# Patient Record
Sex: MALE | Race: WHITE | NOT HISPANIC OR LATINO | Employment: OTHER | URBAN - METROPOLITAN AREA
[De-identification: names, ages, dates, MRNs, and addresses within clinical notes are randomized per-mention and may not be internally consistent; named-entity substitution may affect disease eponyms.]

---

## 2017-01-30 ENCOUNTER — DOCUMENTATION (OUTPATIENT)
Dept: PHYSICAL THERAPY | Facility: HOSPITAL | Age: 47
End: 2017-01-30

## 2017-01-30 RX ORDER — GABAPENTIN 300 MG/1
CAPSULE ORAL
Qty: 90 CAPSULE | Refills: 0 | Status: SHIPPED | OUTPATIENT
Start: 2017-01-30 | End: 2017-02-27 | Stop reason: SDUPTHER

## 2017-02-27 RX ORDER — GABAPENTIN 300 MG/1
CAPSULE ORAL
Qty: 90 CAPSULE | Refills: 0 | Status: SHIPPED | OUTPATIENT
Start: 2017-02-27 | End: 2017-03-08 | Stop reason: SDUPTHER

## 2017-03-08 ENCOUNTER — OFFICE VISIT (OUTPATIENT)
Dept: NEUROSURGERY | Facility: CLINIC | Age: 47
End: 2017-03-08

## 2017-03-08 VITALS
WEIGHT: 312 LBS | HEART RATE: 103 BPM | SYSTOLIC BLOOD PRESSURE: 123 MMHG | DIASTOLIC BLOOD PRESSURE: 66 MMHG | BODY MASS INDEX: 44.67 KG/M2 | HEIGHT: 70 IN

## 2017-03-08 DIAGNOSIS — Q76.2 CONGENITAL SPONDYLOLYSIS, LUMBOSACRAL REGION: Primary | ICD-10-CM

## 2017-03-08 DIAGNOSIS — M54.16 LUMBAR RADICULOPATHY: ICD-10-CM

## 2017-03-08 PROCEDURE — 99213 OFFICE O/P EST LOW 20 MIN: CPT | Performed by: NEUROLOGICAL SURGERY

## 2017-03-08 RX ORDER — GABAPENTIN 300 MG/1
300 CAPSULE ORAL 3 TIMES DAILY
Qty: 90 CAPSULE | Refills: 6 | Status: SHIPPED | OUTPATIENT
Start: 2017-03-08 | End: 2017-09-14 | Stop reason: SDUPTHER

## 2017-03-08 RX ORDER — ALOGLIPTIN 6.25 MG/1
TABLET, FILM COATED ORAL
COMMUNITY
Start: 2017-03-06 | End: 2018-10-04 | Stop reason: ALTCHOICE

## 2017-03-08 RX ORDER — LISINOPRIL 20 MG/1
TABLET ORAL
Refills: 2 | COMMUNITY
Start: 2017-02-09 | End: 2019-05-14

## 2017-03-08 NOTE — PROGRESS NOTES
Subjective   Patient ID: Ronni Asif is a 46 y.o. male is here today for follow-up of back pain.    At the patient's last visit, he was encouraged to followup with Dr. Nair regarding his orthostatic hypotension and was advised to followup today.    Today, the patient notes that he is doing well at this time.  He notes that he has been treated with AREN x3 since his last visit.  His last AREN was in December.  He notes that the AREN has helped his pain symptoms.    He notes in December after the AREN that his pain symptoms had resolved, but notes that as the AREN wears off that his pain will return.  He notes he is scheduled for another AREN next month.    He notes that he continues to have feet and leg pain.      The patient notes that he is undergoing Octreotide treatment for neurogenic tumor at [facility] with Dr. Villalba ([phone]).  He notes that he shouldn't make physical contact with other people at this time. He is scheduled for more testing later today.    Back Pain   This is a chronic problem. The current episode started more than 1 month ago. The problem occurs daily. The problem has been gradually improving since onset. Pertinent negatives include no fever.       The following portions of the patient's history were reviewed and updated as appropriate: allergies, current medications, past family history, past medical history, past social history, past surgical history and problem list.    Review of Systems   Constitutional: Negative for fever.   Musculoskeletal: Positive for back pain.   Neurological: Negative for syncope.   All other systems reviewed and are negative.    This patient has a known L5-S1 isthmic spondylolisthesis.  We have been trying to avoid surgery.  Among other issues is his morbid obesity.  He is also being evaluated for a possible neurogenic tumor that is causing fluctuations in his blood pressure.  He has been getting blocks for his pain physician.  They seem to be helping.  He  remains on gabapentin at 300 mg 3 times a day which seems to help his nerve pain.  There is also some superimposed diabetic neuropathy.  Generally doing well from my standpoint.  I encouraged him to continue to work with his pain physician.  We can keep it open-ended.  We will refill his gabapentin.  If symptoms flareup in the future, he can certainly return to the office.      Objective   Physical Exam   Constitutional: He is oriented to person, place, and time. He appears well-developed and well-nourished.   HENT:   Head: Normocephalic and atraumatic.   Eyes: Conjunctivae and EOM are normal. Pupils are equal, round, and reactive to light.   Fundoscopic exam:       The right eye shows no papilledema. The right eye shows venous pulsations.        The left eye shows no papilledema. The left eye shows venous pulsations.   Neck: Carotid bruit is not present.   Neurological: He is oriented to person, place, and time. He has a normal Finger-Nose-Finger Test and a normal Heel to Shin Test. Gait normal.   Reflex Scores:       Tricep reflexes are 2+ on the right side and 2+ on the left side.       Bicep reflexes are 2+ on the right side and 2+ on the left side.       Brachioradialis reflexes are 2+ on the right side and 2+ on the left side.       Patellar reflexes are 2+ on the right side and 2+ on the left side.       Achilles reflexes are 2+ on the right side and 2+ on the left side.  Psychiatric: His speech is normal.     Neurologic Exam     Mental Status   Oriented to person, place, and time.   Registration of memory: Good recent and remote memory.   Attention: normal. Concentration: normal.   Speech: speech is normal   Level of consciousness: alert  Knowledge: consistent with education.     Cranial Nerves     CN II   Visual fields full to confrontation.   Visual acuity: normal    CN III, IV, VI   Pupils are equal, round, and reactive to light.  Extraocular motions are normal.     CN V   Facial sensation intact.   Right  corneal reflex: normal  Left corneal reflex: normal    CN VII   Facial expression full, symmetric.   Right facial weakness: none  Left facial weakness: none    CN VIII   Hearing: intact    CN IX, X   Palate: symmetric    CN XI   Right sternocleidomastoid strength: normal  Left sternocleidomastoid strength: normal    CN XII   Tongue: not atrophic  Tongue deviation: none    Motor Exam   Muscle bulk: normal  Right arm tone: normal  Left arm tone: normal  Right leg tone: normal  Left leg tone: normal    Strength   Strength 5/5 except as noted.     Sensory Exam   Light touch normal.     Gait, Coordination, and Reflexes     Gait  Gait: normal    Coordination   Finger to nose coordination: normal  Heel to shin coordination: normal    Reflexes   Right brachioradialis: 2+  Left brachioradialis: 2+  Right biceps: 2+  Left biceps: 2+  Right triceps: 2+  Left triceps: 2+  Right patellar: 2+  Left patellar: 2+  Right achilles: 2+  Left achilles: 2+  Right : 2+  Left : 2+      Assessment/Plan   Independent Review of Radiographic Studies:    I reviewed the MRI done 5/12/16 which does show a chronic L5-S1 pars defect with an anterolisthesis of 3 mm and disc desiccation at L4-L5.  I agree with the report.    Medical Decision Making:    Generally doing well.  I refilled his gabapentin.  He will continue to work with his pain physician.  He will do exercises as tolerated.  We can keep it open-ended.  If he needs refills for his gabapentin, he can talk to his nurse practitioner.  If symptoms flareup in the future, he can certainly return to the office.      Ronni was seen today for back pain.    Diagnoses and all orders for this visit:    Congenital spondylolysis, lumbosacral region    Lumbar radiculopathy    Other orders  -     gabapentin (NEURONTIN) 300 MG capsule; Take 1 capsule by mouth 3 (Three) Times a Day.    Return if symptoms worsen or fail to improve.    Body mass index is 44.77 kg/(m^2).

## 2017-03-08 NOTE — PATIENT INSTRUCTIONS
Obesity  Obesity is defined as having too much total body fat and a body mass index (BMI) of 30 or more. BMI is an estimate of body fat and is calculated from your height and weight. BMI is typically calculated by your health care provider during regular wellness visits. Obesity happens when you consume more calories than you can burn by exercising or performing daily physical tasks. Prolonged obesity can cause major illnesses or emergencies, such as:  · Stroke.  · Heart disease.  · Diabetes.  · Cancer.  · Arthritis.  · High blood pressure (hypertension).  · High cholesterol.  · Sleep apnea.  · Erectile dysfunction.  · Infertility problems.  CAUSES   · Regularly eating unhealthy foods.  · Physical inactivity.  · Certain disorders, such as an underactive thyroid (hypothyroidism), Cushing's syndrome, and polycystic ovarian syndrome.  · Certain medicines, such as steroids, some depression medicines, and antipsychotics.  · Genetics.  · Lack of sleep.  DIAGNOSIS  A health care provider can diagnose obesity after calculating your BMI. Obesity will be diagnosed if your BMI is 30 or higher.  There are other methods of measuring obesity levels. Some other methods include measuring your skinfold thickness, your waist circumference, and comparing your hip circumference to your waist circumference.  TREATMENT   A healthy treatment program includes some or all of the following:  · Long-term dietary changes.  · Exercise and physical activity.  · Behavioral and lifestyle changes.  · Medicine only under the supervision of your health care provider. Medicines may help, but only if they are used with diet and exercise programs.  If your BMI is 40 or higher, your health care provider may recommend specialized surgery or programs to help with weight loss.  An unhealthy treatment program includes:  · Fasting.  · Fad diets.  · Supplements and drugs.  These choices do not succeed in long-term weight control.  HOME CARE  INSTRUCTIONS  · Exercise and perform physical activity as directed by your health care provider. To increase physical activity, try the following:    Use stairs instead of elevators.    Park farther away from store entrances.    Garden, bike, or walk instead of watching television or using the computer.  · Eat healthy, low-calorie foods and drinks on a regular basis. Eat more fruits and vegetables. Use low-calorie cookbooks or take healthy cooking classes.  · Limit fast food, sweets, and processed snack foods.  · Eat smaller portions.  · Keep a daily journal of everything you eat. There are many free websites to help you with this. It may be helpful to measure your foods so you can determine if you are eating the correct portion sizes.  · Avoid drinking alcohol. Drink more water and drinks without calories.  · Take vitamins and supplements only as recommended by your health care provider.  · Weight-loss support groups, registered dietitians, counselors, and stress reduction education can also be very helpful.  SEEK IMMEDIATE MEDICAL CARE IF:  · You have chest pain or tightness.  · You have trouble breathing or feel short of breath.  · You have weakness or leg numbness.  · You feel confused or have trouble talking.  · You have sudden changes in your vision.     This information is not intended to replace advice given to you by your health care provider. Make sure you discuss any questions you have with your health care provider.     Document Released: 01/25/2006 Document Revised: 01/08/2016 Document Reviewed: 10/05/2016  [employer] Interactive Patient Education ©2016 [employer]

## 2017-04-10 RX ORDER — FLUDROCORTISONE ACETATE 0.1 MG/1
TABLET ORAL
Qty: 30 TABLET | Refills: 1 | Status: SHIPPED | OUTPATIENT
Start: 2017-04-10 | End: 2017-09-14 | Stop reason: SDUPTHER

## 2017-05-08 RX ORDER — GABAPENTIN 300 MG/1
CAPSULE ORAL
Qty: 90 CAPSULE | Refills: 0 | Status: SHIPPED | OUTPATIENT
Start: 2017-05-08 | End: 2018-08-06 | Stop reason: SDUPTHER

## 2017-07-11 ENCOUNTER — HOSPITAL ENCOUNTER (OUTPATIENT)
Dept: SLEEP MEDICINE | Facility: HOSPITAL | Age: 47
Discharge: HOME | End: 2017-07-11
Admitting: NURSE PRACTITIONER

## 2017-07-11 PROCEDURE — G0463 HOSPITAL OUTPT CLINIC VISIT: HCPCS

## 2017-07-12 NOTE — PROGRESS NOTES
[facility]  Telephone: [phone] / Fax: [phone] [place]  Telephone: [phone] / Fax: [phone] Carissa Winkler    Referring Physician: THEO Mock  PCP: THEO Bello    Reason for visit:  Sleep disorders annual f/u    Ronni Asif was last seen at [facility] sleep disorder Center on 7/12/16.  This is [facility] patient I am asked to see as he is out of the office. Patient has underlying obstructive sleep apnea currently controlled well on CPAP device.  He goes to bed at 10 PM and is up at 7 AM.  He is here today to obtain an order for supplies renewal.  He gets about 9 hours of sleep per night.  He uses the device and benefits from it in terms of reduction of hypersomnia and snoring.  His Belmont sleepiness score today is 15.  He is considering changing DME providers.  He complains of a rubber piece on the headgear being stretched too much and needs to have it replaced as soon as possible.  He uses a nose mask.  It does not feel properly and leaks.      Caffeine usage: coffee 2 /day  Alcohol usage: social drinker  Belmont Sleepiness Scale is recorded as 15.    Current Medications:    Current Outpatient Prescriptions:   •  albuterol (2.5 MG/3ML) 0.083% nebulizer solution 3 mL, albuterol (5 MG/ML) 0.5% nebulizer solution 0.5 mL, Inhale 4 (four) times a day as needed., Disp: , Rfl:   •  albuterol (PROVENTIL HFA;VENTOLIN HFA) 108 (90 BASE) MCG/ACT inhaler, Inhale 2 puffs every 4 (four) hours as needed for wheezing., Disp: , Rfl:   •  Alogliptin Benzoate 6.25 MG tablet, , Disp: , Rfl:   •  ASPIRIN LOW DOSE 81 MG chewable tablet, Chew 1 tablet Daily., Disp: , Rfl: 4  •  baclofen (LIORESAL) 10 MG tablet, Take 10 mg by mouth 3 (three) times a day., Disp: , Rfl:   •  diclofenac (CATAFLAM) 50 MG tablet, 50 mg 2 (two) times a day., Disp: , Rfl:   •  fludrocortisone 0.1 MG tablet, Take 0.1 mg by mouth Daily., Disp: , Rfl:   •  fludrocortisone 0.1 MG  tablet, TAKE 1 TABLET BY MOUTH DAILY, Disp: 30 tablet, Rfl: 1  •  gabapentin (NEURONTIN) 300 MG capsule, Take 1 capsule by mouth 3 (Three) Times a Day., Disp: 90 capsule, Rfl: 6  •  gabapentin (NEURONTIN) 300 MG capsule, TAKE 1 CAPSULE BY MOUTH THREE TIMES DAILY, Disp: 90 capsule, Rfl: 0  •  glipiZIDE (GLUCOTROL) 5 MG ER tablet, Take 5 mg by mouth daily., Disp: , Rfl:   •  HYDROcodone-acetaminophen (NORCO)  MG per tablet, Take 1 tablet by mouth 2 (two) times a day., Disp: , Rfl:   •  HYDROcodone-acetaminophen (NORCO) 5-325 MG per tablet, Take 1 tablet by mouth Every 4 (Four) Hours As Needed for moderate pain (4-6)., Disp: 20 tablet, Rfl: 0  •  linagliptin (TRADJENTA) 5 MG tablet tablet, Take 5 mg by mouth daily., Disp: , Rfl:   •  lisinopril (PRINIVIL,ZESTRIL) 20 MG tablet, TK 1 T PO  BID, Disp: , Rfl: 2  •  metFORMIN XR (GLUCOPHAGE-XR) 750 MG 24 hr tablet, Take 750 mg by mouth daily with breakfast., Disp: , Rfl:   •  mometasone-formoterol (DULERA 100) 100-5 MCG/ACT inhaler, Inhale 2 puffs 2 (two) times a day., Disp: , Rfl:   •  omeprazole (PriLOSEC) 40 MG capsule, Take 40 mg by mouth daily., Disp: , Rfl:   •  tiZANidine (ZANAFLEX) 4 MG tablet, Take 4 mg by mouth daily., Disp: , Rfl:     I have reviewed the Past Medical History, Past Surgical History, Social History and Family History.    Review of Systems  and as recorded in Sleep Questionnaire.    Vital Signs: height 71 inches, weight 314 pounds, BMI 44, blood pressure 110/74, heart rate 107.          General: Alert. Cooperative. Well developed. No acute distress.             Head:  Normocephalic. Symmetrical. Atraumatic.              Eyes: Sclera clear. No icterus. PERRLA. Normal EOM.             Ears: No deformities. Normal hearing.             Nose: No septal deviation. No drainage.          Throat: No oral lesions. No thrush. Moist mucous membranes.   Tongue is large       Pharynx: Posterior pharyngeal pillars are narrow with a Mallampati score of IV  (only hard palate visible) and pharynx without erythema.   Chest Wall:  No abnormalities observed.             Neck:  Trachea midline. No JVD.          Lungs:  Clear to auscultation bilaterally. No wheezes. No rhonchi. No rales. Respirations regular, even and unlabored.            Heart:  Regular rhythm and normal rate. Normal S1 and S2. No murmur.     Abdomen:  Soft, non-tender and non-distended. Normal bowel sounds. No masses. No organomegaly. No guarding. No rebound tenderness.  Extremities:  Moves all extremities well. No cyanosis. No redness. No edema.           Pulses: Pulses palpable and equal bilaterally.               Skin: Dry. Intact. No bleeding. No rash.           Neuro: Moves all 4 extremities and cranial nerves grossly intact.  Psychiatric: Normal mood and affect.    Testing:   Download dates 4/12/17-7/10/17 shows 85% compliance. He did not use the machine on 13 days as he was on vacation.  Average use is 7 hours and 7 minutes on auto CPAP with average pressures of 12.9 and residual AHI of 1.5.  Central apnea index is 0.3.    DME Company: [employer]    Impression:  1. Obstructive sleep apnea   2.Diabetes mellitus  3. Hypertension   4. Morbid obesity with BMI 44    Plan:  He uses the CPAP device and benefits from it in terms of reduction of hypersomnia and snoring.  He was reminded to change CPAP accessories and regular intervals.  He is considering changing DME providers and has contact information for [employer].  He will call both companies to see which one will be more affordable to transfer to.  He will call us when he makes the decision.    Weight loss will be strongly beneficial in order to reduce the severity of sleep-disordered breathing.  He will continue to monitor blood pressure with primary care physician as well as of his blood sugars.      He will follow-up sleep Center in 1 year.      I appreciate the opportunity appointment this patient's care    THEO Mock  [facility] SLEEP  LAB PROVIDER SCHEDULE  7/11/2017

## 2017-08-03 ENCOUNTER — HOSPITAL ENCOUNTER (EMERGENCY)
Facility: HOSPITAL | Age: 47
Discharge: HOME | End: 2017-08-03
Admitting: PHYSICIAN ASSISTANT

## 2017-08-03 ENCOUNTER — APPOINTMENT (OUTPATIENT)
Dept: GENERAL RADIOLOGY | Facility: HOSPITAL | Age: 47
End: 2017-08-03

## 2017-08-03 VITALS
RESPIRATION RATE: 18 BRPM | TEMPERATURE: 98.3 F | OXYGEN SATURATION: 95 % | HEIGHT: 70 IN | HEART RATE: 94 BPM | DIASTOLIC BLOOD PRESSURE: 86 MMHG | BODY MASS INDEX: 42.95 KG/M2 | WEIGHT: 300 LBS | SYSTOLIC BLOOD PRESSURE: 135 MMHG

## 2017-08-03 DIAGNOSIS — M79.672 LEFT FOOT PAIN: ICD-10-CM

## 2017-08-03 DIAGNOSIS — L03.90 CELLULITIS, UNSPECIFIED CELLULITIS SITE: Primary | ICD-10-CM

## 2017-08-03 PROCEDURE — 99284 EMERGENCY DEPT VISIT MOD MDM: CPT | Performed by: PHYSICIAN ASSISTANT

## 2017-08-03 PROCEDURE — 73630 X-RAY EXAM OF FOOT: CPT

## 2017-08-03 PROCEDURE — 99283 EMERGENCY DEPT VISIT LOW MDM: CPT

## 2017-08-03 RX ORDER — CLONIDINE HYDROCHLORIDE 0.1 MG/1
0.1 TABLET ORAL 2 TIMES DAILY
COMMUNITY
End: 2019-06-11 | Stop reason: SDUPTHER

## 2017-08-03 RX ORDER — CEPHALEXIN 500 MG/1
500 CAPSULE ORAL 4 TIMES DAILY
Qty: 40 CAPSULE | Refills: 0 | Status: SHIPPED | OUTPATIENT
Start: 2017-08-03 | End: 2017-08-13

## 2017-08-03 NOTE — ED PROVIDER NOTES
Subjective   History of Present Illness  History of Present Illness    Chief complaint: Foot pain    Location: Left foot    Quality/Severity:  Aching, moderate to severe    Timing/Duration: One week ago.  Worse past 2-3 days    Modifying Factors: Walking makes worse.  Nothing makes better.    Associated Symptoms: Denies numbness or tingling.  Denies any other injury.  Denies fevers or chills.    Narrative: 47-year-old male with a history of diabetes presents after dropping some wood on his foot one week ago.  He was doing okay but started to develop worsening pain approximately 2-3 days ago.  The area of pain is NOT in the area where he dropped the wound on his foot.  His primary care provider's office closed and his Medicaid has not given him a new PCP yet.  His medicaid told him to come to the emergency department for evaluation.    Review of Systems  General: Denies fevers or chills.  Denies any weakness or fatigue.  Denies any weight loss or weight gain.  SKIN: Denies any rashes lesions or ulcers.  Denies color change.    HEENT:  Denies any change in vision.  LUNGS: Denies any shortness of breath or wheezing.    CARDIAC: Denies any chest pain.  Denies palpitations.  Denies syncope.  Denies any edema  ABD: Denies any abdominal pain.  Denies any nausea or vomiting or diarrhea.    : Denies any dysuria, urgency, frequency or hematuria.    NEURO: Denies any weakness.  Denies headache.  Denies seizures.  Denies changes in speech or difficulty walking.  M/S: Denies arthralgias, back pain, myalgias or neck pain  PSYCH: Negative for suicidal ideas. Denies anxiety or depression  A 10 system review was performed in addition to those in the above all other reviews are negative.    Past Medical History:   Diagnosis Date   • Asthma    • Diabetes mellitus    • Dizziness    • GERD (gastroesophageal reflux disease)    • Hypertension    • Injury of back 03/2016    Pt fell 20 feet    • Kidney stone    • Neuromuscular disorder     • Pneumonia    • Sleep apnea    • Stroke    • Syncope and collapse        No Known Allergies    Past Surgical History:   Procedure Laterality Date   • COLONOSCOPY     • HERNIA REPAIR  10/2015       Family History   Problem Relation Age of Onset   • Brain cancer Father    • Stroke Father    • Hypertension Father    • Atrial fibrillation Father    • Hypertension Mother    • Diabetes Mother    • Heart disease Mother    • Hypertension Brother    • Colon cancer Maternal Grandmother    • Lung cancer Maternal Grandfather    • Bone cancer Maternal Grandfather        Social History     Social History   • Marital status:      Spouse name: N/A   • Number of children: N/A   • Years of education: N/A     Social History Main Topics   • Smoking status: Never Smoker   • Smokeless tobacco: Never Used   • Alcohol use No   • Drug use: No   • Sexual activity: Defer     Other Topics Concern   • None     Social History Narrative   • None     No current facility-administered medications on file prior to encounter.      Current Outpatient Prescriptions on File Prior to Encounter   Medication Sig Dispense Refill   • albuterol (2.5 MG/3ML) 0.083% nebulizer solution 3 mL, albuterol (5 MG/ML) 0.5% nebulizer solution 0.5 mL Inhale 4 (four) times a day as needed.     • albuterol (PROVENTIL HFA;VENTOLIN HFA) 108 (90 BASE) MCG/ACT inhaler Inhale 2 puffs every 4 (four) hours as needed for wheezing.     • Alogliptin Benzoate 6.25 MG tablet      • ASPIRIN LOW DOSE 81 MG chewable tablet Chew 1 tablet Daily.  4   • baclofen (LIORESAL) 10 MG tablet Take 10 mg by mouth 3 (three) times a day.     • diclofenac (CATAFLAM) 50 MG tablet 50 mg 2 (two) times a day.     • fludrocortisone 0.1 MG tablet Take 0.1 mg by mouth Daily.     • fludrocortisone 0.1 MG tablet TAKE 1 TABLET BY MOUTH DAILY 30 tablet 1   • gabapentin (NEURONTIN) 300 MG capsule Take 1 capsule by mouth 3 (Three) Times a Day. 90 capsule 6   • gabapentin (NEURONTIN) 300 MG capsule TAKE 1  CAPSULE BY MOUTH THREE TIMES DAILY 90 capsule 0   • glipiZIDE (GLUCOTROL) 5 MG ER tablet Take 5 mg by mouth daily.     • HYDROcodone-acetaminophen (NORCO)  MG per tablet Take 1 tablet by mouth 2 (two) times a day.     • HYDROcodone-acetaminophen (NORCO) 5-325 MG per tablet Take 1 tablet by mouth Every 4 (Four) Hours As Needed for moderate pain (4-6). 20 tablet 0   • linagliptin (TRADJENTA) 5 MG tablet tablet Take 5 mg by mouth daily.     • lisinopril (PRINIVIL,ZESTRIL) 20 MG tablet TK 1 T PO  BID  2   • metFORMIN XR (GLUCOPHAGE-XR) 750 MG 24 hr tablet Take 750 mg by mouth daily with breakfast.     • mometasone-formoterol (DULERA 100) 100-5 MCG/ACT inhaler Inhale 2 puffs 2 (two) times a day.     • omeprazole (PriLOSEC) 40 MG capsule Take 40 mg by mouth daily.     • tiZANidine (ZANAFLEX) 4 MG tablet Take 4 mg by mouth daily.             Objective   Physical Exam  Vitals:    08/03/17 1122   BP: 135/86   Pulse: 94   Resp: 18   Temp: 98.3 °F (36.8 °C)   SpO2: 95%     GENERAL: Alert and oriented ×4.  No apparent distress.  SKIN: Warm, pink and dry  HEENT: Atraumatic normocephalic  LUNGS: Clear to auscultation bilaterally without wheezes, rales or rhonchi  CARDIAC: Regular rate and rhythm.  S1 and S2.  No murmurs, rubs or gallops.  ABD: Soft, nontender  M/S:  no deformity, Left  to palpation along 3nd digit and metatarsal with erythema 5 cm x 3 cm  PSYCH: Normal mood and affect    Procedures         ED Course  ED Course      Xr Foot 3+ View Left    Result Date: 8/3/2017  Narrative: LEFT FOOT, 08/03/2017:  HISTORY: 47-year-old male in the ED complaining of chronic left foot pain, present for about four years. No reported recent acute injury.  TECHNIQUE: Three-view left foot series.  FINDINGS: The examination is negative. No evidence of stress fracture, significant arthropathy or additional osseous abnormality.      Impression: Negative left foot.  This report was finalized on 8/3/2017 12:55 PM by   Eleazar Parnell MD.    .  Reviewed foot xray. Independently viewed by me. Interpreted by radiologist. Discussed with pt.      Dictated patient on importance of good foot care and hygiene with diabetes.  He will follow up with a podiatrist.  He has plenty of pain medications at home.            MDM  Number of Diagnoses or Management Options  Cellulitis, unspecified cellulitis site: new and requires workup  Left foot pain: new and requires workup     Amount and/or Complexity of Data Reviewed  Tests in the radiology section of CPT®: ordered and reviewed  Tests in the medicine section of CPT®: reviewed and ordered  Independent visualization of images, tracings, or specimens: yes    Risk of Complications, Morbidity, and/or Mortality  Presenting problems: low  Diagnostic procedures: low  Management options: low    Patient Progress  Patient progress: improved      Final diagnoses:   Cellulitis, unspecified cellulitis site   Left foot pain            Missy Chi PA-C  08/03/17 1314       Missy Chi PA-C  08/03/17 131

## 2017-08-03 NOTE — ED NOTES
Went over discharge instructions with patient, alert and oriented at time of discharge, no questions at this time. Left ambulatory.       Shayla Suh RN  08/03/17 6000

## 2017-08-22 ENCOUNTER — TELEPHONE (OUTPATIENT)
Dept: CARDIOLOGY | Facility: CLINIC | Age: 47
End: 2017-08-22

## 2017-08-22 NOTE — TELEPHONE ENCOUNTER
08/22/17  1:10 PM  Ronni Asif  1970     Home Phone [phone]   Mobile [phone]     Mr. Asif recently switched PCP. He saw Dr. Gil Chambers this week who recommended follow-up with Dr. Conteh.  He had a positive Tilt table test with Dr. Andrade Boykin back in October 2016. He was referred to Dr. 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 However, he is having trouble with his insurance company getting to cover therapy for this condition.    He has labile blood pressure and Dr. Conteh has prescribed him 20mg lisinopril and 0.1mg florinef daily. He was last seen in October 2016.     The patient states his BP has been as high as 200/80 and then quickly drops to 90/40. In Dr. Chambers's office, /78 with HR 92. He also went to ER at [place] that same day on 8/21 for headache and /82 with HR 94.     You have appts available on 9/14. Will this be ok or does he need to be seen sooner?    Celi WALLS RN

## 2017-09-14 ENCOUNTER — OFFICE VISIT (OUTPATIENT)
Dept: CARDIOLOGY | Facility: CLINIC | Age: 47
End: 2017-09-14

## 2017-09-14 VITALS
BODY MASS INDEX: 44.95 KG/M2 | HEIGHT: 70 IN | SYSTOLIC BLOOD PRESSURE: 110 MMHG | DIASTOLIC BLOOD PRESSURE: 80 MMHG | HEART RATE: 98 BPM | WEIGHT: 314 LBS

## 2017-09-14 DIAGNOSIS — E11.43 TYPE 2 DIABETES MELLITUS WITH AUTONOMIC NEUROPATHY, UNSPECIFIED LONG TERM INSULIN USE STATUS: ICD-10-CM

## 2017-09-14 DIAGNOSIS — I95.1 ORTHOSTATIC HYPOTENSION: ICD-10-CM

## 2017-09-14 DIAGNOSIS — I10 ESSENTIAL HYPERTENSION: Primary | ICD-10-CM

## 2017-09-14 DIAGNOSIS — E11.43 AUTONOMIC DYSFUNCTION WITH TYPE 2 DIABETES MELLITUS (HCC): ICD-10-CM

## 2017-09-14 PROCEDURE — 93000 ELECTROCARDIOGRAM COMPLETE: CPT | Performed by: INTERNAL MEDICINE

## 2017-09-14 PROCEDURE — 99214 OFFICE O/P EST MOD 30 MIN: CPT | Performed by: INTERNAL MEDICINE

## 2017-09-14 RX ORDER — TESTOSTERONE 16.2 MG/G
GEL TRANSDERMAL
Refills: 0 | COMMUNITY
Start: 2017-08-10 | End: 2019-09-03 | Stop reason: HOSPADM

## 2017-09-14 RX ORDER — IPRATROPIUM BROMIDE AND ALBUTEROL SULFATE 2.5; .5 MG/3ML; MG/3ML
3 SOLUTION RESPIRATORY (INHALATION)
COMMUNITY
End: 2019-05-14

## 2017-09-14 RX ORDER — LISINOPRIL 10 MG/1
1 TABLET ORAL EVERY EVENING
Refills: 2 | COMMUNITY
Start: 2017-08-03 | End: 2019-05-14

## 2017-09-14 RX ORDER — METFORMIN HYDROCHLORIDE 500 MG/1
2 TABLET, EXTENDED RELEASE ORAL 2 TIMES DAILY
Refills: 1 | COMMUNITY
Start: 2017-08-21

## 2017-09-14 RX ORDER — GLIPIZIDE 2.5 MG/1
1 TABLET, EXTENDED RELEASE ORAL DAILY
Refills: 1 | COMMUNITY
Start: 2017-08-28 | End: 2018-08-06

## 2017-09-14 NOTE — PROGRESS NOTES
Date of Office Visit: 2017  Encounter Provider: Víctor Conteh MD  Place of Service: [facility] CARDIOLOGY  Patient Name: Ronni Asif  :1970      Chief Complaint   Patient presents with   • Orthostatic Hypotension     History of Present Illness  Orthostatic hypotension that I believe is on the basis of autonomic dysfunction.  The patient's symptoms are intermittent.  He had been prescribed Florinef in the past and has now used it intermittently.  He also has significant spikes in his blood pressure and uses clonidine intermittently.  He's never on either of the medications at the same time nor does he take either medicine on a daily basis.  He's had an evaluation done at [facility].  I do not have any records from this evaluation.  He was also without a diabetic physician for a period of time but is now reestablished with Dr. Bower.    Symptomatically the patient does complain intermittently of palpitations and syncope or lightheadedness.  He did have one episode where he actually fell injuring himself.  One issue that seems to be problematic for the patient is his continued weight gain.  He states he's been trying to eat healthier and do some exercise but appears to be unable to lose weight in fact he's gained weight from his last visit.    Past Medical History:   Diagnosis Date   • Asthma    • Diabetes mellitus    • Dizziness    • GERD (gastroesophageal reflux disease)    • Hypertension    • Injury of back 2016    Pt fell 20 feet    • Kidney stone    • Neuromuscular disorder    • Pneumonia    • Sleep apnea    • Stroke    • Syncope and collapse          Past Surgical History:   Procedure Laterality Date   • COLONOSCOPY     • HERNIA REPAIR  10/2015           Current Outpatient Prescriptions:   •  albuterol (2.5 MG/3ML) 0.083% nebulizer solution 3 mL, albuterol (5 MG/ML) 0.5% nebulizer solution 0.5 mL, Inhale 4 (four) times a day as needed.,  Disp: , Rfl:   •  albuterol (PROVENTIL HFA;VENTOLIN HFA) 108 (90 BASE) MCG/ACT inhaler, Inhale 2 puffs every 4 (four) hours as needed for wheezing., Disp: , Rfl:   •  Alogliptin Benzoate 6.25 MG tablet, , Disp: , Rfl:   •  ANDROGEL PUMP 20.25 MG/ACT (1.62%) gel, APPLY 2 PUMPS DAILY AS DIRECTED, Disp: , Rfl: 0  •  ASPIRIN LOW DOSE 81 MG chewable tablet, Chew 1 tablet Daily., Disp: , Rfl: 4  •  CloNIDine (CATAPRES) 0.1 MG tablet, Take 0.1 mg by mouth 2 (Two) Times a Day., Disp: , Rfl:   •  diclofenac (CATAFLAM) 50 MG tablet, 50 mg 2 (two) times a day., Disp: , Rfl:   •  fludrocortisone 0.1 MG tablet, Take 0.1 mg by mouth 2 (Two) Times a Day., Disp: , Rfl:   •  gabapentin (NEURONTIN) 300 MG capsule, TAKE 1 CAPSULE BY MOUTH THREE TIMES DAILY, Disp: 90 capsule, Rfl: 0  •  glipiZIDE (GLUCOTROL) 2.5 MG 24 hr tablet, Take 1 tablet by mouth Daily., Disp: , Rfl: 1  •  HYDROcodone-acetaminophen (NORCO)  MG per tablet, Take 1 tablet by mouth 2 (two) times a day., Disp: , Rfl:   •  ipratropium-albuterol (DUO-NEB) 0.5-2.5 mg/mL nebulizer, Inhale 3 mL., Disp: , Rfl:   •  lisinopril (PRINIVIL,ZESTRIL) 10 MG tablet, Take 1 tablet by mouth Every Evening., Disp: , Rfl: 2  •  lisinopril (PRINIVIL,ZESTRIL) 20 MG tablet, TK 1 T PO  qd, Disp: , Rfl: 2  •  metFORMIN ER (GLUCOPHAGE-XR) 500 MG 24 hr tablet, Take 2 tablets by mouth 2 (Two) Times a Day., Disp: , Rfl: 1  •  metFORMIN XR (GLUCOPHAGE-XR) 750 MG 24 hr tablet, Take 750 mg by mouth daily with breakfast., Disp: , Rfl:   •  mometasone-formoterol (DULERA 100) 100-5 MCG/ACT inhaler, Inhale 2 puffs 2 (two) times a day., Disp: , Rfl:   •  omeprazole (PriLOSEC) 40 MG capsule, Take 40 mg by mouth daily., Disp: , Rfl:   •  tiZANidine (ZANAFLEX) 4 MG tablet, Take 4 mg by mouth daily., Disp: , Rfl:       Social History     Social History   • Marital status:      Spouse name: N/A   • Number of children: N/A   • Years of education: N/A     Occupational History   • Not on file.  "    Social History Main Topics   • Smoking status: Never Smoker   • Smokeless tobacco: Never Used   • Alcohol use No   • Drug use: No   • Sexual activity: Defer     Other Topics Concern   • Not on file     Social History Narrative         Review of Systems   Constitution: Negative.   HENT: Negative.    Eyes: Negative.    Cardiovascular: Positive for near-syncope and palpitations.   Respiratory: Negative.    Endocrine: Negative.    Skin: Negative.    Musculoskeletal: Negative.    Gastrointestinal: Negative.    Neurological: Positive for light-headedness.   Psychiatric/Behavioral: Negative.        Procedures      ECG 12 Lead  Date/Time: 9/14/2017 3:37 PM  Performed by: COMFORT QUILES  Authorized by: COMFORT QUILES   Comparison: compared with previous ECG from 8/21/2017  Similar to previous ECG  Rhythm: sinus rhythm  Rate: normal  Conduction: conduction normal  ST Segments: ST segments normal  T Waves: T waves normal  QRS axis: normal                 Objective:    /80  Pulse 98  Ht 70\" (177.8 cm)  Wt (!) 314 lb (142 kg)  BMI 45.05 kg/m2        Physical Exam   Constitutional: He is oriented to person, place, and time. He appears well-developed and well-nourished.   Overweight   HENT:   Head: Normocephalic.   Eyes: Pupils are equal, round, and reactive to light.   Neck: Normal range of motion. No JVD present. Carotid bruit is not present. No thyromegaly present.   Cardiovascular: Normal rate, regular rhythm, S1 normal, S2 normal, normal heart sounds and intact distal pulses.  Exam reveals no gallop and no friction rub.    No murmur heard.  Pulmonary/Chest: Effort normal and breath sounds normal.   Abdominal: Soft. Bowel sounds are normal.   Musculoskeletal: He exhibits no edema.   Neurological: He is alert and oriented to person, place, and time.   Skin: Skin is warm, dry and intact. No erythema.   Psychiatric: He has a normal mood and affect.   Vitals reviewed.          Assessment:       Diagnosis " Plan   1. Essential hypertension     2. Orthostatic hypotension     3. Type 2 diabetes mellitus with autonomic neuropathy, unspecified long term insulin use status     4. Autonomic dysfunction with type 2 diabetes mellitus         At this point I don't have much to offer the patient.  We have evaluated him in the past and I believe after the evaluation this patient has autonomic dysfunction.  Whether this some neuropsychological issue associated with this I am not aware of.  Major issue I believe that is it the root of the patient's issues is his diabetes that is uncontrolled along with his weight.  We spent quite some time talking about dietary maneuvers and what he could do to help himself.  I believe this will be his major road to improvement.  I will be more than happy to reevaluate him should there be a concern with his cardiac status.   Plan:

## 2018-01-17 ENCOUNTER — APPOINTMENT (OUTPATIENT)
Dept: GENERAL RADIOLOGY | Facility: HOSPITAL | Age: 48
End: 2018-01-17

## 2018-01-17 ENCOUNTER — HOSPITAL ENCOUNTER (EMERGENCY)
Facility: HOSPITAL | Age: 48
Discharge: HOME | End: 2018-01-17
Attending: EMERGENCY MEDICINE | Admitting: EMERGENCY MEDICINE

## 2018-01-17 VITALS
BODY MASS INDEX: 41.52 KG/M2 | HEART RATE: 98 BPM | RESPIRATION RATE: 18 BRPM | WEIGHT: 290 LBS | OXYGEN SATURATION: 96 % | TEMPERATURE: 97.8 F | DIASTOLIC BLOOD PRESSURE: 83 MMHG | HEIGHT: 70 IN | SYSTOLIC BLOOD PRESSURE: 132 MMHG

## 2018-01-17 DIAGNOSIS — R07.9 CHEST PAIN, UNSPECIFIED TYPE: Primary | ICD-10-CM

## 2018-01-17 DIAGNOSIS — M51.36 DEGENERATIVE DISC DISEASE, LUMBAR: ICD-10-CM

## 2018-01-17 LAB
ALBUMIN SERPL-MCNC: 4.3 G/DL (ref 3.5–5.2)
ALBUMIN/GLOB SERPL: 1.7 G/DL
ALP SERPL-CCNC: 62 U/L (ref 40–129)
ALT SERPL W P-5'-P-CCNC: 45 U/L (ref 5–41)
ANION GAP SERPL CALCULATED.3IONS-SCNC: 14.5 MMOL/L
APTT PPP: 30.6 SECONDS (ref 24.3–38.1)
AST SERPL-CCNC: 28 U/L (ref 5–40)
BACTERIA UR QL AUTO: ABNORMAL /HPF
BASOPHILS # BLD AUTO: 0.04 10*3/MM3 (ref 0–0.2)
BASOPHILS NFR BLD AUTO: 0.4 % (ref 0–2)
BILIRUB SERPL-MCNC: 0.4 MG/DL (ref 0.2–1.2)
BILIRUB UR QL STRIP: NEGATIVE
BUN BLD-MCNC: 14 MG/DL (ref 6–20)
BUN/CREAT SERPL: 16.1 (ref 7–25)
CALCIUM SPEC-SCNC: 9.4 MG/DL (ref 8.6–10.5)
CHLORIDE SERPL-SCNC: 96 MMOL/L (ref 98–107)
CLARITY UR: CLEAR
CO2 SERPL-SCNC: 24.5 MMOL/L (ref 22–29)
COLOR UR: YELLOW
CREAT BLD-MCNC: 0.87 MG/DL (ref 0.76–1.27)
DEPRECATED RDW RBC AUTO: 43 FL (ref 37–54)
EOSINOPHIL # BLD AUTO: 0.25 10*3/MM3 (ref 0.1–0.3)
EOSINOPHIL NFR BLD AUTO: 2.6 % (ref 0–4)
ERYTHROCYTE [DISTWIDTH] IN BLOOD BY AUTOMATED COUNT: 14.2 % (ref 11.5–14.5)
FLUAV AG NPH QL: NEGATIVE
FLUBV AG NPH QL IA: NEGATIVE
GFR SERPL CREATININE-BSD FRML MDRD: 94 ML/MIN/1.73
GLOBULIN UR ELPH-MCNC: 2.6 GM/DL
GLUCOSE BLD-MCNC: 157 MG/DL (ref 65–99)
GLUCOSE UR STRIP-MCNC: NEGATIVE MG/DL
HCT VFR BLD AUTO: 44.2 % (ref 42–52)
HGB BLD-MCNC: 14.8 G/DL (ref 14–18)
HGB UR QL STRIP.AUTO: ABNORMAL
HYALINE CASTS UR QL AUTO: ABNORMAL /LPF
IMM GRANULOCYTES # BLD: 0.06 10*3/MM3 (ref 0–0.03)
IMM GRANULOCYTES NFR BLD: 0.6 % (ref 0–0.5)
INR PPP: 1.01 (ref 0.9–1.1)
KETONES UR QL STRIP: NEGATIVE
LEUKOCYTE ESTERASE UR QL STRIP.AUTO: NEGATIVE
LYMPHOCYTES # BLD AUTO: 2.23 10*3/MM3 (ref 0.6–4.8)
LYMPHOCYTES NFR BLD AUTO: 23.6 % (ref 20–45)
MCH RBC QN AUTO: 28 PG (ref 27–31)
MCHC RBC AUTO-ENTMCNC: 33.5 G/DL (ref 31–37)
MCV RBC AUTO: 83.7 FL (ref 80–94)
MONOCYTES # BLD AUTO: 0.58 10*3/MM3 (ref 0–1)
MONOCYTES NFR BLD AUTO: 6.1 % (ref 3–8)
MUCOUS THREADS URNS QL MICRO: ABNORMAL /HPF
NEUTROPHILS # BLD AUTO: 6.28 10*3/MM3 (ref 1.5–8.3)
NEUTROPHILS NFR BLD AUTO: 66.7 % (ref 45–70)
NITRITE UR QL STRIP: NEGATIVE
NRBC BLD MANUAL-RTO: 0 /100 WBC (ref 0–0)
OTHER OBSERVATIONS IN URINE MICRO: ABNORMAL /HPF
PH UR STRIP.AUTO: 7.5 [PH] (ref 4.5–8)
PLATELET # BLD AUTO: 279 10*3/MM3 (ref 140–500)
PMV BLD AUTO: 9.8 FL (ref 7.4–10.4)
POTASSIUM BLD-SCNC: 4.4 MMOL/L (ref 3.5–5.2)
PROT SERPL-MCNC: 6.9 G/DL (ref 6–8.5)
PROT UR QL STRIP: NEGATIVE
PROTHROMBIN TIME: 13.3 SECONDS (ref 12.1–15)
RBC # BLD AUTO: 5.28 10*6/MM3 (ref 4.7–6.1)
RBC # UR: ABNORMAL /HPF
REF LAB TEST METHOD: ABNORMAL
SODIUM BLD-SCNC: 135 MMOL/L (ref 136–145)
SP GR UR STRIP: 1.01 (ref 1–1.03)
SPERM URNS QL MICRO: ABNORMAL /HPF
SQUAMOUS #/AREA URNS HPF: ABNORMAL /HPF
TROPONIN T SERPL-MCNC: <0.01 NG/ML (ref 0–0.03)
TROPONIN T SERPL-MCNC: <0.01 NG/ML (ref 0–0.03)
UROBILINOGEN UR QL STRIP: ABNORMAL
WBC NRBC COR # BLD: 9.44 10*3/MM3 (ref 4.8–10.8)
WBC UR QL AUTO: ABNORMAL /HPF

## 2018-01-17 PROCEDURE — 93010 ELECTROCARDIOGRAM REPORT: CPT | Performed by: INTERNAL MEDICINE

## 2018-01-17 PROCEDURE — 93005 ELECTROCARDIOGRAM TRACING: CPT | Performed by: EMERGENCY MEDICINE

## 2018-01-17 PROCEDURE — 81001 URINALYSIS AUTO W/SCOPE: CPT | Performed by: PHYSICIAN ASSISTANT

## 2018-01-17 PROCEDURE — 87804 INFLUENZA ASSAY W/OPTIC: CPT | Performed by: EMERGENCY MEDICINE

## 2018-01-17 PROCEDURE — 85730 THROMBOPLASTIN TIME PARTIAL: CPT | Performed by: PHYSICIAN ASSISTANT

## 2018-01-17 PROCEDURE — 71046 X-RAY EXAM CHEST 2 VIEWS: CPT

## 2018-01-17 PROCEDURE — 85610 PROTHROMBIN TIME: CPT | Performed by: PHYSICIAN ASSISTANT

## 2018-01-17 PROCEDURE — 99284 EMERGENCY DEPT VISIT MOD MDM: CPT | Performed by: PHYSICIAN ASSISTANT

## 2018-01-17 PROCEDURE — 72110 X-RAY EXAM L-2 SPINE 4/>VWS: CPT

## 2018-01-17 PROCEDURE — 85025 COMPLETE CBC W/AUTO DIFF WBC: CPT | Performed by: PHYSICIAN ASSISTANT

## 2018-01-17 PROCEDURE — 99285 EMERGENCY DEPT VISIT HI MDM: CPT

## 2018-01-17 PROCEDURE — 84484 ASSAY OF TROPONIN QUANT: CPT | Performed by: PHYSICIAN ASSISTANT

## 2018-01-17 PROCEDURE — 80053 COMPREHEN METABOLIC PANEL: CPT | Performed by: PHYSICIAN ASSISTANT

## 2018-01-17 RX ORDER — SODIUM CHLORIDE 0.9 % (FLUSH) 0.9 %
10 SYRINGE (ML) INJECTION AS NEEDED
Status: DISCONTINUED | OUTPATIENT
Start: 2018-01-17 | End: 2018-01-17 | Stop reason: HOSPADM

## 2018-01-17 NOTE — ED PROVIDER NOTES
Subjective   History of Present Illness  History of Present Illness    Chief complaint: back pain, cp    Location: substernal without radiation and L lower back    Quality/Severity: tightness    Timing/Duration: 3 days    Modifying Factors: Nothing specific makes worse or better    Associated Symptoms: Mild nonproductive cough.  Denies shortness of breath.  Denies abdominal pain or nausea/vomiting/diarrhea.  Denies fevers or chills.  Denies hemoptysis.    Narrative: 47-year-old male presents with back pain and chest pain.  He's got chronic back pain that he sees neurology, neurosurgery and pain management for.  He has an appointment with the neurosurgeon next week.  He states that he is causing autonomic disorder that has been affecting his blood pressure.  He is also seen pain management and has been getting epidurals.  He is taking 3 hydrocodone 10 mg today already.  He is also supposed to be following with neuropsych.  Patient also has a history of asthma and does follow with pulmonology for that as well.  He reports that he has had a negative stress test within the past year, but I cannot find any record of that.  He does have prior results as below.  He was having orthostatic hypotension.  Cardiology notes refer to the fact that he had been on Florinef at one point but was taking a very inconsistently.    MRI lumbar spine May 2016- chronic L5-S1 pars defect with an anterolisthesis of 3 mm and disc desiccation at L4-L5.     - MRI brain 8/17/16- WNL  - seen by neuro, possible autonomic dysfunction  - 3/7/17: neg Octreotide scan  - 10/31/16: positive tilt table test      Review of Systems  General: Denies fevers or chills.  Denies any weakness or fatigue.  Denies any weight loss or weight gain.  SKIN: Denies any rashes lesions or ulcers.  Denies color change.  ENT: Denies sore throat or rhinorrhea.  Denies ear pain.    EYES: Denies any blurred vision.  Denies any change in vision.  Denies any photophobia.  Denies  any vision loss.  LUNGS: Denies any shortness of breath or wheezing.  Denies any cough.  Denies any hemoptysis.  CARDIAC: Denies any chest pain.  Denies palpitations.  Denies syncope.  Denies any edema  ABD: Denies any abdominal pain.  Denies any nausea or vomiting or diarrhea.  Denies any rectal bleeding.  Denies constipation  : Denies any dysuria, urgency, frequency or hematuria.  Denies discharge.  Denies flank pain.  NEURO: Denies any focal weakness.  Denies headache.  Denies seizures.  Denies changes in speech or difficulty walking.  ENDOCRINE: Denies polydipsia and polyuria  M/S: + back pain. Denies arthralgias, myalgias or neck pain  HEME/LYMPH: Negative for adenopathy. Does not bruise/bleed easily.   PSYCH: Negative for suicidal ideas. Denies anxiety or depression  review was performed in addition to those in the above all other reviews are negative.      Past Medical History:   Diagnosis Date   • Asthma    • Diabetes mellitus    • Dizziness    • GERD (gastroesophageal reflux disease)    • Hypertension    • Injury of back 03/2016    Pt fell 20 feet    • Kidney stone    • Neuromuscular disorder    • Pneumonia    • Sleep apnea    • Stroke    • Syncope and collapse        No Known Allergies    Past Surgical History:   Procedure Laterality Date   • COLONOSCOPY     • HERNIA REPAIR  10/2015       Family History   Problem Relation Age of Onset   • Brain cancer Father    • Stroke Father    • Hypertension Father    • Atrial fibrillation Father    • Hypertension Mother    • Diabetes Mother    • Heart disease Mother    • Hypertension Brother    • Colon cancer Maternal Grandmother    • Lung cancer Maternal Grandfather    • Bone cancer Maternal Grandfather        Social History     Social History   • Marital status:      Spouse name: N/A   • Number of children: N/A   • Years of education: N/A     Social History Main Topics   • Smoking status: Never Smoker   • Smokeless tobacco: Never Used   • Alcohol use No   •  Drug use: No   • Sexual activity: Defer     Other Topics Concern   • None     Social History Narrative   No current facility-administered medications for this encounter.     Current Outpatient Prescriptions:   •  albuterol (2.5 MG/3ML) 0.083% nebulizer solution 3 mL, albuterol (5 MG/ML) 0.5% nebulizer solution 0.5 mL, Inhale 4 (four) times a day as needed., Disp: , Rfl:   •  albuterol (PROVENTIL HFA;VENTOLIN HFA) 108 (90 BASE) MCG/ACT inhaler, Inhale 2 puffs every 4 (four) hours as needed for wheezing., Disp: , Rfl:   •  Alogliptin Benzoate 6.25 MG tablet, , Disp: , Rfl:   •  ANDROGEL PUMP 20.25 MG/ACT (1.62%) gel, APPLY 2 PUMPS DAILY AS DIRECTED, Disp: , Rfl: 0  •  ASPIRIN LOW DOSE 81 MG chewable tablet, Chew 1 tablet Daily., Disp: , Rfl: 4  •  gabapentin (NEURONTIN) 300 MG capsule, TAKE 1 CAPSULE BY MOUTH THREE TIMES DAILY, Disp: 90 capsule, Rfl: 0  •  glipiZIDE (GLUCOTROL) 2.5 MG 24 hr tablet, Take 1 tablet by mouth Daily., Disp: , Rfl: 1  •  HYDROcodone-acetaminophen (NORCO)  MG per tablet, Take 1 tablet by mouth 2 (two) times a day., Disp: , Rfl:   •  ipratropium-albuterol (DUO-NEB) 0.5-2.5 mg/mL nebulizer, Inhale 3 mL., Disp: , Rfl:   •  lisinopril (PRINIVIL,ZESTRIL) 10 MG tablet, Take 1 tablet by mouth Every Evening., Disp: , Rfl: 2  •  lisinopril (PRINIVIL,ZESTRIL) 20 MG tablet, TK 1 T PO  qd, Disp: , Rfl: 2  •  metFORMIN ER (GLUCOPHAGE-XR) 500 MG 24 hr tablet, Take 2 tablets by mouth 2 (Two) Times a Day., Disp: , Rfl: 1  •  metFORMIN XR (GLUCOPHAGE-XR) 750 MG 24 hr tablet, Take 750 mg by mouth daily with breakfast., Disp: , Rfl:   •  mometasone-formoterol (DULERA 100) 100-5 MCG/ACT inhaler, Inhale 2 puffs 2 (two) times a day., Disp: , Rfl:   •  omeprazole (PriLOSEC) 40 MG capsule, Take 40 mg by mouth daily., Disp: , Rfl:   •  CloNIDine (CATAPRES) 0.1 MG tablet, Take 0.1 mg by mouth 2 (Two) Times a Day., Disp: , Rfl:   •  diclofenac (CATAFLAM) 50 MG tablet, 50 mg 2 (two) times a day., Disp: , Rfl:   •   fludrocortisone 0.1 MG tablet, Take 0.1 mg by mouth 2 (Two) Times a Day., Disp: , Rfl:   •  tiZANidine (ZANAFLEX) 4 MG tablet, Take 4 mg by mouth daily., Disp: , Rfl:           Objective   Physical Exam  Vitals:    01/17/18 1250   BP: 113/70   Pulse: 100   Resp: 18   Temp: 98.1 °F (36.7 °C)   SpO2: 96%     GENERAL: a/o x 4, NAD  SKIN: Warm pink and dry   HEENT:  PERRLA, EOM intact, conjunctiva normal, sclera clear  NECK: supple, no JVD  LUNGS: Clear to auscultation bilaterally without wheezes, rales or rhonchi.  No accessory muscle use and no nasal flaring.  CARDIAC:  Regular rate and rhythm, S1-S2.  No murmurs, rubs or gallops.  No peripheral edema.  Equal pulses bilaterally.  ABDOMEN: Soft, nontender, nondistended.  No guarding or rebound tenderness.  Normal bowel sounds.  MUSCULOSKELETAL: Moves all extremities well.  No deformity.  NEURO: Cranial nerves II through XII grossly intact.  No gross focal deficits.  Alert.  Normal speech and motor.  PSYCH: Anxious      Procedures         ED Course  ED Course    EKG         EKG time / Interpretation time: 1316 / 1316  Rhythm/Rate: NSR 88   ME: 128  QRS, axis: 38   QTc 431  ST and T waves: invesrion III   Interpreted Contemporaneously by me, independently viewed by me and MD.  unchanged compared to prior 11/21/16    Results for orders placed or performed during the hospital encounter of 01/17/18   Influenza Antigen, Rapid - Swab, Nasopharynx   Result Value Ref Range    Influenza A Ag, EIA Negative Negative    Influenza B Ag, EIA Negative Negative   Comprehensive Metabolic Panel   Result Value Ref Range    Glucose 157 (H) 65 - 99 mg/dL    BUN 14 6 - 20 mg/dL    Creatinine 0.87 0.76 - 1.27 mg/dL    Sodium 135 (L) 136 - 145 mmol/L    Potassium 4.4 3.5 - 5.2 mmol/L    Chloride 96 (L) 98 - 107 mmol/L    CO2 24.5 22.0 - 29.0 mmol/L    Calcium 9.4 8.6 - 10.5 mg/dL    Total Protein 6.9 6.0 - 8.5 g/dL    Albumin 4.30 3.50 - 5.20 g/dL    ALT (SGPT) 45 (H) 5 - 41 U/L    AST (SGOT)  28 5 - 40 U/L    Alkaline Phosphatase 62 40 - 129 U/L    Total Bilirubin 0.4 0.2 - 1.2 mg/dL    eGFR Non African Amer 94 >60 mL/min/1.73    Globulin 2.6 gm/dL    A/G Ratio 1.7 g/dL    BUN/Creatinine Ratio 16.1 7.0 - 25.0    Anion Gap 14.5 mmol/L   Protime-INR   Result Value Ref Range    Protime 13.3 12.1 - 15.0 Seconds    INR 1.01 0.90 - 1.10   aPTT   Result Value Ref Range    PTT 30.6 24.3 - 38.1 seconds   Troponin   Result Value Ref Range    Troponin T <0.010 0.000 - 0.030 ng/mL   CBC Auto Differential   Result Value Ref Range    WBC 9.44 4.80 - 10.80 10*3/mm3    RBC 5.28 4.70 - 6.10 10*6/mm3    Hemoglobin 14.8 14.0 - 18.0 g/dL    Hematocrit 44.2 42.0 - 52.0 %    MCV 83.7 80.0 - 94.0 fL    MCH 28.0 27.0 - 31.0 pg    MCHC 33.5 31.0 - 37.0 g/dL    RDW 14.2 11.5 - 14.5 %    RDW-SD 43.0 37.0 - 54.0 fl    MPV 9.8 7.4 - 10.4 fL    Platelets 279 140 - 500 10*3/mm3    Neutrophil % 66.7 45.0 - 70.0 %    Lymphocyte % 23.6 20.0 - 45.0 %    Monocyte % 6.1 3.0 - 8.0 %    Eosinophil % 2.6 0.0 - 4.0 %    Basophil % 0.4 0.0 - 2.0 %    Immature Grans % 0.6 (H) 0.0 - 0.5 %    Neutrophils, Absolute 6.28 1.50 - 8.30 10*3/mm3    Lymphocytes, Absolute 2.23 0.60 - 4.80 10*3/mm3    Monocytes, Absolute 0.58 0.00 - 1.00 10*3/mm3    Eosinophils, Absolute 0.25 0.10 - 0.30 10*3/mm3    Basophils, Absolute 0.04 0.00 - 0.20 10*3/mm3    Immature Grans, Absolute 0.06 (H) 0.00 - 0.03 10*3/mm3    nRBC 0.0 0.0 - 0.0 /100 WBC     Reviewed CXR, L spine xray. Independently viewed by me. Interpreted by radiologist. Discussed with pt.  Xr Chest 2 View    Result Date: 1/17/2018  Narrative: CHEST X-RAY, 01/17/2018:  HISTORY: 47-year-old male in the ED complaining of three day history of cough and some shortness of air.  TECHNIQUE: AP and lateral upright chest series.  FINDINGS: Heart size and pulmonary vascularity are within normal limits. The lungs appear clear. No visible pulmonary infiltrate or pleural effusion. No change since 11/21/2016.       Impression: Negative chest.  This report was finalized on 1/17/2018 2:12 PM by Dr. Eleazar Parnell MD.      Xr Spine Lumbar 4+ View    Result Date: 1/17/2018  Narrative: LUMBAR SPINE, 01/17/2018:  HISTORY: 47-year-old male in the ED complaining of chronic low back pain, worsening about three days ago. No reported acute traumatic injury.  TECHNIQUE: Five view lumbar spine series.  FINDINGS: No acute or chronic fracture deformity or additional osseous lesion.  Minimal degenerative disc space narrowing at L1-2, L2-3 and L3-4. Mild posterior vertebral osteophyte formation is seen at the L3-4 and L4-5 disc spaces.  Mild lower lumbar degenerative facet arthropathy. Lumbar vertebral alignment is normal.      Impression: 1. No acute osseous abnormality. 2. Mild degenerative changes as noted.  This report was finalized on 1/17/2018 2:07 PM by Dr. Eleazar Parnell MD.      1605- no cp since arrival, pt only has back pain.  Offered obs. Pt will f/u with cards as outpt.  He may need stress test. Encouraged keeping  Appointment with pain management as well as neurosurgery next week.    Discussed pertinent labs and imaging findings with the patient/family.  Patient/Family voiced understanding of need to follow-up for recheck, further testing as needed.  Return to the emergency Department warnings were given.                  MDM  Number of Diagnoses or Management Options  Chest pain, unspecified type: new and requires workup  Degenerative disc disease, lumbar: new and requires workup     Amount and/or Complexity of Data Reviewed  Clinical lab tests: reviewed and ordered  Tests in the radiology section of CPT®: reviewed and ordered  Tests in the medicine section of CPT®: reviewed and ordered  Decide to obtain previous medical records or to obtain history from someone other than the patient: yes  Obtain history from someone other than the patient: yes  Review and summarize past medical records: yes  Independent visualization  of images, tracings, or specimens: yes    Risk of Complications, Morbidity, and/or Mortality  Presenting problems: moderate  Diagnostic procedures: moderate  Management options: moderate    Patient Progress  Patient progress: improved    HEART score 2- low risk.    Final diagnoses:   Chest pain, unspecified type   Degenerative disc disease, lumbar       Dictated utilizing Dragon dictation       Missy Chi PA-C  01/17/18 [id number]

## 2018-03-06 ENCOUNTER — HOSPITAL ENCOUNTER (OUTPATIENT)
Dept: GENERAL RADIOLOGY | Facility: HOSPITAL | Age: 48
Discharge: HOME | End: 2018-03-06
Attending: NEUROLOGICAL SURGERY

## 2018-03-06 ENCOUNTER — OFFICE VISIT (OUTPATIENT)
Dept: NEUROSURGERY | Facility: CLINIC | Age: 48
End: 2018-03-06

## 2018-03-06 ENCOUNTER — HOSPITAL ENCOUNTER (OUTPATIENT)
Dept: GENERAL RADIOLOGY | Facility: HOSPITAL | Age: 48
Discharge: HOME | End: 2018-03-06
Attending: NEUROLOGICAL SURGERY | Admitting: NEUROLOGICAL SURGERY

## 2018-03-06 VITALS
HEART RATE: 103 BPM | DIASTOLIC BLOOD PRESSURE: 74 MMHG | SYSTOLIC BLOOD PRESSURE: 118 MMHG | WEIGHT: 290 LBS | HEIGHT: 70 IN | BODY MASS INDEX: 41.52 KG/M2

## 2018-03-06 DIAGNOSIS — Q76.2 CONGENITAL SPONDYLOLYSIS, LUMBOSACRAL REGION: ICD-10-CM

## 2018-03-06 DIAGNOSIS — M54.2 NECK PAIN: ICD-10-CM

## 2018-03-06 DIAGNOSIS — M54.6 PAIN IN THORACIC SPINE: ICD-10-CM

## 2018-03-06 DIAGNOSIS — M54.2 NECK PAIN: Primary | ICD-10-CM

## 2018-03-06 PROCEDURE — 72052 X-RAY EXAM NECK SPINE 6/>VWS: CPT

## 2018-03-06 PROCEDURE — 72070 X-RAY EXAM THORAC SPINE 2VWS: CPT

## 2018-03-06 PROCEDURE — 99213 OFFICE O/P EST LOW 20 MIN: CPT | Performed by: NEUROLOGICAL SURGERY

## 2018-03-06 RX ORDER — CYCLOBENZAPRINE HCL 5 MG
10 TABLET ORAL 3 TIMES DAILY PRN
Qty: 60 TABLET | Refills: 3 | Status: SHIPPED | OUTPATIENT
Start: 2018-03-06 | End: 2018-07-05 | Stop reason: SDUPTHER

## 2018-03-06 RX ORDER — RANITIDINE 150 MG/1
150 TABLET ORAL 2 TIMES DAILY
COMMUNITY
End: 2019-05-14

## 2018-03-06 NOTE — PROGRESS NOTES
Subjective   Patient ID: Ronni Asif is a 47 y.o. male is here today for follow-up on back pain.    At the patient's last visit he reported getting relief from the epidurals, but the pain would return once the epidural wore off. He also complained of pain in his feet and leg.    Today the patient reports that his back pain has worsened. He is still having the right leg pain and pain in his feet. The patient had a lumbar spine x-ray on 1/17/18. Patient reports that he has been diagnosed with autonomic nervous disorder and POTS since the last visit.    Back Pain   This is a chronic problem. The current episode started more than 1 month ago. The problem occurs daily. The problem has been gradually worsening since onset. The pain radiates to the right knee and right thigh. Associated symptoms include leg pain, numbness, tingling and weakness. Pertinent negatives include no bladder incontinence or bowel incontinence.       The following portions of the patient's history were reviewed and updated as appropriate: allergies, current medications, past family history, past medical history, past social history, past surgical history and problem list.    Review of Systems   Gastrointestinal: Negative for bowel incontinence.   Genitourinary: Negative for bladder incontinence.   Musculoskeletal: Positive for back pain.   Neurological: Positive for tingling, weakness and numbness.   All other systems reviewed and are negative.    I have not seen this patient in about 1 year. His back and his leg pain have been helped with the blocks that he gets from Dr. Nair. He has been evaluated by Dr. Boykin for possible autonomic dysfunction and has been going [place] of [place] testing. Recently he has been having some pain in his midthoracic and lower cervical spine. He had x-rays done in the emergency room that were just of his lumbar spine but the symptoms do not run down the arms. I told him that given his history of disk disease in  his low back, it would not surprise me if he has the same thing going on in his neck and his thoracic spine. I do not think he needs an MRI but plain x-rays will do and we will start with physical therapy and muscle relaxants. We will have him come back in 2 months for a follow-up visit.      Objective   Physical Exam   Constitutional: He is oriented to person, place, and time. He appears well-developed and well-nourished.   HENT:   Head: Normocephalic and atraumatic.   Eyes: Conjunctivae and EOM are normal. Pupils are equal, round, and reactive to light.   Fundoscopic exam:       The right eye shows no papilledema. The right eye shows venous pulsations.        The left eye shows no papilledema. The left eye shows venous pulsations.   Neck: Carotid bruit is not present.   Neurological: He is oriented to person, place, and time. He has a normal Finger-Nose-Finger Test and a normal Heel to Shin Test. Gait normal.   Reflex Scores:       Tricep reflexes are 2+ on the right side and 2+ on the left side.       Bicep reflexes are 2+ on the right side and 2+ on the left side.       Brachioradialis reflexes are 2+ on the right side and 2+ on the left side.       Patellar reflexes are 2+ on the right side and 2+ on the left side.       Achilles reflexes are 2+ on the right side and 2+ on the left side.  Psychiatric: His speech is normal.     Neurologic Exam     Mental Status   Oriented to person, place, and time.   Registration of memory: Good recent and remote memory.   Attention: normal. Concentration: normal.   Speech: speech is normal   Level of consciousness: alert  Knowledge: consistent with education.     Cranial Nerves     CN II   Visual fields full to confrontation.   Visual acuity: normal    CN III, IV, VI   Pupils are equal, round, and reactive to light.  Extraocular motions are normal.     CN V   Facial sensation intact.   Right corneal reflex: normal  Left corneal reflex: normal    CN VII   Facial expression  full, symmetric.   Right facial weakness: none  Left facial weakness: none    CN VIII   Hearing: intact    CN IX, X   Palate: symmetric    CN XI   Right sternocleidomastoid strength: normal  Left sternocleidomastoid strength: normal    CN XII   Tongue: not atrophic  Tongue deviation: none    Motor Exam   Muscle bulk: normal  Right arm tone: normal  Left arm tone: normal  Right leg tone: normal  Left leg tone: normal    Strength   Strength 5/5 except as noted.     Sensory Exam   Light touch normal.     Gait, Coordination, and Reflexes     Gait  Gait: normal    Coordination   Finger to nose coordination: normal  Heel to shin coordination: normal    Reflexes   Right brachioradialis: 2+  Left brachioradialis: 2+  Right biceps: 2+  Left biceps: 2+  Right triceps: 2+  Left triceps: 2+  Right patellar: 2+  Left patellar: 2+  Right achilles: 2+  Left achilles: 2+  Right : 2+  Left : 2+      Assessment/Plan   Independent Review of Radiographic Studies:    I reviewed the x-rays done in January 2018 as well as the MRI on 5/12/16 which shows a chronic L5-S1 pars defect with a mild anterolisthesis at 3 mm and disc desiccation at L4-L5.  I agree with the report.      Medical Decision Making:    We will go ahead and get some cervical and thoracic x-rays and try some Flexeril and physical therapy and have him come back in 2 months.  I think this should probably helped some the cervical and thoracic symptoms of pain and spasm that he has.      Ronni was seen today for back pain.    Diagnoses and all orders for this visit:    Neck pain  -     XR Spine Thoracic 2 View; Future  -     XR Spine Cervical Complete With Flex Ext; Future  -     Ambulatory Referral to Physical Therapy Evaluate and treat    Pain in thoracic spine  -     XR Spine Thoracic 2 View; Future  -     XR Spine Cervical Complete With Flex Ext; Future  -     Ambulatory Referral to Physical Therapy Evaluate and treat    Congenital spondylolysis, lumbosacral  region  -     XR Spine Thoracic 2 View; Future  -     XR Spine Cervical Complete With Flex Ext; Future  -     Ambulatory Referral to Physical Therapy Evaluate and treat    Other orders  -     cyclobenzaprine (FLEXERIL) 5 MG tablet; Take 2 tablets by mouth 3 (Three) Times a Day As Needed for Muscle Spasms.    Return in about 2 months (around 5/6/2018).

## 2018-03-07 RX ORDER — FLUDROCORTISONE ACETATE 0.1 MG/1
0.1 TABLET ORAL DAILY
Qty: 30 TABLET | Refills: 5 | Status: SHIPPED | OUTPATIENT
Start: 2018-03-07 | End: 2018-09-29 | Stop reason: SDUPTHER

## 2018-03-27 ENCOUNTER — TREATMENT (OUTPATIENT)
Dept: PHYSICAL THERAPY | Facility: CLINIC | Age: 48
End: 2018-03-27

## 2018-03-27 DIAGNOSIS — M54.2 NECK PAIN: Primary | ICD-10-CM

## 2018-03-27 DIAGNOSIS — M54.6 PAIN IN THORACIC SPINE: ICD-10-CM

## 2018-03-27 PROCEDURE — 97161 PT EVAL LOW COMPLEX 20 MIN: CPT | Performed by: PHYSICAL THERAPIST

## 2018-03-27 NOTE — PROGRESS NOTES
Physical Therapy Initial Evaluation and Plan of Care    TIME IN 11:35 TIME OUT 12:10  Patient: Ronni Asif   : 1970  Diagnosis/ICD-10 Code:  Neck pain [M54.2]  Referring practitioner: Jake Tripathi MD    Subjective Evaluation    History of Present Illness  Onset date: 4 months ago   Mechanism of injury: Pt is a 48 y/o WM who reports to the clinic with neck and upper back pain.  Pt reports he went to the ER secondary to increased pain-did an x-ray-MRI to rule out CVA/TIA-found the skin CA-referred to [facility].  Pt states he has had a three yr Hx of LBP-spinal jwolskuk-LVR-yvqnbqzevhwecmwle- epidurals for LBP, but no prior treatment to neck.      PMH:  Pt states he has been diagnosis with a Autoimmune disease-states he can pass out frequently because of it.  Pt reports falling in  and sustained compression Fx at T11-T12.        Subjective comment: pt states he is getting more relief with Ibuprofren than his pain meds.    Precautions and Work Restrictions: In the process of removing skin cancer on back.  Finished first procedure and waiting on getting the second one done after initial heals-also has to have a cysts removed on left upper trap area.  Quality of life: good    Pain  Current pain rating: 3  At worst pain ratin  Location: mid thoracic and scapula area   Quality: dull ache (constant pain-at times that radiates into shoulders )  Relieving factors: medications, ice and heat (therapeutic massages )  Aggravating factors: prolonged positioning and sleeping (has trouble getting comfortable secondary to entire back is sore and painful )    Hand dominance: right    Diagnostic Tests  No diagnostic tests performed  X-ray: abnormal (degenerative changes at cervical and thoracic spine )    Treatments  Previous treatment: injection treatment, medication, massage and chiropractic  Current treatment: medication  Patient Goals  Patient goals for therapy: decreased pain             Objective        Postural Observations  Seated posture: fair        Palpation   Left   Hypertonic in the levator scapulae, middle trapezius, scalenes, sternocleidomastoid and upper trapezius.   Tenderness of the levator scapulae, middle trapezius, scalenes, sternocleidomastoid and upper trapezius.     Right   Hypertonic in the levator scapulae, middle trapezius, scalenes, sternocleidomastoid and upper trapezius. Tenderness of the levator scapulae, middle trapezius, scalenes, sternocleidomastoid and upper trapezius.     Neurological Testing     Sensation   Cervical/Thoracic   Left   Intact: light touch    Right   Intact: light touch    Active Range of Motion   Cervical/Thoracic Spine   Cervical    Flexion: 41 degrees   Extension: 38 degrees   Left lateral flexion: 28 degrees   Right lateral flexion: 35 degrees   Left rotation: 38 degrees   Right rotation: 47 degrees   Left Shoulder   Flexion: WFL  Abduction: WFL  External rotation BTH: WFL  Internal rotation BTB: WFL    Right Shoulder   Flexion: WFL  Abduction: WFL  External rotation BTH: WFL  Internal rotation BTB: WFL    Strength/Myotome Testing   Cervical Spine   Neck extension: 5  Neck flexion: 5    Left   Neck lateral flexion (C3): 3+    Right   Neck lateral flexion (C3): 5    Left Shoulder     Planes of Motion   Flexion: 4+   Abduction: 3+   External rotation at 0°: 4+   Internal rotation at 0°: 5     Right Shoulder     Planes of Motion   Flexion: 4+   Abduction: 3+   External rotation at 0°: 4+   Internal rotation at 0°: 5     Left Elbow   Flexion: 5  Extension: 5    Right Elbow   Flexion: 5  Extension: 5    Left Wrist/Hand   Wrist extension: 4+    Right Wrist/Hand   Wrist extension: 4+    Additional Strength Details  Finger add B 4/5   Thumb abd B 4/5     Tests   Cervical     Left   Negative cervical distraction.     Right   Negative cervical distraction.     Left Shoulder   Negative active compression ([place]).     Right Shoulder   Negative active compression  ([place]).     Additional Tests Details  Negative vertebral artery B          Assessment & Plan     Assessment  Impairments: abnormal muscle tone, abnormal or restricted ROM, impaired physical strength, lacks appropriate home exercise program and pain with function  Assessment details: Pt is a 46 y/o WM who reports to the clinic with c/o upper back and neck pain, tenderness, decreased B UE strength, decreased flexibility in B cervical musculature, and decreased functional use of UE secondary to pain.    Barriers to therapy: mild degenerative changes in cervical-lumbar spine.    Prognosis: fair  Functional Limitations: lifting, sleeping, pushing, uncomfortable because of pain, sitting, standing and reaching overhead  Goals  Plan Goals: STGs: 3 weeks  1.  Decrease cervical and upper thoracic pain to a 5/10 at it's worst  2.  Increase left SB AROM 35 degrees  3.  Increase left cervical Rot 45 degrees  4.  Decrease B cervical musculature tightness to minimal to palpation  5.  Increase pt's tolerance to sitting and standing for at least 20-30 minutes with < or = 5/10 pain.      LTGs:6 weeks  1.  Decrease cervical and upper thoracic pain 3/10 at it's worst  2.  Increase B shoulder strength to 4-4+/5 flex, abd, scaption, and ER  3.  Increase pt's tolerance to sitting and standing for at least >30 minutes with minimal pain.    4.  Decrease B cervical and thoracic PS tenderness to palpation to minimal to palpation.    5.  Pt is independent with HEP.      Plan  Therapy options: will be seen for skilled physical therapy services  Planned modality interventions: cryotherapy, electrical stimulation/Russian stimulation, TENS, thermotherapy (hydrocollator packs) and traction  Planned therapy interventions: flexibility, functional ROM exercises, home exercise program, strengthening, stretching, therapeutic activities, soft tissue mobilization and manual therapy  Other planned therapy interventions: dry needling performed by  Wen Stanley, PT,MPT   Frequency: 2x week  Duration in weeks: 6  Treatment plan discussed with: patient        Manual Therapy:         mins  [id number];  Therapeutic Exercise:         mins  50114;     Neuromuscular Kamini:        mins  87739;    Therapeutic Activity:          mins  44490;     Gait Training:           mins  82955;     Ultrasound:          mins  05322;    Electrical Stimulation:         mins  90555 ( );  Dry Needling          mins self-pay    Timed Treatment:      mins   Total Treatment:    35    mins    PT SIGNATURE: Jigna Ahuja, PT   DATE TREATMENT INITIATED: 3/29/2018    Initial Certification  Certification Period: 6/27/2018  I certify that the therapy services are furnished while this patient is under my care.  The services outlined above are required by this patient, and will be reviewed every 90 days.     PHYSICIAN: Jake Tripathi MD      DATE:     Please sign and return via fax to [phone].. Thank you, [facility].

## 2018-05-02 ENCOUNTER — TELEPHONE (OUTPATIENT)
Dept: NEUROSURGERY | Facility: CLINIC | Age: 48
End: 2018-05-02

## 2018-05-02 DIAGNOSIS — M54.2 CERVICAL PAIN: Primary | ICD-10-CM

## 2018-05-02 DIAGNOSIS — M54.6 PAIN IN THORACIC SPINE: ICD-10-CM

## 2018-05-02 NOTE — TELEPHONE ENCOUNTER
Does this patient need to continue with physical therapy? His order has  and he will need a new one if he needs to continue therapy.

## 2018-05-02 NOTE — TELEPHONE ENCOUNTER
PATIENT PHYSICAL THERAPY ORDER HAS  NEEDS A NEW ORDER FAXED TO [facility] [id number] PATIENTS PHONE [phone]

## 2018-05-04 NOTE — PROGRESS NOTES
Subjective   Patient ID: Ronni Asif is a 48 y.o. male is here today for follow-up on back pain.    At the last visit the patient reported worsening back pain along with right leg and bilateral foot pain. He was started on Flexeril 5 mg TID PRN and referred to physical therapy at the last visit.    Today the patient is here with new cervical, thoracic, and lumbar spine x-rays. He reports that there have been no changes with his symptoms since the last visit.    Back Pain   This is a chronic problem. The current episode started more than 1 month ago. The problem occurs daily. The problem is unchanged. The pain is present in the lumbar spine. The pain radiates to the right thigh and right knee. Associated symptoms include leg pain, numbness, tingling and weakness. Pertinent negatives include no bladder incontinence or bowel incontinence.       The following portions of the patient's history were reviewed and updated as appropriate: allergies, current medications, past family history, past medical history, past social history, past surgical history and problem list.    Review of Systems   Gastrointestinal: Negative for bowel incontinence.   Genitourinary: Negative for bladder incontinence.   Musculoskeletal: Positive for back pain.   Neurological: Positive for tingling, weakness and numbness.   All other systems reviewed and are negative.    He comes in again to discuss the x-rays. No surprises. He does have some degenerative disk changes in the cervical and thoracic spine. So far the insurance would not approve the physical therapy but we put in the request again and he is supposed to see a therapist and hopefully the therapy will start. He sees Hammad Nair MD, and he got a lumbar epidural block. He does have a known L5-S1 anterolisthesis that we are trying to avoid surgery for. I told him to talk to Dr. Nair. He could have epidural blocks in his thoracic spine but I think it is probably best to start with  therapy which hopefully we can get started later on this week. Obviously, nothing surgical in the thoracic or neck area. We will see him in about 3 months.         Objective   Physical Exam   Constitutional: He is oriented to person, place, and time. He appears well-developed and well-nourished.   HENT:   Head: Normocephalic and atraumatic.   Eyes: Conjunctivae and EOM are normal. Pupils are equal, round, and reactive to light.   Fundoscopic exam:       The right eye shows no papilledema. The right eye shows venous pulsations.        The left eye shows no papilledema. The left eye shows venous pulsations.   Neck: Carotid bruit is not present.   Neurological: He is oriented to person, place, and time. He has a normal Finger-Nose-Finger Test and a normal Heel to Shin Test. Gait normal.   Reflex Scores:       Tricep reflexes are 2+ on the right side and 2+ on the left side.       Bicep reflexes are 2+ on the right side and 2+ on the left side.       Brachioradialis reflexes are 2+ on the right side and 2+ on the left side.       Patellar reflexes are 2+ on the right side and 2+ on the left side.       Achilles reflexes are 2+ on the right side and 2+ on the left side.  Psychiatric: His speech is normal.     Neurologic Exam     Mental Status   Oriented to person, place, and time.   Registration of memory: Good recent and remote memory.   Attention: normal. Concentration: normal.   Speech: speech is normal   Level of consciousness: alert  Knowledge: consistent with education.     Cranial Nerves     CN II   Visual fields full to confrontation.   Visual acuity: normal    CN III, IV, VI   Pupils are equal, round, and reactive to light.  Extraocular motions are normal.     CN V   Facial sensation intact.   Right corneal reflex: normal  Left corneal reflex: normal    CN VII   Facial expression full, symmetric.   Right facial weakness: none  Left facial weakness: none    CN VIII   Hearing: intact    CN IX, X   Palate:  symmetric    CN XI   Right sternocleidomastoid strength: normal  Left sternocleidomastoid strength: normal    CN XII   Tongue: not atrophic  Tongue deviation: none    Motor Exam   Muscle bulk: normal  Right arm tone: normal  Left arm tone: normal  Right leg tone: normal  Left leg tone: normal    Strength   Strength 5/5 except as noted.     Sensory Exam   Light touch normal.     Gait, Coordination, and Reflexes     Gait  Gait: normal    Coordination   Finger to nose coordination: normal  Heel to shin coordination: normal    Reflexes   Right brachioradialis: 2+  Left brachioradialis: 2+  Right biceps: 2+  Left biceps: 2+  Right triceps: 2+  Left triceps: 2+  Right patellar: 2+  Left patellar: 2+  Right achilles: 2+  Left achilles: 2+  Right : 2+  Left : 2+      Assessment/Plan   Independent Review of Radiographic Studies:    Plain x-rays done on 3/6/18 of the cervical thoracic spine showed some disc degeneration and endplate hypertrophy throughout and disc space narrowing.  Agree with the report.      Medical Decision Making:    She should be able to start the physical therapy now that we've reordered it pending insurance approval.  He'll continue ibuprofen and muscle relaxants and see us in 3 months.      Ronni was seen today for back pain.    Diagnoses and all orders for this visit:    Pain in thoracic spine    Degeneration of thoracic intervertebral disc    Degeneration of intervertebral disc at C5-C6 level      Return in about 3 months (around 8/7/2018).

## 2018-05-07 ENCOUNTER — OFFICE VISIT (OUTPATIENT)
Dept: NEUROSURGERY | Facility: CLINIC | Age: 48
End: 2018-05-07

## 2018-05-07 VITALS
WEIGHT: 290 LBS | HEIGHT: 70 IN | SYSTOLIC BLOOD PRESSURE: 131 MMHG | DIASTOLIC BLOOD PRESSURE: 84 MMHG | BODY MASS INDEX: 41.52 KG/M2 | HEART RATE: 94 BPM

## 2018-05-07 DIAGNOSIS — M51.34 DEGENERATION OF THORACIC INTERVERTEBRAL DISC: ICD-10-CM

## 2018-05-07 DIAGNOSIS — M50.322 DEGENERATION OF INTERVERTEBRAL DISC AT C5-C6 LEVEL: ICD-10-CM

## 2018-05-07 DIAGNOSIS — M54.6 PAIN IN THORACIC SPINE: Primary | ICD-10-CM

## 2018-05-07 PROCEDURE — 99213 OFFICE O/P EST LOW 20 MIN: CPT | Performed by: NEUROLOGICAL SURGERY

## 2018-05-15 ENCOUNTER — TREATMENT (OUTPATIENT)
Dept: PHYSICAL THERAPY | Facility: CLINIC | Age: 48
End: 2018-05-15

## 2018-05-15 DIAGNOSIS — M50.322 DEGENERATION OF INTERVERTEBRAL DISC AT C5-C6 LEVEL: ICD-10-CM

## 2018-05-15 DIAGNOSIS — M54.6 PAIN IN THORACIC SPINE: ICD-10-CM

## 2018-05-15 DIAGNOSIS — M54.2 NECK PAIN: Primary | ICD-10-CM

## 2018-05-15 DIAGNOSIS — M51.34 DEGENERATION OF THORACIC INTERVERTEBRAL DISC: ICD-10-CM

## 2018-05-15 PROCEDURE — 97162 PT EVAL MOD COMPLEX 30 MIN: CPT | Performed by: PHYSICAL THERAPIST

## 2018-05-15 NOTE — PROGRESS NOTES
Physical Therapy Initial Evaluation and Plan of Care        Patient: Ronni Asif   : 1970  Diagnosis/ICD-10 Code:  Neck pain [M54.2]  Referring practitioner: Jake Tripathi MD  Date of Initial Visit: 5/15/2018  Today's Date: 5/15/2018  Patient seen for 1 sessions           Subjective Questionnaire: NDI:26      Subjective Evaluation    History of Present Illness  Date of onset: 2017  Mechanism of injury: Progressive neck and thoracic pain started in November and has progressively gotten worse.  Every 3 months for the past 3 years lumbar epidural injections were planning lumbar fusion and found out that I have ANS (going to [place] for treatment) - tomorrow going for epidurals for cervical and possible thoracic epidural injections       Precautions and Work Restrictions: off work since last year due to ANS - Dr. Andrade Proality of life: excellent    Pain  Current pain ratin (w/ 3 hydrocodone)  At best pain ratin  At worst pain rating: 10  Location: thoraco-lumbar back pain   Quality: shooting.  Relieving factors: ice, heat, rest and medications  Aggravating factors: standing, prolonged positioning, sleeping and lifting  Progression: worsening    Diagnostic Tests  X-ray: abnormal (disc space narrowing at)  MRI studies: abnormal (There is a 3 mm anterolisthesis of L5 with respect to S1.)    Treatments  Previous treatment: injection treatment, medication and physical therapy  Current treatment: medication and physical therapy  Current treatment comments: flexeril, hydrocodone, ibuprofen, gabapentin.     Patient Goals  Patient goals for therapy: decreased pain, improved balance, increased motion, increased strength, independence with ADLs/IADLs, return to sport/leisure activities and return to work             Objective       Palpation   Left   Tenderness of the erector spinae, cervical paraspinals, lumbar paraspinals, middle trapezius, piriformis, quadratus lumborum and rhomboids.      Right Tenderness of the erector spinae, cervical paraspinals, lumbar paraspinals, middle trapezius, piriformis, quadratus lumborum and rhomboids.     Neurological Testing     Sensation   Cervical/Thoracic   Left   Intact: light touch    Right   Intact: light touch    Lumbar   Left   Intact: light touch    Right   Intact: light touch    Active Range of Motion   Cervical/Thoracic Spine   Cervical    Flexion: 45 degrees   Extension: 40 degrees with pain  Left lateral flexion: 45 degrees   Right lateral flexion: 45 degrees   Left rotation: 60 degrees   Right rotation: 60 degrees     Lumbar   Flexion: 50 degrees with pain  Extension: 90 degrees   Left lateral flexion: 75 degrees with pain  Right lateral flexion: 75 degrees with pain  Left rotation: 90 degrees   Right rotation: 90 degrees     Strength/Myotome Testing     Left Shoulder     Planes of Motion   Flexion: 5   Abduction: 5     Right Shoulder     Planes of Motion   Flexion: 5   Abduction: 5     Left Elbow   Flexion: 5  Extension: 5    Right Elbow   Flexion: 5  Extension: 5    Left Wrist/Hand   Wrist extension: 5  Wrist flexion: 5    Right Wrist/Hand   Wrist extension: 5  Wrist flexion: 5    Left Hip   Planes of Motion   Flexion: 5    Right Hip   Planes of Motion   Flexion: 4+    Left Knee   Flexion: 5  Extension: 5    Right Knee   Flexion: 5 (5-/5)  Extension: 5    Left Ankle/Foot   Dorsiflexion: 5  Plantar flexion: 5    Right Ankle/Foot   Dorsiflexion: 5  Plantar flexion: 5    Tests   Cervical     Left   Negative alar ligament integrity, cervical distraction and transverse ligament.     Right   Negative alar ligament integrity and cervical distraction.     Thoracic   Negative slump.     Left Shoulder   Negative active compression (Webb).     Right Shoulder   Negative active compression (Webb).     Lumbar     Left   Negative passive SLR.     Right   Negative passive SLR.     Left Pelvic Girdle/Sacrum   Negative: sacrum compression and gapping.     Right  Pelvic Girdle/Sacrum   Negative: sacrum compression and gapping.     Left Hip   Negative Gaenslen's.     Right Hip   Negative Gaenslen's.          Assessment & Plan     Assessment  Impairments: abnormal muscle firing, abnormal muscle tone, abnormal or restricted ROM, activity intolerance, impaired physical strength, pain with function and safety issue  Other impairment: lumbar pain   Assessment details: 48 y.o male with 1) cervical, thoracic and lumbar pain 2) decreased trunk and cervical ROM 3) decreased right LE strength 4) decreased tolerance to prolonged positioning sitting or standing   Functional Limitations: lifting, sleeping, walking, uncomfortable because of pain, moving in bed, sitting, standing and reaching overhead  Goals  Plan Goals: SHORT TERM GOALS:     4 weeks  1. Pt independent with HEP  2. Pt to demonstrate cervical AROM % of expected norms to allow for improved ability to perform ADL's  3. Pt to tolerated prolonged sitting > 1 hour without increase in pain     LONG TERM GOALS:   8 weeks  1. Pt to demonstrate cervical AROM 100% of expected norms to allow for improved safety when driving  2. Pt to demonstrate ability to lift 10# OH with bilateral arm(s) without increase in pain in the neck   3. Pt to report being able to work in the home without increase in pain in the neck    Plan  Therapy options: will be seen for skilled physical therapy services  Planned modality interventions: cryotherapy, electrical stimulation/Russian stimulation, iontophoresis, thermotherapy (hydrocollator packs), traction and ultrasound  Planned therapy interventions: ADL retraining, compression, flexibility, functional ROM exercises, home exercise program, joint mobilization, manual therapy, neuromuscular re-education, soft tissue mobilization, spinal/joint mobilization, strengthening, stretching and therapeutic activities  Frequency: 2x week  Duration in weeks: 12  Treatment plan discussed with:  patient        Manual Therapy:         mins  42157;  Therapeutic Exercise:         mins  57344;     Neuromuscular Kamini:        mins  99837;    Therapeutic Activity:          mins  29624;     Gait Training:           mins  94079;     Ultrasound:          mins  68288;    Electrical Stimulation:         mins  09971 ( );  Dry Needling          mins self-pay    Timed Treatment:      mins   Total Treatment:     40   mins    PT SIGNATURE: Wen Stanley, PT   DATE TREATMENT INITIATED: 5/15/2018    Initial Certification  Certification Period: 8/13/2018  I certify that the therapy services are furnished while this patient is under my care.  The services outlined above are required by this patient, and will be reviewed every 90 days.     PHYSICIAN: Jake Tripathi MD      DATE:     Please sign and return via fax to [phone].. Thank you, [facility].

## 2018-05-22 ENCOUNTER — TREATMENT (OUTPATIENT)
Dept: PHYSICAL THERAPY | Facility: CLINIC | Age: 48
End: 2018-05-22

## 2018-05-22 DIAGNOSIS — M54.2 NECK PAIN: Primary | ICD-10-CM

## 2018-05-22 DIAGNOSIS — M50.322 DEGENERATION OF INTERVERTEBRAL DISC AT C5-C6 LEVEL: ICD-10-CM

## 2018-05-22 DIAGNOSIS — M54.6 PAIN IN THORACIC SPINE: ICD-10-CM

## 2018-05-22 DIAGNOSIS — M54.16 LUMBAR RADICULOPATHY: ICD-10-CM

## 2018-05-22 DIAGNOSIS — Q76.2 CONGENITAL SPONDYLOLYSIS, LUMBOSACRAL REGION: ICD-10-CM

## 2018-05-22 DIAGNOSIS — M51.34 DEGENERATION OF THORACIC INTERVERTEBRAL DISC: ICD-10-CM

## 2018-05-22 PROCEDURE — 97014 ELECTRIC STIMULATION THERAPY: CPT | Performed by: PHYSICAL THERAPIST

## 2018-05-22 PROCEDURE — 97110 THERAPEUTIC EXERCISES: CPT | Performed by: PHYSICAL THERAPIST

## 2018-05-22 PROCEDURE — 97530 THERAPEUTIC ACTIVITIES: CPT | Performed by: PHYSICAL THERAPIST

## 2018-05-22 NOTE — PROGRESS NOTES
Physical Therapy Daily Progress Note        Visit # : 2  Ronni Reyespoonam reports: I had thoracic and lumbar epidural last week - scheduled to have cervical epidural tomm - have a little more relief now     Subjective     Objective       Palpation   Left   Hypertonic in the lumbar paraspinals.   Tenderness of the erector spinae.     Right   Hypertonic in the lumbar paraspinals. Tenderness of the erector spinae.     Tenderness     Right Hip   Tenderness in the PSIS.      See Exercise, Manual, and Modality Logs for complete treatment.       Assessment & Plan     Assessment  Assessment details: Patient presents with slight improved subjective reports for thoracic and lumbar spine after epidural last week. Discussed proper sleeping positions. Tolerated gentle stretching initiation and mobility well today. Deficits persist in decreased strength, endurance , and pain end-range. Cont PT 2-3x per week next visit attempt cervical stretching and/or lumbar stabilization if tolerated.         Progress strengthening /stabilization /functional activity           Manual Therapy:         mins  42267;  Therapeutic Exercise:     22    mins  26667;     Neuromuscular Kamini:        mins  28956;    Therapeutic Activity:     10     mins  46373;     Gait Training:           mins  09291;     Ultrasound:     8     mins  69415;    Electrical Stimulation:    15     mins  55284 ( );  Dry Needling          mins self-pay    Timed Treatment:   40   mins   Total Treatment:     55   mins    Wen Stanley, PT  Physical Therapist  [place] License # [id number]

## 2018-05-24 ENCOUNTER — TREATMENT (OUTPATIENT)
Dept: PHYSICAL THERAPY | Facility: CLINIC | Age: 48
End: 2018-05-24

## 2018-05-24 DIAGNOSIS — M51.34 DEGENERATION OF THORACIC INTERVERTEBRAL DISC: ICD-10-CM

## 2018-05-24 DIAGNOSIS — M54.16 LUMBAR RADICULOPATHY: ICD-10-CM

## 2018-05-24 DIAGNOSIS — M54.2 NECK PAIN: Primary | ICD-10-CM

## 2018-05-24 DIAGNOSIS — Q76.2 CONGENITAL SPONDYLOLYSIS, LUMBOSACRAL REGION: ICD-10-CM

## 2018-05-24 DIAGNOSIS — M54.6 PAIN IN THORACIC SPINE: ICD-10-CM

## 2018-05-24 DIAGNOSIS — M50.322 DEGENERATION OF INTERVERTEBRAL DISC AT C5-C6 LEVEL: ICD-10-CM

## 2018-05-24 PROCEDURE — DRYNDL PR CUSTOM DRY NEEDLING SELF PAY: Performed by: PHYSICAL THERAPIST

## 2018-05-24 NOTE — PROGRESS NOTES
Physical Therapy Daily Progress Note        Visit # : 3  Ronni Paulmahendra reports: Cervical epidural got cancelled yesterday - have been working in the yard - my back is really sore -- would like to try the Dry Needling today     Subjective     Objective       Palpation   Left   Hypertonic in the erector spinae, lumbar interspinals, lumbar paraspinals and quadratus lumborum.     Right   Hypertonic in the erector spinae, lumbar interspinals, lumbar paraspinals and quadratus lumborum.      See Exercise, Manual, and Modality Logs for complete treatment.       Assessment & Plan     Assessment  Assessment details: Informed and signed consent for Dry Needling obtained. Educated patient on purpose and procedure for Dry Needling. Improved muscle tone and subjective reports after Dry Needling. Cont PT 2-3x per week for strength, stabilization, and Dry Needling as indicated by patient.         Progress per Plan of Care           Manual Therapy:         mins  32950;  Therapeutic Exercise:         mins  95300;     Neuromuscular Kamini:       mins  48173;    Therapeutic Activity:          mins  33804;     Gait Training:           mins  58722;     Ultrasound:          mins  19527;    Electrical Stimulation:         mins  73441 ( );  Dry Needling     15     mins self-pay    Timed Treatment:      mins   Total Treatment:     25   mins    Wen tSanley, PT  Physical Therapist  [place] License # [id number]

## 2018-05-31 ENCOUNTER — TREATMENT (OUTPATIENT)
Dept: PHYSICAL THERAPY | Facility: CLINIC | Age: 48
End: 2018-05-31

## 2018-05-31 DIAGNOSIS — M54.16 LUMBAR RADICULOPATHY: ICD-10-CM

## 2018-05-31 DIAGNOSIS — M54.2 NECK PAIN: Primary | ICD-10-CM

## 2018-05-31 DIAGNOSIS — M51.34 DEGENERATION OF THORACIC INTERVERTEBRAL DISC: ICD-10-CM

## 2018-05-31 DIAGNOSIS — Q76.2 CONGENITAL SPONDYLOLYSIS, LUMBOSACRAL REGION: ICD-10-CM

## 2018-05-31 DIAGNOSIS — M50.322 DEGENERATION OF INTERVERTEBRAL DISC AT C5-C6 LEVEL: ICD-10-CM

## 2018-05-31 DIAGNOSIS — M54.6 PAIN IN THORACIC SPINE: ICD-10-CM

## 2018-05-31 PROCEDURE — 97530 THERAPEUTIC ACTIVITIES: CPT | Performed by: PHYSICAL THERAPIST

## 2018-05-31 PROCEDURE — 97110 THERAPEUTIC EXERCISES: CPT | Performed by: PHYSICAL THERAPIST

## 2018-05-31 PROCEDURE — 97014 ELECTRIC STIMULATION THERAPY: CPT | Performed by: PHYSICAL THERAPIST

## 2018-05-31 NOTE — PROGRESS NOTES
Physical Therapy Daily Progress Note        Visit # : 4  Ronni Asif reports: I just passed out about 30 min ago fell hit the back of my head and my low back so I am really sore right now - due to autonomic disorder     Subjective     Objective       Postural Observations    Additional Postural Observation Details  Guarded lumbar posture with transitional movements    Tenderness     Right Hip   Tenderness in the PSIS.      See Exercise, Manual, and Modality Logs for complete treatment.       Assessment & Plan     Assessment  Assessment details: Patient presents with increased lumbar guarding with ambulation and transitional movements. Increased tenderness right PSIS persists. Tolerated gentle stretching progressions well today. Cont PT 1-2x per week for mobility, strengthening, and Dry Needling as indicated by patient - next visit attempt lumbar stabilization initiation if tolerated.         Progress strengthening /stabilization /functional activity           Manual Therapy:         mins  60084;  Therapeutic Exercise:    15     mins  42696;     Neuromuscular Kamini:        mins  87364;    Therapeutic Activity:    10      mins  85254;     Gait Training:           mins  90612;     Ultrasound:     8     mins  02766;    Electrical Stimulation:    15     mins  63896 ( );  Dry Needling          mins self-pay    Timed Treatment:  33   mins   Total Treatment:     48   mins    Wen Stanley, PT  Physical Therapist  [place] License # [id number]

## 2018-06-05 ENCOUNTER — TREATMENT (OUTPATIENT)
Dept: PHYSICAL THERAPY | Facility: CLINIC | Age: 48
End: 2018-06-05

## 2018-06-05 DIAGNOSIS — M54.2 NECK PAIN: Primary | ICD-10-CM

## 2018-06-05 DIAGNOSIS — Q76.2 CONGENITAL SPONDYLOLYSIS, LUMBOSACRAL REGION: ICD-10-CM

## 2018-06-05 DIAGNOSIS — M54.6 PAIN IN THORACIC SPINE: ICD-10-CM

## 2018-06-05 PROCEDURE — 97110 THERAPEUTIC EXERCISES: CPT | Performed by: PHYSICAL THERAPIST

## 2018-06-05 PROCEDURE — 97014 ELECTRIC STIMULATION THERAPY: CPT | Performed by: PHYSICAL THERAPIST

## 2018-06-05 PROCEDURE — 97530 THERAPEUTIC ACTIVITIES: CPT | Performed by: PHYSICAL THERAPIST

## 2018-06-05 NOTE — PROGRESS NOTES
Physical Therapy Daily Progress Note    Time In 8:00  Time Out 9:05    Visit # : 5  Ronni Meinaseeml reports: dry needling helped a lot.  Not too sore today.    Subjective     Objective   See Exercise, Manual, and Modality Logs for complete treatment.   Tender right PSIS.  Difficulty with isolation of transversus abdominis.    Pt education:  Purpose of exercise, role of lumbar stabilizers with back pain and arthritis, anatomy.    Assessment/Plan  Initiated lumbar stabilization without increase in pain.  Needed verbal and tactile cueing to recruit transversus abdominis.   Progress per Plan of Care  Assess new exercises         Manual Therapy:          mins  43285;  Therapeutic Exercise:    20     mins  61070;     Neuromuscular Kamini:         mins  73212;    Therapeutic Activity:     10     mins  21793;     Gait Training:            mins  76000;     Ultrasound:     8     mins  80714;    Electrical Stimulation:    15     mins  10647 ( );  Dry Needling           mins self-pay    Timed Treatment:   38   mins   Total Treatment:     65   mins    Shannan Reeves, PT  Physical Therapist

## 2018-06-07 ENCOUNTER — TREATMENT (OUTPATIENT)
Dept: PHYSICAL THERAPY | Facility: CLINIC | Age: 48
End: 2018-06-07

## 2018-06-07 DIAGNOSIS — M54.2 NECK PAIN: Primary | ICD-10-CM

## 2018-06-07 DIAGNOSIS — Q76.2 CONGENITAL SPONDYLOLYSIS, LUMBOSACRAL REGION: ICD-10-CM

## 2018-06-07 DIAGNOSIS — M50.322 DEGENERATION OF INTERVERTEBRAL DISC AT C5-C6 LEVEL: ICD-10-CM

## 2018-06-07 DIAGNOSIS — M51.34 DEGENERATION OF THORACIC INTERVERTEBRAL DISC: ICD-10-CM

## 2018-06-07 DIAGNOSIS — M54.6 PAIN IN THORACIC SPINE: ICD-10-CM

## 2018-06-07 DIAGNOSIS — M54.16 LUMBAR RADICULOPATHY: ICD-10-CM

## 2018-06-07 PROCEDURE — 97014 ELECTRIC STIMULATION THERAPY: CPT | Performed by: PHYSICAL THERAPIST

## 2018-06-07 PROCEDURE — 97035 APP MDLTY 1+ULTRASOUND EA 15: CPT | Performed by: PHYSICAL THERAPIST

## 2018-06-07 PROCEDURE — 97110 THERAPEUTIC EXERCISES: CPT | Performed by: PHYSICAL THERAPIST

## 2018-06-07 NOTE — PROGRESS NOTES
Physical Therapy Daily Progress Note    Time In 9:38  Time Out 10:59    Visit # : 6  Ronni Asif reports: his back is feeling better.       Subjective     Objective   See Exercise, Manual, and Modality Logs for complete treatment.       Assessment & Plan     Assessment  Assessment details: Pt is progressing well with treatment.  Pt continues to respond with less pain and increasing tolerance to exercises.  Pt still remains tender over B PSIS with palpation.  Will continue to see for exercise progression and modalities PRN for pain relief.          Progress per Plan of Care           Manual Therapy:         mins  09950;  Therapeutic Exercise:   30      mins  08026;     Neuromuscular Kamini:        mins  89718;    Therapeutic Activity:          mins  14435;     Gait Training:           mins  14075;     Ultrasound:     8     mins  20079;    Electrical Stimulation:   15      mins  10374 ( );  Dry Needling          mins self-pay    Timed Treatment:  38    mins   Total Treatment:    81    mins    Jigna Ahuja, PT  Physical Therapist

## 2018-06-12 ENCOUNTER — TREATMENT (OUTPATIENT)
Dept: PHYSICAL THERAPY | Facility: CLINIC | Age: 48
End: 2018-06-12

## 2018-06-12 DIAGNOSIS — M51.34 DEGENERATION OF THORACIC INTERVERTEBRAL DISC: ICD-10-CM

## 2018-06-12 DIAGNOSIS — M50.322 DEGENERATION OF INTERVERTEBRAL DISC AT C5-C6 LEVEL: ICD-10-CM

## 2018-06-12 DIAGNOSIS — Q76.2 CONGENITAL SPONDYLOLYSIS, LUMBOSACRAL REGION: ICD-10-CM

## 2018-06-12 DIAGNOSIS — M54.16 LUMBAR RADICULOPATHY: ICD-10-CM

## 2018-06-12 DIAGNOSIS — M54.6 PAIN IN THORACIC SPINE: ICD-10-CM

## 2018-06-12 DIAGNOSIS — M54.2 NECK PAIN: Primary | ICD-10-CM

## 2018-06-12 PROCEDURE — 97110 THERAPEUTIC EXERCISES: CPT | Performed by: PHYSICAL THERAPIST

## 2018-06-12 PROCEDURE — 97014 ELECTRIC STIMULATION THERAPY: CPT | Performed by: PHYSICAL THERAPIST

## 2018-06-12 PROCEDURE — 97035 APP MDLTY 1+ULTRASOUND EA 15: CPT | Performed by: PHYSICAL THERAPIST

## 2018-06-12 PROCEDURE — 97530 THERAPEUTIC ACTIVITIES: CPT | Performed by: PHYSICAL THERAPIST

## 2018-06-12 NOTE — PROGRESS NOTES
Physical Therapy Daily Progress Note        Visit # : 7  Ronni Asif reports: I was very active over the weekend - lots of yard work - I was really sore yesterday better today    Subjective     Objective       Postural Observations    Additional Postural Observation Details  No visible lumbar shift in standing    Tenderness     Right Hip   Tenderness in the PSIS.      See Exercise, Manual, and Modality Logs for complete treatment.       Assessment & Plan     Assessment  Assessment details: Patient presents with improved standing posture. Increased tenderness right PSIS persists. Tolerated gentle stabilization progressions well today. Cont PT 1-2x per week for mobility, strengthening, and Dry Needling as indicated by patient - next visit attempt roll for control and/ or resisted lumbar rotation if tolerated.         Progress strengthening /stabilization /functional activity           Manual Therapy:         mins  18675;  Therapeutic Exercise:    30     mins  07667;     Neuromuscular Kamini:        mins  73436;    Therapeutic Activity:     22     mins  63470;     Gait Training:           mins  93878;     Ultrasound:     8     mins  95691;    Electrical Stimulation:    15     mins  68205 ( );  Dry Needling          mins self-pay    Timed Treatment:   60   mins   Total Treatment:     75   mins    Wen Stanley PT  Physical Therapist  [place] License # [id number]

## 2018-06-14 ENCOUNTER — TREATMENT (OUTPATIENT)
Dept: PHYSICAL THERAPY | Facility: CLINIC | Age: 48
End: 2018-06-14

## 2018-06-14 DIAGNOSIS — Q76.2 CONGENITAL SPONDYLOLYSIS, LUMBOSACRAL REGION: ICD-10-CM

## 2018-06-14 DIAGNOSIS — M50.322 DEGENERATION OF INTERVERTEBRAL DISC AT C5-C6 LEVEL: ICD-10-CM

## 2018-06-14 DIAGNOSIS — M54.6 PAIN IN THORACIC SPINE: Primary | ICD-10-CM

## 2018-06-14 DIAGNOSIS — M51.34 DEGENERATION OF THORACIC INTERVERTEBRAL DISC: ICD-10-CM

## 2018-06-14 DIAGNOSIS — M54.16 LUMBAR RADICULOPATHY: ICD-10-CM

## 2018-06-14 PROCEDURE — 97110 THERAPEUTIC EXERCISES: CPT | Performed by: PHYSICAL THERAPIST

## 2018-06-14 PROCEDURE — 97014 ELECTRIC STIMULATION THERAPY: CPT | Performed by: PHYSICAL THERAPIST

## 2018-06-14 PROCEDURE — 97140 MANUAL THERAPY 1/> REGIONS: CPT | Performed by: PHYSICAL THERAPIST

## 2018-06-14 PROCEDURE — 97530 THERAPEUTIC ACTIVITIES: CPT | Performed by: PHYSICAL THERAPIST

## 2018-06-14 NOTE — PROGRESS NOTES
Physical Therapy Daily Progress Note/ Re-Certification         Visit # : 8  Ronni Paulmahendra reports: I think I did too much yesterday - I am more sore today - picked weeds, vacuumed, picked up around the house     Subjective     Objective       Postural Observations  Standing posture: fair    Additional Postural Observation Details  Standing left lumbar shift     Active Range of Motion     Lumbar   Flexion: 50 degrees with pain  Left lateral flexion: 25 degrees with pain  Right lateral flexion: 25 degrees with pain  Left rotation: 50 degrees with pain  Right rotation: 50 degrees with pain    Tests     Lumbar     Right   Positive lumbar push.     Right Pelvic Girdle/Sacrum   Positive: sacral spring.     Right Hip   Positive Gaenslen's.      See Exercise, Manual, and Modality Logs for complete treatment.       Assessment & Plan     Assessment  Assessment details: Patient presents with increased left lumbar shift in standing. Increased tenderness right PSIS, with right Inflare present today. Tolerated gentle stabilization progressions well today. Cont PT 1-2x per week for mobility, strengthening, and Dry Needling as indicated by patient - next visit attempt roll for control and/ or resisted lumbar rotation if tolerated.       Cont PT 3-4 weeks  STG 1&2 Met - working towards - 2 weeks  3. Pt to tolerated prolonged sitting > 1 hour without increase in pain     Working towards all LTG - 6 weeks  1. Pt to demonstrate cervical AROM 100% of expected norms to allow for improved safety when driving  2. Pt to demonstrate ability to lift 10# OH with bilateral arm(s) without increase in pain in the neck   3. Pt to report being able to work in the home without increase in pain in the neck and back  4. Normal lumbar mobility and full 100% trunk AROM    Progress strengthening /stabilization /functional activity           Manual Therapy:    8     mins  56328;  Therapeutic Exercise:    25     mins  64476;     Neuromuscular Kamini:         mins  83239;    Therapeutic Activity:     19     mins  47738;     Gait Training:           mins  62475;     Ultrasound:     8     mins  44423;    Electrical Stimulation:    15     mins  79329 ( );  Dry Needling          mins self-pay    Timed Treatment:   60   mins   Total Treatment:     75   mins    Wen Stanley, PT  Physical Therapist  [place] License # [id number]

## 2018-06-19 ENCOUNTER — TREATMENT (OUTPATIENT)
Dept: PHYSICAL THERAPY | Facility: CLINIC | Age: 48
End: 2018-06-19

## 2018-06-19 DIAGNOSIS — M54.16 LUMBAR RADICULOPATHY: ICD-10-CM

## 2018-06-19 DIAGNOSIS — M54.6 PAIN IN THORACIC SPINE: Primary | ICD-10-CM

## 2018-06-19 DIAGNOSIS — Q76.2 CONGENITAL SPONDYLOLYSIS, LUMBOSACRAL REGION: ICD-10-CM

## 2018-06-19 PROCEDURE — 97140 MANUAL THERAPY 1/> REGIONS: CPT | Performed by: PHYSICAL THERAPIST

## 2018-06-19 PROCEDURE — 97530 THERAPEUTIC ACTIVITIES: CPT | Performed by: PHYSICAL THERAPIST

## 2018-06-19 PROCEDURE — 97014 ELECTRIC STIMULATION THERAPY: CPT | Performed by: PHYSICAL THERAPIST

## 2018-06-19 PROCEDURE — 97110 THERAPEUTIC EXERCISES: CPT | Performed by: PHYSICAL THERAPIST

## 2018-06-19 NOTE — PROGRESS NOTES
Physical Therapy Daily Progress Note        Visit # : 9  Ronni Meiyung reports: Overall - I still have discomfort in my right low back - increases with activity or prolonged positioning - denies any right LE radicular s/s since last epidural     Subjective     Objective       Tenderness     Right Hip   Tenderness in the PSIS.     Tests     Right Pelvic Girdle/Sacrum   Positive: sacral spring.      See Exercise, Manual, and Modality Logs for complete treatment.       Assessment & Plan     Assessment  Assessment details: Patient presents with neutral standing posture. Increased tenderness right PSIS, with right Inflare present today. Tolerated gentle stabilization progressions well today. Cont PT 1-2x per week for mobility, strengthening, and Dry Needling as indicated by patient - next visit attempt roll for control and/ or prone leg lift if tolerated.         Progress strengthening /stabilization /functional activity           Manual Therapy:    8     mins  77798;  Therapeutic Exercise:    30     mins  84064;     Neuromuscular Kamini:        mins  23897;    Therapeutic Activity:     17     mins  61449;     Gait Training:           mins  56828;     Ultrasound:          mins  89762;    Electrical Stimulation:    15     mins  73748 ( );  Dry Needling          mins self-pay    Timed Treatment:   55   mins   Total Treatment:     70   mins    Wen Stanley, PT  Physical Therapist  [place] License # [id number]

## 2018-06-26 ENCOUNTER — TREATMENT (OUTPATIENT)
Dept: PHYSICAL THERAPY | Facility: CLINIC | Age: 48
End: 2018-06-26

## 2018-06-26 DIAGNOSIS — M54.16 LUMBAR RADICULOPATHY: ICD-10-CM

## 2018-06-26 DIAGNOSIS — M50.322 DEGENERATION OF INTERVERTEBRAL DISC AT C5-C6 LEVEL: ICD-10-CM

## 2018-06-26 DIAGNOSIS — M54.2 NECK PAIN: ICD-10-CM

## 2018-06-26 DIAGNOSIS — M54.6 PAIN IN THORACIC SPINE: Primary | ICD-10-CM

## 2018-06-26 DIAGNOSIS — M51.34 DEGENERATION OF THORACIC INTERVERTEBRAL DISC: ICD-10-CM

## 2018-06-26 DIAGNOSIS — Q76.2 CONGENITAL SPONDYLOLYSIS, LUMBOSACRAL REGION: ICD-10-CM

## 2018-06-26 PROCEDURE — 97140 MANUAL THERAPY 1/> REGIONS: CPT | Performed by: PHYSICAL THERAPIST

## 2018-06-26 PROCEDURE — 97110 THERAPEUTIC EXERCISES: CPT | Performed by: PHYSICAL THERAPIST

## 2018-06-26 PROCEDURE — 97530 THERAPEUTIC ACTIVITIES: CPT | Performed by: PHYSICAL THERAPIST

## 2018-06-26 PROCEDURE — 97112 NEUROMUSCULAR REEDUCATION: CPT | Performed by: PHYSICAL THERAPIST

## 2018-06-26 PROCEDURE — 97014 ELECTRIC STIMULATION THERAPY: CPT | Performed by: PHYSICAL THERAPIST

## 2018-06-26 NOTE — PROGRESS NOTES
Physical Therapy Daily Progress Note - Re-Certification         Visit # : 10  Ronni Yefri reports: I worked around the house all weekend - I was more sore but I sat in the hot tub a lot and the pain is better today - 1/10 current pain     Subjective     Objective       Tenderness     Right Hip   Tenderness in the PSIS.     Active Range of Motion     Lumbar   Flexion: 75 degrees with pain  Extension: 100 degrees   Left lateral flexion: 75 degrees   Right lateral flexion: 75 degrees with pain  Left rotation: 75 degrees   Right rotation: 75 degrees with pain    Strength/Myotome Testing     Left Hip   Planes of Motion   Flexion: 4+    Right Hip   Planes of Motion   Flexion: 5    Left Knee   Flexion: 5  Extension: 5    Right Knee   Flexion: 5  Extension: 5    Left Ankle/Foot   Dorsiflexion: 5  Plantar flexion: 5    Right Ankle/Foot   Dorsiflexion: 5  Plantar flexion: 5    Tests       Thoracic   Negative slump.     Lumbar     Left   Negative crossed SLR and lumbar push.     Right   Positive lumbar push.   Negative crossed SLR.     Left Hip   Negative Gaenslen's.     Right Hip   Negative Gaenslen's.      See Exercise, Manual, and Modality Logs for complete treatment.       Assessment & Plan     Assessment  Assessment details: Patient presents with decreased lumbar mobility, pain end-range and limited tolerance to functional activity I.e. Vacuuming, cutting grass on riding mower, and sleeping. Improvements in tenderness and pelvic alignment have been noted. Feel that continued PT for strength, stabilization and mobility to allow increased tolerance to functional activity would be beneficial at this time.         Progress strengthening /stabilization /functional activity           Manual Therapy:    8     mins  20422;  Therapeutic Exercise:    35     mins  08566;     Neuromuscular Kamini:    10    mins  61931;    Therapeutic Activity:     14     mins  23826;     Gait Training:           mins  81290;     Ultrasound:     8      mins  43819;    Electrical Stimulation:    15     mins  64026 ( );  Dry Needling          mins self-pay    Timed Treatment:   75   mins   Total Treatment:     90   mins    Wen Stanley PT  Physical Therapist  [place] License # [id number]

## 2018-06-27 ENCOUNTER — TELEPHONE (OUTPATIENT)
Dept: NEUROSURGERY | Facility: CLINIC | Age: 48
End: 2018-06-27

## 2018-06-27 DIAGNOSIS — M54.6 PAIN IN THORACIC SPINE: Primary | ICD-10-CM

## 2018-06-27 NOTE — TELEPHONE ENCOUNTER
Patient called and needs an order for electrodes for his tens unit.  It can be faxed to [facility] at [phone]

## 2018-07-05 RX ORDER — CYCLOBENZAPRINE HCL 5 MG
TABLET ORAL
Qty: 60 TABLET | Refills: 0 | Status: SHIPPED | OUTPATIENT
Start: 2018-07-05 | End: 2018-08-06 | Stop reason: SDUPTHER

## 2018-07-05 NOTE — TELEPHONE ENCOUNTER
RX refill request from pharmacy  Cyclobenzaprine 5 mg 2 tablets TID  Patient was last seen: 05/07/2018    Patients next appointment: 08/06/2018    Is this okay to refill?

## 2018-07-24 ENCOUNTER — OFFICE VISIT (OUTPATIENT)
Dept: SLEEP MEDICINE | Facility: HOSPITAL | Age: 48
End: 2018-07-24
Attending: INTERNAL MEDICINE

## 2018-07-24 VITALS
DIASTOLIC BLOOD PRESSURE: 73 MMHG | BODY MASS INDEX: 42.09 KG/M2 | SYSTOLIC BLOOD PRESSURE: 120 MMHG | HEIGHT: 70 IN | WEIGHT: 294 LBS | HEART RATE: 94 BPM

## 2018-07-24 DIAGNOSIS — G47.33 OBSTRUCTIVE SLEEP APNEA, ADULT: Primary | ICD-10-CM

## 2018-07-24 DIAGNOSIS — G45.9 TRANSIENT CEREBRAL ISCHEMIA, UNSPECIFIED TYPE: ICD-10-CM

## 2018-07-24 DIAGNOSIS — E11.9 TYPE 2 DIABETES MELLITUS WITHOUT COMPLICATION, WITHOUT LONG-TERM CURRENT USE OF INSULIN (HCC): ICD-10-CM

## 2018-07-24 DIAGNOSIS — I10 ESSENTIAL HYPERTENSION: ICD-10-CM

## 2018-07-24 PROCEDURE — G0463 HOSPITAL OUTPT CLINIC VISIT: HCPCS

## 2018-07-24 NOTE — PROGRESS NOTES
PULMONARY SLEEP CONSULT NOTE      PATIENT IDENTIFICATION:  Name: Ronni Asif  Age: 48 y.o.  Sex: male  :  1970  MRN: [id number]    DATE OF CONSULTATION:  2018   Referring Physician: No admitting provider for patient encounter.                  CHIEF COMPLAINT: Obstructive Sleep Apnea    History of Present Illness:   Ronni Asif is a 48 y.o. male Pt on CPAP feeling better more energy especially the night he use it more than 4 hours, no sleepiness no fatigue no tiredness, no mask irritation no dryness, compliance report reviewed with pt AHI< 5 with good usage.       Review of Systems:   Constitutional: negative   Eyes: negative   ENT/oropharynx: negative   Cardiovascular: negative   Respiratory: negative   Gastrointestinal: negative   Genitourinary: negative   Neurological: negative   Musculoskeletal: negative   Integument/breast: negative   Endocrine: negative   Allergic/Immunologic: negative     Past Medical History:  Past Medical History:   Diagnosis Date   • Anxiety    • Asthma    • Cancer (CMS/HCC)    • Diabetes mellitus (CMS/HCC)    • Dizziness    • GERD (gastroesophageal reflux disease)    • Headache    • Hypertension    • Injury of back 2016    Pt fell 20 feet    • Kidney stone    • Neuromuscular disorder (CMS/HCC)    • Pneumonia    • Sleep apnea    • Stroke (CMS/HCC)    • Syncope and collapse        Past Surgical History:  Past Surgical History:   Procedure Laterality Date   • COLONOSCOPY     • HERNIA REPAIR  10/2015        Family History:  Family History   Problem Relation Age of Onset   • Brain cancer Father    • Stroke Father    • Hypertension Father    • Atrial fibrillation Father    • Hypertension Mother    • Diabetes Mother    • Heart disease Mother    • Hypertension Brother    • Colon cancer Maternal Grandmother    • Lung cancer Maternal Grandfather    • Bone cancer Maternal Grandfather         Social History:   Social History   Substance Use Topics   • Smoking  status: Never Smoker   • Smokeless tobacco: Never Used   • Alcohol use No        Allergies:  No Known Allergies    Home Meds:    (Not in a hospital admission)    Objective:    Vitals Ranges:   Heart Rate:  [94] 94  BP: (120)/(73) 120/73  Body mass index is 42.18 kg/m².         Exam:    General Appearance:    WDWN  Head:  Normocephalic, without obvious abnormality, atraumatic.   Eyes:  Conjunctiva/corneas clear, EOM's intact, both eyes.         Ears:  Normal external ear canals, both ears.    Nose:  Nares normal, no drainage      Throat:  Lips, mucosa, and tongue normal. Mallampati score 3    Neck:  Supple, symmetrical, trachea midline. No JVD.  Lungs:   Bilateral basal rhonchi bilaterally, respirations unlabored symmetrical wall movement.    Chest wall:  No tenderness or deformity.    Heart:  Regular rate and rhythm, S1 and S2 normal.  Abdomen: Soft, non-tender, no masses, no organomegaly.    Extremities: Trace edema no clubbing or Cyanosis        Data Review:  All labs (24hrs): No results found for this or any previous visit (from the past 24 hour(s)).     Imaging:  [unfilled]    ASSESSMENT:  Diagnoses and all orders for this visit:    Obstructive sleep apnea, adult    Type 2 diabetes mellitus without complication, without long-term current use of insulin (CMS/[facility])    Transient cerebral ischemia, unspecified type    Essential hypertension        PLAN:  This patient with obstructive sleep apnea, compliance is improved. Encourage to use it more frequent, I re-emphasized on pt the long and short term benefit of treating DEVAUGHN.   Treating DEVAUGHN will improve BP control and DM          Amy Wilson MD. D, ABSM.  7/24/2018  10:44 AM

## 2018-08-06 ENCOUNTER — OFFICE VISIT (OUTPATIENT)
Dept: NEUROSURGERY | Facility: CLINIC | Age: 48
End: 2018-08-06

## 2018-08-06 VITALS
DIASTOLIC BLOOD PRESSURE: 75 MMHG | HEART RATE: 115 BPM | HEIGHT: 70 IN | WEIGHT: 294 LBS | BODY MASS INDEX: 42.09 KG/M2 | SYSTOLIC BLOOD PRESSURE: 127 MMHG

## 2018-08-06 DIAGNOSIS — M50.322 DEGENERATION OF INTERVERTEBRAL DISC AT C5-C6 LEVEL: ICD-10-CM

## 2018-08-06 DIAGNOSIS — M51.34 DEGENERATION OF THORACIC INTERVERTEBRAL DISC: ICD-10-CM

## 2018-08-06 DIAGNOSIS — Q76.2 CONGENITAL SPONDYLOLYSIS, LUMBOSACRAL REGION: Primary | ICD-10-CM

## 2018-08-06 PROCEDURE — 99214 OFFICE O/P EST MOD 30 MIN: CPT | Performed by: NEUROLOGICAL SURGERY

## 2018-08-06 RX ORDER — CYCLOBENZAPRINE HCL 5 MG
TABLET ORAL
Qty: 30 TABLET | Refills: 3 | Status: SHIPPED | OUTPATIENT
Start: 2018-08-06 | End: 2019-05-09 | Stop reason: SDUPTHER

## 2018-08-06 RX ORDER — GABAPENTIN 300 MG/1
300 CAPSULE ORAL 3 TIMES DAILY
Qty: 90 CAPSULE | Refills: 5 | Status: SHIPPED | OUTPATIENT
Start: 2018-08-06 | End: 2018-12-26 | Stop reason: SDUPTHER

## 2018-08-27 ENCOUNTER — TREATMENT (OUTPATIENT)
Dept: PHYSICAL THERAPY | Facility: CLINIC | Age: 48
End: 2018-08-27

## 2018-08-27 DIAGNOSIS — M54.16 LUMBAR RADICULOPATHY: Primary | ICD-10-CM

## 2018-08-27 PROCEDURE — DRYNDL PR CUSTOM DRY NEEDLING SELF PAY: Performed by: PHYSICAL THERAPIST

## 2018-10-01 RX ORDER — FLUDROCORTISONE ACETATE 0.1 MG/1
0.1 TABLET ORAL DAILY
Qty: 30 TABLET | Refills: 0 | Status: SHIPPED | OUTPATIENT
Start: 2018-10-01 | End: 2018-10-04

## 2018-10-04 ENCOUNTER — OFFICE VISIT (OUTPATIENT)
Dept: CARDIOLOGY | Facility: CLINIC | Age: 48
End: 2018-10-04

## 2018-10-04 VITALS
WEIGHT: 290 LBS | HEIGHT: 70 IN | DIASTOLIC BLOOD PRESSURE: 80 MMHG | BODY MASS INDEX: 41.52 KG/M2 | HEART RATE: 90 BPM | SYSTOLIC BLOOD PRESSURE: 120 MMHG

## 2018-10-04 DIAGNOSIS — R55 SYNCOPE, UNSPECIFIED SYNCOPE TYPE: ICD-10-CM

## 2018-10-04 DIAGNOSIS — I10 ESSENTIAL HYPERTENSION: Primary | ICD-10-CM

## 2018-10-04 PROCEDURE — 99214 OFFICE O/P EST MOD 30 MIN: CPT | Performed by: INTERNAL MEDICINE

## 2018-10-04 PROCEDURE — 93000 ELECTROCARDIOGRAM COMPLETE: CPT | Performed by: INTERNAL MEDICINE

## 2018-10-04 NOTE — PROGRESS NOTES
Date of Office Visit: 10/04/2018  Encounter Provider: Víctor Conteh MD  Place of Service: [facility] CARDIOLOGY  Patient Name: Ronni Asif  :1970      Chief Complaint   Patient presents with   • Hypotension     History of Present Illness    The patient is a 48-year-old white male with a history of intermittent syncope associated with hypotension.  At one point it was felt that he had orthostatic hypotension secondary to autonomic dysfunction.  He had a recent evaluation at [place].  Evaluation suggests that he does not have autonomic dysfunction.  There is suggestion was that he follow-up in hypertensive clinic.  The patient has had a history of hypertension and has been on lisinopril 20 mg in the morning and 10 mg in the evening with occasional supplementation with clonidine.  He will notice he syncopal episodes of this blood pressure gets exceedingly high.  Occasionally they will go low and it can occur at anytime of the day during any activity without warning.    Past Medical History:   Diagnosis Date   • Anxiety    • Asthma    • Cancer (CMS/[facility])    • Diabetes mellitus (CMS/[facility])    • Dizziness    • GERD (gastroesophageal reflux disease)    • Headache    • Hypertension    • Injury of back 2016    Pt fell 20 feet    • Kidney stone    • Neuromuscular disorder (CMS/HCC)    • Pneumonia    • Sleep apnea    • Stroke (CMS/HCC)    • Syncope and collapse          Past Surgical History:   Procedure Laterality Date   • COLONOSCOPY     • HERNIA REPAIR  10/2015           Current Outpatient Prescriptions:   •  albuterol (2.5 MG/3ML) 0.083% nebulizer solution 3 mL, albuterol (5 MG/ML) 0.5% nebulizer solution 0.5 mL, Inhale 4 (four) times a day as needed., Disp: , Rfl:   •  albuterol (PROVENTIL HFA;VENTOLIN HFA) 108 (90 BASE) MCG/ACT inhaler, Inhale 2 puffs every 4 (four) hours as needed for wheezing., Disp: , Rfl:   •  ANDROGEL PUMP 20.25 MG/ACT (1.62%) gel, APPLY 2 PUMPS  DAILY AS DIRECTED, Disp: , Rfl: 0  •  ASPIRIN LOW DOSE 81 MG chewable tablet, Chew 1 tablet Daily., Disp: , Rfl: 4  •  CloNIDine (CATAPRES) 0.1 MG tablet, Take 0.1 mg by mouth 2 (Two) Times a Day., Disp: , Rfl:   •  cyclobenzaprine (FLEXERIL) 5 MG tablet, 1 tablet PO qhs prn, Disp: 30 tablet, Rfl: 3  •  fluticasone-salmeterol (ADVAIR DISKUS) 250-50 MCG/DOSE DISKUS, INL 1 PUFF ITL BID, Disp: , Rfl:   •  gabapentin (NEURONTIN) 300 MG capsule, Take 1 capsule by mouth 3 (Three) Times a Day., Disp: 90 capsule, Rfl: 5  •  HYDROcodone-acetaminophen (NORCO)  MG per tablet, Take 1 tablet by mouth 2 (two) times a day., Disp: , Rfl:   •  ipratropium-albuterol (DUO-NEB) 0.5-2.5 mg/mL nebulizer, Inhale 3 mL., Disp: , Rfl:   •  lisinopril (PRINIVIL,ZESTRIL) 10 MG tablet, Take 1 tablet by mouth Every Evening., Disp: , Rfl: 2  •  lisinopril (PRINIVIL,ZESTRIL) 20 MG tablet, TK 1 T PO  qd, Disp: , Rfl: 2  •  metFORMIN ER (GLUCOPHAGE-XR) 500 MG 24 hr tablet, Take 2 tablets by mouth 2 (Two) Times a Day., Disp: , Rfl: 1  •  mometasone-formoterol (DULERA 100) 100-5 MCG/ACT inhaler, Inhale 2 puffs 2 (two) times a day., Disp: , Rfl:   •  raNITIdine (ZANTAC) 150 MG tablet, Take 150 mg by mouth 2 (Two) Times a Day., Disp: , Rfl:       Social History     Social History   • Marital status:      Spouse name: N/A   • Number of children: N/A   • Years of education: N/A     Occupational History   • Not on file.     Social History Main Topics   • Smoking status: Never Smoker   • Smokeless tobacco: Never Used   • Alcohol use No   • Drug use: No   • Sexual activity: Defer     Other Topics Concern   • Not on file     Social History Narrative   • No narrative on file         Review of Systems   Constitution: Positive for malaise/fatigue.   HENT: Negative.    Eyes: Negative.    Cardiovascular: Positive for dyspnea on exertion and palpitations.   Respiratory: Negative.    Endocrine: Negative.    Skin: Negative.    Musculoskeletal: Negative.  "   Gastrointestinal: Negative.    Neurological: Negative.    Psychiatric/Behavioral: Negative.        Procedures      ECG 12 Lead  Date/Time: 10/4/2018 12:49 PM  Performed by: COMFORT QUILES  Authorized by: COMFORT QUILES   Comparison: compared with previous ECG from 1/17/2018  Similar to previous ECG  Rhythm: sinus rhythm  Rate: normal  Conduction: conduction normal  QRS axis: normal                  Objective:    /80   Pulse 90   Ht 177.8 cm (70\")   Wt 132 kg (290 lb)   BMI 41.61 kg/m²         Physical Exam   Constitutional: He is oriented to person, place, and time. He appears well-developed and well-nourished.   Obese   HENT:   Head: Normocephalic.   Eyes: Pupils are equal, round, and reactive to light.   Neck: Normal range of motion. No JVD present. Carotid bruit is not present. No thyromegaly present.   Cardiovascular: Normal rate, regular rhythm, S1 normal, S2 normal and intact distal pulses.  Exam reveals no gallop and no friction rub.    Murmur heard.   Systolic murmur is present with a grade of 1/6   Pulmonary/Chest: Effort normal and breath sounds normal.   Abdominal: Soft. Bowel sounds are normal.   Musculoskeletal: He exhibits no edema.   Neurological: He is alert and oriented to person, place, and time.   Skin: Skin is warm, dry and intact. No erythema.   Psychiatric: He has a normal mood and affect.   Vitals reviewed.          Assessment:       Diagnosis Plan   1. Essential hypertension     2. Syncope, unspecified syncope type       1.  Hypertension: He reports that his blood pressures mostly elevated in the evenings.  Going to increase his fosinopril to 20 mg twice a day.    2.  Syncope: Etiology appears to be still elusive.  It is certainly possible that this is due to labile hypertension.  We'll see how he tolerates the change in medication.  The next medicine I think may be appropriate would be a beta blocker.  He is now off Florinef.    He will start to record his blood " pressures on a regular basis and report to change if any with change in medication.  If he is doing well no further changes will need to be made and I will see him back in a year if we need to change his medication will do so.       Plan:

## 2018-11-30 RX ORDER — CYCLOBENZAPRINE HCL 5 MG
TABLET ORAL
Qty: 60 TABLET | Refills: 0 | Status: SHIPPED | OUTPATIENT
Start: 2018-11-30 | End: 2019-02-04 | Stop reason: SDUPTHER

## 2018-12-06 ENCOUNTER — TELEPHONE (OUTPATIENT)
Dept: NEUROSURGERY | Facility: CLINIC | Age: 48
End: 2018-12-06

## 2018-12-06 NOTE — TELEPHONE ENCOUNTER
Patient asked that his last office notes and MRI reports be faxed to his new pain mgmt. Faxed 36 pages to Dr Marc Quinn [phone]

## 2018-12-27 RX ORDER — GABAPENTIN 300 MG/1
300 CAPSULE ORAL 3 TIMES DAILY
Qty: 90 CAPSULE | Refills: 5 | Status: SHIPPED | OUTPATIENT
Start: 2018-12-27 | End: 2019-07-04 | Stop reason: SDUPTHER

## 2019-02-04 ENCOUNTER — OFFICE VISIT (OUTPATIENT)
Dept: NEUROSURGERY | Facility: CLINIC | Age: 49
End: 2019-02-04

## 2019-02-04 VITALS
DIASTOLIC BLOOD PRESSURE: 76 MMHG | HEART RATE: 91 BPM | WEIGHT: 290 LBS | HEIGHT: 70 IN | BODY MASS INDEX: 41.52 KG/M2 | SYSTOLIC BLOOD PRESSURE: 122 MMHG

## 2019-02-04 DIAGNOSIS — Q76.2 CONGENITAL SPONDYLOLYSIS, LUMBOSACRAL REGION: Primary | ICD-10-CM

## 2019-02-04 DIAGNOSIS — M50.322 DEGENERATION OF INTERVERTEBRAL DISC AT C5-C6 LEVEL: ICD-10-CM

## 2019-02-04 PROCEDURE — 99213 OFFICE O/P EST LOW 20 MIN: CPT | Performed by: NEUROLOGICAL SURGERY

## 2019-02-04 RX ORDER — METOPROLOL TARTRATE 50 MG/1
50 TABLET, FILM COATED ORAL 2 TIMES DAILY
COMMUNITY
End: 2019-05-14

## 2019-02-04 RX ORDER — ATORVASTATIN CALCIUM 20 MG/1
20 TABLET, FILM COATED ORAL DAILY
COMMUNITY
End: 2019-06-11 | Stop reason: SDUPTHER

## 2019-03-05 ENCOUNTER — OFFICE VISIT (OUTPATIENT)
Dept: SLEEP MEDICINE | Facility: HOSPITAL | Age: 49
End: 2019-03-05

## 2019-03-05 VITALS
WEIGHT: 300 LBS | SYSTOLIC BLOOD PRESSURE: 123 MMHG | DIASTOLIC BLOOD PRESSURE: 78 MMHG | BODY MASS INDEX: 42.95 KG/M2 | HEIGHT: 70 IN | HEART RATE: 101 BPM

## 2019-03-05 DIAGNOSIS — J45.909 MODERATE ASTHMA WITHOUT COMPLICATION, UNSPECIFIED WHETHER PERSISTENT: ICD-10-CM

## 2019-03-05 DIAGNOSIS — G47.33 OBSTRUCTIVE SLEEP APNEA, ADULT: Primary | ICD-10-CM

## 2019-03-05 DIAGNOSIS — I10 ESSENTIAL HYPERTENSION: ICD-10-CM

## 2019-03-05 PROCEDURE — G0463 HOSPITAL OUTPT CLINIC VISIT: HCPCS

## 2019-03-05 NOTE — PROGRESS NOTES
PULMONARY SLEEP CONSULT NOTE      PATIENT IDENTIFICATION:  Name: Ronni Asif  Age: 48 y.o.  Sex: male  :  1970  MRN: [id number]    DATE OF CONSULTATION:  3/5/2019   Referring Physician: No admitting provider for patient encounter.                  CHIEF COMPLAINT: Obstructive Sleep Apnea    History of Present Illness:   Ronni Asif is a 48 y.o. male Pt with still multiple wakening up at night with sleepiness fatigue and snoring, witnessed apnea, Hard  to get up in the morning. Daytime fatigue sleepiness loss of energy, [place] score of ( 18)   Pt with DEVAUGHN was on CPAP till 2 month ago when got broken it is not blowing air even after multiple trials.    Review of Systems:   Constitutional: As above    Eyes: negative   ENT/oropharynx: negative   Cardiovascular: negative   Respiratory: negative   Gastrointestinal: negative   Genitourinary: negative   Neurological: negative   Musculoskeletal: negative   Integument/breast: negative   Endocrine: negative   Allergic/Immunologic: negative     Past Medical History:  Past Medical History:   Diagnosis Date   • Anxiety    • Asthma    • Cancer (CMS/HCC)    • Diabetes mellitus (CMS/HCC)    • Dizziness    • GERD (gastroesophageal reflux disease)    • Headache    • Hypertension    • Injury of back 2016    Pt fell 20 feet    • Kidney stone    • Neuromuscular disorder (CMS/HCC)    • Pneumonia    • Sleep apnea    • Stroke (CMS/HCC)    • Syncope and collapse        Past Surgical History:  Past Surgical History:   Procedure Laterality Date   • COLONOSCOPY     • HERNIA REPAIR  10/2015        Family History:  Family History   Problem Relation Age of Onset   • Brain cancer Father    • Stroke Father    • Hypertension Father    • Atrial fibrillation Father    • Hypertension Mother    • Diabetes Mother    • Heart disease Mother    • Hypertension Brother    • Colon cancer Maternal Grandmother    • Lung cancer Maternal Grandfather    • Bone cancer Maternal  Grandfather         Social History:   Social History     Tobacco Use   • Smoking status: Never Smoker   • Smokeless tobacco: Never Used   Substance Use Topics   • Alcohol use: No        Allergies:  No Known Allergies    Home Meds:    (Not in a hospital admission)    Objective:    Vitals Ranges:   Heart Rate:  [101] 101  BP: (123)/(78) 123/78  Body mass index is 43.05 kg/m².     Exam:  General Appearance:  WDWN    HEENT:   without obvious abnormality,  Conjunctiva/corneas clear,  Normal external ear canals, no drainage    Clear orsalmucosa,  Mallampati score 3    Neck:  Supple, symmetrical, trachea midline. No JVD.  Lungs:   Bilateral basal rhonchi bilaterally, respirations unlabored symmetrical wall movement.    Chest wall:  No tenderness or deformity.    Heart:  Regular rate and rhythm, S1 and S2 normal.  Extremities: Trace edema no clubbing or Cyanosis        Data Review:  All labs (24hrs): No results found for this or any previous visit (from the past 24 hour(s)).     Imaging:  [unfilled]    ASSESSMENT:  Diagnoses and all orders for this visit:    Obstructive sleep apnea, adult    Essential hypertension    Moderate asthma without complication, unspecified whether persistent        PLAN:   This is patient with symptoms of obstructive sleep apnea, NPSG study ASAP / split night study, Avoid supine avoid sedative meds in pm, weight loss, Avoid driving. Long discussion with patient about the physiology of DEVAUGHN, and long term and short term   benefit of treating DEVAUGHN     Will try auto CPAP 6/16  in the meanwhile       Amy Wilson MD. D, ABSM.  3/5/2019  2:09 PM

## 2019-03-06 ENCOUNTER — HOSPITAL ENCOUNTER (OUTPATIENT)
Dept: SLEEP MEDICINE | Facility: HOSPITAL | Age: 49
Discharge: HOME | End: 2019-03-06
Admitting: INTERNAL MEDICINE

## 2019-03-06 DIAGNOSIS — G47.33 OBSTRUCTIVE SLEEP APNEA, ADULT: ICD-10-CM

## 2019-03-06 PROCEDURE — 95811 POLYSOM 6/>YRS CPAP 4/> PARM: CPT

## 2019-03-20 ENCOUNTER — TELEPHONE (OUTPATIENT)
Dept: SLEEP MEDICINE | Facility: HOSPITAL | Age: 49
End: 2019-03-20

## 2019-05-09 RX ORDER — CYCLOBENZAPRINE HCL 5 MG
TABLET ORAL
Qty: 30 TABLET | Refills: 0 | Status: SHIPPED | OUTPATIENT
Start: 2019-05-09 | End: 2019-09-18 | Stop reason: SDUPTHER

## 2019-05-09 NOTE — TELEPHONE ENCOUNTER
He was last seen in February 2019 and has fu appt in November 2019.  He is seeing Dr Quinn for pain management, but the Flexeril was originally prescribed by our office.    Ok to refill?

## 2019-05-14 ENCOUNTER — APPOINTMENT (OUTPATIENT)
Dept: SLEEP MEDICINE | Facility: HOSPITAL | Age: 49
End: 2019-05-14

## 2019-05-14 ENCOUNTER — OFFICE VISIT (OUTPATIENT)
Dept: SURGERY | Facility: CLINIC | Age: 49
End: 2019-05-14

## 2019-05-14 VITALS — WEIGHT: 301.4 LBS | HEART RATE: 117 BPM | HEIGHT: 70 IN | OXYGEN SATURATION: 99 % | BODY MASS INDEX: 43.15 KG/M2

## 2019-05-14 DIAGNOSIS — R19.7 DIARRHEA, UNSPECIFIED TYPE: ICD-10-CM

## 2019-05-14 DIAGNOSIS — Z86.010 HISTORY OF COLON POLYPS: ICD-10-CM

## 2019-05-14 DIAGNOSIS — K21.9 GASTROESOPHAGEAL REFLUX DISEASE, ESOPHAGITIS PRESENCE NOT SPECIFIED: Primary | ICD-10-CM

## 2019-05-14 PROBLEM — Z86.0100 HISTORY OF COLON POLYPS: Status: ACTIVE | Noted: 2019-05-14

## 2019-05-14 PROCEDURE — 99213 OFFICE O/P EST LOW 20 MIN: CPT | Performed by: SURGERY

## 2019-05-14 RX ORDER — PANTOPRAZOLE SODIUM 40 MG/1
40 TABLET, DELAYED RELEASE ORAL DAILY
Status: ON HOLD | COMMUNITY
End: 2021-06-01

## 2019-05-20 NOTE — PROGRESS NOTES
Subjective   Ronni Asif is a 49 y.o. male who presents to the office in surgical consultation from Gil Chambers MD for GERD and a previous history of colonic polyps.    History of Present Illness     The patient has frequent GERD and takes Protonix with marginal improvement.  He has not had any nausea or vomiting.  His appetite is good.    He also had a colonoscopy on 10/31/2014 at which time there was a polyp that was a tubular adenoma removed.  He has had no significant problems after that procedure until recently when he developed persistent diarrhea.  This diarrhea has been present for several months and is nonbloody in nature.  There is no associated abdominal pain.    Review of Systems   Constitutional: Negative for activity change, appetite change, fatigue and fever.   HENT: Negative for trouble swallowing and voice change.    Respiratory: Negative for chest tightness and shortness of breath.    Cardiovascular: Negative for chest pain and palpitations.   Gastrointestinal: Positive for diarrhea. Negative for abdominal pain, blood in stool, constipation, nausea and vomiting.   Endocrine: Negative for cold intolerance and heat intolerance.   Genitourinary: Negative for dysuria and flank pain.   Neurological: Negative for dizziness and light-headedness.   Hematological: Negative for adenopathy. Does not bruise/bleed easily.   Psychiatric/Behavioral: Negative for agitation and confusion.     Past Medical History:   Diagnosis Date   • Anxiety    • Asthma    • Cancer (CMS/HCC)     skin   • Diabetes mellitus (CMS/HCC)    • Dizziness    • GERD (gastroesophageal reflux disease)    • Headache    • Hypertension    • Injury of back 03/2016    Pt fell 20 feet    • Kidney stone    • Neuromuscular disorder (CMS/HCC)    • Pneumonia    • Sleep apnea    • Stroke (CMS/HCC)    • Syncope and collapse      Past Surgical History:   Procedure Laterality Date   • COLONOSCOPY     • HERNIA REPAIR  10/2015     Family  History   Problem Relation Age of Onset   • Brain cancer Father    • Stroke Father    • Hypertension Father    • Atrial fibrillation Father    • Hypertension Mother    • Diabetes Mother    • Heart disease Mother    • Hypertension Brother    • Colon cancer Maternal Grandmother    • Lung cancer Maternal Grandfather    • Bone cancer Maternal Grandfather      Social History     Socioeconomic History   • Marital status:      Spouse name: Not on file   • Number of children: Not on file   • Years of education: Not on file   • Highest education level: Not on file   Tobacco Use   • Smoking status: Never Smoker   • Smokeless tobacco: Never Used   Substance and Sexual Activity   • Alcohol use: No   • Drug use: No   • Sexual activity: Defer       Objective   Physical Exam   Constitutional: He is oriented to person, place, and time. He appears well-developed and well-nourished.  Non-toxic appearance.   Eyes: EOM are normal. No scleral icterus.   Pulmonary/Chest: Effort normal. No respiratory distress.   Abdominal: Soft. Normal appearance. There is no tenderness.   Neurological: He is alert and oriented to person, place, and time.   Skin: Skin is warm and dry.   Psychiatric: He has a normal mood and affect. His behavior is normal. Judgment and thought content normal.       Assessment/Plan       The primary encounter diagnosis was Gastroesophageal reflux disease, esophagitis presence not specified. Diagnoses of Diarrhea, unspecified type and History of colon polyps were also pertinent to this visit.    The patient has GERD that is marginally improved with Protonix.  He will need an EGD.    The patient has persistent diarrhea and a history of colonic polyps.  He will need a colonoscopy.    He has been scheduled for an EGD and colonoscopy.  The patient understands the indications, alternatives, risks, and benefits of the procedure and wishes to proceed.

## 2019-05-23 ENCOUNTER — HOSPITAL ENCOUNTER (OUTPATIENT)
Facility: HOSPITAL | Age: 49
Setting detail: HOSPITAL OUTPATIENT SURGERY
Discharge: HOME | End: 2019-05-23
Attending: SURGERY | Admitting: SURGERY

## 2019-05-23 ENCOUNTER — ANESTHESIA EVENT (OUTPATIENT)
Dept: GASTROENTEROLOGY | Facility: HOSPITAL | Age: 49
End: 2019-05-23

## 2019-05-23 ENCOUNTER — ANESTHESIA (OUTPATIENT)
Dept: GASTROENTEROLOGY | Facility: HOSPITAL | Age: 49
End: 2019-05-23

## 2019-05-23 VITALS
RESPIRATION RATE: 12 BRPM | BODY MASS INDEX: 42.09 KG/M2 | OXYGEN SATURATION: 96 % | HEART RATE: 89 BPM | WEIGHT: 294 LBS | HEIGHT: 70 IN | TEMPERATURE: 98.3 F | DIASTOLIC BLOOD PRESSURE: 84 MMHG | SYSTOLIC BLOOD PRESSURE: 125 MMHG

## 2019-05-23 DIAGNOSIS — R19.7 DIARRHEA, UNSPECIFIED TYPE: ICD-10-CM

## 2019-05-23 DIAGNOSIS — K21.9 GASTROESOPHAGEAL REFLUX DISEASE, ESOPHAGITIS PRESENCE NOT SPECIFIED: ICD-10-CM

## 2019-05-23 DIAGNOSIS — Z86.010 HISTORY OF COLON POLYPS: ICD-10-CM

## 2019-05-23 LAB — GLUCOSE BLDC GLUCOMTR-MCNC: 107 MG/DL (ref 70–130)

## 2019-05-23 PROCEDURE — 88305 TISSUE EXAM BY PATHOLOGIST: CPT | Performed by: SURGERY

## 2019-05-23 PROCEDURE — 45380 COLONOSCOPY AND BIOPSY: CPT | Performed by: SURGERY

## 2019-05-23 PROCEDURE — 45385 COLONOSCOPY W/LESION REMOVAL: CPT | Performed by: SURGERY

## 2019-05-23 PROCEDURE — 43239 EGD BIOPSY SINGLE/MULTIPLE: CPT | Performed by: SURGERY

## 2019-05-23 PROCEDURE — 82962 GLUCOSE BLOOD TEST: CPT

## 2019-05-23 PROCEDURE — 25010000002 PROPOFOL 10 MG/ML EMULSION: Performed by: NURSE ANESTHETIST, CERTIFIED REGISTERED

## 2019-05-23 RX ORDER — PROMETHAZINE HYDROCHLORIDE 25 MG/1
25 TABLET ORAL ONCE AS NEEDED
Status: DISCONTINUED | OUTPATIENT
Start: 2019-05-23 | End: 2019-05-23 | Stop reason: HOSPADM

## 2019-05-23 RX ORDER — SODIUM CHLORIDE, SODIUM LACTATE, POTASSIUM CHLORIDE, CALCIUM CHLORIDE 600; 310; 30; 20 MG/100ML; MG/100ML; MG/100ML; MG/100ML
1000 INJECTION, SOLUTION INTRAVENOUS CONTINUOUS
Status: DISCONTINUED | OUTPATIENT
Start: 2019-05-23 | End: 2019-05-23 | Stop reason: HOSPADM

## 2019-05-23 RX ORDER — GLYCOPYRROLATE 0.2 MG/ML
INJECTION INTRAMUSCULAR; INTRAVENOUS AS NEEDED
Status: DISCONTINUED | OUTPATIENT
Start: 2019-05-23 | End: 2019-05-23 | Stop reason: SURG

## 2019-05-23 RX ORDER — PROPOFOL 10 MG/ML
VIAL (ML) INTRAVENOUS CONTINUOUS PRN
Status: DISCONTINUED | OUTPATIENT
Start: 2019-05-23 | End: 2019-05-23 | Stop reason: SURG

## 2019-05-23 RX ORDER — PROPOFOL 10 MG/ML
VIAL (ML) INTRAVENOUS AS NEEDED
Status: DISCONTINUED | OUTPATIENT
Start: 2019-05-23 | End: 2019-05-23 | Stop reason: SURG

## 2019-05-23 RX ORDER — LIDOCAINE HYDROCHLORIDE 10 MG/ML
0.5 INJECTION, SOLUTION INFILTRATION; PERINEURAL ONCE AS NEEDED
Status: DISCONTINUED | OUTPATIENT
Start: 2019-05-23 | End: 2019-05-23 | Stop reason: HOSPADM

## 2019-05-23 RX ORDER — LIDOCAINE HYDROCHLORIDE 20 MG/ML
INJECTION, SOLUTION INFILTRATION; PERINEURAL AS NEEDED
Status: DISCONTINUED | OUTPATIENT
Start: 2019-05-23 | End: 2019-05-23 | Stop reason: SURG

## 2019-05-23 RX ORDER — SODIUM CHLORIDE 0.9 % (FLUSH) 0.9 %
3 SYRINGE (ML) INJECTION AS NEEDED
Status: DISCONTINUED | OUTPATIENT
Start: 2019-05-23 | End: 2019-05-23 | Stop reason: HOSPADM

## 2019-05-23 RX ORDER — PROMETHAZINE HYDROCHLORIDE 25 MG/ML
12.5 INJECTION, SOLUTION INTRAMUSCULAR; INTRAVENOUS ONCE AS NEEDED
Status: DISCONTINUED | OUTPATIENT
Start: 2019-05-23 | End: 2019-05-23 | Stop reason: HOSPADM

## 2019-05-23 RX ORDER — PROMETHAZINE HYDROCHLORIDE 25 MG/1
25 SUPPOSITORY RECTAL ONCE AS NEEDED
Status: DISCONTINUED | OUTPATIENT
Start: 2019-05-23 | End: 2019-05-23 | Stop reason: HOSPADM

## 2019-05-23 RX ADMIN — LIDOCAINE HYDROCHLORIDE 100 MG: 20 INJECTION, SOLUTION INFILTRATION; PERINEURAL at 08:53

## 2019-05-23 RX ADMIN — GLYCOPYRROLATE 0.1 MG: 0.2 INJECTION INTRAMUSCULAR; INTRAVENOUS at 08:53

## 2019-05-23 RX ADMIN — PROPOFOL 150 MCG/KG/MIN: 10 INJECTION, EMULSION INTRAVENOUS at 08:53

## 2019-05-23 RX ADMIN — SODIUM CHLORIDE, POTASSIUM CHLORIDE, SODIUM LACTATE AND CALCIUM CHLORIDE 1000 ML: 600; 310; 30; 20 INJECTION, SOLUTION INTRAVENOUS at 07:57

## 2019-05-23 RX ADMIN — PROPOFOL 100 MG: 10 INJECTION, EMULSION INTRAVENOUS at 08:53

## 2019-05-23 NOTE — ANESTHESIA PREPROCEDURE EVALUATION
Anesthesia Evaluation     Patient summary reviewed and Nursing notes reviewed   NPO Solid Status: > 8 hours  NPO Liquid Status: > 8 hours           Airway   Mallampati: II  TM distance: >3 FB  Neck ROM: full  no difficulty expected  Dental - normal exam     Pulmonary - normal exam   (+) pneumonia , asthma, sleep apnea on CPAP,   Cardiovascular - normal exam    (+) hypertension,       Neuro/Psych  (+) TIA, CVA, headaches, dizziness/light headedness, syncope, numbness, psychiatric history Anxiety,     GI/Hepatic/Renal/Endo    (+) obesity, morbid obesity, GERD,  diabetes mellitus type 2,     Musculoskeletal     (+) neck pain,   Abdominal  - normal exam   Substance History      OB/GYN          Other   (+) arthritis   history of cancer                    Anesthesia Plan    ASA 3     MAC     Anesthetic plan, all risks, benefits, and alternatives have been provided, discussed and informed consent has been obtained with: patient.

## 2019-05-23 NOTE — ANESTHESIA POSTPROCEDURE EVALUATION
Patient: Ronni Asif    Procedure Summary     Date:  05/23/19 Room / Location:  [facility] ENDOSCOPY 9 /  [facility]    Anesthesia Start:  0847 Anesthesia Stop:      Procedures:       ESOPHAGOGASTRODUODENOSCOPY with Bx's (N/A Esophagus)      COLONOSCOPY to Cecum with Hot Polypectomy x 2 (N/A ) Diagnosis:       Gastroesophageal reflux disease, esophagitis presence not specified      Diarrhea, unspecified type      History of colon polyps      (Gastroesophageal reflux disease, esophagitis presence not specified [K21.9])      (Diarrhea, unspecified type [R19.7])      (History of colon polyps [Z86.010])    Surgeon:  Navid Zafar Jr., MD Provider:  Keegan Kruger MD    Anesthesia Type:  MAC ASA Status:  3          Anesthesia Type: MAC  Last vitals  BP   126/84 (05/23/19 0758)   Temp   36.7 °C (98 °F) (05/23/19 0758)   Pulse   82 (05/23/19 0758)   Resp   16 (05/23/19 0758)     SpO2   96 % (05/23/19 0758)     Anesthesia Post Evaluation

## 2019-05-23 NOTE — ANESTHESIA POSTPROCEDURE EVALUATION
"Patient: Ronni Asif    Procedure Summary     Date:  05/23/19 Room / Location:   [facility] /  [facility]    Anesthesia Start:  0847 Anesthesia Stop:  0927    Procedures:       ESOPHAGOGASTRODUODENOSCOPY with Bx's (N/A Esophagus)      COLONOSCOPY to Cecum with Hot and Cold Polypectomy x 2 (N/A ) Diagnosis:       Gastroesophageal reflux disease, esophagitis presence not specified      Diarrhea, unspecified type      History of colon polyps      (Gastroesophageal reflux disease, esophagitis presence not specified [K21.9])      (Diarrhea, unspecified type [R19.7])      (History of colon polyps [Z86.010])    Surgeon:  Navid Zafar Jr., MD Provider:  Keegan Kruger MD    Anesthesia Type:  MAC ASA Status:  3          Anesthesia Type: MAC  Last vitals  BP   126/84 (05/23/19 0758)   Temp   36.7 °C (98 °F) (05/23/19 0758)   Pulse   82 (05/23/19 0758)   Resp   16 (05/23/19 0758)     SpO2   96 % (05/23/19 0758)     Post Anesthesia Care and Evaluation    Patient location during evaluation: bedside  Patient participation: complete - patient participated  Level of consciousness: awake and alert  Pain management: adequate  Airway patency: patent  Anesthetic complications: No anesthetic complications    Cardiovascular status: acceptable  Respiratory status: acceptable  Hydration status: acceptable    Comments: /84 (BP Location: Left arm, Patient Position: Lying)   Pulse 82   Temp 36.7 °C (98 °F) (Oral)   Resp 16   Ht 177.8 cm (70\")   Wt 133 kg (294 lb)   SpO2 96%   BMI 42.18 kg/m²       "

## 2019-05-24 LAB
CYTO UR: NORMAL
LAB AP CASE REPORT: NORMAL
PATH REPORT.FINAL DX SPEC: NORMAL
PATH REPORT.GROSS SPEC: NORMAL

## 2019-05-28 ENCOUNTER — OFFICE VISIT (OUTPATIENT)
Dept: SLEEP MEDICINE | Facility: HOSPITAL | Age: 49
End: 2019-05-28

## 2019-05-28 VITALS
HEIGHT: 70 IN | SYSTOLIC BLOOD PRESSURE: 122 MMHG | BODY MASS INDEX: 43.09 KG/M2 | HEART RATE: 80 BPM | WEIGHT: 301 LBS | DIASTOLIC BLOOD PRESSURE: 83 MMHG

## 2019-05-28 DIAGNOSIS — K21.9 GASTROESOPHAGEAL REFLUX DISEASE WITHOUT ESOPHAGITIS: ICD-10-CM

## 2019-05-28 DIAGNOSIS — I10 ESSENTIAL HYPERTENSION: ICD-10-CM

## 2019-05-28 DIAGNOSIS — G47.33 OBSTRUCTIVE SLEEP APNEA, ADULT: Primary | ICD-10-CM

## 2019-05-28 PROCEDURE — G0463 HOSPITAL OUTPT CLINIC VISIT: HCPCS

## 2019-05-28 NOTE — PROGRESS NOTES
PULMONARY SLEEP CONSULT NOTE      PATIENT IDENTIFICATION:  Name: Ronni Asif  Age: 49 y.o.  Sex: male  :  1970  MRN: [id number]    DATE OF CONSULTATION:  2019   Referring Physician: No admitting provider for patient encounter.                  CHIEF COMPLAINT: Obstructive Sleep Apnea    History of Present Illness:   Ronni Asif is a 49 y.o. male  Pt on CPAP feeling better more energy especially the night he use it more than 4 hours, no sleepiness no fatigue no tiredness, no mask irritation no dryness, compliance report reviewed with pt AHI< 5 with good usage.       Review of Systems:   Constitutional: negative   Eyes: negative   ENT/oropharynx: negative   Cardiovascular: negative   Respiratory: negative   Gastrointestinal: negative   Genitourinary: negative   Neurological: negative   Musculoskeletal: negative   Integument/breast: negative   Endocrine: negative   Allergic/Immunologic: negative     Past Medical History:  Past Medical History:   Diagnosis Date   • Anxiety    • Asthma    • Cancer (CMS/HCC)     skin   • Diabetes mellitus (CMS/HCC)    • Dizziness    • GERD (gastroesophageal reflux disease)    • Headache    • Hypertension    • Injury of back 2016    Pt fell 20 feet    • Kidney stone    • Neuromuscular disorder (CMS/HCC)    • Pneumonia    • Sleep apnea     cpap   • Stroke (CMS/HCC)     tia   • Syncope and collapse        Past Surgical History:  Past Surgical History:   Procedure Laterality Date   • COLONOSCOPY     • COLONOSCOPY N/A 2019    Procedure: COLONOSCOPY to Cecum with Hot and Cold Polypectomy x 2;  Surgeon: Navid Zafar Jr., MD;  Location: [facility] ENDOSCOPY;  Service: General   • ENDOSCOPY N/A 2019    Procedure: ESOPHAGOGASTRODUODENOSCOPY with Bx's;  Surgeon: Navid Zafar Jr., MD;  Location: [facility] ENDOSCOPY;  Service: General   • HERNIA REPAIR  10/2015        Family History:  Family History   Problem Relation Age of Onset   • Brain cancer Father     • Stroke Father    • Hypertension Father    • Atrial fibrillation Father    • Hypertension Mother    • Diabetes Mother    • Heart disease Mother    • Hypertension Brother    • Colon cancer Maternal Grandmother    • Lung cancer Maternal Grandfather    • Bone cancer Maternal Grandfather         Social History:   Social History     Tobacco Use   • Smoking status: Never Smoker   • Smokeless tobacco: Never Used   Substance Use Topics   • Alcohol use: No        Allergies:  No Known Allergies    Home Meds:    (Not in a hospital admission)    Objective:    Vitals Ranges:   Heart Rate:  [80] 80  BP: (122)/(83) 122/83  Body mass index is 43.19 kg/m².     Exam:  General Appearance:  WDWN    HEENT:   without obvious abnormality,  Conjunctiva/corneas clear,  Normal external ear canals, no drainage    Clear orsalmucosa,  Mallampati score 3    Neck:  Supple, symmetrical, trachea midline. No JVD.  Lungs:   Bilateral basal rhonchi bilaterally, respirations unlabored symmetrical wall movement.    Chest wall:  No tenderness or deformity.    Heart:  Regular rate and rhythm, S1 and S2 normal.  Extremities: Trace edema no clubbing or Cyanosis        Data Review:  All labs (24hrs): No results found for this or any previous visit (from the past 24 hour(s)).     Imaging:  [unfilled]    ASSESSMENT:  Diagnoses and all orders for this visit:    Obstructive sleep apnea, adult    Essential hypertension    Gastroesophageal reflux disease without esophagitis        PLAN:  This patient with obstructive sleep apnea, compliance is improved. Encourage to use it more frequent, I re-emphasized on pt the long and short term benefit of treating DEVAUGHN.     Treating DEVAUGHN will improve BP and GERD symptoms control        Amy Wilson MD. D, ABSM.  5/28/2019  1:31 PM

## 2019-05-29 ENCOUNTER — TELEPHONE (OUTPATIENT)
Dept: SURGERY | Facility: CLINIC | Age: 49
End: 2019-05-29

## 2019-05-29 NOTE — TELEPHONE ENCOUNTER
----- Message from Navid Zafar Jr., MD sent at 5/24/2019  5:46 PM EDT -----  Please contact this patient and let him know his stomach biopsies were fine but showed some inflammation.  He should avoid NSAIDs like ibuprofen, which includes Advil and Motrin, and Naprosyn, which includes Aleve.  He should also try to avoid aspirin.  The colon polyps were benign.  He will need a repeat colonoscopy in 5 years.  Please place him in recall for colonoscopy in 5 years.  Thanks

## 2019-05-29 NOTE — TELEPHONE ENCOUNTER
Informed patient of results and recommendations. Patient will contact PCP regarding aspirin. Recall put in computer.

## 2019-06-06 ENCOUNTER — OFFICE VISIT (OUTPATIENT)
Dept: CARDIOLOGY | Facility: CLINIC | Age: 49
End: 2019-06-06

## 2019-06-06 VITALS
DIASTOLIC BLOOD PRESSURE: 90 MMHG | HEIGHT: 70 IN | HEART RATE: 97 BPM | WEIGHT: 295 LBS | BODY MASS INDEX: 42.23 KG/M2 | SYSTOLIC BLOOD PRESSURE: 132 MMHG | OXYGEN SATURATION: 99 %

## 2019-06-06 DIAGNOSIS — I10 ESSENTIAL HYPERTENSION: Primary | ICD-10-CM

## 2019-06-06 DIAGNOSIS — R55 SYNCOPE, UNSPECIFIED SYNCOPE TYPE: ICD-10-CM

## 2019-06-06 PROCEDURE — 93000 ELECTROCARDIOGRAM COMPLETE: CPT | Performed by: INTERNAL MEDICINE

## 2019-06-06 PROCEDURE — 99214 OFFICE O/P EST MOD 30 MIN: CPT | Performed by: INTERNAL MEDICINE

## 2019-06-06 RX ORDER — ERGOCALCIFEROL 1.25 MG/1
50000 CAPSULE ORAL WEEKLY
Status: ON HOLD | COMMUNITY
Start: 2019-04-30 | End: 2021-06-01

## 2019-06-06 RX ORDER — ALLOPURINOL 100 MG/1
100 TABLET ORAL DAILY
COMMUNITY
Start: 2019-03-14 | End: 2020-03-13

## 2019-06-06 RX ORDER — PREDNISONE 20 MG/1
20 TABLET ORAL DAILY
COMMUNITY
Start: 2019-06-05 | End: 2019-09-03 | Stop reason: HOSPADM

## 2019-06-06 NOTE — PROGRESS NOTES
Date of Office Visit: 2019  Encounter Provider: Víctor Conteh MD  Place of Service: [facility] CARDIOLOGY  Patient Name: Ronni Asif  :1970      Chief Complaint   Patient presents with   • Hypertension     6 mos follow up     History of Present Illness  The patient is a 49-year-old white male with a complex history including diabetes mellitus, hypertension and intermittent syncope.  He has had multiple evaluations without any specific etiology of the syncope found.  He even had a complete evaluation at [facility].  The suspicion now is that he may have autonomic dysfunction secondary to his diabetes.  He struggles with control.  He is also moderately overweight and working to control this as well.  He still uses clonidine for control of blood pressure.    Past Medical History:   Diagnosis Date   • Anxiety    • Asthma    • Cancer (CMS/HCC)     skin   • Diabetes mellitus (CMS/HCC)    • Dizziness    • GERD (gastroesophageal reflux disease)    • Headache    • Hypertension    • Injury of back 2016    Pt fell 20 feet    • Kidney stone    • Neuromuscular disorder (CMS/HCC)    • Pneumonia    • Sleep apnea     cpap   • Stroke (CMS/HCC)     tia   • Syncope and collapse          Past Surgical History:   Procedure Laterality Date   • COLONOSCOPY     • COLONOSCOPY N/A 2019    Procedure: COLONOSCOPY to Cecum with Hot and Cold Polypectomy x 2;  Surgeon: Navid Zafar Jr., MD;  Location: [facility] ENDOSCOPY;  Service: General   • ENDOSCOPY N/A 2019    Procedure: ESOPHAGOGASTRODUODENOSCOPY with Bx's;  Surgeon: Navid Zafar Jr., MD;  Location: [facility] ENDOSCOPY;  Service: General   • HERNIA REPAIR  10/2015           Current Outpatient Medications:   •  albuterol (2.5 MG/3ML) 0.083% nebulizer solution 3 mL, albuterol (5 MG/ML) 0.5% nebulizer solution 0.5 mL, Inhale 4 (four) times a day as needed., Disp: , Rfl:   •  albuterol (PROVENTIL HFA;VENTOLIN  HFA) 108 (90 BASE) MCG/ACT inhaler, Inhale 2 puffs every 4 (four) hours as needed for wheezing., Disp: , Rfl:   •  allopurinol (ZYLOPRIM) 100 MG tablet, Take 100 mg by mouth Daily., Disp: , Rfl:   •  ANDROGEL PUMP 20.25 MG/ACT (1.62%) gel, APPLY 2 PUMPS DAILY AS DIRECTED, Disp: , Rfl: 0  •  ASPIRIN LOW DOSE 81 MG chewable tablet, Chew 1 tablet Daily., Disp: , Rfl: 4  •  atorvastatin (LIPITOR) 20 MG tablet, Take 20 mg by mouth Daily., Disp: , Rfl:   •  CloNIDine (CATAPRES) 0.1 MG tablet, Take 0.1 mg by mouth 2 (Two) Times a Day., Disp: , Rfl:   •  cyclobenzaprine (FLEXERIL) 5 MG tablet, TAKE 1 TABLET BY MOUTH EVERY NIGHT AT BEDTIME AS NEEDED, Disp: 30 tablet, Rfl: 0  •  Dulaglutide 0.75 MG/0.5ML solution pen-injector, Inject  under the skin into the appropriate area as directed., Disp: , Rfl:   •  fluticasone-salmeterol (ADVAIR DISKUS) 250-50 MCG/DOSE DISKUS, INL 1 PUFF ITL BID, Disp: , Rfl:   •  gabapentin (NEURONTIN) 300 MG capsule, Take 1 capsule by mouth 3 (Three) Times a Day., Disp: 90 capsule, Rfl: 5  •  HYDROcodone-acetaminophen (NORCO)  MG per tablet, Take 1 tablet by mouth 2 (two) times a day., Disp: , Rfl:   •  metFORMIN ER (GLUCOPHAGE-XR) 500 MG 24 hr tablet, Take 2 tablets by mouth 2 (Two) Times a Day., Disp: , Rfl: 1  •  pantoprazole (PROTONIX) 40 MG EC tablet, Take 40 mg by mouth Daily., Disp: , Rfl:   •  predniSONE (DELTASONE) 20 MG tablet, Take 20 mg by mouth Daily., Disp: , Rfl:   •  vitamin D (ERGOCALCIFEROL) 49012 units capsule capsule, Take 50,000 Units by mouth 1 (One) Time Per Week., Disp: , Rfl:       Social History     Socioeconomic History   • Marital status:      Spouse name: Not on file   • Number of children: Not on file   • Years of education: Not on file   • Highest education level: Not on file   Tobacco Use   • Smoking status: Never Smoker   • Smokeless tobacco: Never Used   • Tobacco comment: no caffeine    Substance and Sexual Activity   • Alcohol use: No   • Drug use:  "No   • Sexual activity: Defer         Review of Systems   Constitution: Negative.   HENT: Negative.    Eyes: Negative.    Cardiovascular: Positive for syncope.   Respiratory: Negative.    Endocrine: Negative.    Skin: Negative.    Musculoskeletal: Negative.    Gastrointestinal: Negative.    Neurological: Positive for light-headedness.   Psychiatric/Behavioral: Negative.        Procedures      ECG 12 Lead  Date/Time: 6/6/2019 12:06 PM  Performed by: Víctor Conteh MD  Authorized by: Víctor Conteh MD   Comparison: compared with previous ECG from 10/4/2018  Similar to previous ECG  Rhythm: sinus rhythm  Rate: normal  Conduction: conduction normal  QRS axis: normal  Other findings: non-specific ST-T wave changes                Objective:    /90 (BP Location: Left arm, Patient Position: Sitting, Cuff Size: Large Adult)   Pulse 97   Ht 177.8 cm (70\")   Wt 134 kg (295 lb)   SpO2 99%   BMI 42.33 kg/m²         Physical Exam   Constitutional: He is oriented to person, place, and time. He appears well-developed and well-nourished.   HENT:   Head: Normocephalic.   Eyes: Pupils are equal, round, and reactive to light.   Neck: Normal range of motion. No JVD present. Carotid bruit is not present. No thyromegaly present.   Cardiovascular: Normal rate, regular rhythm, S1 normal, S2 normal, normal heart sounds and intact distal pulses. Exam reveals no gallop and no friction rub.   No murmur heard.  Pulmonary/Chest: Effort normal and breath sounds normal.   Abdominal: Soft. Bowel sounds are normal.   Musculoskeletal: He exhibits no edema.   Neurological: He is alert and oriented to person, place, and time.   Skin: Skin is warm, dry and intact. No erythema.   Psychiatric: He has a normal mood and affect.   Vitals reviewed.          Assessment:       Diagnosis Plan   1. Essential hypertension     2. Syncope, unspecified syncope type       1.  Hypertension: Controlled at present  2.  Syncope: Uncertain etiology.  " This may be related to autonomic dysfunction   3.  Diabetes mellitus: The patient is struggling with control.  He is working with an endocrinologist.  There is a suggestion also that he be evaluated at the [facility] for possible autonomic dysfunction and for diabetes control.  I am supportive of that  4.  Overweight: He is working diligently to try to get his weight reduced.    The patient did ask me about using medication such as Viagra.  Based on the fact that he does not have coronary disease and is not on nitrates it would be acceptable from a cardiology standpoint to use these products.  Plan:

## 2019-06-11 RX ORDER — CLONIDINE HYDROCHLORIDE 0.1 MG/1
0.1 TABLET ORAL 2 TIMES DAILY
Qty: 60 TABLET | Refills: 5 | Status: SHIPPED | OUTPATIENT
Start: 2019-06-11 | End: 2021-05-13

## 2019-06-11 RX ORDER — ATORVASTATIN CALCIUM 20 MG/1
20 TABLET, FILM COATED ORAL DAILY
Qty: 30 TABLET | Refills: 5 | Status: SHIPPED | OUTPATIENT
Start: 2019-06-11 | End: 2019-12-20 | Stop reason: DRUGHIGH

## 2019-07-05 RX ORDER — GABAPENTIN 300 MG/1
CAPSULE ORAL
Qty: 90 CAPSULE | Refills: 0 | Status: SHIPPED | OUTPATIENT
Start: 2019-07-05 | End: 2019-08-02 | Stop reason: SDUPTHER

## 2019-07-05 NOTE — TELEPHONE ENCOUNTER
Was due to be seen in Feb 2019 and was not- he does have appt in November here for follow up.  He was last seen in Aug 2018    Ok to refill?

## 2019-07-23 ENCOUNTER — APPOINTMENT (OUTPATIENT)
Dept: SLEEP MEDICINE | Facility: HOSPITAL | Age: 49
End: 2019-07-23
Attending: INTERNAL MEDICINE

## 2019-08-02 RX ORDER — GABAPENTIN 300 MG/1
CAPSULE ORAL
Qty: 90 CAPSULE | Refills: 0 | Status: SHIPPED | OUTPATIENT
Start: 2019-08-02 | End: 2019-09-05 | Stop reason: SDUPTHER

## 2019-08-02 NOTE — TELEPHONE ENCOUNTER
Ok to refill?  He has follow up appt to see Dr SHAFFER in November-  He is seeing Dr Quinn for pain mgmt, but Dr SHAFFER has been writing the script for Gabapentin

## 2019-08-05 NOTE — TELEPHONE ENCOUNTER
Tried to call patient at the only # we have and there was no answer and his voicemail is not set up. He is wanting refill on Flexeril which Eli prescribed in May, he has not been seen since 2/2019 and has fu appt in Nove 2019    Needing to know why he wants a refill on Flexeril?  Is he having problems and if so what are his symptoms.    He is also seeing Dr Quinn for pain management    Eli, do you want to go ahead and refill or wait and see if he calls back

## 2019-08-06 RX ORDER — CYCLOBENZAPRINE HCL 5 MG
TABLET ORAL
Qty: 60 TABLET | Refills: 0 | Status: SHIPPED | OUTPATIENT
Start: 2019-08-06 | End: 2019-10-11 | Stop reason: ALTCHOICE

## 2019-08-06 NOTE — TELEPHONE ENCOUNTER
Wait and see he calls back. Try to make contact with him again tomorrow. He has not been seen for 6 months.

## 2019-08-06 NOTE — TELEPHONE ENCOUNTER
Spoke with patient , he said he has had increased low back and left posterior leg pain in past month. He said he twisted somehow.  He denies leg weakness.  He is seeing Dr Quinn for pain management.  He last had AREN in June, but due to insurance he cannot have another one just yet as it is too soon.    He is due to see Dr SHAFFER again in November, does he need to be seen again, ok to refill or does he need to try and get from Dr Quinn office?    Please advise

## 2019-08-12 ENCOUNTER — HOSPITAL ENCOUNTER (EMERGENCY)
Facility: HOSPITAL | Age: 49
Discharge: HOME | End: 2019-08-12
Attending: EMERGENCY MEDICINE | Admitting: EMERGENCY MEDICINE

## 2019-08-12 ENCOUNTER — TELEPHONE (OUTPATIENT)
Dept: NEUROSURGERY | Facility: CLINIC | Age: 49
End: 2019-08-12

## 2019-08-12 ENCOUNTER — APPOINTMENT (OUTPATIENT)
Dept: GENERAL RADIOLOGY | Facility: HOSPITAL | Age: 49
End: 2019-08-12

## 2019-08-12 VITALS
BODY MASS INDEX: 42.95 KG/M2 | OXYGEN SATURATION: 96 % | WEIGHT: 300 LBS | RESPIRATION RATE: 18 BRPM | SYSTOLIC BLOOD PRESSURE: 132 MMHG | DIASTOLIC BLOOD PRESSURE: 95 MMHG | HEIGHT: 70 IN | TEMPERATURE: 97.1 F | HEART RATE: 87 BPM

## 2019-08-12 DIAGNOSIS — M54.16 SUBACUTE LEFT LUMBAR RADICULOPATHY: Primary | ICD-10-CM

## 2019-08-12 PROCEDURE — 99283 EMERGENCY DEPT VISIT LOW MDM: CPT

## 2019-08-12 PROCEDURE — 99282 EMERGENCY DEPT VISIT SF MDM: CPT | Performed by: EMERGENCY MEDICINE

## 2019-08-12 PROCEDURE — 72100 X-RAY EXAM L-S SPINE 2/3 VWS: CPT

## 2019-08-12 NOTE — TELEPHONE ENCOUNTER
Patient called today with severe back pain that radiates into his left leg. It is similar to the right side pain he was having at his last visit. He was debating on going to the ER his pain is so severe. I told him if his is so severe he cannot tolerate it he can go to the ER. I told him we can also schedule him an appointment with a midlevel provider, he will call back if he wants an appointment and he might go to the ER.

## 2019-08-12 NOTE — ED PROVIDER NOTES
Subjective     History provided by:  Patient    History of Present Illness    · Chief complaint: Low back pain    · Location: Left lower back radiating posteriorly behind the left hip and down the left thigh to the left knee    · Quality/Severity: The pain is sharp in character and moderate in intensity.    · Timing/Onset: Started a month ago.    · Modifying Factors: The pain is exacerbated by sitting and standing.  Pain is better with laying supine.    · Associated symptoms: He reports some numbness and tingling around his left knee.  He denies any saddle anesthesia or incontinence.    · Narrative: The patient is a 49-year-old white male complaining of a one-month history of left low back pain that radiates down his left lower extremity to his left knee.  He has associated numbness and tingling in the left knee.  He denies any saddle anesthesia or bowel or bladder incontinence.  The patient has a history of degenerative disc disease affecting the right side of his body for which he is under the care of Dr. Tripathi and in pain management with Dr. Marc Quinn.  He states he normally gets epidurals about every 3 months he has had on for the last 3 to 4 years.  He currently takes Flexeril and gabapentin and hydrocodone for his back.  He is a type II diabetic and states that steroids in the past and shot his blood sugar into the 400s.    Review of Systems   Constitutional: Negative for activity change, appetite change, chills, diaphoresis, fatigue and fever.   HENT: Negative for congestion, dental problem, ear pain, hearing loss, mouth sores, postnasal drip, rhinorrhea, sinus pressure, sore throat and voice change.    Eyes: Negative for photophobia, pain, discharge, redness and visual disturbance.   Respiratory: Negative for cough, chest tightness, shortness of breath, wheezing and stridor.    Cardiovascular: Negative for chest pain, palpitations and leg swelling.   Gastrointestinal: Negative for abdominal pain,  "diarrhea, nausea and vomiting.   Genitourinary: Negative for difficulty urinating, dysuria, flank pain, frequency, hematuria and urgency.   Musculoskeletal: Positive for back pain. Negative for arthralgias, gait problem, joint swelling, myalgias, neck pain and neck stiffness.   Skin: Negative for color change and rash.   Neurological: Negative for dizziness, tremors, seizures, syncope, facial asymmetry, speech difficulty, weakness, light-headedness, numbness and headaches.   Hematological: Negative for adenopathy.   Psychiatric/Behavioral: Negative.  Negative for confusion and decreased concentration. The patient is not nervous/anxious.      Past Medical History:   Diagnosis Date   • Anxiety    • Asthma    • Cancer (CMS/HCC)     skin   • Diabetes mellitus (CMS/HCC)    • Dizziness    • GERD (gastroesophageal reflux disease)    • Headache    • Hypertension    • Injury of back 03/2016    Pt fell 20 feet    • Kidney stone    • Neuromuscular disorder (CMS/HCC)    • Pneumonia    • Sleep apnea     cpap   • Stroke (CMS/HCC)     tia   • Syncope and collapse      /80 (BP Location: Left arm, Patient Position: Standing)   Pulse 96   Temp 98.1 °F (36.7 °C) (Oral)   Resp 18   Ht 177.8 cm (70\")   Wt 136 kg (300 lb)   SpO2 95%   BMI 43.05 kg/m²     Past Medical History:   Diagnosis Date   • Anxiety    • Asthma    • Cancer (CMS/HCC)     skin   • Diabetes mellitus (CMS/HCC)    • Dizziness    • GERD (gastroesophageal reflux disease)    • Headache    • Hypertension    • Injury of back 03/2016    Pt fell 20 feet    • Kidney stone    • Neuromuscular disorder (CMS/HCC)    • Pneumonia    • Sleep apnea     cpap   • Stroke (CMS/HCC)     tia   • Syncope and collapse        No Known Allergies    Past Surgical History:   Procedure Laterality Date   • COLONOSCOPY  2014   • COLONOSCOPY N/A 5/23/2019    Procedure: COLONOSCOPY to Cecum with Hot and Cold Polypectomy x 2;  Surgeon: Navid Zafar Jr., MD;  Location: [facility] ENDOSCOPY;  " Service: General   • ENDOSCOPY N/A 5/23/2019    Procedure: ESOPHAGOGASTRODUODENOSCOPY with Bx's;  Surgeon: Navid Zafar Jr., MD;  Location: [facility] ENDOSCOPY;  Service: General   • HERNIA REPAIR  10/2015       Family History   Problem Relation Age of Onset   • Brain cancer Father    • Stroke Father    • Hypertension Father    • Atrial fibrillation Father    • Hypertension Mother    • Diabetes Mother    • Heart disease Mother    • Hypertension Brother    • Colon cancer Maternal Grandmother    • Lung cancer Maternal Grandfather    • Bone cancer Maternal Grandfather        Social History     Socioeconomic History   • Marital status:      Spouse name: Not on file   • Number of children: Not on file   • Years of education: Not on file   • Highest education level: Not on file   Tobacco Use   • Smoking status: Never Smoker   • Smokeless tobacco: Never Used   • Tobacco comment: no caffeine    Substance and Sexual Activity   • Alcohol use: No   • Drug use: No   • Sexual activity: Defer           Objective   Physical Exam   Constitutional: He is oriented to person, place, and time. He appears well-developed and well-nourished. No distress.   The patient is obese.  He appears in no acute distress.  Review of his vital signs: He is afebrile temperature 98.1, mildly tachycardic with a heart rate of 96, blood pressure normal 118/80, respirations normal 18 with a normal oxygen saturation of 95% on room air.   HENT:   Head: Normocephalic and atraumatic.   Nose: Nose normal.   Mouth/Throat: Oropharynx is clear and moist. No oropharyngeal exudate.   Eyes: EOM are normal. Pupils are equal, round, and reactive to light. Right eye exhibits no discharge. Left eye exhibits no discharge. No scleral icterus.   Neck: Normal range of motion. Neck supple. No JVD present. No thyromegaly present.   Cardiovascular: Normal rate, regular rhythm and normal heart sounds.   No murmur heard.  Pulmonary/Chest: Effort normal and breath sounds  normal. He has no wheezes. He has no rales. He exhibits no tenderness.   Abdominal: Soft. Bowel sounds are normal. He exhibits no distension. There is no tenderness.   Musculoskeletal: Normal range of motion. He exhibits no edema, tenderness or deformity.   No tenderness or deformity to palpation of the thoracic or lumbar spine.   Lymphadenopathy:     He has no cervical adenopathy.   Neurological: He is alert and oriented to person, place, and time. No cranial nerve deficit or sensory deficit. He exhibits normal muscle tone. Coordination normal.   No focal motor sensory deficit.  He has good sensation and strength in his left lower extremity.   Skin: Skin is warm and dry. Capillary refill takes less than 2 seconds. No rash noted. He is not diaphoretic.   Psychiatric: He has a normal mood and affect. His behavior is normal. Judgment and thought content normal.   Nursing note and vitals reviewed.      Procedures           ED Course  ED Course as of Aug 12 1619   Mon Aug 12, 2019   1457 Andrey Report [id number]  [TP]   1601 The patient's x-rays of the lumbar spine show degenerative changes only.  [TP]   1609 The patient has a new left lumbar radiculopathy.  He is instructed to make an appointment to follow-up with his PCP or Dr. Tripathi to schedule a lumbar MRI to further evaluate it.  He is in pain management, so he will have to contact Dr. Rasmussen for any additional pain prescriptions.  [TP]      ED Course User Index  [TP] Porfirio Yusuf MD                  MDM  Number of Diagnoses or Management Options  Subacute left lumbar radiculopathy: new and requires workup     Amount and/or Complexity of Data Reviewed  Tests in the radiology section of CPT®: ordered and reviewed  Independent visualization of images, tracings, or specimens: yes    Risk of Complications, Morbidity, and/or Mortality  Presenting problems: moderate  Diagnostic procedures: moderate  Management options: moderate  General comments: My differential  diagnosis for back pain includes but is not limited to:  Musculoskeletal strain, contusion, retroperitoneal hematoma, disc protrusion, vertebral fracture, transverse process fracture, rib fracture, facet syndrome, sacroiliac joint strain, sciatica, renal injury, splenic injury, pancreatic injury, osteoarthritis, lumbar spondylosis, spinal stenosis, ankylosing spondylitis, sacroiliac joint inflammation, pancreatitis, perforated peptic ulcer, diverticulitis, endometriosis, chronic PID, epidural abscess, osteomyelitis, retroperitoneal abscess, pyelonephritis, pneumonia, subphrenic abscess, tuberculosis, neurofibroma, meningioma, multiple myeloma, lymphoma, metastatic cancer, primary cancer, AAA, aortic dissection, spinal ischemia, referred pain, ureterolithiasis    Patient Progress  Patient progress: stable        Final diagnoses:   Subacute left lumbar radiculopathy           Labs Reviewed - No data to display  XR Spine Lumbar 2 or 3 View   ED Interpretation   Degenerative changes of the lumbar spine. No acute findings. FINDINGS   are similar to mildly progressed compared to the prior study.       This report was finalized on 8/12/2019 3:30 PM by Dr. Rl Boucher MD.          Final Result   Degenerative changes of the lumbar spine. No acute findings. FINDINGS   are similar to mildly progressed compared to the prior study.       This report was finalized on 8/12/2019 3:30 PM by Dr. Rl Boucher MD.                 Medication List      No changes were made to your prescriptions during this visit.            Porfirio Yusuf MD  08/12/19 1906

## 2019-08-19 ENCOUNTER — DOCUMENTATION (OUTPATIENT)
Dept: NEUROSURGERY | Facility: CLINIC | Age: 49
End: 2019-08-19

## 2019-08-19 ENCOUNTER — TELEPHONE (OUTPATIENT)
Dept: NEUROSURGERY | Facility: CLINIC | Age: 49
End: 2019-08-19

## 2019-08-19 NOTE — TELEPHONE ENCOUNTER
Dr SHAFFER spoke with physician.  Patient now has a foot drop.  Patient will call office when he comes back in town for follow up appt and bring any images that they have done there with him    I called and spoke with pt and made appt for him for 8.26.19 at 10 am- he has his reports and disc from hospital where he is and will bring those with him

## 2019-08-19 NOTE — TELEPHONE ENCOUNTER
Dr SHAFFER called this number and had to leave a message.  He left his cell phone number for the doctor to call

## 2019-08-19 NOTE — PROGRESS NOTES
I spoke with a neurosurgeon who saw him in [place].  He was therefore a  and began having weakness and pain in his leg as well as a foot drop.  He is diabetic and he was put on steroids so his sugars were elevated but he is a bit better and he wants to come home to be seen and possibly undergo surgical treatment.  He has an L5-S1 isthmic spondylolisthesis.  We will see him when he gets back from being out of town.

## 2019-08-19 NOTE — TELEPHONE ENCOUNTER
Patient called and wanted to know if Dr. Tripathi will call Dr. Ferrell (chief marli) at [facility] in [place], [place] at phone number [phone].  The patient went out of town for a  and ended up in the hospital.  Dr. Ferrell is wanting to do surgery and the patient is wanting to come back to [place], [place] but they will not discharge him until they speak with Dr. Tripathi.  The patient is going to have the hospital fax the imaging reports to our office.  The patient is scheduled to see you on 19.

## 2019-08-21 ENCOUNTER — TELEPHONE (OUTPATIENT)
Dept: NEUROSURGERY | Facility: CLINIC | Age: 49
End: 2019-08-21

## 2019-08-21 RX ORDER — DEXAMETHASONE 2 MG/1
2 TABLET ORAL 2 TIMES DAILY
Qty: 14 TABLET | Refills: 0 | Status: SHIPPED | OUTPATIENT
Start: 2019-08-21 | End: 2019-09-03 | Stop reason: HOSPADM

## 2019-08-21 NOTE — TELEPHONE ENCOUNTER
Spoke with patient and let him know that Dr SHAFFER has sent a script to his pharmacy for Dexamethasone 2 mg BID #14.    I also reminded him that his appt for 8.26.19 is here in the office to discuss surgery, he is not currently scheduled for surgery

## 2019-08-21 NOTE — TELEPHONE ENCOUNTER
Patient came in the office to drop off Medical Records/discs that he said Dr. SHAFFER wanted to see. Patient wanted to know if Dr. SHAFFER could refill a script he got from a previuos phy. ( dexamethasone 2mg tab) or a diff med. I put copy of the script with other info..Pt stated he will be running out soon and the med is for his leg and if he doesn't have it he will lose the use of his leg...the patient also said Dr. SHAFFER will be perfoming surgery on him nxt week and if he wanted to do it earlier that would be fine as he is ready.    Thanks,

## 2019-08-25 ENCOUNTER — HOSPITAL ENCOUNTER (INPATIENT)
Facility: HOSPITAL | Age: 49
LOS: 9 days | Discharge: NURSING, REHAB OR LONG-TERM CARE | End: 2019-09-03
Attending: EMERGENCY MEDICINE | Admitting: NEUROLOGICAL SURGERY

## 2019-08-25 DIAGNOSIS — M21.372 LEFT FOOT DROP: ICD-10-CM

## 2019-08-25 DIAGNOSIS — Q76.2 CONGENITAL SPONDYLOLYSIS, LUMBOSACRAL REGION: ICD-10-CM

## 2019-08-25 DIAGNOSIS — M54.16 LUMBAR RADICULOPATHY: ICD-10-CM

## 2019-08-25 DIAGNOSIS — R73.9 HYPERGLYCEMIA: Primary | ICD-10-CM

## 2019-08-25 LAB
ALBUMIN SERPL-MCNC: 4.4 G/DL (ref 3.5–5.2)
ALBUMIN/GLOB SERPL: 1.8 G/DL
ALP SERPL-CCNC: 118 U/L (ref 39–117)
ALT SERPL W P-5'-P-CCNC: 31 U/L (ref 1–41)
ANION GAP SERPL CALCULATED.3IONS-SCNC: 16.2 MMOL/L (ref 5–15)
AST SERPL-CCNC: 19 U/L (ref 1–40)
BASOPHILS # BLD AUTO: 0.03 10*3/MM3 (ref 0–0.2)
BASOPHILS NFR BLD AUTO: 0.2 % (ref 0–1.5)
BILIRUB SERPL-MCNC: 0.3 MG/DL (ref 0.2–1.2)
BUN BLD-MCNC: 17 MG/DL (ref 6–20)
BUN/CREAT SERPL: 20.5 (ref 7–25)
CALCIUM SPEC-SCNC: 9.5 MG/DL (ref 8.6–10.5)
CHLORIDE SERPL-SCNC: 100 MMOL/L (ref 98–107)
CO2 SERPL-SCNC: 21.8 MMOL/L (ref 22–29)
CREAT BLD-MCNC: 0.83 MG/DL (ref 0.76–1.27)
DEPRECATED RDW RBC AUTO: 43.8 FL (ref 37–54)
EOSINOPHIL # BLD AUTO: 0.02 10*3/MM3 (ref 0–0.4)
EOSINOPHIL NFR BLD AUTO: 0.1 % (ref 0.3–6.2)
ERYTHROCYTE [DISTWIDTH] IN BLOOD BY AUTOMATED COUNT: 14 % (ref 12.3–15.4)
GFR SERPL CREATININE-BSD FRML MDRD: 98 ML/MIN/1.73
GLOBULIN UR ELPH-MCNC: 2.4 GM/DL
GLUCOSE BLD-MCNC: 356 MG/DL (ref 65–99)
GLUCOSE BLDC GLUCOMTR-MCNC: 349 MG/DL (ref 70–130)
HCT VFR BLD AUTO: 46.9 % (ref 37.5–51)
HGB BLD-MCNC: 15.4 G/DL (ref 13–17.7)
IMM GRANULOCYTES # BLD AUTO: 0.21 10*3/MM3 (ref 0–0.05)
IMM GRANULOCYTES NFR BLD AUTO: 1.4 % (ref 0–0.5)
LYMPHOCYTES # BLD AUTO: 2.37 10*3/MM3 (ref 0.7–3.1)
LYMPHOCYTES NFR BLD AUTO: 15.4 % (ref 19.6–45.3)
MCH RBC QN AUTO: 28.2 PG (ref 26.6–33)
MCHC RBC AUTO-ENTMCNC: 32.8 G/DL (ref 31.5–35.7)
MCV RBC AUTO: 85.7 FL (ref 79–97)
MONOCYTES # BLD AUTO: 0.85 10*3/MM3 (ref 0.1–0.9)
MONOCYTES NFR BLD AUTO: 5.5 % (ref 5–12)
NEUTROPHILS # BLD AUTO: 11.91 10*3/MM3 (ref 1.7–7)
NEUTROPHILS NFR BLD AUTO: 77.4 % (ref 42.7–76)
NRBC BLD AUTO-RTO: 0 /100 WBC (ref 0–0.2)
PLATELET # BLD AUTO: 316 10*3/MM3 (ref 140–450)
PMV BLD AUTO: 10.3 FL (ref 6–12)
POTASSIUM BLD-SCNC: 4.9 MMOL/L (ref 3.5–5.2)
PROT SERPL-MCNC: 6.8 G/DL (ref 6–8.5)
RBC # BLD AUTO: 5.47 10*6/MM3 (ref 4.14–5.8)
SODIUM BLD-SCNC: 138 MMOL/L (ref 136–145)
WBC NRBC COR # BLD: 15.39 10*3/MM3 (ref 3.4–10.8)

## 2019-08-25 PROCEDURE — G0378 HOSPITAL OBSERVATION PER HR: HCPCS

## 2019-08-25 PROCEDURE — 25010000002 DEXAMETHASONE PER 1 MG: Performed by: HOSPITALIST

## 2019-08-25 PROCEDURE — 63710000001 INSULIN LISPRO (HUMAN) PER 5 UNITS: Performed by: HOSPITALIST

## 2019-08-25 PROCEDURE — 25010000002 HYDROMORPHONE PER 4 MG: Performed by: HOSPITALIST

## 2019-08-25 PROCEDURE — 82962 GLUCOSE BLOOD TEST: CPT

## 2019-08-25 PROCEDURE — 99284 EMERGENCY DEPT VISIT MOD MDM: CPT

## 2019-08-25 PROCEDURE — 85025 COMPLETE CBC W/AUTO DIFF WBC: CPT | Performed by: EMERGENCY MEDICINE

## 2019-08-25 PROCEDURE — 80053 COMPREHEN METABOLIC PANEL: CPT | Performed by: EMERGENCY MEDICINE

## 2019-08-25 PROCEDURE — 63710000001 INSULIN GLARGINE PER 5 UNITS: Performed by: HOSPITALIST

## 2019-08-25 PROCEDURE — 25010000002 DEXAMETHASONE SODIUM PHOSPHATE 20 MG/5ML SOLUTION: Performed by: HOSPITALIST

## 2019-08-25 RX ORDER — SODIUM CHLORIDE 0.9 % (FLUSH) 0.9 %
10 SYRINGE (ML) INJECTION AS NEEDED
Status: DISCONTINUED | OUTPATIENT
Start: 2019-08-25 | End: 2019-09-03 | Stop reason: HOSPADM

## 2019-08-25 RX ORDER — HYDROMORPHONE HYDROCHLORIDE 1 MG/ML
0.5 INJECTION, SOLUTION INTRAMUSCULAR; INTRAVENOUS; SUBCUTANEOUS
Status: DISCONTINUED | OUTPATIENT
Start: 2019-08-25 | End: 2019-08-30

## 2019-08-25 RX ORDER — ACETAMINOPHEN 325 MG/1
650 TABLET ORAL EVERY 4 HOURS PRN
Status: DISCONTINUED | OUTPATIENT
Start: 2019-08-25 | End: 2019-09-03 | Stop reason: HOSPADM

## 2019-08-25 RX ORDER — CYCLOBENZAPRINE HCL 10 MG
10 TABLET ORAL 3 TIMES DAILY
Status: DISCONTINUED | OUTPATIENT
Start: 2019-08-25 | End: 2019-08-25

## 2019-08-25 RX ORDER — ALLOPURINOL 100 MG/1
100 TABLET ORAL DAILY
Status: DISCONTINUED | OUTPATIENT
Start: 2019-08-25 | End: 2019-09-03 | Stop reason: HOSPADM

## 2019-08-25 RX ORDER — ALBUTEROL SULFATE 90 UG/1
2 AEROSOL, METERED RESPIRATORY (INHALATION) EVERY 4 HOURS PRN
Status: DISCONTINUED | OUTPATIENT
Start: 2019-08-25 | End: 2019-08-25 | Stop reason: CLARIF

## 2019-08-25 RX ORDER — BUDESONIDE AND FORMOTEROL FUMARATE DIHYDRATE 160; 4.5 UG/1; UG/1
2 AEROSOL RESPIRATORY (INHALATION)
Status: DISCONTINUED | OUTPATIENT
Start: 2019-08-25 | End: 2019-09-03 | Stop reason: HOSPADM

## 2019-08-25 RX ORDER — ATORVASTATIN CALCIUM 20 MG/1
20 TABLET, FILM COATED ORAL DAILY
Status: DISCONTINUED | OUTPATIENT
Start: 2019-08-25 | End: 2019-09-03 | Stop reason: HOSPADM

## 2019-08-25 RX ORDER — NALOXONE HCL 0.4 MG/ML
0.4 VIAL (ML) INJECTION
Status: DISCONTINUED | OUTPATIENT
Start: 2019-08-25 | End: 2019-08-30

## 2019-08-25 RX ORDER — ACETAMINOPHEN 160 MG/5ML
650 SOLUTION ORAL EVERY 4 HOURS PRN
Status: DISCONTINUED | OUTPATIENT
Start: 2019-08-25 | End: 2019-09-03 | Stop reason: HOSPADM

## 2019-08-25 RX ORDER — HYDROCODONE BITARTRATE AND ACETAMINOPHEN 10; 325 MG/1; MG/1
1 TABLET ORAL EVERY 6 HOURS PRN
Status: DISCONTINUED | OUTPATIENT
Start: 2019-08-25 | End: 2019-08-30

## 2019-08-25 RX ORDER — CYCLOBENZAPRINE HCL 10 MG
5 TABLET ORAL 3 TIMES DAILY PRN
Status: DISCONTINUED | OUTPATIENT
Start: 2019-08-25 | End: 2019-08-25 | Stop reason: SDUPTHER

## 2019-08-25 RX ORDER — SODIUM CHLORIDE 0.9 % (FLUSH) 0.9 %
3-10 SYRINGE (ML) INJECTION AS NEEDED
Status: DISCONTINUED | OUTPATIENT
Start: 2019-08-25 | End: 2019-09-03 | Stop reason: HOSPADM

## 2019-08-25 RX ORDER — NICOTINE POLACRILEX 4 MG
15 LOZENGE BUCCAL
Status: DISCONTINUED | OUTPATIENT
Start: 2019-08-25 | End: 2019-09-03 | Stop reason: HOSPADM

## 2019-08-25 RX ORDER — DEXTROSE MONOHYDRATE 25 G/50ML
25 INJECTION, SOLUTION INTRAVENOUS
Status: DISCONTINUED | OUTPATIENT
Start: 2019-08-25 | End: 2019-09-03 | Stop reason: HOSPADM

## 2019-08-25 RX ORDER — ALBUTEROL SULFATE 2.5 MG/3ML
2.5 SOLUTION RESPIRATORY (INHALATION) EVERY 6 HOURS PRN
Status: DISCONTINUED | OUTPATIENT
Start: 2019-08-25 | End: 2019-09-03 | Stop reason: HOSPADM

## 2019-08-25 RX ORDER — DEXAMETHASONE SODIUM PHOSPHATE 4 MG/ML
4 INJECTION, SOLUTION INTRA-ARTICULAR; INTRALESIONAL; INTRAMUSCULAR; INTRAVENOUS; SOFT TISSUE EVERY 6 HOURS
Status: DISCONTINUED | OUTPATIENT
Start: 2019-08-25 | End: 2019-08-27

## 2019-08-25 RX ORDER — ACETAMINOPHEN 650 MG/1
650 SUPPOSITORY RECTAL EVERY 4 HOURS PRN
Status: DISCONTINUED | OUTPATIENT
Start: 2019-08-25 | End: 2019-09-03 | Stop reason: HOSPADM

## 2019-08-25 RX ORDER — INSULIN GLARGINE 100 [IU]/ML
10 INJECTION, SOLUTION SUBCUTANEOUS NIGHTLY
Status: DISCONTINUED | OUTPATIENT
Start: 2019-08-25 | End: 2019-08-27

## 2019-08-25 RX ORDER — PANTOPRAZOLE SODIUM 40 MG/1
40 TABLET, DELAYED RELEASE ORAL DAILY
Status: DISCONTINUED | OUTPATIENT
Start: 2019-08-25 | End: 2019-09-03 | Stop reason: HOSPADM

## 2019-08-25 RX ORDER — CYCLOBENZAPRINE HCL 10 MG
10 TABLET ORAL 3 TIMES DAILY PRN
Status: DISCONTINUED | OUTPATIENT
Start: 2019-08-25 | End: 2019-09-03 | Stop reason: HOSPADM

## 2019-08-25 RX ORDER — CLONIDINE HYDROCHLORIDE 0.1 MG/1
0.1 TABLET ORAL 2 TIMES DAILY
Status: DISCONTINUED | OUTPATIENT
Start: 2019-08-25 | End: 2019-09-03 | Stop reason: HOSPADM

## 2019-08-25 RX ORDER — ASPIRIN 81 MG/1
81 TABLET, CHEWABLE ORAL DAILY
Status: DISCONTINUED | OUTPATIENT
Start: 2019-08-25 | End: 2019-08-26

## 2019-08-25 RX ORDER — SODIUM CHLORIDE 0.9 % (FLUSH) 0.9 %
3 SYRINGE (ML) INJECTION EVERY 12 HOURS SCHEDULED
Status: DISCONTINUED | OUTPATIENT
Start: 2019-08-25 | End: 2019-09-03 | Stop reason: HOSPADM

## 2019-08-25 RX ORDER — GABAPENTIN 300 MG/1
300 CAPSULE ORAL 3 TIMES DAILY
Status: DISCONTINUED | OUTPATIENT
Start: 2019-08-25 | End: 2019-09-03 | Stop reason: HOSPADM

## 2019-08-25 RX ADMIN — PANTOPRAZOLE SODIUM 40 MG: 40 TABLET, DELAYED RELEASE ORAL at 18:44

## 2019-08-25 RX ADMIN — ASPIRIN 81 MG: 81 TABLET, CHEWABLE ORAL at 18:44

## 2019-08-25 RX ADMIN — DEXAMETHASONE SODIUM PHOSPHATE 4 MG: 4 INJECTION, SOLUTION INTRAMUSCULAR; INTRAVENOUS at 22:18

## 2019-08-25 RX ADMIN — GABAPENTIN 300 MG: 300 CAPSULE ORAL at 20:25

## 2019-08-25 RX ADMIN — SODIUM CHLORIDE, PRESERVATIVE FREE 3 ML: 5 INJECTION INTRAVENOUS at 20:25

## 2019-08-25 RX ADMIN — HYDROMORPHONE HYDROCHLORIDE 0.5 MG: 1 INJECTION, SOLUTION INTRAMUSCULAR; INTRAVENOUS; SUBCUTANEOUS at 18:45

## 2019-08-25 RX ADMIN — HYDROMORPHONE HYDROCHLORIDE 0.5 MG: 1 INJECTION, SOLUTION INTRAMUSCULAR; INTRAVENOUS; SUBCUTANEOUS at 21:42

## 2019-08-25 RX ADMIN — CLONIDINE HYDROCHLORIDE 0.1 MG: 0.1 TABLET ORAL at 20:25

## 2019-08-25 RX ADMIN — ALLOPURINOL 100 MG: 100 TABLET ORAL at 20:25

## 2019-08-25 RX ADMIN — ATORVASTATIN CALCIUM 20 MG: 20 TABLET, FILM COATED ORAL at 20:24

## 2019-08-25 RX ADMIN — INSULIN GLARGINE 10 UNITS: 100 INJECTION, SOLUTION SUBCUTANEOUS at 20:26

## 2019-08-25 RX ADMIN — INSULIN LISPRO 12 UNITS: 100 INJECTION, SOLUTION INTRAVENOUS; SUBCUTANEOUS at 22:16

## 2019-08-25 RX ADMIN — CYCLOBENZAPRINE 5 MG: 10 TABLET, FILM COATED ORAL at 18:44

## 2019-08-25 RX ADMIN — INSULIN LISPRO 4 UNITS: 100 INJECTION, SOLUTION INTRAVENOUS; SUBCUTANEOUS at 18:44

## 2019-08-25 RX ADMIN — HYDROCODONE BITARTRATE AND ACETAMINOPHEN 1 TABLET: 10; 325 TABLET ORAL at 20:25

## 2019-08-25 RX ADMIN — DEXAMETHASONE SODIUM PHOSPHATE 4 MG: 4 INJECTION, SOLUTION INTRAMUSCULAR; INTRAVENOUS at 18:45

## 2019-08-26 ENCOUNTER — TRANSCRIBE ORDERS (OUTPATIENT)
Dept: NEUROSURGERY | Facility: CLINIC | Age: 49
End: 2019-08-26

## 2019-08-26 DIAGNOSIS — M21.372 FOOT DROP, LEFT: ICD-10-CM

## 2019-08-26 DIAGNOSIS — M51.16 LUMBAR DISC HERNIATION WITH RADICULOPATHY: Primary | ICD-10-CM

## 2019-08-26 LAB
ANION GAP SERPL CALCULATED.3IONS-SCNC: 13.7 MMOL/L (ref 5–15)
BASOPHILS # BLD AUTO: 0.03 10*3/MM3 (ref 0–0.2)
BASOPHILS NFR BLD AUTO: 0.2 % (ref 0–1.5)
BUN BLD-MCNC: 21 MG/DL (ref 6–20)
BUN/CREAT SERPL: 25 (ref 7–25)
CALCIUM SPEC-SCNC: 9.9 MG/DL (ref 8.6–10.5)
CHLORIDE SERPL-SCNC: 92 MMOL/L (ref 98–107)
CO2 SERPL-SCNC: 29.3 MMOL/L (ref 22–29)
CREAT BLD-MCNC: 0.84 MG/DL (ref 0.76–1.27)
DEPRECATED RDW RBC AUTO: 43.5 FL (ref 37–54)
EOSINOPHIL # BLD AUTO: 0 10*3/MM3 (ref 0–0.4)
EOSINOPHIL NFR BLD AUTO: 0 % (ref 0.3–6.2)
ERYTHROCYTE [DISTWIDTH] IN BLOOD BY AUTOMATED COUNT: 14.2 % (ref 12.3–15.4)
GFR SERPL CREATININE-BSD FRML MDRD: 97 ML/MIN/1.73
GLUCOSE BLD-MCNC: 246 MG/DL (ref 65–99)
GLUCOSE BLDC GLUCOMTR-MCNC: 246 MG/DL (ref 70–130)
GLUCOSE BLDC GLUCOMTR-MCNC: 258 MG/DL (ref 70–130)
GLUCOSE BLDC GLUCOMTR-MCNC: 301 MG/DL (ref 70–130)
GLUCOSE BLDC GLUCOMTR-MCNC: 324 MG/DL (ref 70–130)
GLUCOSE BLDC GLUCOMTR-MCNC: 380 MG/DL (ref 70–130)
HCT VFR BLD AUTO: 46.1 % (ref 37.5–51)
HGB BLD-MCNC: 15 G/DL (ref 13–17.7)
IMM GRANULOCYTES # BLD AUTO: 0.27 10*3/MM3 (ref 0–0.05)
IMM GRANULOCYTES NFR BLD AUTO: 1.8 % (ref 0–0.5)
LYMPHOCYTES # BLD AUTO: 1.96 10*3/MM3 (ref 0.7–3.1)
LYMPHOCYTES NFR BLD AUTO: 13.1 % (ref 19.6–45.3)
MCH RBC QN AUTO: 27.7 PG (ref 26.6–33)
MCHC RBC AUTO-ENTMCNC: 32.5 G/DL (ref 31.5–35.7)
MCV RBC AUTO: 85.2 FL (ref 79–97)
MONOCYTES # BLD AUTO: 0.51 10*3/MM3 (ref 0.1–0.9)
MONOCYTES NFR BLD AUTO: 3.4 % (ref 5–12)
NEUTROPHILS # BLD AUTO: 12.14 10*3/MM3 (ref 1.7–7)
NEUTROPHILS NFR BLD AUTO: 81.5 % (ref 42.7–76)
NRBC BLD AUTO-RTO: 0 /100 WBC (ref 0–0.2)
PLATELET # BLD AUTO: 288 10*3/MM3 (ref 140–450)
PMV BLD AUTO: 10.1 FL (ref 6–12)
POTASSIUM BLD-SCNC: 4.6 MMOL/L (ref 3.5–5.2)
RBC # BLD AUTO: 5.41 10*6/MM3 (ref 4.14–5.8)
SODIUM BLD-SCNC: 135 MMOL/L (ref 136–145)
WBC NRBC COR # BLD: 14.91 10*3/MM3 (ref 3.4–10.8)

## 2019-08-26 PROCEDURE — 94799 UNLISTED PULMONARY SVC/PX: CPT

## 2019-08-26 PROCEDURE — 25010000002 DEXAMETHASONE PER 1 MG: Performed by: HOSPITALIST

## 2019-08-26 PROCEDURE — 25010000002 DEXAMETHASONE SODIUM PHOSPHATE 20 MG/5ML SOLUTION: Performed by: HOSPITALIST

## 2019-08-26 PROCEDURE — 63710000001 INSULIN LISPRO (HUMAN) PER 5 UNITS: Performed by: HOSPITALIST

## 2019-08-26 PROCEDURE — 36415 COLL VENOUS BLD VENIPUNCTURE: CPT | Performed by: HOSPITALIST

## 2019-08-26 PROCEDURE — 85025 COMPLETE CBC W/AUTO DIFF WBC: CPT | Performed by: HOSPITALIST

## 2019-08-26 PROCEDURE — 94640 AIRWAY INHALATION TREATMENT: CPT

## 2019-08-26 PROCEDURE — 25010000002 HYDROMORPHONE PER 4 MG: Performed by: HOSPITALIST

## 2019-08-26 PROCEDURE — 99214 OFFICE O/P EST MOD 30 MIN: CPT | Performed by: NURSE PRACTITIONER

## 2019-08-26 PROCEDURE — 82962 GLUCOSE BLOOD TEST: CPT

## 2019-08-26 PROCEDURE — G0378 HOSPITAL OBSERVATION PER HR: HCPCS

## 2019-08-26 PROCEDURE — 80048 BASIC METABOLIC PNL TOTAL CA: CPT | Performed by: HOSPITALIST

## 2019-08-26 PROCEDURE — 63710000001 INSULIN GLARGINE PER 5 UNITS: Performed by: HOSPITALIST

## 2019-08-26 RX ADMIN — INSULIN LISPRO 8 UNITS: 100 INJECTION, SOLUTION INTRAVENOUS; SUBCUTANEOUS at 08:39

## 2019-08-26 RX ADMIN — INSULIN LISPRO 4 UNITS: 100 INJECTION, SOLUTION INTRAVENOUS; SUBCUTANEOUS at 08:40

## 2019-08-26 RX ADMIN — ASPIRIN 81 MG: 81 TABLET, CHEWABLE ORAL at 08:39

## 2019-08-26 RX ADMIN — DEXAMETHASONE SODIUM PHOSPHATE 4 MG: 4 INJECTION, SOLUTION INTRAMUSCULAR; INTRAVENOUS at 04:17

## 2019-08-26 RX ADMIN — GABAPENTIN 300 MG: 300 CAPSULE ORAL at 17:19

## 2019-08-26 RX ADMIN — CYCLOBENZAPRINE 10 MG: 10 TABLET, FILM COATED ORAL at 02:17

## 2019-08-26 RX ADMIN — ALLOPURINOL 100 MG: 100 TABLET ORAL at 08:39

## 2019-08-26 RX ADMIN — SODIUM CHLORIDE, PRESERVATIVE FREE 3 ML: 5 INJECTION INTRAVENOUS at 22:00

## 2019-08-26 RX ADMIN — CLONIDINE HYDROCHLORIDE 0.1 MG: 0.1 TABLET ORAL at 21:53

## 2019-08-26 RX ADMIN — HYDROMORPHONE HYDROCHLORIDE 0.5 MG: 1 INJECTION, SOLUTION INTRAMUSCULAR; INTRAVENOUS; SUBCUTANEOUS at 07:55

## 2019-08-26 RX ADMIN — ATORVASTATIN CALCIUM 20 MG: 20 TABLET, FILM COATED ORAL at 08:39

## 2019-08-26 RX ADMIN — INSULIN LISPRO 12 UNITS: 100 INJECTION, SOLUTION INTRAVENOUS; SUBCUTANEOUS at 21:52

## 2019-08-26 RX ADMIN — HYDROCODONE BITARTRATE AND ACETAMINOPHEN 1 TABLET: 10; 325 TABLET ORAL at 09:02

## 2019-08-26 RX ADMIN — GABAPENTIN 300 MG: 300 CAPSULE ORAL at 21:53

## 2019-08-26 RX ADMIN — DEXAMETHASONE SODIUM PHOSPHATE 4 MG: 4 INJECTION, SOLUTION INTRAMUSCULAR; INTRAVENOUS at 17:19

## 2019-08-26 RX ADMIN — CLONIDINE HYDROCHLORIDE 0.1 MG: 0.1 TABLET ORAL at 08:39

## 2019-08-26 RX ADMIN — PANTOPRAZOLE SODIUM 40 MG: 40 TABLET, DELAYED RELEASE ORAL at 08:39

## 2019-08-26 RX ADMIN — HYDROMORPHONE HYDROCHLORIDE 0.5 MG: 1 INJECTION, SOLUTION INTRAMUSCULAR; INTRAVENOUS; SUBCUTANEOUS at 11:59

## 2019-08-26 RX ADMIN — CYCLOBENZAPRINE 10 MG: 10 TABLET, FILM COATED ORAL at 23:33

## 2019-08-26 RX ADMIN — INSULIN LISPRO 4 UNITS: 100 INJECTION, SOLUTION INTRAVENOUS; SUBCUTANEOUS at 17:19

## 2019-08-26 RX ADMIN — HYDROMORPHONE HYDROCHLORIDE 0.5 MG: 1 INJECTION, SOLUTION INTRAMUSCULAR; INTRAVENOUS; SUBCUTANEOUS at 18:21

## 2019-08-26 RX ADMIN — INSULIN LISPRO 5 UNITS: 100 INJECTION, SOLUTION INTRAVENOUS; SUBCUTANEOUS at 17:19

## 2019-08-26 RX ADMIN — DEXAMETHASONE SODIUM PHOSPHATE 4 MG: 4 INJECTION, SOLUTION INTRAMUSCULAR; INTRAVENOUS at 23:26

## 2019-08-26 RX ADMIN — BUDESONIDE AND FORMOTEROL FUMARATE DIHYDRATE 2 PUFF: 160; 4.5 AEROSOL RESPIRATORY (INHALATION) at 20:12

## 2019-08-26 RX ADMIN — CYCLOBENZAPRINE 10 MG: 10 TABLET, FILM COATED ORAL at 12:00

## 2019-08-26 RX ADMIN — GABAPENTIN 300 MG: 300 CAPSULE ORAL at 08:39

## 2019-08-26 RX ADMIN — SODIUM CHLORIDE, PRESERVATIVE FREE 3 ML: 5 INJECTION INTRAVENOUS at 08:40

## 2019-08-26 RX ADMIN — INSULIN LISPRO 4 UNITS: 100 INJECTION, SOLUTION INTRAVENOUS; SUBCUTANEOUS at 12:00

## 2019-08-26 RX ADMIN — HYDROCODONE BITARTRATE AND ACETAMINOPHEN 1 TABLET: 10; 325 TABLET ORAL at 23:33

## 2019-08-26 RX ADMIN — INSULIN GLARGINE 10 UNITS: 100 INJECTION, SOLUTION SUBCUTANEOUS at 21:52

## 2019-08-26 RX ADMIN — HYDROCODONE BITARTRATE AND ACETAMINOPHEN 1 TABLET: 10; 325 TABLET ORAL at 02:17

## 2019-08-26 RX ADMIN — INSULIN LISPRO 10 UNITS: 100 INJECTION, SOLUTION INTRAVENOUS; SUBCUTANEOUS at 12:00

## 2019-08-26 RX ADMIN — DEXAMETHASONE SODIUM PHOSPHATE 4 MG: 4 INJECTION, SOLUTION INTRAMUSCULAR; INTRAVENOUS at 11:59

## 2019-08-27 ENCOUNTER — APPOINTMENT (OUTPATIENT)
Dept: GENERAL RADIOLOGY | Facility: HOSPITAL | Age: 49
End: 2019-08-27

## 2019-08-27 ENCOUNTER — APPOINTMENT (OUTPATIENT)
Dept: CT IMAGING | Facility: HOSPITAL | Age: 49
End: 2019-08-27

## 2019-08-27 LAB
ANION GAP SERPL CALCULATED.3IONS-SCNC: 14.6 MMOL/L (ref 5–15)
BILIRUB UR QL STRIP: NEGATIVE
BUN BLD-MCNC: 21 MG/DL (ref 6–20)
BUN/CREAT SERPL: 24.7 (ref 7–25)
CALCIUM SPEC-SCNC: 9.4 MG/DL (ref 8.6–10.5)
CHLORIDE SERPL-SCNC: 93 MMOL/L (ref 98–107)
CLARITY UR: CLEAR
CO2 SERPL-SCNC: 25.4 MMOL/L (ref 22–29)
COLOR UR: YELLOW
CREAT BLD-MCNC: 0.85 MG/DL (ref 0.76–1.27)
DEPRECATED RDW RBC AUTO: 42.5 FL (ref 37–54)
ERYTHROCYTE [DISTWIDTH] IN BLOOD BY AUTOMATED COUNT: 13.8 % (ref 12.3–15.4)
GFR SERPL CREATININE-BSD FRML MDRD: 96 ML/MIN/1.73
GLUCOSE BLD-MCNC: 312 MG/DL (ref 65–99)
GLUCOSE BLDC GLUCOMTR-MCNC: 230 MG/DL (ref 70–130)
GLUCOSE BLDC GLUCOMTR-MCNC: 293 MG/DL (ref 70–130)
GLUCOSE BLDC GLUCOMTR-MCNC: 311 MG/DL (ref 70–130)
GLUCOSE BLDC GLUCOMTR-MCNC: 320 MG/DL (ref 70–130)
GLUCOSE BLDC GLUCOMTR-MCNC: 364 MG/DL (ref 70–130)
GLUCOSE UR STRIP-MCNC: ABNORMAL MG/DL
HCT VFR BLD AUTO: 44.3 % (ref 37.5–51)
HGB BLD-MCNC: 14.8 G/DL (ref 13–17.7)
HGB UR QL STRIP.AUTO: NEGATIVE
INR PPP: 1.04 (ref 0.9–1.1)
KETONES UR QL STRIP: ABNORMAL
LEUKOCYTE ESTERASE UR QL STRIP.AUTO: NEGATIVE
MAGNESIUM SERPL-MCNC: 2.5 MG/DL (ref 1.6–2.6)
MCH RBC QN AUTO: 28.5 PG (ref 26.6–33)
MCHC RBC AUTO-ENTMCNC: 33.4 G/DL (ref 31.5–35.7)
MCV RBC AUTO: 85.2 FL (ref 79–97)
NITRITE UR QL STRIP: NEGATIVE
PH UR STRIP.AUTO: 6 [PH] (ref 5–8)
PLATELET # BLD AUTO: 281 10*3/MM3 (ref 140–450)
PMV BLD AUTO: 9.9 FL (ref 6–12)
POTASSIUM BLD-SCNC: 4.4 MMOL/L (ref 3.5–5.2)
PROT UR QL STRIP: NEGATIVE
PROTHROMBIN TIME: 13.3 SECONDS (ref 11.7–14.2)
RBC # BLD AUTO: 5.2 10*6/MM3 (ref 4.14–5.8)
SODIUM BLD-SCNC: 133 MMOL/L (ref 136–145)
SP GR UR STRIP: >=1.03 (ref 1–1.03)
UROBILINOGEN UR QL STRIP: ABNORMAL
WBC NRBC COR # BLD: 15.9 10*3/MM3 (ref 3.4–10.8)

## 2019-08-27 PROCEDURE — 63710000001 INSULIN GLARGINE PER 5 UNITS: Performed by: INTERNAL MEDICINE

## 2019-08-27 PROCEDURE — 83735 ASSAY OF MAGNESIUM: CPT | Performed by: INTERNAL MEDICINE

## 2019-08-27 PROCEDURE — 62304 MYELOGRAPHY LUMBAR INJECTION: CPT

## 2019-08-27 PROCEDURE — 72240 MYELOGRAPHY NECK SPINE: CPT

## 2019-08-27 PROCEDURE — B01B1ZZ FLUOROSCOPY OF SPINAL CORD USING LOW OSMOLAR CONTRAST: ICD-10-PCS | Performed by: RADIOLOGY

## 2019-08-27 PROCEDURE — 72114 X-RAY EXAM L-S SPINE BENDING: CPT

## 2019-08-27 PROCEDURE — G0378 HOSPITAL OBSERVATION PER HR: HCPCS

## 2019-08-27 PROCEDURE — 82962 GLUCOSE BLOOD TEST: CPT

## 2019-08-27 PROCEDURE — 25010000003 LIDOCAINE 1 % SOLUTION: Performed by: INTERNAL MEDICINE

## 2019-08-27 PROCEDURE — 85610 PROTHROMBIN TIME: CPT | Performed by: PHYSICIAN ASSISTANT

## 2019-08-27 PROCEDURE — 94799 UNLISTED PULMONARY SVC/PX: CPT

## 2019-08-27 PROCEDURE — 72132 CT LUMBAR SPINE W/DYE: CPT

## 2019-08-27 PROCEDURE — 63710000001 INSULIN LISPRO (HUMAN) PER 5 UNITS: Performed by: HOSPITALIST

## 2019-08-27 PROCEDURE — 0 IOPAMIDOL 41 % SOLUTION: Performed by: INTERNAL MEDICINE

## 2019-08-27 PROCEDURE — 25010000002 DEXAMETHASONE SODIUM PHOSPHATE 20 MG/5ML SOLUTION: Performed by: PHYSICIAN ASSISTANT

## 2019-08-27 PROCEDURE — 85027 COMPLETE CBC AUTOMATED: CPT | Performed by: INTERNAL MEDICINE

## 2019-08-27 PROCEDURE — 25010000002 HYDROMORPHONE PER 4 MG: Performed by: HOSPITALIST

## 2019-08-27 PROCEDURE — 25010000002 DEXAMETHASONE SODIUM PHOSPHATE 20 MG/5ML SOLUTION: Performed by: HOSPITALIST

## 2019-08-27 PROCEDURE — 80048 BASIC METABOLIC PNL TOTAL CA: CPT | Performed by: INTERNAL MEDICINE

## 2019-08-27 PROCEDURE — 81003 URINALYSIS AUTO W/O SCOPE: CPT | Performed by: PHYSICIAN ASSISTANT

## 2019-08-27 RX ORDER — DEXAMETHASONE SODIUM PHOSPHATE 4 MG/ML
2 INJECTION, SOLUTION INTRA-ARTICULAR; INTRALESIONAL; INTRAMUSCULAR; INTRAVENOUS; SOFT TISSUE EVERY 6 HOURS
Status: DISCONTINUED | OUTPATIENT
Start: 2019-08-27 | End: 2019-08-30

## 2019-08-27 RX ORDER — LIDOCAINE HYDROCHLORIDE 10 MG/ML
10 INJECTION, SOLUTION INFILTRATION; PERINEURAL ONCE
Status: COMPLETED | OUTPATIENT
Start: 2019-08-27 | End: 2019-08-27

## 2019-08-27 RX ORDER — INSULIN GLARGINE 100 [IU]/ML
25 INJECTION, SOLUTION SUBCUTANEOUS NIGHTLY
Status: DISCONTINUED | OUTPATIENT
Start: 2019-08-27 | End: 2019-08-29

## 2019-08-27 RX ADMIN — INSULIN LISPRO 4 UNITS: 100 INJECTION, SOLUTION INTRAVENOUS; SUBCUTANEOUS at 18:51

## 2019-08-27 RX ADMIN — CYCLOBENZAPRINE 10 MG: 10 TABLET, FILM COATED ORAL at 18:50

## 2019-08-27 RX ADMIN — DEXAMETHASONE SODIUM PHOSPHATE 2 MG: 4 INJECTION, SOLUTION INTRAMUSCULAR; INTRAVENOUS at 18:50

## 2019-08-27 RX ADMIN — LIDOCAINE HYDROCHLORIDE 7 ML: 10 INJECTION, SOLUTION INFILTRATION; PERINEURAL at 12:15

## 2019-08-27 RX ADMIN — INSULIN LISPRO 5 UNITS: 100 INJECTION, SOLUTION INTRAVENOUS; SUBCUTANEOUS at 18:50

## 2019-08-27 RX ADMIN — GABAPENTIN 300 MG: 300 CAPSULE ORAL at 10:24

## 2019-08-27 RX ADMIN — GABAPENTIN 300 MG: 300 CAPSULE ORAL at 18:50

## 2019-08-27 RX ADMIN — CLONIDINE HYDROCHLORIDE 0.1 MG: 0.1 TABLET ORAL at 21:56

## 2019-08-27 RX ADMIN — INSULIN LISPRO 4 UNITS: 100 INJECTION, SOLUTION INTRAVENOUS; SUBCUTANEOUS at 10:24

## 2019-08-27 RX ADMIN — CLONIDINE HYDROCHLORIDE 0.1 MG: 0.1 TABLET ORAL at 10:24

## 2019-08-27 RX ADMIN — GABAPENTIN 300 MG: 300 CAPSULE ORAL at 21:47

## 2019-08-27 RX ADMIN — BUDESONIDE AND FORMOTEROL FUMARATE DIHYDRATE 2 PUFF: 160; 4.5 AEROSOL RESPIRATORY (INHALATION) at 07:26

## 2019-08-27 RX ADMIN — ATORVASTATIN CALCIUM 20 MG: 20 TABLET, FILM COATED ORAL at 10:24

## 2019-08-27 RX ADMIN — BUDESONIDE AND FORMOTEROL FUMARATE DIHYDRATE 2 PUFF: 160; 4.5 AEROSOL RESPIRATORY (INHALATION) at 20:36

## 2019-08-27 RX ADMIN — HYDROMORPHONE HYDROCHLORIDE 0.5 MG: 1 INJECTION, SOLUTION INTRAMUSCULAR; INTRAVENOUS; SUBCUTANEOUS at 13:26

## 2019-08-27 RX ADMIN — HYDROCODONE BITARTRATE AND ACETAMINOPHEN 1 TABLET: 10; 325 TABLET ORAL at 21:46

## 2019-08-27 RX ADMIN — CYCLOBENZAPRINE 10 MG: 10 TABLET, FILM COATED ORAL at 10:23

## 2019-08-27 RX ADMIN — INSULIN GLARGINE 25 UNITS: 100 INJECTION, SOLUTION SUBCUTANEOUS at 21:50

## 2019-08-27 RX ADMIN — INSULIN LISPRO 10 UNITS: 100 INJECTION, SOLUTION INTRAVENOUS; SUBCUTANEOUS at 10:24

## 2019-08-27 RX ADMIN — DEXAMETHASONE SODIUM PHOSPHATE 2 MG: 4 INJECTION, SOLUTION INTRAMUSCULAR; INTRAVENOUS at 10:29

## 2019-08-27 RX ADMIN — INSULIN LISPRO 8 UNITS: 100 INJECTION, SOLUTION INTRAVENOUS; SUBCUTANEOUS at 13:42

## 2019-08-27 RX ADMIN — IOPAMIDOL 15 ML: 408 INJECTION, SOLUTION INTRATHECAL at 12:18

## 2019-08-27 RX ADMIN — INSULIN LISPRO 4 UNITS: 100 INJECTION, SOLUTION INTRAVENOUS; SUBCUTANEOUS at 13:41

## 2019-08-27 RX ADMIN — PANTOPRAZOLE SODIUM 40 MG: 40 TABLET, DELAYED RELEASE ORAL at 10:23

## 2019-08-27 RX ADMIN — DEXAMETHASONE SODIUM PHOSPHATE 2 MG: 4 INJECTION, SOLUTION INTRAMUSCULAR; INTRAVENOUS at 23:56

## 2019-08-27 RX ADMIN — DEXAMETHASONE SODIUM PHOSPHATE 4 MG: 4 INJECTION, SOLUTION INTRAMUSCULAR; INTRAVENOUS at 04:58

## 2019-08-27 RX ADMIN — SODIUM CHLORIDE, PRESERVATIVE FREE 3 ML: 5 INJECTION INTRAVENOUS at 21:48

## 2019-08-27 RX ADMIN — INSULIN LISPRO 12 UNITS: 100 INJECTION, SOLUTION INTRAVENOUS; SUBCUTANEOUS at 21:47

## 2019-08-27 RX ADMIN — SODIUM CHLORIDE, PRESERVATIVE FREE 3 ML: 5 INJECTION INTRAVENOUS at 10:29

## 2019-08-27 RX ADMIN — ALLOPURINOL 100 MG: 100 TABLET ORAL at 10:24

## 2019-08-28 ENCOUNTER — TRANSCRIBE ORDERS (OUTPATIENT)
Dept: NEUROSURGERY | Facility: CLINIC | Age: 49
End: 2019-08-28

## 2019-08-28 DIAGNOSIS — M51.16 LUMBAR DISC HERNIATION WITH RADICULOPATHY: ICD-10-CM

## 2019-08-28 DIAGNOSIS — M21.372 FOOT DROP, LEFT: ICD-10-CM

## 2019-08-28 DIAGNOSIS — Q76.2 CONGENITAL SPONDYLOLYSIS, LUMBOSACRAL REGION: Primary | ICD-10-CM

## 2019-08-28 LAB
ANION GAP SERPL CALCULATED.3IONS-SCNC: 12.7 MMOL/L (ref 5–15)
BASOPHILS # BLD AUTO: 0.02 10*3/MM3 (ref 0–0.2)
BASOPHILS NFR BLD AUTO: 0.1 % (ref 0–1.5)
BUN BLD-MCNC: 25 MG/DL (ref 6–20)
BUN/CREAT SERPL: 30.9 (ref 7–25)
CALCIUM SPEC-SCNC: 9 MG/DL (ref 8.6–10.5)
CHLORIDE SERPL-SCNC: 97 MMOL/L (ref 98–107)
CO2 SERPL-SCNC: 25.3 MMOL/L (ref 22–29)
CREAT BLD-MCNC: 0.81 MG/DL (ref 0.76–1.27)
DEPRECATED RDW RBC AUTO: 42.9 FL (ref 37–54)
EOSINOPHIL # BLD AUTO: 0 10*3/MM3 (ref 0–0.4)
EOSINOPHIL NFR BLD AUTO: 0 % (ref 0.3–6.2)
ERYTHROCYTE [DISTWIDTH] IN BLOOD BY AUTOMATED COUNT: 13.7 % (ref 12.3–15.4)
GFR SERPL CREATININE-BSD FRML MDRD: 101 ML/MIN/1.73
GLUCOSE BLD-MCNC: 256 MG/DL (ref 65–99)
GLUCOSE BLDC GLUCOMTR-MCNC: 211 MG/DL (ref 70–130)
GLUCOSE BLDC GLUCOMTR-MCNC: 221 MG/DL (ref 70–130)
GLUCOSE BLDC GLUCOMTR-MCNC: 276 MG/DL (ref 70–130)
GLUCOSE BLDC GLUCOMTR-MCNC: 276 MG/DL (ref 70–130)
HCT VFR BLD AUTO: 43.5 % (ref 37.5–51)
HGB BLD-MCNC: 14.3 G/DL (ref 13–17.7)
IMM GRANULOCYTES # BLD AUTO: 0.3 10*3/MM3 (ref 0–0.05)
IMM GRANULOCYTES NFR BLD AUTO: 2.2 % (ref 0–0.5)
LYMPHOCYTES # BLD AUTO: 2.07 10*3/MM3 (ref 0.7–3.1)
LYMPHOCYTES NFR BLD AUTO: 15.4 % (ref 19.6–45.3)
MCH RBC QN AUTO: 28.1 PG (ref 26.6–33)
MCHC RBC AUTO-ENTMCNC: 32.9 G/DL (ref 31.5–35.7)
MCV RBC AUTO: 85.6 FL (ref 79–97)
MONOCYTES # BLD AUTO: 1.06 10*3/MM3 (ref 0.1–0.9)
MONOCYTES NFR BLD AUTO: 7.9 % (ref 5–12)
NEUTROPHILS # BLD AUTO: 9.96 10*3/MM3 (ref 1.7–7)
NEUTROPHILS NFR BLD AUTO: 74.4 % (ref 42.7–76)
NRBC BLD AUTO-RTO: 0 /100 WBC (ref 0–0.2)
PLATELET # BLD AUTO: 252 10*3/MM3 (ref 140–450)
PMV BLD AUTO: 9.7 FL (ref 6–12)
POTASSIUM BLD-SCNC: 4.3 MMOL/L (ref 3.5–5.2)
RBC # BLD AUTO: 5.08 10*6/MM3 (ref 4.14–5.8)
SODIUM BLD-SCNC: 135 MMOL/L (ref 136–145)
WBC NRBC COR # BLD: 13.41 10*3/MM3 (ref 3.4–10.8)

## 2019-08-28 PROCEDURE — 25010000002 DEXAMETHASONE PER 1 MG: Performed by: PHYSICIAN ASSISTANT

## 2019-08-28 PROCEDURE — 94660 CPAP INITIATION&MGMT: CPT

## 2019-08-28 PROCEDURE — 97162 PT EVAL MOD COMPLEX 30 MIN: CPT

## 2019-08-28 PROCEDURE — 63710000001 INSULIN GLARGINE PER 5 UNITS: Performed by: INTERNAL MEDICINE

## 2019-08-28 PROCEDURE — 99232 SBSQ HOSP IP/OBS MODERATE 35: CPT | Performed by: NURSE PRACTITIONER

## 2019-08-28 PROCEDURE — 94799 UNLISTED PULMONARY SVC/PX: CPT

## 2019-08-28 PROCEDURE — 25010000002 HYDROMORPHONE PER 4 MG: Performed by: HOSPITALIST

## 2019-08-28 PROCEDURE — 97110 THERAPEUTIC EXERCISES: CPT

## 2019-08-28 PROCEDURE — 82962 GLUCOSE BLOOD TEST: CPT

## 2019-08-28 PROCEDURE — 80048 BASIC METABOLIC PNL TOTAL CA: CPT | Performed by: INTERNAL MEDICINE

## 2019-08-28 PROCEDURE — 85025 COMPLETE CBC W/AUTO DIFF WBC: CPT | Performed by: PHYSICIAN ASSISTANT

## 2019-08-28 PROCEDURE — 63710000001 INSULIN LISPRO (HUMAN) PER 5 UNITS: Performed by: HOSPITALIST

## 2019-08-28 RX ORDER — POLYETHYLENE GLYCOL 3350 17 G/17G
17 POWDER, FOR SOLUTION ORAL DAILY PRN
Status: DISCONTINUED | OUTPATIENT
Start: 2019-08-28 | End: 2019-09-03 | Stop reason: HOSPADM

## 2019-08-28 RX ADMIN — INSULIN LISPRO 5 UNITS: 100 INJECTION, SOLUTION INTRAVENOUS; SUBCUTANEOUS at 11:40

## 2019-08-28 RX ADMIN — CYCLOBENZAPRINE 10 MG: 10 TABLET, FILM COATED ORAL at 23:45

## 2019-08-28 RX ADMIN — SODIUM CHLORIDE, PRESERVATIVE FREE 3 ML: 5 INJECTION INTRAVENOUS at 08:37

## 2019-08-28 RX ADMIN — DEXAMETHASONE SODIUM PHOSPHATE 2 MG: 4 INJECTION, SOLUTION INTRAMUSCULAR; INTRAVENOUS at 23:45

## 2019-08-28 RX ADMIN — HYDROCODONE BITARTRATE AND ACETAMINOPHEN 1 TABLET: 10; 325 TABLET ORAL at 04:47

## 2019-08-28 RX ADMIN — INSULIN GLARGINE 25 UNITS: 100 INJECTION, SOLUTION SUBCUTANEOUS at 20:32

## 2019-08-28 RX ADMIN — HYDROMORPHONE HYDROCHLORIDE 0.5 MG: 1 INJECTION, SOLUTION INTRAMUSCULAR; INTRAVENOUS; SUBCUTANEOUS at 20:43

## 2019-08-28 RX ADMIN — DEXAMETHASONE SODIUM PHOSPHATE 2 MG: 4 INJECTION, SOLUTION INTRAMUSCULAR; INTRAVENOUS at 04:47

## 2019-08-28 RX ADMIN — INSULIN LISPRO 8 UNITS: 100 INJECTION, SOLUTION INTRAVENOUS; SUBCUTANEOUS at 20:31

## 2019-08-28 RX ADMIN — PANTOPRAZOLE SODIUM 40 MG: 40 TABLET, DELAYED RELEASE ORAL at 08:21

## 2019-08-28 RX ADMIN — HYDROCODONE BITARTRATE AND ACETAMINOPHEN 1 TABLET: 10; 325 TABLET ORAL at 17:57

## 2019-08-28 RX ADMIN — DEXAMETHASONE SODIUM PHOSPHATE 2 MG: 4 INJECTION, SOLUTION INTRAMUSCULAR; INTRAVENOUS at 16:32

## 2019-08-28 RX ADMIN — POLYETHYLENE GLYCOL 3350 17 G: 17 POWDER, FOR SOLUTION ORAL at 20:34

## 2019-08-28 RX ADMIN — SODIUM CHLORIDE, PRESERVATIVE FREE 3 ML: 5 INJECTION INTRAVENOUS at 20:38

## 2019-08-28 RX ADMIN — BUDESONIDE AND FORMOTEROL FUMARATE DIHYDRATE 2 PUFF: 160; 4.5 AEROSOL RESPIRATORY (INHALATION) at 20:22

## 2019-08-28 RX ADMIN — HYDROMORPHONE HYDROCHLORIDE 0.5 MG: 1 INJECTION, SOLUTION INTRAMUSCULAR; INTRAVENOUS; SUBCUTANEOUS at 09:04

## 2019-08-28 RX ADMIN — DEXAMETHASONE SODIUM PHOSPHATE 2 MG: 4 INJECTION, SOLUTION INTRAMUSCULAR; INTRAVENOUS at 11:39

## 2019-08-28 RX ADMIN — GABAPENTIN 300 MG: 300 CAPSULE ORAL at 16:31

## 2019-08-28 RX ADMIN — HYDROCODONE BITARTRATE AND ACETAMINOPHEN 1 TABLET: 10; 325 TABLET ORAL at 23:45

## 2019-08-28 RX ADMIN — HYDROMORPHONE HYDROCHLORIDE 0.5 MG: 1 INJECTION, SOLUTION INTRAMUSCULAR; INTRAVENOUS; SUBCUTANEOUS at 13:54

## 2019-08-28 RX ADMIN — ATORVASTATIN CALCIUM 20 MG: 20 TABLET, FILM COATED ORAL at 08:22

## 2019-08-28 RX ADMIN — INSULIN LISPRO 5 UNITS: 100 INJECTION, SOLUTION INTRAVENOUS; SUBCUTANEOUS at 17:56

## 2019-08-28 RX ADMIN — HYDROMORPHONE HYDROCHLORIDE 0.5 MG: 1 INJECTION, SOLUTION INTRAMUSCULAR; INTRAVENOUS; SUBCUTANEOUS at 11:39

## 2019-08-28 RX ADMIN — HYDROCODONE BITARTRATE AND ACETAMINOPHEN 1 TABLET: 10; 325 TABLET ORAL at 11:39

## 2019-08-28 RX ADMIN — BUDESONIDE AND FORMOTEROL FUMARATE DIHYDRATE 2 PUFF: 160; 4.5 AEROSOL RESPIRATORY (INHALATION) at 12:21

## 2019-08-28 RX ADMIN — HYDROMORPHONE HYDROCHLORIDE 0.5 MG: 1 INJECTION, SOLUTION INTRAMUSCULAR; INTRAVENOUS; SUBCUTANEOUS at 06:01

## 2019-08-28 RX ADMIN — HYDROMORPHONE HYDROCHLORIDE 0.5 MG: 1 INJECTION, SOLUTION INTRAMUSCULAR; INTRAVENOUS; SUBCUTANEOUS at 16:02

## 2019-08-28 RX ADMIN — CYCLOBENZAPRINE 10 MG: 10 TABLET, FILM COATED ORAL at 04:47

## 2019-08-28 RX ADMIN — CLONIDINE HYDROCHLORIDE 0.1 MG: 0.1 TABLET ORAL at 08:21

## 2019-08-28 RX ADMIN — INSULIN LISPRO 4 UNITS: 100 INJECTION, SOLUTION INTRAVENOUS; SUBCUTANEOUS at 08:20

## 2019-08-28 RX ADMIN — INSULIN LISPRO 4 UNITS: 100 INJECTION, SOLUTION INTRAVENOUS; SUBCUTANEOUS at 17:55

## 2019-08-28 RX ADMIN — GABAPENTIN 300 MG: 300 CAPSULE ORAL at 08:21

## 2019-08-28 RX ADMIN — INSULIN LISPRO 4 UNITS: 100 INJECTION, SOLUTION INTRAVENOUS; SUBCUTANEOUS at 11:40

## 2019-08-28 RX ADMIN — CYCLOBENZAPRINE 10 MG: 10 TABLET, FILM COATED ORAL at 13:54

## 2019-08-28 RX ADMIN — INSULIN LISPRO 8 UNITS: 100 INJECTION, SOLUTION INTRAVENOUS; SUBCUTANEOUS at 08:20

## 2019-08-28 RX ADMIN — HYDROMORPHONE HYDROCHLORIDE 0.5 MG: 1 INJECTION, SOLUTION INTRAMUSCULAR; INTRAVENOUS; SUBCUTANEOUS at 17:56

## 2019-08-28 RX ADMIN — CLONIDINE HYDROCHLORIDE 0.1 MG: 0.1 TABLET ORAL at 20:31

## 2019-08-28 RX ADMIN — GABAPENTIN 300 MG: 300 CAPSULE ORAL at 20:31

## 2019-08-28 RX ADMIN — ALLOPURINOL 100 MG: 100 TABLET ORAL at 08:22

## 2019-08-29 LAB
GLUCOSE BLDC GLUCOMTR-MCNC: 217 MG/DL (ref 70–130)
GLUCOSE BLDC GLUCOMTR-MCNC: 243 MG/DL (ref 70–130)
GLUCOSE BLDC GLUCOMTR-MCNC: 262 MG/DL (ref 70–130)
GLUCOSE BLDC GLUCOMTR-MCNC: 271 MG/DL (ref 70–130)

## 2019-08-29 PROCEDURE — 63710000001 INSULIN LISPRO (HUMAN) PER 5 UNITS: Performed by: INTERNAL MEDICINE

## 2019-08-29 PROCEDURE — 63710000001 INSULIN GLARGINE PER 5 UNITS: Performed by: INTERNAL MEDICINE

## 2019-08-29 PROCEDURE — 63710000001 INSULIN LISPRO (HUMAN) PER 5 UNITS: Performed by: HOSPITALIST

## 2019-08-29 PROCEDURE — 99232 SBSQ HOSP IP/OBS MODERATE 35: CPT | Performed by: NURSE PRACTITIONER

## 2019-08-29 PROCEDURE — 82962 GLUCOSE BLOOD TEST: CPT

## 2019-08-29 PROCEDURE — 25010000002 DEXAMETHASONE PER 1 MG: Performed by: PHYSICIAN ASSISTANT

## 2019-08-29 PROCEDURE — 94799 UNLISTED PULMONARY SVC/PX: CPT

## 2019-08-29 PROCEDURE — 25010000002 HYDROMORPHONE PER 4 MG: Performed by: HOSPITALIST

## 2019-08-29 RX ORDER — INSULIN GLARGINE 100 [IU]/ML
30 INJECTION, SOLUTION SUBCUTANEOUS NIGHTLY
Status: DISCONTINUED | OUTPATIENT
Start: 2019-08-29 | End: 2019-09-01

## 2019-08-29 RX ORDER — LORAZEPAM 0.5 MG/1
0.5 TABLET ORAL EVERY 6 HOURS PRN
Status: DISCONTINUED | OUTPATIENT
Start: 2019-08-29 | End: 2019-09-03 | Stop reason: HOSPADM

## 2019-08-29 RX ADMIN — CYCLOBENZAPRINE 10 MG: 10 TABLET, FILM COATED ORAL at 16:26

## 2019-08-29 RX ADMIN — INSULIN LISPRO 2 UNITS: 100 INJECTION, SOLUTION INTRAVENOUS; SUBCUTANEOUS at 21:39

## 2019-08-29 RX ADMIN — HYDROMORPHONE HYDROCHLORIDE 0.5 MG: 1 INJECTION, SOLUTION INTRAMUSCULAR; INTRAVENOUS; SUBCUTANEOUS at 05:27

## 2019-08-29 RX ADMIN — LORAZEPAM 0.5 MG: 0.5 TABLET ORAL at 19:00

## 2019-08-29 RX ADMIN — SODIUM CHLORIDE, PRESERVATIVE FREE 3 ML: 5 INJECTION INTRAVENOUS at 21:39

## 2019-08-29 RX ADMIN — HYDROMORPHONE HYDROCHLORIDE 0.5 MG: 1 INJECTION, SOLUTION INTRAMUSCULAR; INTRAVENOUS; SUBCUTANEOUS at 14:21

## 2019-08-29 RX ADMIN — HYDROMORPHONE HYDROCHLORIDE 0.5 MG: 1 INJECTION, SOLUTION INTRAMUSCULAR; INTRAVENOUS; SUBCUTANEOUS at 19:00

## 2019-08-29 RX ADMIN — ALLOPURINOL 100 MG: 100 TABLET ORAL at 08:10

## 2019-08-29 RX ADMIN — HYDROMORPHONE HYDROCHLORIDE 0.5 MG: 1 INJECTION, SOLUTION INTRAMUSCULAR; INTRAVENOUS; SUBCUTANEOUS at 16:27

## 2019-08-29 RX ADMIN — BUDESONIDE AND FORMOTEROL FUMARATE DIHYDRATE 2 PUFF: 160; 4.5 AEROSOL RESPIRATORY (INHALATION) at 20:53

## 2019-08-29 RX ADMIN — INSULIN LISPRO 4 UNITS: 100 INJECTION, SOLUTION INTRAVENOUS; SUBCUTANEOUS at 08:11

## 2019-08-29 RX ADMIN — INSULIN LISPRO 3 UNITS: 100 INJECTION, SOLUTION INTRAVENOUS; SUBCUTANEOUS at 12:23

## 2019-08-29 RX ADMIN — INSULIN LISPRO 10 UNITS: 100 INJECTION, SOLUTION INTRAVENOUS; SUBCUTANEOUS at 12:23

## 2019-08-29 RX ADMIN — GABAPENTIN 300 MG: 300 CAPSULE ORAL at 21:39

## 2019-08-29 RX ADMIN — CYCLOBENZAPRINE 10 MG: 10 TABLET, FILM COATED ORAL at 08:10

## 2019-08-29 RX ADMIN — SODIUM CHLORIDE, PRESERVATIVE FREE 3 ML: 5 INJECTION INTRAVENOUS at 08:12

## 2019-08-29 RX ADMIN — INSULIN LISPRO 3 UNITS: 100 INJECTION, SOLUTION INTRAVENOUS; SUBCUTANEOUS at 17:40

## 2019-08-29 RX ADMIN — INSULIN LISPRO 8 UNITS: 100 INJECTION, SOLUTION INTRAVENOUS; SUBCUTANEOUS at 08:11

## 2019-08-29 RX ADMIN — DEXAMETHASONE SODIUM PHOSPHATE 2 MG: 4 INJECTION, SOLUTION INTRAMUSCULAR; INTRAVENOUS at 17:39

## 2019-08-29 RX ADMIN — ATORVASTATIN CALCIUM 20 MG: 20 TABLET, FILM COATED ORAL at 08:10

## 2019-08-29 RX ADMIN — HYDROMORPHONE HYDROCHLORIDE 0.5 MG: 1 INJECTION, SOLUTION INTRAMUSCULAR; INTRAVENOUS; SUBCUTANEOUS at 10:35

## 2019-08-29 RX ADMIN — HYDROMORPHONE HYDROCHLORIDE 0.5 MG: 1 INJECTION, SOLUTION INTRAMUSCULAR; INTRAVENOUS; SUBCUTANEOUS at 21:38

## 2019-08-29 RX ADMIN — HYDROCODONE BITARTRATE AND ACETAMINOPHEN 1 TABLET: 10; 325 TABLET ORAL at 08:10

## 2019-08-29 RX ADMIN — HYDROCODONE BITARTRATE AND ACETAMINOPHEN 1 TABLET: 10; 325 TABLET ORAL at 21:38

## 2019-08-29 RX ADMIN — HYDROCODONE BITARTRATE AND ACETAMINOPHEN 1 TABLET: 10; 325 TABLET ORAL at 15:01

## 2019-08-29 RX ADMIN — DEXAMETHASONE SODIUM PHOSPHATE 2 MG: 4 INJECTION, SOLUTION INTRAMUSCULAR; INTRAVENOUS at 10:35

## 2019-08-29 RX ADMIN — PANTOPRAZOLE SODIUM 40 MG: 40 TABLET, DELAYED RELEASE ORAL at 08:10

## 2019-08-29 RX ADMIN — DEXAMETHASONE SODIUM PHOSPHATE 2 MG: 4 INJECTION, SOLUTION INTRAMUSCULAR; INTRAVENOUS at 22:26

## 2019-08-29 RX ADMIN — INSULIN LISPRO 10 UNITS: 100 INJECTION, SOLUTION INTRAVENOUS; SUBCUTANEOUS at 17:39

## 2019-08-29 RX ADMIN — GABAPENTIN 300 MG: 300 CAPSULE ORAL at 16:26

## 2019-08-29 RX ADMIN — CLONIDINE HYDROCHLORIDE 0.1 MG: 0.1 TABLET ORAL at 08:10

## 2019-08-29 RX ADMIN — GABAPENTIN 300 MG: 300 CAPSULE ORAL at 08:10

## 2019-08-29 RX ADMIN — CLONIDINE HYDROCHLORIDE 0.1 MG: 0.1 TABLET ORAL at 21:38

## 2019-08-29 RX ADMIN — INSULIN GLARGINE 30 UNITS: 100 INJECTION, SOLUTION SUBCUTANEOUS at 21:39

## 2019-08-29 RX ADMIN — BUDESONIDE AND FORMOTEROL FUMARATE DIHYDRATE 2 PUFF: 160; 4.5 AEROSOL RESPIRATORY (INHALATION) at 12:48

## 2019-08-29 RX ADMIN — DEXAMETHASONE SODIUM PHOSPHATE 2 MG: 4 INJECTION, SOLUTION INTRAMUSCULAR; INTRAVENOUS at 05:21

## 2019-08-30 ENCOUNTER — ANESTHESIA EVENT (OUTPATIENT)
Dept: PERIOP | Facility: HOSPITAL | Age: 49
End: 2019-08-30

## 2019-08-30 ENCOUNTER — APPOINTMENT (OUTPATIENT)
Dept: GENERAL RADIOLOGY | Facility: HOSPITAL | Age: 49
End: 2019-08-30

## 2019-08-30 ENCOUNTER — ANESTHESIA (OUTPATIENT)
Dept: PERIOP | Facility: HOSPITAL | Age: 49
End: 2019-08-30

## 2019-08-30 LAB
ANION GAP SERPL CALCULATED.3IONS-SCNC: 9.7 MMOL/L (ref 5–15)
BASOPHILS # BLD AUTO: 0.04 10*3/MM3 (ref 0–0.2)
BASOPHILS NFR BLD AUTO: 0.3 % (ref 0–1.5)
BUN BLD-MCNC: 19 MG/DL (ref 6–20)
BUN/CREAT SERPL: 20.7 (ref 7–25)
CALCIUM SPEC-SCNC: 9 MG/DL (ref 8.6–10.5)
CHLORIDE SERPL-SCNC: 93 MMOL/L (ref 98–107)
CO2 SERPL-SCNC: 31.3 MMOL/L (ref 22–29)
CREAT BLD-MCNC: 0.92 MG/DL (ref 0.76–1.27)
DEPRECATED RDW RBC AUTO: 42.5 FL (ref 37–54)
EOSINOPHIL # BLD AUTO: 0.01 10*3/MM3 (ref 0–0.4)
EOSINOPHIL NFR BLD AUTO: 0.1 % (ref 0.3–6.2)
ERYTHROCYTE [DISTWIDTH] IN BLOOD BY AUTOMATED COUNT: 13.8 % (ref 12.3–15.4)
GFR SERPL CREATININE-BSD FRML MDRD: 87 ML/MIN/1.73
GLUCOSE BLD-MCNC: 239 MG/DL (ref 65–99)
GLUCOSE BLDC GLUCOMTR-MCNC: 134 MG/DL (ref 70–130)
GLUCOSE BLDC GLUCOMTR-MCNC: 146 MG/DL (ref 70–130)
GLUCOSE BLDC GLUCOMTR-MCNC: 198 MG/DL (ref 70–130)
GLUCOSE BLDC GLUCOMTR-MCNC: 219 MG/DL (ref 70–130)
HBA1C MFR BLD: 8.9 % (ref 4.8–5.6)
HCT VFR BLD AUTO: 43.2 % (ref 37.5–51)
HGB BLD-MCNC: 14.3 G/DL (ref 13–17.7)
IMM GRANULOCYTES # BLD AUTO: 0.51 10*3/MM3 (ref 0–0.05)
IMM GRANULOCYTES NFR BLD AUTO: 3.6 % (ref 0–0.5)
LYMPHOCYTES # BLD AUTO: 2.64 10*3/MM3 (ref 0.7–3.1)
LYMPHOCYTES NFR BLD AUTO: 18.7 % (ref 19.6–45.3)
MCH RBC QN AUTO: 28.4 PG (ref 26.6–33)
MCHC RBC AUTO-ENTMCNC: 33.1 G/DL (ref 31.5–35.7)
MCV RBC AUTO: 85.7 FL (ref 79–97)
MONOCYTES # BLD AUTO: 0.93 10*3/MM3 (ref 0.1–0.9)
MONOCYTES NFR BLD AUTO: 6.6 % (ref 5–12)
NEUTROPHILS # BLD AUTO: 9.96 10*3/MM3 (ref 1.7–7)
NEUTROPHILS NFR BLD AUTO: 70.7 % (ref 42.7–76)
NRBC BLD AUTO-RTO: 0 /100 WBC (ref 0–0.2)
PLATELET # BLD AUTO: 258 10*3/MM3 (ref 140–450)
PMV BLD AUTO: 9.9 FL (ref 6–12)
POTASSIUM BLD-SCNC: 4.8 MMOL/L (ref 3.5–5.2)
RBC # BLD AUTO: 5.04 10*6/MM3 (ref 4.14–5.8)
SODIUM BLD-SCNC: 134 MMOL/L (ref 136–145)
WBC NRBC COR # BLD: 14.09 10*3/MM3 (ref 3.4–10.8)

## 2019-08-30 PROCEDURE — 94799 UNLISTED PULMONARY SVC/PX: CPT

## 2019-08-30 PROCEDURE — C1713 ANCHOR/SCREW BN/BN,TIS/BN: HCPCS | Performed by: NEUROLOGICAL SURGERY

## 2019-08-30 PROCEDURE — 25010000002 HYDROMORPHONE PER 4 MG: Performed by: HOSPITALIST

## 2019-08-30 PROCEDURE — 20936 SP BONE AGRFT LOCAL ADD-ON: CPT | Performed by: NEUROLOGICAL SURGERY

## 2019-08-30 PROCEDURE — 0SG30AJ FUSION OF LUMBOSACRAL JOINT WITH INTERBODY FUSION DEVICE, POSTERIOR APPROACH, ANTERIOR COLUMN, OPEN APPROACH: ICD-10-PCS | Performed by: NEUROLOGICAL SURGERY

## 2019-08-30 PROCEDURE — 25010000002 HYDROMORPHONE PER 4 MG: Performed by: ANESTHESIOLOGY

## 2019-08-30 PROCEDURE — 83036 HEMOGLOBIN GLYCOSYLATED A1C: CPT | Performed by: INTERNAL MEDICINE

## 2019-08-30 PROCEDURE — 76000 FLUOROSCOPY <1 HR PHYS/QHP: CPT

## 2019-08-30 PROCEDURE — 0SB40ZZ EXCISION OF LUMBOSACRAL DISC, OPEN APPROACH: ICD-10-PCS | Performed by: NEUROLOGICAL SURGERY

## 2019-08-30 PROCEDURE — 20930 SP BONE ALGRFT MORSEL ADD-ON: CPT | Performed by: NEUROLOGICAL SURGERY

## 2019-08-30 PROCEDURE — 85025 COMPLETE CBC W/AUTO DIFF WBC: CPT | Performed by: NURSE PRACTITIONER

## 2019-08-30 PROCEDURE — 22630 ARTHRD PST TQ 1NTRSPC LUM: CPT | Performed by: NEUROLOGICAL SURGERY

## 2019-08-30 PROCEDURE — 22840 INSERT SPINE FIXATION DEVICE: CPT | Performed by: NEUROLOGICAL SURGERY

## 2019-08-30 PROCEDURE — 63710000001 INSULIN LISPRO (HUMAN) PER 5 UNITS: Performed by: INTERNAL MEDICINE

## 2019-08-30 PROCEDURE — C1769 GUIDE WIRE: HCPCS | Performed by: NEUROLOGICAL SURGERY

## 2019-08-30 PROCEDURE — 22853 INSJ BIOMECHANICAL DEVICE: CPT | Performed by: NEUROLOGICAL SURGERY

## 2019-08-30 PROCEDURE — 80048 BASIC METABOLIC PNL TOTAL CA: CPT | Performed by: INTERNAL MEDICINE

## 2019-08-30 PROCEDURE — 25010000002 FENTANYL CITRATE (PF) 100 MCG/2ML SOLUTION: Performed by: ANESTHESIOLOGY

## 2019-08-30 PROCEDURE — 25010000002 DEXAMETHASONE PER 1 MG: Performed by: PHYSICIAN ASSISTANT

## 2019-08-30 PROCEDURE — 25010000002 ONDANSETRON PER 1 MG: Performed by: NURSE ANESTHETIST, CERTIFIED REGISTERED

## 2019-08-30 PROCEDURE — 82962 GLUCOSE BLOOD TEST: CPT

## 2019-08-30 PROCEDURE — 25010000002 PROPOFOL 10 MG/ML EMULSION: Performed by: ANESTHESIOLOGY

## 2019-08-30 PROCEDURE — 72100 X-RAY EXAM L-S SPINE 2/3 VWS: CPT

## 2019-08-30 PROCEDURE — 63710000001 INSULIN GLARGINE PER 5 UNITS: Performed by: INTERNAL MEDICINE

## 2019-08-30 PROCEDURE — 25010000003 CEFAZOLIN PER 500 MG: Performed by: NEUROLOGICAL SURGERY

## 2019-08-30 DEVICE — SCREW 54840016550 CN MAS 6.5X50 CC
Type: IMPLANTABLE DEVICE | Site: SPINE LUMBAR | Status: FUNCTIONAL
Brand: CD HORIZON® SPINAL SYSTEM

## 2019-08-30 DEVICE — PUTTY DBM GRAFTON 6CC: Type: IMPLANTABLE DEVICE | Site: SPINE LUMBAR | Status: FUNCTIONAL

## 2019-08-30 DEVICE — SPACER 8881028 ELEVATE X-LOR 28X10MM
Type: IMPLANTABLE DEVICE | Site: SPINE LUMBAR | Status: FUNCTIONAL
Brand: ELEVATE™ SPINAL SYSTEM

## 2019-08-30 RX ORDER — SODIUM CHLORIDE 0.9 % (FLUSH) 0.9 %
3-10 SYRINGE (ML) INJECTION AS NEEDED
Status: DISCONTINUED | OUTPATIENT
Start: 2019-08-30 | End: 2019-08-30 | Stop reason: HOSPADM

## 2019-08-30 RX ORDER — FENTANYL CITRATE 50 UG/ML
50 INJECTION, SOLUTION INTRAMUSCULAR; INTRAVENOUS
Status: DISCONTINUED | OUTPATIENT
Start: 2019-08-30 | End: 2019-08-30 | Stop reason: HOSPADM

## 2019-08-30 RX ORDER — ONDANSETRON 2 MG/ML
4 INJECTION INTRAMUSCULAR; INTRAVENOUS ONCE AS NEEDED
Status: DISCONTINUED | OUTPATIENT
Start: 2019-08-30 | End: 2019-08-30 | Stop reason: HOSPADM

## 2019-08-30 RX ORDER — SODIUM CHLORIDE, SODIUM LACTATE, POTASSIUM CHLORIDE, CALCIUM CHLORIDE 600; 310; 30; 20 MG/100ML; MG/100ML; MG/100ML; MG/100ML
100 INJECTION, SOLUTION INTRAVENOUS CONTINUOUS
Status: DISCONTINUED | OUTPATIENT
Start: 2019-08-30 | End: 2019-09-01

## 2019-08-30 RX ORDER — HYDROMORPHONE HCL 110MG/55ML
PATIENT CONTROLLED ANALGESIA SYRINGE INTRAVENOUS AS NEEDED
Status: DISCONTINUED | OUTPATIENT
Start: 2019-08-30 | End: 2019-08-30 | Stop reason: SURG

## 2019-08-30 RX ORDER — SODIUM CHLORIDE, SODIUM LACTATE, POTASSIUM CHLORIDE, CALCIUM CHLORIDE 600; 310; 30; 20 MG/100ML; MG/100ML; MG/100ML; MG/100ML
9 INJECTION, SOLUTION INTRAVENOUS CONTINUOUS
Status: DISCONTINUED | OUTPATIENT
Start: 2019-08-30 | End: 2019-08-30

## 2019-08-30 RX ORDER — METOPROLOL TARTRATE 5 MG/5ML
INJECTION INTRAVENOUS AS NEEDED
Status: DISCONTINUED | OUTPATIENT
Start: 2019-08-30 | End: 2019-08-30 | Stop reason: SURG

## 2019-08-30 RX ORDER — WOUND DRESSING ADHESIVE - LIQUID
LIQUID MISCELLANEOUS AS NEEDED
Status: DISCONTINUED | OUTPATIENT
Start: 2019-08-30 | End: 2019-08-30 | Stop reason: HOSPADM

## 2019-08-30 RX ORDER — HYDRALAZINE HYDROCHLORIDE 20 MG/ML
5 INJECTION INTRAMUSCULAR; INTRAVENOUS
Status: DISCONTINUED | OUTPATIENT
Start: 2019-08-30 | End: 2019-08-30 | Stop reason: HOSPADM

## 2019-08-30 RX ORDER — PROMETHAZINE HYDROCHLORIDE 25 MG/ML
12.5 INJECTION, SOLUTION INTRAMUSCULAR; INTRAVENOUS ONCE AS NEEDED
Status: DISCONTINUED | OUTPATIENT
Start: 2019-08-30 | End: 2019-08-30 | Stop reason: HOSPADM

## 2019-08-30 RX ORDER — HYDROMORPHONE HCL IN 0.9% NACL 10 MG/50ML
PATIENT CONTROLLED ANALGESIA SYRINGE INTRAVENOUS CONTINUOUS
Status: DISCONTINUED | OUTPATIENT
Start: 2019-08-30 | End: 2019-09-01

## 2019-08-30 RX ORDER — CEFAZOLIN SODIUM IN 0.9 % NACL 3 G/100 ML
3 INTRAVENOUS SOLUTION, PIGGYBACK (ML) INTRAVENOUS ONCE
Status: COMPLETED | OUTPATIENT
Start: 2019-08-30 | End: 2019-08-30

## 2019-08-30 RX ORDER — PROPOFOL 10 MG/ML
VIAL (ML) INTRAVENOUS AS NEEDED
Status: DISCONTINUED | OUTPATIENT
Start: 2019-08-30 | End: 2019-08-30 | Stop reason: SURG

## 2019-08-30 RX ORDER — ONDANSETRON 2 MG/ML
INJECTION INTRAMUSCULAR; INTRAVENOUS AS NEEDED
Status: DISCONTINUED | OUTPATIENT
Start: 2019-08-30 | End: 2019-08-30 | Stop reason: SURG

## 2019-08-30 RX ORDER — HYDROMORPHONE HYDROCHLORIDE 1 MG/ML
0.5 INJECTION, SOLUTION INTRAMUSCULAR; INTRAVENOUS; SUBCUTANEOUS
Status: DISCONTINUED | OUTPATIENT
Start: 2019-08-30 | End: 2019-08-30 | Stop reason: HOSPADM

## 2019-08-30 RX ORDER — ROCURONIUM BROMIDE 10 MG/ML
INJECTION, SOLUTION INTRAVENOUS AS NEEDED
Status: DISCONTINUED | OUTPATIENT
Start: 2019-08-30 | End: 2019-08-30 | Stop reason: SURG

## 2019-08-30 RX ORDER — MIDAZOLAM HYDROCHLORIDE 1 MG/ML
1 INJECTION INTRAMUSCULAR; INTRAVENOUS
Status: DISCONTINUED | OUTPATIENT
Start: 2019-08-30 | End: 2019-08-30 | Stop reason: HOSPADM

## 2019-08-30 RX ORDER — ACETAMINOPHEN 325 MG/1
650 TABLET ORAL ONCE AS NEEDED
Status: DISCONTINUED | OUTPATIENT
Start: 2019-08-30 | End: 2019-08-30 | Stop reason: HOSPADM

## 2019-08-30 RX ORDER — NALOXONE HCL 0.4 MG/ML
0.2 VIAL (ML) INJECTION AS NEEDED
Status: DISCONTINUED | OUTPATIENT
Start: 2019-08-30 | End: 2019-08-30 | Stop reason: HOSPADM

## 2019-08-30 RX ORDER — MIDAZOLAM HYDROCHLORIDE 1 MG/ML
2 INJECTION INTRAMUSCULAR; INTRAVENOUS
Status: DISCONTINUED | OUTPATIENT
Start: 2019-08-30 | End: 2019-08-30 | Stop reason: HOSPADM

## 2019-08-30 RX ORDER — LIDOCAINE HYDROCHLORIDE 10 MG/ML
0.5 INJECTION, SOLUTION EPIDURAL; INFILTRATION; INTRACAUDAL; PERINEURAL ONCE AS NEEDED
Status: DISCONTINUED | OUTPATIENT
Start: 2019-08-30 | End: 2019-08-30 | Stop reason: HOSPADM

## 2019-08-30 RX ORDER — HYDROCODONE BITARTRATE AND ACETAMINOPHEN 10; 325 MG/1; MG/1
1 TABLET ORAL EVERY 4 HOURS PRN
Status: DISCONTINUED | OUTPATIENT
Start: 2019-08-30 | End: 2019-09-03 | Stop reason: HOSPADM

## 2019-08-30 RX ORDER — FLUMAZENIL 0.1 MG/ML
0.2 INJECTION INTRAVENOUS AS NEEDED
Status: DISCONTINUED | OUTPATIENT
Start: 2019-08-30 | End: 2019-08-30 | Stop reason: HOSPADM

## 2019-08-30 RX ORDER — HYDROCODONE BITARTRATE AND ACETAMINOPHEN 7.5; 325 MG/1; MG/1
1 TABLET ORAL ONCE AS NEEDED
Status: DISCONTINUED | OUTPATIENT
Start: 2019-08-30 | End: 2019-08-30 | Stop reason: HOSPADM

## 2019-08-30 RX ORDER — PROMETHAZINE HYDROCHLORIDE 25 MG/ML
6.25 INJECTION, SOLUTION INTRAMUSCULAR; INTRAVENOUS
Status: DISCONTINUED | OUTPATIENT
Start: 2019-08-30 | End: 2019-08-30 | Stop reason: HOSPADM

## 2019-08-30 RX ORDER — PROMETHAZINE HYDROCHLORIDE 25 MG/1
25 TABLET ORAL ONCE AS NEEDED
Status: DISCONTINUED | OUTPATIENT
Start: 2019-08-30 | End: 2019-08-30 | Stop reason: HOSPADM

## 2019-08-30 RX ORDER — FAMOTIDINE 10 MG/ML
20 INJECTION, SOLUTION INTRAVENOUS ONCE
Status: COMPLETED | OUTPATIENT
Start: 2019-08-30 | End: 2019-08-30

## 2019-08-30 RX ORDER — EPHEDRINE SULFATE 50 MG/ML
5 INJECTION, SOLUTION INTRAVENOUS ONCE AS NEEDED
Status: DISCONTINUED | OUTPATIENT
Start: 2019-08-30 | End: 2019-08-30 | Stop reason: HOSPADM

## 2019-08-30 RX ORDER — DIPHENHYDRAMINE HYDROCHLORIDE 50 MG/ML
12.5 INJECTION INTRAMUSCULAR; INTRAVENOUS
Status: DISCONTINUED | OUTPATIENT
Start: 2019-08-30 | End: 2019-08-30 | Stop reason: HOSPADM

## 2019-08-30 RX ORDER — LIDOCAINE HYDROCHLORIDE 20 MG/ML
INJECTION, SOLUTION INFILTRATION; PERINEURAL AS NEEDED
Status: DISCONTINUED | OUTPATIENT
Start: 2019-08-30 | End: 2019-08-30 | Stop reason: SURG

## 2019-08-30 RX ORDER — NALOXONE HCL 0.4 MG/ML
0.1 VIAL (ML) INJECTION
Status: DISCONTINUED | OUTPATIENT
Start: 2019-08-30 | End: 2019-09-01

## 2019-08-30 RX ORDER — SODIUM CHLORIDE 0.9 % (FLUSH) 0.9 %
3 SYRINGE (ML) INJECTION EVERY 12 HOURS SCHEDULED
Status: DISCONTINUED | OUTPATIENT
Start: 2019-08-30 | End: 2019-08-30 | Stop reason: HOSPADM

## 2019-08-30 RX ORDER — PROMETHAZINE HYDROCHLORIDE 25 MG/1
25 SUPPOSITORY RECTAL ONCE AS NEEDED
Status: DISCONTINUED | OUTPATIENT
Start: 2019-08-30 | End: 2019-08-30 | Stop reason: HOSPADM

## 2019-08-30 RX ORDER — OXYCODONE AND ACETAMINOPHEN 7.5; 325 MG/1; MG/1
1 TABLET ORAL ONCE AS NEEDED
Status: DISCONTINUED | OUTPATIENT
Start: 2019-08-30 | End: 2019-08-30 | Stop reason: HOSPADM

## 2019-08-30 RX ORDER — BUPIVACAINE HYDROCHLORIDE AND EPINEPHRINE 2.5; 5 MG/ML; UG/ML
INJECTION, SOLUTION INFILTRATION; PERINEURAL AS NEEDED
Status: DISCONTINUED | OUTPATIENT
Start: 2019-08-30 | End: 2019-08-30 | Stop reason: HOSPADM

## 2019-08-30 RX ORDER — DIPHENHYDRAMINE HCL 25 MG
25 CAPSULE ORAL
Status: DISCONTINUED | OUTPATIENT
Start: 2019-08-30 | End: 2019-08-30 | Stop reason: HOSPADM

## 2019-08-30 RX ORDER — FENTANYL CITRATE 50 UG/ML
INJECTION, SOLUTION INTRAMUSCULAR; INTRAVENOUS AS NEEDED
Status: DISCONTINUED | OUTPATIENT
Start: 2019-08-30 | End: 2019-08-30 | Stop reason: SURG

## 2019-08-30 RX ADMIN — INSULIN LISPRO 10 UNITS: 100 INJECTION, SOLUTION INTRAVENOUS; SUBCUTANEOUS at 07:56

## 2019-08-30 RX ADMIN — CYCLOBENZAPRINE 10 MG: 10 TABLET, FILM COATED ORAL at 20:52

## 2019-08-30 RX ADMIN — FAMOTIDINE 20 MG: 10 INJECTION INTRAVENOUS at 09:28

## 2019-08-30 RX ADMIN — ONDANSETRON 4 MG: 2 INJECTION INTRAMUSCULAR; INTRAVENOUS at 16:03

## 2019-08-30 RX ADMIN — DEXAMETHASONE SODIUM PHOSPHATE 2 MG: 4 INJECTION, SOLUTION INTRAMUSCULAR; INTRAVENOUS at 05:20

## 2019-08-30 RX ADMIN — CLONIDINE HYDROCHLORIDE 0.1 MG: 0.1 TABLET ORAL at 20:53

## 2019-08-30 RX ADMIN — SODIUM CHLORIDE, POTASSIUM CHLORIDE, SODIUM LACTATE AND CALCIUM CHLORIDE: 600; 310; 30; 20 INJECTION, SOLUTION INTRAVENOUS at 16:32

## 2019-08-30 RX ADMIN — HYDROMORPHONE HYDROCHLORIDE: 10 INJECTION INTRAMUSCULAR; INTRAVENOUS; SUBCUTANEOUS at 17:15

## 2019-08-30 RX ADMIN — ROCURONIUM BROMIDE 50 MG: 10 INJECTION INTRAVENOUS at 11:12

## 2019-08-30 RX ADMIN — GABAPENTIN 300 MG: 300 CAPSULE ORAL at 20:53

## 2019-08-30 RX ADMIN — METOPROLOL TARTRATE 5 MG: 1 INJECTION, SOLUTION INTRAVENOUS at 15:22

## 2019-08-30 RX ADMIN — CYCLOBENZAPRINE 10 MG: 10 TABLET, FILM COATED ORAL at 00:17

## 2019-08-30 RX ADMIN — CEFAZOLIN 3 G: 1 INJECTION, POWDER, FOR SOLUTION INTRAMUSCULAR; INTRAVENOUS; PARENTERAL at 11:18

## 2019-08-30 RX ADMIN — BUDESONIDE AND FORMOTEROL FUMARATE DIHYDRATE 2 PUFF: 160; 4.5 AEROSOL RESPIRATORY (INHALATION) at 07:48

## 2019-08-30 RX ADMIN — SUGAMMADEX 200 MG: 100 INJECTION, SOLUTION INTRAVENOUS at 16:20

## 2019-08-30 RX ADMIN — HYDROMORPHONE HYDROCHLORIDE 0.5 MG: 2 INJECTION INTRAMUSCULAR; INTRAVENOUS; SUBCUTANEOUS at 16:20

## 2019-08-30 RX ADMIN — SODIUM CHLORIDE, POTASSIUM CHLORIDE, SODIUM LACTATE AND CALCIUM CHLORIDE 9 ML/HR: 600; 310; 30; 20 INJECTION, SOLUTION INTRAVENOUS at 09:29

## 2019-08-30 RX ADMIN — FENTANYL CITRATE 50 MCG: 50 INJECTION, SOLUTION INTRAMUSCULAR; INTRAVENOUS at 14:00

## 2019-08-30 RX ADMIN — METOPROLOL TARTRATE 5 MG: 1 INJECTION, SOLUTION INTRAVENOUS at 15:07

## 2019-08-30 RX ADMIN — HYDROMORPHONE HYDROCHLORIDE 0.5 MG: 1 INJECTION, SOLUTION INTRAMUSCULAR; INTRAVENOUS; SUBCUTANEOUS at 18:20

## 2019-08-30 RX ADMIN — HYDROMORPHONE HYDROCHLORIDE 0.5 MG: 2 INJECTION INTRAMUSCULAR; INTRAVENOUS; SUBCUTANEOUS at 14:50

## 2019-08-30 RX ADMIN — FENTANYL CITRATE 100 MCG: 50 INJECTION, SOLUTION INTRAMUSCULAR; INTRAVENOUS at 11:10

## 2019-08-30 RX ADMIN — HYDROMORPHONE HYDROCHLORIDE 0.5 MG: 1 INJECTION, SOLUTION INTRAMUSCULAR; INTRAVENOUS; SUBCUTANEOUS at 00:32

## 2019-08-30 RX ADMIN — HYDROMORPHONE HYDROCHLORIDE 0.5 MG: 2 INJECTION INTRAMUSCULAR; INTRAVENOUS; SUBCUTANEOUS at 16:36

## 2019-08-30 RX ADMIN — LORAZEPAM 0.5 MG: 0.5 TABLET ORAL at 05:17

## 2019-08-30 RX ADMIN — LIDOCAINE HYDROCHLORIDE 100 MG: 20 INJECTION, SOLUTION INFILTRATION; PERINEURAL at 11:11

## 2019-08-30 RX ADMIN — INSULIN GLARGINE 30 UNITS: 100 INJECTION, SOLUTION SUBCUTANEOUS at 20:53

## 2019-08-30 RX ADMIN — FENTANYL CITRATE 50 MCG: 50 INJECTION, SOLUTION INTRAMUSCULAR; INTRAVENOUS at 12:13

## 2019-08-30 RX ADMIN — GABAPENTIN 300 MG: 300 CAPSULE ORAL at 09:28

## 2019-08-30 RX ADMIN — METOPROLOL TARTRATE 10 MG: 1 INJECTION, SOLUTION INTRAVENOUS at 15:25

## 2019-08-30 RX ADMIN — INSULIN LISPRO 3 UNITS: 100 INJECTION, SOLUTION INTRAVENOUS; SUBCUTANEOUS at 07:56

## 2019-08-30 RX ADMIN — HYDROMORPHONE HYDROCHLORIDE 0.5 MG: 1 INJECTION, SOLUTION INTRAMUSCULAR; INTRAVENOUS; SUBCUTANEOUS at 08:04

## 2019-08-30 RX ADMIN — FENTANYL CITRATE 50 MCG: 50 INJECTION, SOLUTION INTRAMUSCULAR; INTRAVENOUS at 14:32

## 2019-08-30 RX ADMIN — LORAZEPAM 0.5 MG: 0.5 TABLET ORAL at 23:30

## 2019-08-30 RX ADMIN — ROCURONIUM BROMIDE 20 MG: 10 INJECTION INTRAVENOUS at 12:13

## 2019-08-30 RX ADMIN — PROPOFOL 200 MG: 10 INJECTION, EMULSION INTRAVENOUS at 11:11

## 2019-08-30 RX ADMIN — FENTANYL CITRATE 50 MCG: 50 INJECTION INTRAMUSCULAR; INTRAVENOUS at 17:55

## 2019-08-30 RX ADMIN — METOPROLOL TARTRATE 5 MG: 1 INJECTION, SOLUTION INTRAVENOUS at 15:12

## 2019-08-30 RX ADMIN — HYDROMORPHONE HYDROCHLORIDE 0.5 MG: 2 INJECTION INTRAMUSCULAR; INTRAVENOUS; SUBCUTANEOUS at 14:45

## 2019-08-30 RX ADMIN — HYDROCODONE BITARTRATE AND ACETAMINOPHEN 1 TABLET: 10; 325 TABLET ORAL at 05:17

## 2019-08-30 RX ADMIN — HYDROCODONE BITARTRATE AND ACETAMINOPHEN 1 TABLET: 10; 325 TABLET ORAL at 20:52

## 2019-08-30 RX ADMIN — CLONIDINE HYDROCHLORIDE 0.1 MG: 0.1 TABLET ORAL at 09:28

## 2019-08-30 RX ADMIN — HYDROMORPHONE HYDROCHLORIDE 0.5 MG: 1 INJECTION, SOLUTION INTRAMUSCULAR; INTRAVENOUS; SUBCUTANEOUS at 05:17

## 2019-08-30 NOTE — ANESTHESIA POSTPROCEDURE EVALUATION
"Patient: Ronni Asif    Procedure Summary     Date:  08/30/19 Room / Location:  [facility] OR 07 / [facility]    Anesthesia Start:  1107 Anesthesia Stop:  1705    Procedure:  LUMBAR 5 TO SACRAL 1 OPEN DECOMPRESSION WITH  POSTERIOR LUMBAR INTERBODY FUSION, CAGE AND SCREW (Bilateral Spine Lumbar) Diagnosis:       Congenital spondylolysis, lumbosacral region      Lumbar disc herniation with radiculopathy      Foot drop, left      (Congenital spondylolysis, lumbosacral region [Q76.2])      (Lumbar disc herniation with radiculopathy [M51.16])      (Foot drop, left [M21.372])    Surgeon:  Jake Tripathi MD Provider:  Lauren Estrada MD    Anesthesia Type:  general ASA Status:  3          Anesthesia Type: general  Last vitals  BP   139/88 (08/30/19 1830)   Temp   37.1 °C (98.8 °F) (08/30/19 1659)   Pulse   90 (08/30/19 1830)   Resp   16 (08/30/19 1830)     SpO2   98 % (08/30/19 1830)     Post Anesthesia Care and Evaluation    Patient location during evaluation: PACU  Patient participation: complete - patient participated  Level of consciousness: awake and alert  Pain management: adequate  Airway patency: patent  Anesthetic complications: No anesthetic complications    Cardiovascular status: acceptable  Respiratory status: acceptable  Hydration status: acceptable    Comments: /88   Pulse 90   Temp 37.1 °C (98.8 °F) (Oral)   Resp 16   Ht 177.8 cm (70\")   Wt 132 kg (290 lb)   SpO2 98%   BMI 41.61 kg/m²         "

## 2019-08-30 NOTE — ANESTHESIA PROCEDURE NOTES
Airway  Urgency: elective      General Information and Staff    Patient location during procedure: OR  Anesthesiologist: Lauren Estrada MD    Indications and Patient Condition  Indications for airway management: airway protection    Preoxygenated: yes  Mask difficulty assessment: 1 - vent by mask    Final Airway Details  Final airway type: endotracheal airway      Successful airway: ETT  Cuffed: yes   Successful intubation technique: video laryngoscopy  Endotracheal tube insertion site: oral  Blade: CMAC  Blade size: 4  ETT size (mm): 7.5  Cormack-Lehane Classification: grade I - full view of glottis  Placement verified by: chest auscultation and capnometry   Measured from: gums  Number of attempts at approach: 1    Additional Comments  Atraumatic

## 2019-08-30 NOTE — ANESTHESIA PREPROCEDURE EVALUATION
Anesthesia Evaluation     NPO Solid Status: > 8 hours             Airway   Mallampati: IV  TM distance: >3 FB  Neck ROM: limited  Difficult intubation highly probable, Small opening and Large neck circumference  Dental - normal exam     Pulmonary - normal exam   (+) asthma, sleep apnea on CPAP,   Cardiovascular - normal exam    (+) hypertension, hyperlipidemia,     ROS comment: Patient is being worked up for possible autonomic nervous system dysfunction. He has wild blood pressure swings that resolve in about 3 minutes while he is doing ADLs.  Has had full cardiac workup that's negative by Dr. Conteh.  Going to [facility] for testing.    Neuro/Psych  (+) TIA,     (-) CVA  GI/Hepatic/Renal/Endo    (+)  GERD,  diabetes mellitus,     Musculoskeletal     Abdominal    Substance History      OB/GYN          Other                        Anesthesia Plan    ASA 3     general   (Recommend CMAC for intubation, recommend not overtreating intraop hypertension as it may resolve quickly.)  Anesthetic plan, all risks, benefits, and alternatives have been provided, discussed and informed consent has been obtained with: patient.

## 2019-08-31 LAB
GLUCOSE BLDC GLUCOMTR-MCNC: 132 MG/DL (ref 70–130)
GLUCOSE BLDC GLUCOMTR-MCNC: 187 MG/DL (ref 70–130)
GLUCOSE BLDC GLUCOMTR-MCNC: 246 MG/DL (ref 70–130)
GLUCOSE BLDC GLUCOMTR-MCNC: 273 MG/DL (ref 70–130)

## 2019-08-31 PROCEDURE — 94799 UNLISTED PULMONARY SVC/PX: CPT

## 2019-08-31 PROCEDURE — 63710000001 INSULIN LISPRO (HUMAN) PER 5 UNITS: Performed by: INTERNAL MEDICINE

## 2019-08-31 PROCEDURE — 97110 THERAPEUTIC EXERCISES: CPT

## 2019-08-31 PROCEDURE — 63710000001 INSULIN GLARGINE PER 5 UNITS: Performed by: INTERNAL MEDICINE

## 2019-08-31 PROCEDURE — 99024 POSTOP FOLLOW-UP VISIT: CPT | Performed by: NEUROLOGICAL SURGERY

## 2019-08-31 PROCEDURE — 82962 GLUCOSE BLOOD TEST: CPT

## 2019-08-31 PROCEDURE — 97162 PT EVAL MOD COMPLEX 30 MIN: CPT

## 2019-08-31 RX ORDER — HYDROCODONE BITARTRATE AND ACETAMINOPHEN 10; 325 MG/1; MG/1
2 TABLET ORAL EVERY 4 HOURS PRN
Status: DISCONTINUED | OUTPATIENT
Start: 2019-08-31 | End: 2019-09-03 | Stop reason: HOSPADM

## 2019-08-31 RX ADMIN — HYDROMORPHONE HYDROCHLORIDE: 10 INJECTION INTRAMUSCULAR; INTRAVENOUS; SUBCUTANEOUS at 17:21

## 2019-08-31 RX ADMIN — CYCLOBENZAPRINE 10 MG: 10 TABLET, FILM COATED ORAL at 15:11

## 2019-08-31 RX ADMIN — INSULIN LISPRO 4 UNITS: 100 INJECTION, SOLUTION INTRAVENOUS; SUBCUTANEOUS at 21:59

## 2019-08-31 RX ADMIN — BUDESONIDE AND FORMOTEROL FUMARATE DIHYDRATE 2 PUFF: 160; 4.5 AEROSOL RESPIRATORY (INHALATION) at 12:46

## 2019-08-31 RX ADMIN — HYDROCODONE BITARTRATE AND ACETAMINOPHEN 1 TABLET: 10; 325 TABLET ORAL at 10:57

## 2019-08-31 RX ADMIN — GABAPENTIN 300 MG: 300 CAPSULE ORAL at 08:44

## 2019-08-31 RX ADMIN — HYDROCODONE BITARTRATE AND ACETAMINOPHEN 2 TABLET: 10; 325 TABLET ORAL at 19:45

## 2019-08-31 RX ADMIN — INSULIN GLARGINE 30 UNITS: 100 INJECTION, SOLUTION SUBCUTANEOUS at 20:08

## 2019-08-31 RX ADMIN — SODIUM CHLORIDE, PRESERVATIVE FREE 3 ML: 5 INJECTION INTRAVENOUS at 08:50

## 2019-08-31 RX ADMIN — ATORVASTATIN CALCIUM 20 MG: 20 TABLET, FILM COATED ORAL at 08:46

## 2019-08-31 RX ADMIN — CLONIDINE HYDROCHLORIDE 0.1 MG: 0.1 TABLET ORAL at 08:46

## 2019-08-31 RX ADMIN — ALLOPURINOL 100 MG: 100 TABLET ORAL at 08:46

## 2019-08-31 RX ADMIN — INSULIN LISPRO 10 UNITS: 100 INJECTION, SOLUTION INTRAVENOUS; SUBCUTANEOUS at 08:43

## 2019-08-31 RX ADMIN — INSULIN LISPRO 3 UNITS: 100 INJECTION, SOLUTION INTRAVENOUS; SUBCUTANEOUS at 11:45

## 2019-08-31 RX ADMIN — HYDROCODONE BITARTRATE AND ACETAMINOPHEN 1 TABLET: 10; 325 TABLET ORAL at 01:03

## 2019-08-31 RX ADMIN — INSULIN LISPRO 3 UNITS: 100 INJECTION, SOLUTION INTRAVENOUS; SUBCUTANEOUS at 17:48

## 2019-08-31 RX ADMIN — CYCLOBENZAPRINE 10 MG: 10 TABLET, FILM COATED ORAL at 23:53

## 2019-08-31 RX ADMIN — SODIUM CHLORIDE, PRESERVATIVE FREE 3 ML: 5 INJECTION INTRAVENOUS at 22:00

## 2019-08-31 RX ADMIN — HYDROCODONE BITARTRATE AND ACETAMINOPHEN 1 TABLET: 10; 325 TABLET ORAL at 15:01

## 2019-08-31 RX ADMIN — GABAPENTIN 300 MG: 300 CAPSULE ORAL at 15:47

## 2019-08-31 RX ADMIN — HYDROCODONE BITARTRATE AND ACETAMINOPHEN 1 TABLET: 10; 325 TABLET ORAL at 06:33

## 2019-08-31 RX ADMIN — PANTOPRAZOLE SODIUM 40 MG: 40 TABLET, DELAYED RELEASE ORAL at 08:49

## 2019-08-31 RX ADMIN — CYCLOBENZAPRINE 10 MG: 10 TABLET, FILM COATED ORAL at 06:33

## 2019-08-31 RX ADMIN — INSULIN LISPRO 10 UNITS: 100 INJECTION, SOLUTION INTRAVENOUS; SUBCUTANEOUS at 11:44

## 2019-08-31 RX ADMIN — INSULIN LISPRO 10 UNITS: 100 INJECTION, SOLUTION INTRAVENOUS; SUBCUTANEOUS at 17:47

## 2019-08-31 RX ADMIN — BUDESONIDE AND FORMOTEROL FUMARATE DIHYDRATE 2 PUFF: 160; 4.5 AEROSOL RESPIRATORY (INHALATION) at 19:47

## 2019-08-31 RX ADMIN — POLYETHYLENE GLYCOL 3350 17 G: 17 POWDER, FOR SOLUTION ORAL at 19:45

## 2019-08-31 RX ADMIN — CYCLOBENZAPRINE 10 MG: 10 TABLET, FILM COATED ORAL at 10:58

## 2019-08-31 RX ADMIN — CLONIDINE HYDROCHLORIDE 0.1 MG: 0.1 TABLET ORAL at 20:07

## 2019-08-31 RX ADMIN — GABAPENTIN 300 MG: 300 CAPSULE ORAL at 20:07

## 2019-09-01 LAB
GLUCOSE BLDC GLUCOMTR-MCNC: 261 MG/DL (ref 70–130)
GLUCOSE BLDC GLUCOMTR-MCNC: 273 MG/DL (ref 70–130)
GLUCOSE BLDC GLUCOMTR-MCNC: 276 MG/DL (ref 70–130)
GLUCOSE BLDC GLUCOMTR-MCNC: 278 MG/DL (ref 70–130)

## 2019-09-01 PROCEDURE — 63710000001 INSULIN LISPRO (HUMAN) PER 5 UNITS: Performed by: INTERNAL MEDICINE

## 2019-09-01 PROCEDURE — 82962 GLUCOSE BLOOD TEST: CPT

## 2019-09-01 PROCEDURE — 94799 UNLISTED PULMONARY SVC/PX: CPT

## 2019-09-01 PROCEDURE — 99024 POSTOP FOLLOW-UP VISIT: CPT | Performed by: NEUROLOGICAL SURGERY

## 2019-09-01 PROCEDURE — 97110 THERAPEUTIC EXERCISES: CPT

## 2019-09-01 PROCEDURE — 25010000002 HYDROMORPHONE PER 4 MG: Performed by: INTERNAL MEDICINE

## 2019-09-01 PROCEDURE — 63710000001 INSULIN GLARGINE PER 5 UNITS: Performed by: INTERNAL MEDICINE

## 2019-09-01 RX ORDER — HYDROMORPHONE HYDROCHLORIDE 1 MG/ML
0.5 INJECTION, SOLUTION INTRAMUSCULAR; INTRAVENOUS; SUBCUTANEOUS
Status: DISCONTINUED | OUTPATIENT
Start: 2019-09-01 | End: 2019-09-02

## 2019-09-01 RX ORDER — INSULIN GLARGINE 100 [IU]/ML
35 INJECTION, SOLUTION SUBCUTANEOUS NIGHTLY
Status: DISCONTINUED | OUTPATIENT
Start: 2019-09-01 | End: 2019-09-03 | Stop reason: HOSPADM

## 2019-09-01 RX ORDER — NALOXONE HYDROCHLORIDE 1 MG/ML
0.4 INJECTION INTRAMUSCULAR; INTRAVENOUS; SUBCUTANEOUS AS NEEDED
Status: DISCONTINUED | OUTPATIENT
Start: 2019-09-01 | End: 2019-09-03 | Stop reason: HOSPADM

## 2019-09-01 RX ADMIN — CYCLOBENZAPRINE 10 MG: 10 TABLET, FILM COATED ORAL at 22:07

## 2019-09-01 RX ADMIN — HYDROMORPHONE HYDROCHLORIDE 0.5 MG: 1 INJECTION, SOLUTION INTRAMUSCULAR; INTRAVENOUS; SUBCUTANEOUS at 22:11

## 2019-09-01 RX ADMIN — CLONIDINE HYDROCHLORIDE 0.1 MG: 0.1 TABLET ORAL at 20:34

## 2019-09-01 RX ADMIN — CLONIDINE HYDROCHLORIDE 0.1 MG: 0.1 TABLET ORAL at 08:14

## 2019-09-01 RX ADMIN — HYDROCODONE BITARTRATE AND ACETAMINOPHEN 2 TABLET: 10; 325 TABLET ORAL at 04:05

## 2019-09-01 RX ADMIN — CYCLOBENZAPRINE 10 MG: 10 TABLET, FILM COATED ORAL at 14:08

## 2019-09-01 RX ADMIN — SODIUM CHLORIDE, PRESERVATIVE FREE 3 ML: 5 INJECTION INTRAVENOUS at 21:10

## 2019-09-01 RX ADMIN — CYCLOBENZAPRINE 10 MG: 10 TABLET, FILM COATED ORAL at 08:15

## 2019-09-01 RX ADMIN — INSULIN LISPRO 4 UNITS: 100 INJECTION, SOLUTION INTRAVENOUS; SUBCUTANEOUS at 19:02

## 2019-09-01 RX ADMIN — ATORVASTATIN CALCIUM 20 MG: 20 TABLET, FILM COATED ORAL at 08:14

## 2019-09-01 RX ADMIN — BUDESONIDE AND FORMOTEROL FUMARATE DIHYDRATE 2 PUFF: 160; 4.5 AEROSOL RESPIRATORY (INHALATION) at 20:35

## 2019-09-01 RX ADMIN — ALLOPURINOL 100 MG: 100 TABLET ORAL at 08:14

## 2019-09-01 RX ADMIN — INSULIN GLARGINE 35 UNITS: 100 INJECTION, SOLUTION SUBCUTANEOUS at 20:35

## 2019-09-01 RX ADMIN — INSULIN LISPRO 3 UNITS: 100 INJECTION, SOLUTION INTRAVENOUS; SUBCUTANEOUS at 08:18

## 2019-09-01 RX ADMIN — GABAPENTIN 300 MG: 300 CAPSULE ORAL at 15:21

## 2019-09-01 RX ADMIN — INSULIN LISPRO 10 UNITS: 100 INJECTION, SOLUTION INTRAVENOUS; SUBCUTANEOUS at 12:23

## 2019-09-01 RX ADMIN — GABAPENTIN 300 MG: 300 CAPSULE ORAL at 20:35

## 2019-09-01 RX ADMIN — PANTOPRAZOLE SODIUM 40 MG: 40 TABLET, DELAYED RELEASE ORAL at 08:14

## 2019-09-01 RX ADMIN — INSULIN LISPRO 4 UNITS: 100 INJECTION, SOLUTION INTRAVENOUS; SUBCUTANEOUS at 12:24

## 2019-09-01 RX ADMIN — LORAZEPAM 0.5 MG: 0.5 TABLET ORAL at 20:35

## 2019-09-01 RX ADMIN — INSULIN LISPRO 10 UNITS: 100 INJECTION, SOLUTION INTRAVENOUS; SUBCUTANEOUS at 19:03

## 2019-09-01 RX ADMIN — INSULIN LISPRO 10 UNITS: 100 INJECTION, SOLUTION INTRAVENOUS; SUBCUTANEOUS at 08:14

## 2019-09-01 RX ADMIN — POLYETHYLENE GLYCOL 3350 17 G: 17 POWDER, FOR SOLUTION ORAL at 20:34

## 2019-09-01 RX ADMIN — GABAPENTIN 300 MG: 300 CAPSULE ORAL at 08:14

## 2019-09-01 RX ADMIN — HYDROCODONE BITARTRATE AND ACETAMINOPHEN 2 TABLET: 10; 325 TABLET ORAL at 18:58

## 2019-09-01 RX ADMIN — HYDROCODONE BITARTRATE AND ACETAMINOPHEN 1 TABLET: 10; 325 TABLET ORAL at 12:30

## 2019-09-01 RX ADMIN — INSULIN LISPRO 4 UNITS: 100 INJECTION, SOLUTION INTRAVENOUS; SUBCUTANEOUS at 20:34

## 2019-09-01 RX ADMIN — HYDROCODONE BITARTRATE AND ACETAMINOPHEN 1 TABLET: 10; 325 TABLET ORAL at 08:14

## 2019-09-01 NOTE — PLAN OF CARE
Problem: Patient Care Overview  Goal: Plan of Care Review  Outcome: Ongoing (interventions implemented as appropriate)   08/31/19 1920 09/01/19 4623   Coping/Psychosocial   Plan of Care Reviewed With patient --    Plan of Care Review   Progress --  no change   OTHER   Outcome Summary --  Patient anxious to go home; using pca and oral meds for pain; spouse at bedside; up to bathroom x2 assist; will continue to monitor.        Problem: Pain, Chronic (Adult)  Goal: Identify Related Risk Factors and Signs and Symptoms  Outcome: Ongoing (interventions implemented as appropriate)    Goal: Acceptable Pain/Comfort Level and Functional Ability  Outcome: Ongoing (interventions implemented as appropriate)      Problem: Fall Risk (Adult)  Goal: Identify Related Risk Factors and Signs and Symptoms  Outcome: Ongoing (interventions implemented as appropriate)    Goal: Absence of Fall  Outcome: Outcome(s) achieved Date Met: 09/01/19      Problem: Skin Injury Risk (Adult)  Goal: Identify Related Risk Factors and Signs and Symptoms  Outcome: Ongoing (interventions implemented as appropriate)    Goal: Skin Health and Integrity  Outcome: Ongoing (interventions implemented as appropriate)

## 2019-09-01 NOTE — PROGRESS NOTES
Name: Ronni Asif ADMIT: 2019   : 1970  PCP: Gil Chambers MD    MRN: [id number] LOS: 4 days   AGE/SEX: 49 y.o. male  ROOM: [facility]   Subjective   No chest pain shortness of breath nausea vomiting or diarrhea.  He reports having burning body pain at incision site and also a pressure sensation from hardware.  The radiculopathy down his left leg has resolved almost entirely and he still has some left-sided weakness.    Objective   Vital Signs  Temp:  [97.8 °F (36.6 °C)-98.5 °F (36.9 °C)] 98.5 °F (36.9 °C)  Heart Rate:  [] 97  Resp:  [16-20] 18  BP: (104-123)/(61-71) 119/65  SpO2:  [94 %-99 %] 98 %  on   ;   Device (Oxygen Therapy): room air  Body mass index is 41.61 kg/m².    Physical Exam   Constitutional: He is oriented to person, place, and time. He appears well-developed. No distress.   HENT:   Head: Atraumatic.   Nose: Nose normal.   Eyes: Conjunctivae and EOM are normal.   Neck: Neck supple. No tracheal deviation present.   Cardiovascular: Normal rate, regular rhythm and intact distal pulses.   Pulmonary/Chest: Effort normal and breath sounds normal. He has no wheezes.   Abdominal: Soft. He exhibits no distension. There is no tenderness.   Musculoskeletal: He exhibits tenderness. He exhibits no edema.   Neurological: He is alert and oriented to person, place, and time.   Skin: Skin is warm and dry. He is not diaphoretic.   Psychiatric: He has a normal mood and affect. His behavior is normal.   Nursing note and vitals reviewed.      Results Review:       I reviewed the patient's new clinical results.    Results from last 7 days   Lab Units 19  0507 19  0432 19  0546   WBC 10*3/mm3 14.09* 13.41* 15.90* 14.91*   HEMOGLOBIN g/dL 14.3 14.3 14.8 15.0   PLATELETS 10*3/mm3 258 252 281 288     Results from last 7 days   Lab Units 19  0507 19  0319 19  0432 19  0546   SODIUM mmol/L 134* 135* 133* 135*   POTASSIUM mmol/L 4.8  4.3 4.4 4.6   CHLORIDE mmol/L 93* 97* 93* 92*   CO2 mmol/L 31.3* 25.3 25.4 29.3*   BUN mg/dL 19 25* 21* 21*   CREATININE mg/dL 0.92 0.81 0.85 0.84   GLUCOSE mg/dL 239* 256* 312* 246*   Estimated Creatinine Clearance: 132.7 mL/min (by C-G formula based on SCr of 0.92 mg/dL).  Results from last 7 days   Lab Units 08/30/19  0507 08/28/19  0319 08/27/19  0432 08/26/19  0546   CALCIUM mg/dL 9.0 9.0 9.4 9.9   MAGNESIUM mg/dL  --   --  2.5  --          allopurinol 100 mg Oral Daily   atorvastatin 20 mg Oral Daily   budesonide-formoterol 2 puff Inhalation BID - RT   CloNIDine 0.1 mg Oral BID   gabapentin 300 mg Oral TID   insulin glargine 30 Units Subcutaneous Nightly   insulin lispro 0-7 Units Subcutaneous 4x Daily With Meals & Nightly   insulin lispro 10 Units Subcutaneous TID With Meals   pantoprazole 40 mg Oral Daily   sodium chloride 3 mL Intravenous Q12H       HYDROmorphone HCl-NaCl    lactated ringers 100 mL/hr   Diet Regular; Consistent Carbohydrate      Assessment/Plan      Active Hospital Problems    Diagnosis  POA   • **Lumbar disc herniation with radiculopathy [M51.16]  Yes   • Hyperglycemia [R73.9]  Yes   • Foot drop, left [M21.372]  Yes   • Type 2 diabetes mellitus without complication, without long-term current use of insulin (CMS/[facility]) [E11.9]  Yes   • Obstructive sleep apnea, adult [G47.33]  Yes   • Congenital spondylolysis, lumbosacral region [Q76.2]  Not Applicable   • Lumbar radiculopathy [M54.16]  Yes      Resolved Hospital Problems   No resolved problems to display.     · Lumbar disc herniation with radiculopathy/left foot drop: Status post L5/S1 decompression/Shell procedure with PLIF 8/30.  Continue oral pain regimen to 1-2 tabs Norco as needed.  Will try to wean off PCA and start IV Dilaudid as needed unless JEFFRY has objection. Neurosurgery following.  · Diabetes: A1c 8.9.  Increase lantus to 35 units. Continue premeal and SSI.  · Hypertension: Stable  · GERD: PPI  · Disposition: TBD/few  days    Tyler Lopez MD  [facility]  09/01/19  4:28 PM

## 2019-09-01 NOTE — PLAN OF CARE
Problem: Patient Care Overview  Goal: Plan of Care Review  Outcome: Ongoing (interventions implemented as appropriate)   09/01/19 1314   Coping/Psychosocial   Plan of Care Reviewed With patient   OTHER   Outcome Summary Pt walked further this visit, 400'CGA. Needed Natacha with sit<>stand transfer. Tolerated with mild complaints of pain. Progressing mobility as tolerated.

## 2019-09-01 NOTE — PROGRESS NOTES
Postoperative day 1  Status post L5-S1 decompression and fusion  Vitals:    08/31/19 1249 08/31/19 1342 08/31/19 1800 08/31/19 1943   BP:  117/77 109/71 114/62   BP Location:  Left arm Right arm Right arm   Patient Position:  Lying Lying Lying   Pulse: 110 94 104 110   Resp: 20 20 20 18   Temp:  98.7 °F (37.1 °C) 98.2 °F (36.8 °C)    TempSrc:  Oral Oral    SpO2:  96% 94% 98%   Weight:       Height:         Lab Results   Component Value Date    GLUCOSE 239 (H) 08/30/2019    BUN 19 08/30/2019    CREATININE 0.92 08/30/2019    EGFRIFNONA 87 08/30/2019    BCR 20.7 08/30/2019    K 4.8 08/30/2019    CO2 31.3 (H) 08/30/2019    CALCIUM 9.0 08/30/2019    ALBUMIN 4.40 08/25/2019    LABIL2 1.4 07/23/2019    AST 19 08/25/2019    ALT 31 08/25/2019     WBC   Date Value Ref Range Status   08/30/2019 14.09 (H) 3.40 - 10.80 10*3/mm3 Final     RBC   Date Value Ref Range Status   08/30/2019 5.04 4.14 - 5.80 10*6/mm3 Final     Hemoglobin   Date Value Ref Range Status   08/30/2019 14.3 13.0 - 17.7 g/dL Final     Hematocrit   Date Value Ref Range Status   08/30/2019 43.2 37.5 - 51.0 % Final     MCV   Date Value Ref Range Status   08/30/2019 85.7 79.0 - 97.0 fL Final     MCH   Date Value Ref Range Status   08/30/2019 28.4 26.6 - 33.0 pg Final     MCHC   Date Value Ref Range Status   08/30/2019 33.1 31.5 - 35.7 g/dL Final     RDW   Date Value Ref Range Status   08/30/2019 13.8 12.3 - 15.4 % Final     RDW-SD   Date Value Ref Range Status   08/30/2019 42.5 37.0 - 54.0 fl Final     MPV   Date Value Ref Range Status   08/30/2019 9.9 6.0 - 12.0 fL Final     Platelets   Date Value Ref Range Status   08/30/2019 258 140 - 450 10*3/mm3 Final     Neutrophil %   Date Value Ref Range Status   08/30/2019 70.7 42.7 - 76.0 % Final     Lymphocyte %   Date Value Ref Range Status   08/30/2019 18.7 (L) 19.6 - 45.3 % Final     Monocyte %   Date Value Ref Range Status   08/30/2019 6.6 5.0 - 12.0 % Final     Eosinophil %   Date Value Ref Range Status    08/30/2019 0.1 (L) 0.3 - 6.2 % Final     Basophil %   Date Value Ref Range Status   08/30/2019 0.3 0.0 - 1.5 % Final     Immature Grans %   Date Value Ref Range Status   08/30/2019 3.6 (H) 0.0 - 0.5 % Final     Neutrophils, Absolute   Date Value Ref Range Status   08/30/2019 9.96 (H) 1.70 - 7.00 10*3/mm3 Final     Lymphocytes, Absolute   Date Value Ref Range Status   08/30/2019 2.64 0.70 - 3.10 10*3/mm3 Final     Monocytes, Absolute   Date Value Ref Range Status   08/30/2019 0.93 (H) 0.10 - 0.90 10*3/mm3 Final     Eosinophils, Absolute   Date Value Ref Range Status   08/30/2019 0.01 0.00 - 0.40 10*3/mm3 Final     Basophils, Absolute   Date Value Ref Range Status   08/30/2019 0.04 0.00 - 0.20 10*3/mm3 Final     Immature Grans, Absolute   Date Value Ref Range Status   08/30/2019 0.51 (H) 0.00 - 0.05 10*3/mm3 Final     nRBC   Date Value Ref Range Status   08/30/2019 0.0 0.0 - 0.2 /100 WBC Final     Incision fine  Usual low back spasm  Left leg pain and foot drop a bit better  Mobilizing with PT  Overall doing better will need inpatient rehab

## 2019-09-01 NOTE — THERAPY TREATMENT NOTE
Patient Name: Ronni Asif  : 1970    MRN: [id number]                              Today's Date: 2019       Admit Date: 2019    Visit Dx:     ICD-10-CM ICD-9-CM   1. Hyperglycemia R73.9 790.29   2. Left foot drop M21.372 736.79   3. Lumbar radiculopathy M54.16 724.4     Patient Active Problem List   Diagnosis   • Lumbar radiculopathy   • Congenital spondylolysis, lumbosacral region   • Transient cerebral ischemia   • Pain in thoracic spine   • Neck pain   • Degeneration of thoracic intervertebral disc   • Degeneration of intervertebral disc at C5-C6 level   • Type 2 diabetes mellitus without complication, without long-term current use of insulin (CMS/HCC)   • Obstructive sleep apnea, adult   • Essential hypertension   • Moderate asthma without complication   • Gastroesophageal reflux disease   • Diarrhea   • History of colon polyps   • Hyperglycemia   • Foot drop, left   • Lumbar disc herniation with radiculopathy     Past Medical History:   Diagnosis Date   • Anxiety    • Asthma    • Cancer (CMS/HCC)     skin   • Diabetes mellitus (CMS/HCC)    • Dizziness    • GERD (gastroesophageal reflux disease)    • Headache    • Hyperlipidemia    • Hypertension    • Injury of back 2016    Pt fell 20 feet    • Kidney stone    • Neuromuscular disorder (CMS/HCC)    • Pneumonia    • Sleep apnea     cpap   • Stroke (CMS/HCC)     tia   • Syncope and collapse      Past Surgical History:   Procedure Laterality Date   • COLONOSCOPY     • COLONOSCOPY N/A 2019    Procedure: COLONOSCOPY to Cecum with Hot and Cold Polypectomy x 2;  Surgeon: Navid Zafar Jr., MD;  Location: [facility] ENDOSCOPY;  Service: General   • ENDOSCOPY N/A 2019    Procedure: ESOPHAGOGASTRODUODENOSCOPY with Bx's;  Surgeon: Navid Zafar Jr., MD;  Location: [facility] ENDOSCOPY;  Service: General   • HERNIA REPAIR  10/2015     General Information     Row Name 19 1312          PT Evaluation Time/Intention    Document Type   therapy note (daily note)  -CS     Mode of Treatment  physical therapy  -     Row Name 09/01/19 1312          General Information    Patient Profile Reviewed?  yes  -CS     Existing Precautions/Restrictions  fall;spinal  -CS     Row Name 09/01/19 1312          Cognitive Assessment/Intervention- PT/OT    Orientation Status (Cognition)  oriented x 4  -CS     Row Name 09/01/19 1312          Safety Issues, Functional Mobility    Safety Issues Affecting Function (Mobility)  insight into deficits/self awareness  -CS       User Key  (r) = Recorded By, (t) = Taken By, (c) = Cosigned By    Initials Name Provider Type    Shane Christy, PT Physical Therapist        Mobility     Row Name 09/01/19 1313          Transfer Assessment/Treatment    Comment (Transfers)  in chair  -CS     Row Name 09/01/19 1313          Sit-Stand Transfer    Sit-Stand [place] (Transfers)  minimum assist (75% patient effort);verbal cues;nonverbal cues (demo/gesture)  -CS     Assistive Device (Sit-Stand Transfers)  walker, front-wheeled  -CS     Row Name 09/01/19 1313          Gait/Stairs Assessment/Training    [place] Level (Gait)  contact guard  -CS     Assistive Device (Gait)  walker, front-wheeled  -CS     Distance in Feet (Gait)  400  -CS     Deviations/Abnormal Patterns (Gait)  gait speed decreased;joe decreased  -CS     Left Sided Gait Deviations  heel strike decreased  -CS       User Key  (r) = Recorded By, (t) = Taken By, (c) = Cosigned By    Initials Name Provider Type    Shane Christy, PT Physical Therapist        Obj/Interventions    No documentation.       Goals/Plan    No documentation.       Clinical Impression     Row Name 09/01/19 1313          Pain Scale: Numbers Pre/Post-Treatment    Pain Location - Orientation  incisional  -CS     Pain Location  back  -CS     Row Name 09/01/19 1313          Pain Scale: FACES Pre/Post-Treatment    Pain: FACES Scale, Pretreatment  4-->hurts little more  -CS     Pain: FACES  Scale, Post-Treatment  4-->hurts little more  -CS     Row Name 09/01/19 1313          Plan of Care Review    Plan of Care Reviewed With  patient  -CS     Row Name 09/01/19 1313          Positioning and Restraints    Pre-Treatment Position  sitting in chair/recliner  -CS     Post Treatment Position  chair  -CS     In Chair  reclined;call light within reach;encouraged to call for assist  -CS       User Key  (r) = Recorded By, (t) = Taken By, (c) = Cosigned By    Initials Name Provider Type    Shane Christy, PT Physical Therapist        Outcome Measures     Row Name 09/01/19 1316          How much help from another person do you currently need...    Turning from your back to your side while in flat bed without using bedrails?  3  -CS     Moving from lying on back to sitting on the side of a flat bed without bedrails?  3  -CS     Moving to and from a bed to a chair (including a wheelchair)?  3  -CS     Standing up from a chair using your arms (e.g., wheelchair, bedside chair)?  3  -CS     Climbing 3-5 steps with a railing?  3  -CS     To walk in hospital room?  3  -CS     AM-PAC 6 Clicks Score (PT)  18  -CS     Row Name 09/01/19 1316          Functional Assessment    Outcome Measure Options  AM-PAC 6 Clicks Basic Mobility (PT)  -CS       User Key  (r) = Recorded By, (t) = Taken By, (c) = Cosigned By    Initials Name Provider Type    Shane Christy, PT Physical Therapist        Physical Therapy Education     Title: PT OT SLP Therapies (Done)     Topic: Physical Therapy (Done)     Point: Mobility training (Done)     Learning Progress Summary           Patient Acceptance, E,TB, VU,NR by CS at 9/1/2019  1:14 PM    Acceptance, E,TB, VU,NR by CS at 8/31/2019 11:22 AM    Eager, E, VU,DU by DB at 8/28/2019  3:30 PM                   Point: Body mechanics (Done)     Learning Progress Summary           Patient Acceptance, E,TB, VU,NR by CS at 9/1/2019  1:14 PM    Acceptance, E,TB, VU,NR by CS at 8/31/2019 11:22 AM     Eager, E, VU,DU by DB at 8/28/2019  3:30 PM                   Point: Precautions (Done)     Learning Progress Summary           Patient Acceptance, E,TB, VU,NR by  at 9/1/2019  1:14 PM    Acceptance, E,TB, VU,NR by  at 8/31/2019 11:22 AM    Eager, E, VU,DU by DB at 8/28/2019  3:30 PM                               User Key     Initials Effective Dates Name Provider Type Discipline     05/14/18 -  Shane Bowman, PT Physical Therapist PT    DB 08/19/19 -  Matti Sepulveda, MARIAH Student PT Student PT              PT Recommendation and Plan  Planned Therapy Interventions (PT Eval): balance training, bed mobility training, gait training, home exercise program, stair training, transfer training  Outcome Summary/Treatment Plan (PT)  Anticipated Discharge Disposition (PT): home with assist, home with home health, skilled nursing facility  Plan of Care Reviewed With: patient  Outcome Summary: Pt walked further this visit, 400'CGA. Needed Natacha with sit<>stand transfer. Tolerated with mild complaints of pain. Progressing mobility as tolerated.      Time Calculation:   PT Charges     Row Name 09/01/19 1316             Time Calculation    Start Time  1252  -CS      Stop Time  1307  -      Time Calculation (min)  15 min  -      PT Received On  09/01/19  -      PT - Next Appointment  09/02/19  -        User Key  (r) = Recorded By, (t) = Taken By, (c) = Cosigned By    Initials Name Provider Type     Shane Bowman, PT Physical Therapist        Therapy Charges for Today     Code Description Service Date Service Provider Modifiers Qty    82958824327 HC PT EVAL MOD COMPLEXITY 2 8/31/2019 Shaen Bowman, PT GP 1    79866842553 HC PT THER PROC EA 15 MIN 8/31/2019 Shane Bowman, PT GP 1    13271812014 HC PT THER PROC EA 15 MIN 9/1/2019 Shane Bowman, PT GP 1          PT G-Codes  Outcome Measure Options: AM-PAC 6 Clicks Basic Mobility (PT)  AM-PAC 6 Clicks Score (PT): 18    Shane Bowman PT  9/1/2019

## 2019-09-02 LAB
ANION GAP SERPL CALCULATED.3IONS-SCNC: 9.2 MMOL/L (ref 5–15)
BUN BLD-MCNC: 10 MG/DL (ref 6–20)
BUN/CREAT SERPL: 16.9 (ref 7–25)
CALCIUM SPEC-SCNC: 8.6 MG/DL (ref 8.6–10.5)
CHLORIDE SERPL-SCNC: 94 MMOL/L (ref 98–107)
CO2 SERPL-SCNC: 26.8 MMOL/L (ref 22–29)
CREAT BLD-MCNC: 0.59 MG/DL (ref 0.76–1.27)
DEPRECATED RDW RBC AUTO: 44.4 FL (ref 37–54)
ERYTHROCYTE [DISTWIDTH] IN BLOOD BY AUTOMATED COUNT: 13.9 % (ref 12.3–15.4)
GFR SERPL CREATININE-BSD FRML MDRD: 146 ML/MIN/1.73
GLUCOSE BLD-MCNC: 241 MG/DL (ref 65–99)
GLUCOSE BLDC GLUCOMTR-MCNC: 206 MG/DL (ref 70–130)
GLUCOSE BLDC GLUCOMTR-MCNC: 219 MG/DL (ref 70–130)
GLUCOSE BLDC GLUCOMTR-MCNC: 243 MG/DL (ref 70–130)
GLUCOSE BLDC GLUCOMTR-MCNC: 271 MG/DL (ref 70–130)
HCT VFR BLD AUTO: 31.7 % (ref 37.5–51)
HGB BLD-MCNC: 10.3 G/DL (ref 13–17.7)
MCH RBC QN AUTO: 28.5 PG (ref 26.6–33)
MCHC RBC AUTO-ENTMCNC: 32.5 G/DL (ref 31.5–35.7)
MCV RBC AUTO: 87.6 FL (ref 79–97)
PLATELET # BLD AUTO: 155 10*3/MM3 (ref 140–450)
PMV BLD AUTO: 9.5 FL (ref 6–12)
POTASSIUM BLD-SCNC: 4.1 MMOL/L (ref 3.5–5.2)
RBC # BLD AUTO: 3.62 10*6/MM3 (ref 4.14–5.8)
SODIUM BLD-SCNC: 130 MMOL/L (ref 136–145)
WBC NRBC COR # BLD: 13.02 10*3/MM3 (ref 3.4–10.8)

## 2019-09-02 PROCEDURE — 63710000001 INSULIN LISPRO (HUMAN) PER 5 UNITS: Performed by: INTERNAL MEDICINE

## 2019-09-02 PROCEDURE — 80048 BASIC METABOLIC PNL TOTAL CA: CPT | Performed by: INTERNAL MEDICINE

## 2019-09-02 PROCEDURE — 25010000002 HYDROMORPHONE PER 4 MG: Performed by: INTERNAL MEDICINE

## 2019-09-02 PROCEDURE — 94799 UNLISTED PULMONARY SVC/PX: CPT

## 2019-09-02 PROCEDURE — 97110 THERAPEUTIC EXERCISES: CPT

## 2019-09-02 PROCEDURE — 82962 GLUCOSE BLOOD TEST: CPT

## 2019-09-02 PROCEDURE — 85027 COMPLETE CBC AUTOMATED: CPT | Performed by: INTERNAL MEDICINE

## 2019-09-02 PROCEDURE — 99024 POSTOP FOLLOW-UP VISIT: CPT | Performed by: NEUROLOGICAL SURGERY

## 2019-09-02 PROCEDURE — 63710000001 INSULIN GLARGINE PER 5 UNITS: Performed by: INTERNAL MEDICINE

## 2019-09-02 RX ORDER — METFORMIN HYDROCHLORIDE 500 MG/1
1000 TABLET, EXTENDED RELEASE ORAL 2 TIMES DAILY WITH MEALS
Status: DISCONTINUED | OUTPATIENT
Start: 2019-09-02 | End: 2019-09-03 | Stop reason: HOSPADM

## 2019-09-02 RX ADMIN — HYDROCODONE BITARTRATE AND ACETAMINOPHEN 2 TABLET: 10; 325 TABLET ORAL at 01:39

## 2019-09-02 RX ADMIN — INSULIN LISPRO 10 UNITS: 100 INJECTION, SOLUTION INTRAVENOUS; SUBCUTANEOUS at 12:39

## 2019-09-02 RX ADMIN — METFORMIN HYDROCHLORIDE 1000 MG: 500 TABLET, EXTENDED RELEASE ORAL at 17:59

## 2019-09-02 RX ADMIN — SODIUM CHLORIDE, PRESERVATIVE FREE 3 ML: 5 INJECTION INTRAVENOUS at 20:06

## 2019-09-02 RX ADMIN — INSULIN LISPRO 4 UNITS: 100 INJECTION, SOLUTION INTRAVENOUS; SUBCUTANEOUS at 21:26

## 2019-09-02 RX ADMIN — INSULIN LISPRO 3 UNITS: 100 INJECTION, SOLUTION INTRAVENOUS; SUBCUTANEOUS at 08:48

## 2019-09-02 RX ADMIN — ALLOPURINOL 100 MG: 100 TABLET ORAL at 08:48

## 2019-09-02 RX ADMIN — HYDROCODONE BITARTRATE AND ACETAMINOPHEN 2 TABLET: 10; 325 TABLET ORAL at 06:06

## 2019-09-02 RX ADMIN — INSULIN LISPRO 10 UNITS: 100 INJECTION, SOLUTION INTRAVENOUS; SUBCUTANEOUS at 08:48

## 2019-09-02 RX ADMIN — HYDROMORPHONE HYDROCHLORIDE 0.5 MG: 1 INJECTION, SOLUTION INTRAMUSCULAR; INTRAVENOUS; SUBCUTANEOUS at 08:49

## 2019-09-02 RX ADMIN — ATORVASTATIN CALCIUM 20 MG: 20 TABLET, FILM COATED ORAL at 08:49

## 2019-09-02 RX ADMIN — CLONIDINE HYDROCHLORIDE 0.1 MG: 0.1 TABLET ORAL at 08:48

## 2019-09-02 RX ADMIN — INSULIN LISPRO 3 UNITS: 100 INJECTION, SOLUTION INTRAVENOUS; SUBCUTANEOUS at 12:39

## 2019-09-02 RX ADMIN — HYDROMORPHONE HYDROCHLORIDE 0.5 MG: 1 INJECTION, SOLUTION INTRAMUSCULAR; INTRAVENOUS; SUBCUTANEOUS at 12:39

## 2019-09-02 RX ADMIN — HYDROMORPHONE HYDROCHLORIDE 0.5 MG: 1 INJECTION, SOLUTION INTRAMUSCULAR; INTRAVENOUS; SUBCUTANEOUS at 06:39

## 2019-09-02 RX ADMIN — SODIUM CHLORIDE, PRESERVATIVE FREE 3 ML: 5 INJECTION INTRAVENOUS at 08:49

## 2019-09-02 RX ADMIN — INSULIN LISPRO 10 UNITS: 100 INJECTION, SOLUTION INTRAVENOUS; SUBCUTANEOUS at 17:58

## 2019-09-02 RX ADMIN — HYDROCODONE BITARTRATE AND ACETAMINOPHEN 2 TABLET: 10; 325 TABLET ORAL at 20:04

## 2019-09-02 RX ADMIN — PANTOPRAZOLE SODIUM 40 MG: 40 TABLET, DELAYED RELEASE ORAL at 08:49

## 2019-09-02 RX ADMIN — GABAPENTIN 300 MG: 300 CAPSULE ORAL at 20:04

## 2019-09-02 RX ADMIN — BUDESONIDE AND FORMOTEROL FUMARATE DIHYDRATE 2 PUFF: 160; 4.5 AEROSOL RESPIRATORY (INHALATION) at 21:28

## 2019-09-02 RX ADMIN — CYCLOBENZAPRINE 10 MG: 10 TABLET, FILM COATED ORAL at 15:17

## 2019-09-02 RX ADMIN — INSULIN LISPRO 3 UNITS: 100 INJECTION, SOLUTION INTRAVENOUS; SUBCUTANEOUS at 17:59

## 2019-09-02 RX ADMIN — BUDESONIDE AND FORMOTEROL FUMARATE DIHYDRATE 2 PUFF: 160; 4.5 AEROSOL RESPIRATORY (INHALATION) at 07:43

## 2019-09-02 RX ADMIN — CYCLOBENZAPRINE 10 MG: 10 TABLET, FILM COATED ORAL at 06:06

## 2019-09-02 RX ADMIN — INSULIN GLARGINE 35 UNITS: 100 INJECTION, SOLUTION SUBCUTANEOUS at 21:25

## 2019-09-02 RX ADMIN — LORAZEPAM 0.5 MG: 0.5 TABLET ORAL at 21:30

## 2019-09-02 RX ADMIN — CLONIDINE HYDROCHLORIDE 0.1 MG: 0.1 TABLET ORAL at 20:04

## 2019-09-02 RX ADMIN — POLYETHYLENE GLYCOL 3350 17 G: 17 POWDER, FOR SOLUTION ORAL at 15:17

## 2019-09-02 RX ADMIN — HYDROCODONE BITARTRATE AND ACETAMINOPHEN 2 TABLET: 10; 325 TABLET ORAL at 15:17

## 2019-09-02 RX ADMIN — GABAPENTIN 300 MG: 300 CAPSULE ORAL at 08:48

## 2019-09-02 NOTE — PROGRESS NOTES
Contacted [facility] re need for BSC in preparation for d/c 9-3-19. All appropriate paperwork faxed to [facility]. Nataly Lion RN

## 2019-09-02 NOTE — PROGRESS NOTES
POD 2  S/p L5/S1 decompression and fusion  Vitals:    09/01/19 1310 09/01/19 1354 09/01/19 1747 09/01/19 2029   BP:  119/65 119/60 135/64   BP Location:  Right arm Right arm Right arm   Patient Position:  Lying Lying Lying   Pulse:  97  98   Resp:  18 18 18   Temp:  98.5 °F (36.9 °C) 97.9 °F (36.6 °C) 98.2 °F (36.8 °C)   TempSrc:  Oral Oral Oral   SpO2: 99% 98% 98%    Weight:       Height:       Actually doing too well for rehab  Will need home health services  Drain removed  Moves all 4 extremities well  Incision fine  Continue to mobilize  Lab Results   Component Value Date    GLUCOSE 239 (H) 08/30/2019    BUN 19 08/30/2019    CREATININE 0.92 08/30/2019    EGFRIFNONA 87 08/30/2019    BCR 20.7 08/30/2019    K 4.8 08/30/2019    CO2 31.3 (H) 08/30/2019    CALCIUM 9.0 08/30/2019    ALBUMIN 4.40 08/25/2019    LABIL2 1.4 07/23/2019    AST 19 08/25/2019    ALT 31 08/25/2019     WBC   Date Value Ref Range Status   08/30/2019 14.09 (H) 3.40 - 10.80 10*3/mm3 Final     RBC   Date Value Ref Range Status   08/30/2019 5.04 4.14 - 5.80 10*6/mm3 Final     Hemoglobin   Date Value Ref Range Status   08/30/2019 14.3 13.0 - 17.7 g/dL Final     Hematocrit   Date Value Ref Range Status   08/30/2019 43.2 37.5 - 51.0 % Final     MCV   Date Value Ref Range Status   08/30/2019 85.7 79.0 - 97.0 fL Final     MCH   Date Value Ref Range Status   08/30/2019 28.4 26.6 - 33.0 pg Final     MCHC   Date Value Ref Range Status   08/30/2019 33.1 31.5 - 35.7 g/dL Final     RDW   Date Value Ref Range Status   08/30/2019 13.8 12.3 - 15.4 % Final     RDW-SD   Date Value Ref Range Status   08/30/2019 42.5 37.0 - 54.0 fl Final     MPV   Date Value Ref Range Status   08/30/2019 9.9 6.0 - 12.0 fL Final     Platelets   Date Value Ref Range Status   08/30/2019 258 140 - 450 10*3/mm3 Final     Neutrophil %   Date Value Ref Range Status   08/30/2019 70.7 42.7 - 76.0 % Final     Lymphocyte %   Date Value Ref Range Status   08/30/2019 18.7 (L) 19.6 - 45.3 % Final      Monocyte %   Date Value Ref Range Status   08/30/2019 6.6 5.0 - 12.0 % Final     Eosinophil %   Date Value Ref Range Status   08/30/2019 0.1 (L) 0.3 - 6.2 % Final     Basophil %   Date Value Ref Range Status   08/30/2019 0.3 0.0 - 1.5 % Final     Immature Grans %   Date Value Ref Range Status   08/30/2019 3.6 (H) 0.0 - 0.5 % Final     Neutrophils, Absolute   Date Value Ref Range Status   08/30/2019 9.96 (H) 1.70 - 7.00 10*3/mm3 Final     Lymphocytes, Absolute   Date Value Ref Range Status   08/30/2019 2.64 0.70 - 3.10 10*3/mm3 Final     Monocytes, Absolute   Date Value Ref Range Status   08/30/2019 0.93 (H) 0.10 - 0.90 10*3/mm3 Final     Eosinophils, Absolute   Date Value Ref Range Status   08/30/2019 0.01 0.00 - 0.40 10*3/mm3 Final     Basophils, Absolute   Date Value Ref Range Status   08/30/2019 0.04 0.00 - 0.20 10*3/mm3 Final     Immature Grans, Absolute   Date Value Ref Range Status   08/30/2019 0.51 (H) 0.00 - 0.05 10*3/mm3 Final     nRBC   Date Value Ref Range Status   08/30/2019 0.0 0.0 - 0.2 /100 WBC Final

## 2019-09-02 NOTE — PLAN OF CARE
Problem: Patient Care Overview  Goal: Plan of Care Review   09/02/19 1036   Coping/Psychosocial   Plan of Care Reviewed With patient   OTHER   Outcome Summary Pt walked 400' less assist with mobility this visit and he also climbed 5 stairs CGA. He thinks he can go home safely, but concerned about standing from toilet. He may benefit from elevated toilet seats at home.

## 2019-09-02 NOTE — THERAPY TREATMENT NOTE
Patient Name: Ronni Asif  : 1970    MRN: [id number]                              Today's Date: 2019       Admit Date: 2019    Visit Dx:     ICD-10-CM ICD-9-CM   1. Hyperglycemia R73.9 790.29   2. Left foot drop M21.372 736.79   3. Lumbar radiculopathy M54.16 724.4     Patient Active Problem List   Diagnosis   • Lumbar radiculopathy   • Congenital spondylolysis, lumbosacral region   • Transient cerebral ischemia   • Pain in thoracic spine   • Neck pain   • Degeneration of thoracic intervertebral disc   • Degeneration of intervertebral disc at C5-C6 level   • Type 2 diabetes mellitus without complication, without long-term current use of insulin (CMS/HCC)   • Obstructive sleep apnea, adult   • Essential hypertension   • Moderate asthma without complication   • Gastroesophageal reflux disease   • Diarrhea   • History of colon polyps   • Hyperglycemia   • Foot drop, left   • Lumbar disc herniation with radiculopathy     Past Medical History:   Diagnosis Date   • Anxiety    • Asthma    • Cancer (CMS/HCC)     skin   • Diabetes mellitus (CMS/HCC)    • Dizziness    • GERD (gastroesophageal reflux disease)    • Headache    • Hyperlipidemia    • Hypertension    • Injury of back 2016    Pt fell 20 feet    • Kidney stone    • Neuromuscular disorder (CMS/HCC)    • Pneumonia    • Sleep apnea     cpap   • Stroke (CMS/HCC)     tia   • Syncope and collapse      Past Surgical History:   Procedure Laterality Date   • COLONOSCOPY     • COLONOSCOPY N/A 2019    Procedure: COLONOSCOPY to Cecum with Hot and Cold Polypectomy x 2;  Surgeon: Navid Zafar Jr., MD;  Location: [facility] ENDOSCOPY;  Service: General   • ENDOSCOPY N/A 2019    Procedure: ESOPHAGOGASTRODUODENOSCOPY with Bx's;  Surgeon: Navid Zafar Jr., MD;  Location: [facility] ENDOSCOPY;  Service: General   • HERNIA REPAIR  10/2015     General Information     Row Name 19 1034          PT Evaluation Time/Intention    Document Type   therapy note (daily note)  -CS     Mode of Treatment  physical therapy  -     Row Name 09/02/19 1034          General Information    Patient Profile Reviewed?  yes  -CS     Existing Precautions/Restrictions  fall;spinal  -CS     Row Name 09/02/19 1034          Cognitive Assessment/Intervention- PT/OT    Orientation Status (Cognition)  oriented x 4  -CS     Row Name 09/02/19 1034          Safety Issues, Functional Mobility    Safety Issues Affecting Function (Mobility)  insight into deficits/self awareness  -CS       User Key  (r) = Recorded By, (t) = Taken By, (c) = Cosigned By    Initials Name Provider Type    CS Shane Bowman, PT Physical Therapist        Mobility     Row Name 09/02/19 1034          Bed Mobility Assessment/Treatment    Bed Mobility Assessment/Treatment  sit-supine  -CS     Sit-Supine [place] (Bed Mobility)  minimum assist (75% patient effort)  -CS     Assistive Device (Bed Mobility)  bed rails;head of bed elevated  -CS     Row Name 09/02/19 1034          Sit-Stand Transfer    Sit-Stand [place] (Transfers)  contact guard  -CS     Assistive Device (Sit-Stand Transfers)  walker, front-wheeled  -CS     Row Name 09/02/19 1034          Gait/Stairs Assessment/Training    [place] Level (Gait)  contact guard  -CS     Assistive Device (Gait)  walker, front-wheeled  -CS     Distance in Feet (Gait)  400  -CS     Deviations/Abnormal Patterns (Gait)  gait speed decreased  -CS     Left Sided Gait Deviations  heel strike decreased  -CS     Number of Steps (Stairs)  5  -CS     Ascending Technique (Stairs)  step-to-step  -CS     Descending Technique (Stairs)  step-to-step  -CS       User Key  (r) = Recorded By, (t) = Taken By, (c) = Cosigned By    Initials Name Provider Type    CS Shane Bowman, PT Physical Therapist        Obj/Interventions    No documentation.       Goals/Plan    No documentation.       Clinical Impression     Row Name 09/02/19 1035          Pain Assessment    Additional  Documentation  Pain Scale: FACES Pre/Post-Treatment (Group)  -CS     Row Name 09/02/19 1035          Pain Scale: Numbers Pre/Post-Treatment    Pain Location - Orientation  incisional  -CS     Pain Location  back  -CS     Pain Intervention(s)  Repositioned  -CS     Row Name 09/02/19 1035          Pain Scale: FACES Pre/Post-Treatment    Pain: FACES Scale, Pretreatment  4-->hurts little more  -CS     Pain: FACES Scale, Post-Treatment  4-->hurts little more  -CS     Row Name 09/02/19 1035          Plan of Care Review    Plan of Care Reviewed With  patient  -CS     Row Name 09/02/19 1035          Positioning and Restraints    Pre-Treatment Position  standing in room  -CS     Post Treatment Position  bed  -CS     In Bed  supine;call light within reach;encouraged to call for assist  -CS       User Key  (r) = Recorded By, (t) = Taken By, (c) = Cosigned By    Initials Name Provider Type    Shane Christy, PT Physical Therapist        Outcome Measures     Row Name 09/02/19 1038          How much help from another person do you currently need...    Turning from your back to your side while in flat bed without using bedrails?  3  -CS     Moving from lying on back to sitting on the side of a flat bed without bedrails?  3  -CS     Moving to and from a bed to a chair (including a wheelchair)?  3  -CS     Standing up from a chair using your arms (e.g., wheelchair, bedside chair)?  3  -CS     Climbing 3-5 steps with a railing?  3  -CS     To walk in hospital room?  3  -CS     AM-PAC 6 Clicks Score (PT)  18  -CS     Row Name 09/02/19 1038          Functional Assessment    Outcome Measure Options  AM-PAC 6 Clicks Basic Mobility (PT)  -CS       User Key  (r) = Recorded By, (t) = Taken By, (c) = Cosigned By    Initials Name Provider Type    Shane Christy, PT Physical Therapist        Physical Therapy Education     Title: PT OT SLP Therapies (Done)     Topic: Physical Therapy (Done)     Point: Mobility training (Done)      Learning Progress Summary           Patient Acceptance, E,TB, VU,NR by CS at 9/2/2019 10:36 AM    Acceptance, E,TB, VU,NR by CS at 9/1/2019  1:14 PM    Acceptance, E,TB, VU,NR by CS at 8/31/2019 11:22 AM    Eager, E, VU,DU by DB at 8/28/2019  3:30 PM                   Point: Body mechanics (Done)     Learning Progress Summary           Patient Acceptance, E,TB, VU,NR by CS at 9/2/2019 10:36 AM    Acceptance, E,TB, VU,NR by CS at 9/1/2019  1:14 PM    Acceptance, E,TB, VU,NR by CS at 8/31/2019 11:22 AM    Eager, E, VU,DU by DB at 8/28/2019  3:30 PM                   Point: Precautions (Done)     Learning Progress Summary           Patient Acceptance, E,TB, VU,NR by CS at 9/2/2019 10:36 AM    Acceptance, E,TB, VU,NR by CS at 9/1/2019  1:14 PM    Acceptance, E,TB, VU,NR by CS at 8/31/2019 11:22 AM    Eager, E, VU,DU by DB at 8/28/2019  3:30 PM                               User Key     Initials Effective Dates Name Provider Type Discipline     05/14/18 -  Shane Bowman, PT Physical Therapist PT    DB 08/19/19 -  Matti Sepulveda, PT Student PT Student PT              PT Recommendation and Plan  Planned Therapy Interventions (PT Eval): balance training, bed mobility training, gait training, home exercise program, stair training, transfer training  Outcome Summary/Treatment Plan (PT)  Anticipated Discharge Disposition (PT): home with home health, home with assist  Plan of Care Reviewed With: patient  Outcome Summary: Pt walked 400' less assist with mobility this visit and he also climbed 5 stairs CGA. He thinks he can go home safely, but concerned about standing from toilet. He may benefit from elevated toilet seats at home.      Time Calculation:   PT Charges     Row Name 09/02/19 1038             Time Calculation    Start Time  1015  -      Stop Time  1034  -      Time Calculation (min)  19 min  -      PT Received On  09/02/19  -      PT - Next Appointment  09/03/19  -        User Key  (r) = Recorded By,  (t) = Taken By, (c) = Cosigned By    Initials Name Provider Type     Shane Bowman, PT Physical Therapist        Therapy Charges for Today     Code Description Service Date Service Provider Modifiers Qty    33180415216 HC PT THER PROC EA 15 MIN 9/1/2019 Shane Bowman, PT GP 1    32066330188 HC PT THER PROC EA 15 MIN 9/2/2019 Shane Bowman, PT GP 1          PT G-Codes  Outcome Measure Options: AM-PAC 6 Clicks Basic Mobility (PT)  AM-PAC 6 Clicks Score (PT): 18    Shane Bowman PT  9/2/2019

## 2019-09-02 NOTE — PLAN OF CARE
Problem: Patient Care Overview  Goal: Plan of Care Review  Outcome: Ongoing (interventions implemented as appropriate)   09/01/19 1941 09/02/19 1395   Coping/Psychosocial   Plan of Care Reviewed With patient --    Plan of Care Review   Progress --  improving   OTHER   Outcome Summary --  IVF's and PCA dc'ed; pain controlled with flexeril/norco and occassional IV dilaudid, dressing changed x1 during the night for moderate serosang drainage; optifoam dressing applied; patient up with walker and assist x1 to the bathroom; cpap at night. Will continue to monitor.        Problem: Pain, Chronic (Adult)  Goal: Identify Related Risk Factors and Signs and Symptoms  Outcome: Outcome(s) achieved Date Met: 09/02/19    Goal: Acceptable Pain/Comfort Level and Functional Ability  Outcome: Ongoing (interventions implemented as appropriate)      Problem: Fall Risk (Adult)  Goal: Identify Related Risk Factors and Signs and Symptoms  Outcome: Outcome(s) achieved Date Met: 09/02/19    Goal: Absence of Fall  Outcome: Ongoing (interventions implemented as appropriate)      Problem: Skin Injury Risk (Adult)  Goal: Identify Related Risk Factors and Signs and Symptoms  Outcome: Outcome(s) achieved Date Met: 09/02/19    Goal: Skin Health and Integrity  Outcome: Ongoing (interventions implemented as appropriate)

## 2019-09-02 NOTE — PLAN OF CARE
Problem: Patient Care Overview  Goal: Plan of Care Review   09/02/19 1893   Coping/Psychosocial   Plan of Care Reviewed With patient   Plan of Care Review   Progress improving   OTHER   Outcome Summary Pt has been up with standby assist and ambulated around floor. Has been taking PRN pain medication quite frequently in regards to incisional pain. DME request for elevated toilet seat rec'd. Continue to monitor.        Problem: Pain, Chronic (Adult)  Goal: Acceptable Pain/Comfort Level and Functional Ability  Outcome: Ongoing (interventions implemented as appropriate)      Problem: Fall Risk (Adult)  Goal: Absence of Fall  Outcome: Ongoing (interventions implemented as appropriate)

## 2019-09-02 NOTE — PROGRESS NOTES
Name: Ronni Asif ADMIT: 2019   : 1970  PCP: Gil Chambers MD    MRN: [id number] LOS: 5 days   AGE/SEX: 49 y.o. male  ROOM: [facility]   Subjective   No chest pain shortness of breath nausea vomiting or diarrhea. Pain improved some.    Objective   Vital Signs  Temp:  [97.3 °F (36.3 °C)-98.5 °F (36.9 °C)] 97.3 °F (36.3 °C)  Heart Rate:  [] 118  Resp:  [16-20] 20  BP: (102-135)/(54-71) 102/69  SpO2:  [95 %-99 %] 98 %  on   ;   Device (Oxygen Therapy): room air  Body mass index is 41.61 kg/m².    Physical Exam   Constitutional: He is oriented to person, place, and time. He appears well-developed. No distress.   HENT:   Head: Atraumatic.   Nose: Nose normal.   Eyes: Conjunctivae and EOM are normal.   Neck: Neck supple. No tracheal deviation present.   Cardiovascular: Normal rate, regular rhythm and intact distal pulses.   Pulmonary/Chest: Effort normal and breath sounds normal. He has no wheezes.   Abdominal: Soft. He exhibits no distension. There is no tenderness.   Musculoskeletal: He exhibits tenderness. He exhibits no edema.   Neurological: He is alert and oriented to person, place, and time.   Skin: Skin is warm and dry. He is not diaphoretic.   Psychiatric: He has a normal mood and affect. His behavior is normal.   Nursing note and vitals reviewed.      Results Review:       I reviewed the patient's new clinical results.    Results from last 7 days   Lab Units 19  0438 19  0507 19  0432   WBC 10*3/mm3 13.02* 14.09* 13.41* 15.90*   HEMOGLOBIN g/dL 10.3* 14.3 14.3 14.8   PLATELETS 10*3/mm3 155 258 252 281     Results from last 7 days   Lab Units 19  0438 19  0507 19  0319 19  0432   SODIUM mmol/L 130* 134* 135* 133*   POTASSIUM mmol/L 4.1 4.8 4.3 4.4   CHLORIDE mmol/L 94* 93* 97* 93*   CO2 mmol/L 26.8 31.3* 25.3 25.4   BUN mg/dL 10 19 25* 21*   CREATININE mg/dL 0.59* 0.92 0.81 0.85   GLUCOSE mg/dL 241* 239* 256* 312*    Estimated Creatinine Clearance: 206.9 mL/min (A) (by C-G formula based on SCr of 0.59 mg/dL (L)).  Results from last 7 days   Lab Units 09/02/19  0438 08/30/19  0507 08/28/19  0319 08/27/19  0432   CALCIUM mg/dL 8.6 9.0 9.0 9.4   MAGNESIUM mg/dL  --   --   --  2.5         allopurinol 100 mg Oral Daily   atorvastatin 20 mg Oral Daily   budesonide-formoterol 2 puff Inhalation BID - RT   CloNIDine 0.1 mg Oral BID   gabapentin 300 mg Oral TID   insulin glargine 35 Units Subcutaneous Nightly   insulin lispro 0-7 Units Subcutaneous 4x Daily With Meals & Nightly   insulin lispro 10 Units Subcutaneous TID With Meals   metFORMIN ER 1,000 mg Oral BID With Meals   pantoprazole 40 mg Oral Daily   sodium chloride 3 mL Intravenous Q12H      Diet Regular; Consistent Carbohydrate      Assessment/Plan      Active Hospital Problems    Diagnosis  POA   • **Lumbar disc herniation with radiculopathy [M51.16]  Yes   • Hyperglycemia [R73.9]  Yes   • Foot drop, left [M21.372]  Yes   • Type 2 diabetes mellitus without complication, without long-term current use of insulin (CMS/[facility]) [E11.9]  Yes   • Obstructive sleep apnea, adult [G47.33]  Yes   • Congenital spondylolysis, lumbosacral region [Q76.2]  Not Applicable   • Lumbar radiculopathy [M54.16]  Yes      Resolved Hospital Problems   No resolved problems to display.     · Lumbar disc herniation with radiculopathy/left foot drop: Status post L5/S1 decompression/Shell procedure with PLIF 8/30.  Continue oral pain regimen to 1-2 tabs Norco as needed.  IV Dilaudid as needed. Neurosurgery following.  · Diabetes: A1c 8.9.  Continue lantus 35 units. Continue premeal and SSI. Resume metformin. Off steroid for few days now, so hopefully he will start to have decreasing requirements.  · Hypertension: Stable  · GERD: PPI  · Disposition: HH/possibly tomorrow    Tyler Lopez MD  [facility]  09/02/19  12:08 PM

## 2019-09-03 VITALS
WEIGHT: 290 LBS | RESPIRATION RATE: 16 BRPM | OXYGEN SATURATION: 98 % | HEIGHT: 70 IN | SYSTOLIC BLOOD PRESSURE: 112 MMHG | HEART RATE: 96 BPM | TEMPERATURE: 97 F | BODY MASS INDEX: 41.52 KG/M2 | DIASTOLIC BLOOD PRESSURE: 90 MMHG

## 2019-09-03 LAB
ANION GAP SERPL CALCULATED.3IONS-SCNC: 7.9 MMOL/L (ref 5–15)
BUN BLD-MCNC: 9 MG/DL (ref 6–20)
BUN/CREAT SERPL: 16.1 (ref 7–25)
CALCIUM SPEC-SCNC: 8.9 MG/DL (ref 8.6–10.5)
CHLORIDE SERPL-SCNC: 91 MMOL/L (ref 98–107)
CO2 SERPL-SCNC: 27.1 MMOL/L (ref 22–29)
CREAT BLD-MCNC: 0.56 MG/DL (ref 0.76–1.27)
GFR SERPL CREATININE-BSD FRML MDRD: >150 ML/MIN/1.73
GLUCOSE BLD-MCNC: 185 MG/DL (ref 65–99)
GLUCOSE BLDC GLUCOMTR-MCNC: 127 MG/DL (ref 70–130)
GLUCOSE BLDC GLUCOMTR-MCNC: 142 MG/DL (ref 70–130)
GLUCOSE BLDC GLUCOMTR-MCNC: 187 MG/DL (ref 70–130)
GLUCOSE BLDC GLUCOMTR-MCNC: 194 MG/DL (ref 70–130)
POTASSIUM BLD-SCNC: 3.8 MMOL/L (ref 3.5–5.2)
SODIUM BLD-SCNC: 126 MMOL/L (ref 136–145)

## 2019-09-03 PROCEDURE — 80048 BASIC METABOLIC PNL TOTAL CA: CPT | Performed by: INTERNAL MEDICINE

## 2019-09-03 PROCEDURE — 97110 THERAPEUTIC EXERCISES: CPT

## 2019-09-03 PROCEDURE — 82962 GLUCOSE BLOOD TEST: CPT

## 2019-09-03 PROCEDURE — 99024 POSTOP FOLLOW-UP VISIT: CPT | Performed by: NURSE PRACTITIONER

## 2019-09-03 PROCEDURE — 63710000001 INSULIN LISPRO (HUMAN) PER 5 UNITS: Performed by: INTERNAL MEDICINE

## 2019-09-03 RX ORDER — ACETAMINOPHEN 325 MG/1
650 TABLET ORAL EVERY 6 HOURS PRN
Start: 2019-09-03

## 2019-09-03 RX ORDER — PREDNISONE 10 MG/1
TABLET ORAL
Qty: 5 TABLET | Refills: 0 | Status: SHIPPED | OUTPATIENT
Start: 2019-09-03 | End: 2020-01-08 | Stop reason: HOSPADM

## 2019-09-03 RX ORDER — POLYETHYLENE GLYCOL 3350 17 G/17G
17 POWDER, FOR SOLUTION ORAL DAILY PRN
Start: 2019-09-03 | End: 2021-05-13

## 2019-09-03 RX ORDER — HYDROCODONE BITARTRATE AND ACETAMINOPHEN 10; 325 MG/1; MG/1
1 TABLET ORAL EVERY 4 HOURS PRN
Qty: 15 TABLET | Refills: 0 | Status: SHIPPED | OUTPATIENT
Start: 2019-09-03 | End: 2019-09-18 | Stop reason: DRUGHIGH

## 2019-09-03 RX ORDER — INSULIN DEGLUDEC 100 U/ML
INJECTION, SOLUTION SUBCUTANEOUS
Start: 2019-09-03 | End: 2019-10-08 | Stop reason: SDUPTHER

## 2019-09-03 RX ADMIN — ALLOPURINOL 100 MG: 100 TABLET ORAL at 07:57

## 2019-09-03 RX ADMIN — ATORVASTATIN CALCIUM 20 MG: 20 TABLET, FILM COATED ORAL at 07:53

## 2019-09-03 RX ADMIN — INSULIN LISPRO 2 UNITS: 100 INJECTION, SOLUTION INTRAVENOUS; SUBCUTANEOUS at 11:40

## 2019-09-03 RX ADMIN — CYCLOBENZAPRINE 10 MG: 10 TABLET, FILM COATED ORAL at 08:11

## 2019-09-03 RX ADMIN — HYDROCODONE BITARTRATE AND ACETAMINOPHEN 2 TABLET: 10; 325 TABLET ORAL at 09:14

## 2019-09-03 RX ADMIN — INSULIN LISPRO 10 UNITS: 100 INJECTION, SOLUTION INTRAVENOUS; SUBCUTANEOUS at 11:40

## 2019-09-03 RX ADMIN — CLONIDINE HYDROCHLORIDE 0.1 MG: 0.1 TABLET ORAL at 08:06

## 2019-09-03 RX ADMIN — INSULIN LISPRO 2 UNITS: 100 INJECTION, SOLUTION INTRAVENOUS; SUBCUTANEOUS at 07:52

## 2019-09-03 RX ADMIN — INSULIN LISPRO 10 UNITS: 100 INJECTION, SOLUTION INTRAVENOUS; SUBCUTANEOUS at 18:02

## 2019-09-03 RX ADMIN — PANTOPRAZOLE SODIUM 40 MG: 40 TABLET, DELAYED RELEASE ORAL at 09:15

## 2019-09-03 RX ADMIN — GABAPENTIN 300 MG: 300 CAPSULE ORAL at 07:59

## 2019-09-03 RX ADMIN — HYDROCODONE BITARTRATE AND ACETAMINOPHEN 2 TABLET: 10; 325 TABLET ORAL at 00:50

## 2019-09-03 RX ADMIN — METFORMIN HYDROCHLORIDE 1000 MG: 500 TABLET, EXTENDED RELEASE ORAL at 07:53

## 2019-09-03 RX ADMIN — METFORMIN HYDROCHLORIDE 1000 MG: 500 TABLET, EXTENDED RELEASE ORAL at 18:02

## 2019-09-03 RX ADMIN — HYDROCODONE BITARTRATE AND ACETAMINOPHEN 2 TABLET: 10; 325 TABLET ORAL at 05:10

## 2019-09-03 RX ADMIN — POLYETHYLENE GLYCOL 3350 17 G: 17 POWDER, FOR SOLUTION ORAL at 07:49

## 2019-09-03 RX ADMIN — GABAPENTIN 300 MG: 300 CAPSULE ORAL at 16:18

## 2019-09-03 RX ADMIN — HYDROCODONE BITARTRATE AND ACETAMINOPHEN 2 TABLET: 10; 325 TABLET ORAL at 18:03

## 2019-09-03 RX ADMIN — CYCLOBENZAPRINE 10 MG: 10 TABLET, FILM COATED ORAL at 16:32

## 2019-09-03 RX ADMIN — HYDROCODONE BITARTRATE AND ACETAMINOPHEN 2 TABLET: 10; 325 TABLET ORAL at 13:10

## 2019-09-03 RX ADMIN — INSULIN LISPRO 10 UNITS: 100 INJECTION, SOLUTION INTRAVENOUS; SUBCUTANEOUS at 07:52

## 2019-09-03 RX ADMIN — SODIUM CHLORIDE, PRESERVATIVE FREE 3 ML: 5 INJECTION INTRAVENOUS at 07:58

## 2019-09-03 NOTE — PAYOR COMM NOTE
"UR CONTACT:  NERISSA  P: [phone]        F: [phone]        Ronni Asif (49 y.o. Male)     Date of Birth Social Security Number Address Home Phone MRN    1970  [id number] [facility] [phone] [id number]    [employer] Status          Episcopalian        Admission Date Admission Type Admitting Provider Attending Provider Department, Room/Bed    8/25/19 Emergency EdlingAnish MD Jackson, Alan David, MD [facility] 5 [place], P581/1    Discharge Date Discharge Disposition Discharge Destination                       Attending Provider:  Edward West MD    Allergies:  No Known Allergies    Isolation:  None   Infection:  None   Code Status:  No CPR    Ht:  177.8 cm (70\")   Wt:  132 kg (290 lb)    Admission Cmt:  None   Principal Problem:  Lumbar disc herniation with radiculopathy [M51.16] More...                 Active Insurance as of 8/25/2019     Primary Coverage     Payor Plan Insurance Group Employer/Plan Group    [id number]     Payor Plan Address Payor Plan Phone Number Payor Plan Fax Number Effective Dates    [address] [phone]  11/1/1997 - None Entered    [facility] [phone]       Subscriber Name Subscriber Birth Date Member ID       RONNI ASIF 1970 [id number]                 Emergency Contacts      (Rel.) Home Phone Work Phone Mobile Phone    ALTA WOLFF (Significant Other) -- -- [phone]    Sergio Asif (Brother) [phone] -- [phone]            ICU Vital Signs     Row Name 09/03/19 0906 09/03/19 0806 09/03/19 0800 09/03/19 0758 09/03/19 0513       Vitals    Temp  97 °F (36.1 °C)  --  --  --  98.1 °F (36.7 °C)    Temp src  Oral  --  --  --  Oral    Pulse  94  92  --  92  63    Heart Rate Source  Monitor  --  --  --  Monitor    Resp  16  --  --  --  16    Resp Rate Source  Visual  --  --  --  Visual    BP  112/90  120/70  --  120/70  107/55    BP " "Location  Left arm  --  --  --  Right leg    BP Method  Automatic  --  --  --  Automatic    Patient Position  Sitting  --  --  --  Lying       Oxygen Therapy    Device (Oxygen Therapy)  room air  --  room air  --  room air        Lines, Drains & Airways    Active LDAs     Name:   Placement date:   Placement time:   Site:   Days:    Peripheral IV 08/25/19 1414 Left Antecubital   08/25/19    1414    Antecubital   8    Peripheral IV 08/30/19 0753 Right Antecubital   08/30/19    0753    Antecubital   4                Hospital Medications (active)       Dose Frequency Start End    acetaminophen (TYLENOL) 160 MG/5ML solution 650 mg 650 mg Every 4 Hours PRN 8/25/2019     Sig - Route: Take 20.3 mL by mouth Every 4 (Four) Hours As Needed for Mild Pain . - Oral    Linked Group 1:  \"Or\" Linked Group Details        acetaminophen (TYLENOL) suppository 650 mg 650 mg Every 4 Hours PRN 8/25/2019     Sig - Route: Insert 1 suppository into the rectum Every 4 (Four) Hours As Needed for Mild Pain . - Rectal    Linked Group 1:  \"Or\" Linked Group Details        acetaminophen (TYLENOL) tablet 650 mg 650 mg Every 4 Hours PRN 8/25/2019     Sig - Route: Take 2 tablets by mouth Every 4 (Four) Hours As Needed for Mild Pain . - Oral    Linked Group 1:  \"Or\" Linked Group Details        albuterol (PROVENTIL) nebulizer solution 0.083% 2.5 mg/3mL 2.5 mg Every 6 Hours PRN 8/25/2019     Sig - Route: Take 2.5 mg by nebulization Every 6 (Six) Hours As Needed for Wheezing. - Nebulization    allopurinol (ZYLOPRIM) tablet 100 mg 100 mg Daily 8/25/2019 3/13/2020    Sig - Route: Take 1 tablet by mouth Daily. - Oral    atorvastatin (LIPITOR) tablet 20 mg 20 mg Daily 8/25/2019     Sig - Route: Take 1 tablet by mouth Daily. - Oral    budesonide-formoterol (SYMBICORT) 160-4.5 MCG/ACT inhaler 2 puff 2 puff 2 Times Daily - RT 8/25/2019     Sig - Route: Inhale 2 puffs 2 (Two) Times a Day. - Inhalation    CloNIDine (CATAPRES) tablet 0.1 mg 0.1 mg 2 Times Daily " 8/25/2019     Sig - Route: Take 1 tablet by mouth 2 (Two) Times a Day. - Oral    cyclobenzaprine (FLEXERIL) tablet 10 mg 10 mg 3 Times Daily PRN 8/25/2019     Sig - Route: Take 1 tablet by mouth 3 (Three) Times a Day As Needed for Muscle Spasms. - Oral    dextrose (D50W) 25 g/ 50mL Intravenous Solution 25 g 25 g Every 15 Minutes PRN 8/25/2019     Sig - Route: Infuse 50 mL into a venous catheter Every 15 (Fifteen) Minutes As Needed for Low Blood Sugar (Blood Sugar Less Than 70). - Intravenous    dextrose (GLUTOSE) oral gel 15 g 15 g Every 15 Minutes PRN 8/25/2019     Sig - Route: Take 15 application by mouth Every 15 (Fifteen) Minutes As Needed for Low Blood Sugar (Blood sugar less than 70). - Oral    gabapentin (NEURONTIN) capsule 300 mg 300 mg 3 Times Daily 8/25/2019     Sig - Route: Take 1 capsule by mouth 3 (Three) Times a Day. - Oral    glucagon (human recombinant) (GLUCAGEN DIAGNOSTIC) injection 1 mg 1 mg As Needed 8/25/2019     Sig - Route: Inject 1 mg under the skin into the appropriate area as directed As Needed for Low Blood Sugar (Blood Glucose Less Than 70). - Subcutaneous    HYDROcodone-acetaminophen (NORCO)  MG per tablet 1 tablet 1 tablet Every 4 Hours PRN 8/30/2019     Sig - Route: Take 1 tablet by mouth Every 4 (Four) Hours As Needed for Moderate Pain . - Oral    HYDROcodone-acetaminophen (NORCO)  MG per tablet 2 tablet 2 tablet Every 4 Hours PRN 8/31/2019 9/10/2019    Sig - Route: Take 2 tablets by mouth Every 4 (Four) Hours As Needed for Severe Pain . - Oral    insulin glargine (LANTUS) injection 35 Units 35 Units Nightly 9/1/2019     Sig - Route: Inject 35 Units under the skin into the appropriate area as directed Every Night. - Subcutaneous    insulin lispro (humaLOG) injection 0-7 Units 0-7 Units 4 Times Daily With Meals & Nightly 8/29/2019     Sig - Route: Inject 0-7 Units under the skin into the appropriate area as directed 4 (Four) Times a Day With Meals & at Bedtime. -  "Subcutaneous    insulin lispro (humaLOG) injection 10 Units 10 Units 3 Times Daily With Meals 8/29/2019     Sig - Route: Inject 10 Units under the skin into the appropriate area as directed 3 (Three) Times a Day With Meals. - Subcutaneous    LORazepam (ATIVAN) tablet 0.5 mg 0.5 mg Every 6 Hours PRN 8/29/2019 9/8/2019    Sig - Route: Take 1 tablet by mouth Every 6 (Six) Hours As Needed for Anxiety. - Oral    metFORMIN ER (GLUCOPHAGE-XR) 24 hr tablet 1,000 mg 1,000 mg 2 Times Daily With Meals 9/2/2019     Sig - Route: Take 2 tablets by mouth 2 (Two) Times a Day With Meals. - Oral    Naloxone HCl (NARCAN) injection 0.4 mg 0.4 mg As Needed 9/1/2019     Sig - Route: Infuse 0.4 mL into a venous catheter As Needed for Opioid Reversal or Respiratory Depression. - Intravenous    pantoprazole (PROTONIX) EC tablet 40 mg 40 mg Daily 8/25/2019     Sig - Route: Take 1 tablet by mouth Daily. - Oral    polyethylene glycol (MIRALAX) powder 17 g 17 g Daily PRN 8/28/2019     Sig - Route: Take 17 g by mouth Daily As Needed for Constipation. - Oral    sodium chloride 0.9 % flush 10 mL 10 mL As Needed 8/25/2019     Sig - Route: Infuse 10 mL into a venous catheter As Needed for Line Care. - Intravenous    Linked Group 2:  \"And\" Linked Group Details        sodium chloride 0.9 % flush 3 mL 3 mL Every 12 Hours Scheduled 8/25/2019     Sig - Route: Infuse 3 mL into a venous catheter Every 12 (Twelve) Hours. - Intravenous    sodium chloride 0.9 % flush 3-10 mL 3-10 mL As Needed 8/25/2019     Sig - Route: Infuse 3-10 mL into a venous catheter As Needed for Line Care. - Intravenous    HYDROmorphone (DILAUDID) injection 0.5 mg (Discontinued) 0.5 mg Every 2 Hours PRN 9/1/2019 9/2/2019    Sig - Route: Infuse 0.5 mL into a venous catheter Every 2 (Two) Hours As Needed for Severe Pain . - Intravenous             Physician Progress Notes      Arline Ayoub, APRN at 9/3/2019 10:43 AM     Attestation signed by Jake Tripathi MD at 9/3/2019 11:21 " AM    I have reviewed the documentation above and agree.                  Postop visit   LOS: 6 days   Patient Care Team:  Gil Chambers MD as PCP - General (Family Medicine)    Chief Complaint: Back pain, foot weakness    Subjective     He is up in the room with a walker.  He states the left foot strength is better, he has the typical postop back pain.  He is concerned about going home because he does not feel like he can get in and out of the bed or on and off the toilet by himself.      Objective      Alert and oriented x3  Sensation intact  Motor 4+/5 in left dorsiflexion otherwise 5/5 with right dorsiflexion 5/5 bilateral plantar flexion  Dressings intact, no active drainage, no redness or swelling    Vital Signs  Temp:  [97 °F (36.1 °C)-98.4 °F (36.9 °C)] 97 °F (36.1 °C)  Heart Rate:  [] 94  Resp:  [16-18] 16  BP: (103-140)/(55-90) 112/90       Results Review:     I reviewed the patient's new clinical results.      Assessment/Plan       Lumbar disc herniation with radiculopathy    Lumbar radiculopathy    Congenital spondylolysis, lumbosacral region    Type 2 diabetes mellitus without complication, without long-term current use of insulin (CMS/[facility])    Obstructive sleep apnea, adult    Hyperglycemia    Foot drop, left      Lumbar disc herniation with radiculopathy, spondylosis, foot drop  Postop day 4 L5-S1 decompression and fusion.  He is doing well from the surgical perspective with typical postop back pain, no leg pain, and improvement in the foot strength.  From the surgical standpoint he can be discharged with a 2-week follow-up, but he does not feel like he is ready to go home.  He does not have 24-hour care at home and cannot get in and out of bed or on and off the toilet by himself.  A raised toilet apparently has been ordered.  He is also looking into a hospital type bed for home      THEO Hernaedz  09/03/19  10:44 AM    Electronically signed by Jake Tripathi MD at 9/3/2019  11:21 AM     Jake Tripathi MD at 9/2/2019  8:05 PM          Postoperative day 3  Status post L5-S1 decompression and fusion  Drain out  Coming off of IV pain medications  Moving all 4 extremities well  Will need home health  Probably home tomorrow  Incision fine  No motor deficits  Vitals:    09/02/19 0848 09/02/19 0933 09/02/19 1417 09/02/19 1645   BP: 102/69  140/77 114/64   BP Location: Left arm  Left arm Left arm   Patient Position: Standing  Sitting Lying   Pulse: 118  93 77   Resp: 20  18 18   Temp:  97.3 °F (36.3 °C) 97.8 °F (36.6 °C)    TempSrc:   Oral    SpO2: 98%  98% 98%   Weight:       Height:         Lab Results   Component Value Date    GLUCOSE 241 (H) 09/02/2019    BUN 10 09/02/2019    CREATININE 0.59 (L) 09/02/2019    EGFRIFNONA 146 09/02/2019    BCR 16.9 09/02/2019    K 4.1 09/02/2019    CO2 26.8 09/02/2019    CALCIUM 8.6 09/02/2019    ALBUMIN 4.40 08/25/2019    LABIL2 1.4 07/23/2019    AST 19 08/25/2019    ALT 31 08/25/2019     WBC   Date Value Ref Range Status   09/02/2019 13.02 (H) 3.40 - 10.80 10*3/mm3 Final     RBC   Date Value Ref Range Status   09/02/2019 3.62 (L) 4.14 - 5.80 10*6/mm3 Final     Hemoglobin   Date Value Ref Range Status   09/02/2019 10.3 (L) 13.0 - 17.7 g/dL Final     Hematocrit   Date Value Ref Range Status   09/02/2019 31.7 (L) 37.5 - 51.0 % Final     MCV   Date Value Ref Range Status   09/02/2019 87.6 79.0 - 97.0 fL Final     MCH   Date Value Ref Range Status   09/02/2019 28.5 26.6 - 33.0 pg Final     MCHC   Date Value Ref Range Status   09/02/2019 32.5 31.5 - 35.7 g/dL Final     RDW   Date Value Ref Range Status   09/02/2019 13.9 12.3 - 15.4 % Final     RDW-SD   Date Value Ref Range Status   09/02/2019 44.4 37.0 - 54.0 fl Final     MPV   Date Value Ref Range Status   09/02/2019 9.5 6.0 - 12.0 fL Final     Platelets   Date Value Ref Range Status   09/02/2019 155 140 - 450 10*3/mm3 Final         Electronically signed by Jake Tripathi MD at 9/2/2019  8:06 PM      Tyler Lopez MD at 2019 12:08 PM              Name: Ronni Asif ADMIT: 2019   : 1970  PCP: Gil Chambers MD    MRN: [id number] LOS: 5 days   AGE/SEX: 49 y.o. male  ROOM: [facility]   Subjective   No chest pain shortness of breath nausea vomiting or diarrhea. Pain improved some.    Objective   Vital Signs  Temp:  [97.3 °F (36.3 °C)-98.5 °F (36.9 °C)] 97.3 °F (36.3 °C)  Heart Rate:  [] 118  Resp:  [16-20] 20  BP: (102-135)/(54-71) 102/69  SpO2:  [95 %-99 %] 98 %  on   ;   Device (Oxygen Therapy): room air  Body mass index is 41.61 kg/m².    Physical Exam   Constitutional: He is oriented to person, place, and time. He appears well-developed. No distress.   HENT:   Head: Atraumatic.   Nose: Nose normal.   Eyes: Conjunctivae and EOM are normal.   Neck: Neck supple. No tracheal deviation present.   Cardiovascular: Normal rate, regular rhythm and intact distal pulses.   Pulmonary/Chest: Effort normal and breath sounds normal. He has no wheezes.   Abdominal: Soft. He exhibits no distension. There is no tenderness.   Musculoskeletal: He exhibits tenderness. He exhibits no edema.   Neurological: He is alert and oriented to person, place, and time.   Skin: Skin is warm and dry. He is not diaphoretic.   Psychiatric: He has a normal mood and affect. His behavior is normal.   Nursing note and vitals reviewed.      Results Review:       I reviewed the patient's new clinical results.    Results from last 7 days   Lab Units 19  0438 19  0507 19  0432   WBC 10*3/mm3 13.02* 14.09* 13.41* 15.90*   HEMOGLOBIN g/dL 10.3* 14.3 14.3 14.8   PLATELETS 10*3/mm3 155 258 252 281     Results from last 7 days   Lab Units 19  0438 19  0507 19  0319 19  0432   SODIUM mmol/L 130* 134* 135* 133*   POTASSIUM mmol/L 4.1 4.8 4.3 4.4   CHLORIDE mmol/L 94* 93* 97* 93*   CO2 mmol/L 26.8 31.3* 25.3 25.4   BUN mg/dL 10 19 25* 21*   CREATININE mg/dL 0.59*  0.92 0.81 0.85   GLUCOSE mg/dL 241* 239* 256* 312*   Estimated Creatinine Clearance: 206.9 mL/min (A) (by C-G formula based on SCr of 0.59 mg/dL (L)).  Results from last 7 days   Lab Units 09/02/19  0438 08/30/19  0507 08/28/19  0319 08/27/19  0432   CALCIUM mg/dL 8.6 9.0 9.0 9.4   MAGNESIUM mg/dL  --   --   --  2.5         allopurinol 100 mg Oral Daily   atorvastatin 20 mg Oral Daily   budesonide-formoterol 2 puff Inhalation BID - RT   CloNIDine 0.1 mg Oral BID   gabapentin 300 mg Oral TID   insulin glargine 35 Units Subcutaneous Nightly   insulin lispro 0-7 Units Subcutaneous 4x Daily With Meals & Nightly   insulin lispro 10 Units Subcutaneous TID With Meals   metFORMIN ER 1,000 mg Oral BID With Meals   pantoprazole 40 mg Oral Daily   sodium chloride 3 mL Intravenous Q12H      Diet Regular; Consistent Carbohydrate      Assessment/Plan     Active Hospital Problems    Diagnosis  POA   • **Lumbar disc herniation with radiculopathy [M51.16]  Yes   • Hyperglycemia [R73.9]  Yes   • Foot drop, left [M21.372]  Yes   • Type 2 diabetes mellitus without complication, without long-term current use of insulin (CMS/[facility]) [E11.9]  Yes   • Obstructive sleep apnea, adult [G47.33]  Yes   • Congenital spondylolysis, lumbosacral region [Q76.2]  Not Applicable   • Lumbar radiculopathy [M54.16]  Yes      Resolved Hospital Problems   No resolved problems to display.     · Lumbar disc herniation with radiculopathy/left foot drop: Status post L5/S1 decompression/Shell procedure with PLIF 8/30.  Continue oral pain regimen to 1-2 tabs Norco as needed.  IV Dilaudid as needed. Neurosurgery following.  · Diabetes: A1c 8.9.  Continue lantus 35 units. Continue premeal and SSI. Resume metformin. Off steroid for few days now, so hopefully he will start to have decreasing requirements.  · Hypertension: Stable  · GERD: PPI  · Disposition: HH/possibly tomorrow    Tyler Lopez MD  [facility]  09/02/19  12:08  PM    Electronically signed by Tyler Lopez MD at 2019 12:11 PM     Jake Tripathi MD at 2019  9:13 PM          POD 2  S/p L5/S1 decompression and fusion  Vitals:    19 1310 19 1354 19 1747 19   BP:  119/65 119/60 135/64   BP Location:  Right arm Right arm Right arm   Patient Position:  Lying Lying Lying   Pulse:  97  98   Resp:  18 18 18   Temp:  98.5 °F (36.9 °C) 97.9 °F (36.6 °C) 98.2 °F (36.8 °C)   TempSrc:  Oral Oral Oral   SpO2: 99% 98% 98%    Weight:       Height:       Actually doing too well for rehab  Will need home health services  Drain removed  Moves all 4 extremities well  Incision fine  Continue to mobilize  Lab Results   Component Value Date    GLUCOSE 239 (H) 2019    BUN 19 2019    CREATININE 0.92 2019    EGFRIFNONA 87 2019    BCR 20.7 2019    K 4.8 2019    CO2 31.3 (H) 2019    CALCIUM 9.0 2019    ALBUMIN 4.40 2019    LABIL2 1.4 2019    AST 19 2019    ALT 31 2019           Electronically signed by Jake Tripathi MD at 2019  9:14 PM     Tyler Lopez MD at 2019  4:27 PM              Name: Ronni Asif ADMIT: 2019   : 1970  PCP: Gil Chambers MD    MRN: [id number] LOS: 4 days   AGE/SEX: 49 y.o. male  ROOM: [facility]   Subjective   No chest pain shortness of breath nausea vomiting or diarrhea.  He reports having burning body pain at incision site and also a pressure sensation from hardware.  The radiculopathy down his left leg has resolved almost entirely and he still has some left-sided weakness.    Objective   Vital Signs  Temp:  [97.8 °F (36.6 °C)-98.5 °F (36.9 °C)] 98.5 °F (36.9 °C)  Heart Rate:  [] 97  Resp:  [16-20] 18  BP: (104-123)/(61-71) 119/65  SpO2:  [94 %-99 %] 98 %  on   ;   Device (Oxygen Therapy): room air  Body mass index is 41.61 kg/m².    Physical Exam   Constitutional: He is oriented to person, place, and time. He  appears well-developed. No distress.   HENT:   Head: Atraumatic.   Nose: Nose normal.   Eyes: Conjunctivae and EOM are normal.   Neck: Neck supple. No tracheal deviation present.   Cardiovascular: Normal rate, regular rhythm and intact distal pulses.   Pulmonary/Chest: Effort normal and breath sounds normal. He has no wheezes.   Abdominal: Soft. He exhibits no distension. There is no tenderness.   Musculoskeletal: He exhibits tenderness. He exhibits no edema.   Neurological: He is alert and oriented to person, place, and time.   Skin: Skin is warm and dry. He is not diaphoretic.   Psychiatric: He has a normal mood and affect. His behavior is normal.   Nursing note and vitals reviewed.      Results Review:       I reviewed the patient's new clinical results.          allopurinol 100 mg Oral Daily   atorvastatin 20 mg Oral Daily   budesonide-formoterol 2 puff Inhalation BID - RT   CloNIDine 0.1 mg Oral BID   gabapentin 300 mg Oral TID   insulin glargine 30 Units Subcutaneous Nightly   insulin lispro 0-7 Units Subcutaneous 4x Daily With Meals & Nightly   insulin lispro 10 Units Subcutaneous TID With Meals   pantoprazole 40 mg Oral Daily   sodium chloride 3 mL Intravenous Q12H       HYDROmorphone HCl-NaCl    lactated ringers 100 mL/hr   Diet Regular; Consistent Carbohydrate      Assessment/Plan     Active Hospital Problems    Diagnosis  POA   • **Lumbar disc herniation with radiculopathy [M51.16]  Yes   • Hyperglycemia [R73.9]  Yes   • Foot drop, left [M21.372]  Yes   • Type 2 diabetes mellitus without complication, without long-term current use of insulin (CMS/[facility]) [E11.9]  Yes   • Obstructive sleep apnea, adult [G47.33]  Yes   • Congenital spondylolysis, lumbosacral region [Q76.2]  Not Applicable   • Lumbar radiculopathy [M54.16]  Yes      Resolved Hospital Problems   No resolved problems to display.     · Lumbar disc herniation with radiculopathy/left foot drop: Status post L5/S1 decompression/Shell procedure with  PLIF 8/30.  Continue oral pain regimen to 1-2 tabs Norco as needed.  Will try to wean off PCA and start IV Dilaudid as needed unless JEFFRY has objection. Neurosurgery following.  · Diabetes: A1c 8.9.  Increase lantus to 35 units. Continue premeal and SSI.  · Hypertension: Stable  · GERD: PPI  · Disposition: TBD/few days    Tyler Lopez MD  [facility]  09/01/19  4:28 PM    Electronically signed by Tyler Lopez MD at 9/1/2019  4:34 PM         Monica Olmedo RN      Case Management   Progress Notes   Signed   Date of Service:  8/31/2019  3:50 PM   Creation Time:  8/31/2019  3:50 PM            Signed             Continued Stay Note  [facility]     Patient Name: Ronni Asif                       MRN: [id number]  Today's Date: 8/31/2019                     Admit Date: 8/25/2019          Discharge Plan      Row Name 08/31/19 1549           Plan     Plan  SNU vs home     Plan Comments  Met with patient , mother and fiance.  Gave permission to discuss with them present.  Patient ambulated 150 ft today.  Will follow.  May be able to return home.  If goes home would like [facility].

## 2019-09-03 NOTE — PLAN OF CARE
Problem: Patient Care Overview  Goal: Plan of Care Review  Outcome: Ongoing (interventions implemented as appropriate)   09/03/19 4468   Coping/Psychosocial   Plan of Care Reviewed With patient   Plan of Care Review   Progress improving   OTHER   Outcome Summary Pt tolerated treatment with c/o pain. Pt worked on bed mobility to simulate bed at home with no bedrails with bed elevated. Pt required ModA for bed mobility. Pt required Martha for sit/stand transfers. Pt ambulated 100 feet with rwx, CGA. Pt has 2-3 steps to get into home with no HRs. Pt used rwx with Martha ascending/descending 3 steps. Pt would continue to benefit from PT (SNF) to improve strength and overall functional mobility as pt lives home alone with no help and would need to be independent with all functional mobility to decrease falls risk and return to home and full-time work safely.

## 2019-09-03 NOTE — PROGRESS NOTES
Postop visit   LOS: 6 days   Patient Care Team:  Gil Chambers MD as PCP - General (Family Medicine)    Chief Complaint: Back pain, foot weakness    Subjective     He is up in the room with a walker.  He states the left foot strength is better, he has the typical postop back pain.  He is concerned about going home because he does not feel like he can get in and out of the bed or on and off the toilet by himself.      Objective      Alert and oriented x3  Sensation intact  Motor 4+/5 in left dorsiflexion otherwise 5/5 with right dorsiflexion 5/5 bilateral plantar flexion  Dressings intact, no active drainage, no redness or swelling    Vital Signs  Temp:  [97 °F (36.1 °C)-98.4 °F (36.9 °C)] 97 °F (36.1 °C)  Heart Rate:  [] 94  Resp:  [16-18] 16  BP: (103-140)/(55-90) 112/90       Results Review:     I reviewed the patient's new clinical results.      Assessment/Plan       Lumbar disc herniation with radiculopathy    Lumbar radiculopathy    Congenital spondylolysis, lumbosacral region    Type 2 diabetes mellitus without complication, without long-term current use of insulin (CMS/[facility])    Obstructive sleep apnea, adult    Hyperglycemia    Foot drop, left      Lumbar disc herniation with radiculopathy, spondylosis, foot drop  Postop day 4 L5-S1 decompression and fusion.  He is doing well from the surgical perspective with typical postop back pain, no leg pain, and improvement in the foot strength.  From the surgical standpoint he can be discharged with a 2-week follow-up, but he does not feel like he is ready to go home.  He does not have 24-hour care at home and cannot get in and out of bed or on and off the toilet by himself.  A raised toilet apparently has been ordered.  He is also looking into a hospital type bed for home      THEO Hernadez  09/03/19  10:44 AM

## 2019-09-03 NOTE — CONSULTS
"Diabetes Education  Assessment/Teaching    Patient Name:  Ronni Asif  YOB: 1970  MRN: [id number]  Admit Date:  8/25/2019      Assessment Date:  9/3/2019    Most Recent Value   General Information    Referral From:  MD cassidy   Height  177.8 cm (70\")   Height Method  Stated   Weight  132 kg (290 lb)   Weight Method  Stated   Pregnancy Assessment   Diabetes History   Education Preferences   Nutrition Information   Assessment Topics   DM Goals            Most Recent Value   DM Education Needs   Meter  Has own   Meter Type  Freestyle   Patient Response  Other (comment) [See notes]            Other Comments:  Pt sees jl Lopez, for other endocrine issues but states has been diagnosed w/ pre DM and currently on Metformin and Trulicity.  Pt has been on steroids within the past month and became hyperglycemic.  Pt has been taking Tresiba and Admelog via pen method pre admission.        Electronically signed by:  Bryon Espino RN  09/03/19 10:30 AM  "

## 2019-09-03 NOTE — PLAN OF CARE
Problem: Patient Care Overview  Goal: Plan of Care Review   09/02/19 1855 09/03/19 0127   Coping/Psychosocial   Plan of Care Reviewed With patient --    Plan of Care Review   Progress improving --    OTHER   Outcome Summary --  Dressing CDI. Walking well with walker and 1 assist. Pain controlled with current PO pain medication. Voiding well. VSS during shift.        Problem: Pain, Chronic (Adult)  Goal: Acceptable Pain/Comfort Level and Functional Ability   09/02/19 1855   Pain, Chronic (Adult)   Acceptable Pain/Comfort Level and Functional Ability making progress toward outcome       Problem: Fall Risk (Adult)  Goal: Absence of Fall   09/02/19 1855   Fall Risk (Adult)   Absence of Fall making progress toward outcome       Problem: Skin Injury Risk (Adult)  Goal: Skin Health and Integrity   08/31/19 0624   Skin Injury Risk (Adult)   Skin Health and Integrity making progress toward outcome

## 2019-09-03 NOTE — PROGRESS NOTES
Continued Stay Note  [facility]     Patient Name: Ronni Asif  MRN: [id number]  Today's Date: 9/3/2019    Admit Date: 8/25/2019    Discharge Plan     Row Name 09/03/19 1539       Plan    Plan  [facility]    Patient/Family in Agreement with Plan  yes    Plan Comments  Discharge plans discussed. Patient stating having trouble getting in and out of bed. Requesting walker, 3in1 and hospital bed. Spoke to Briana with [facility] who talked with patient. PT recommending SNF. Call placed to Mony with [facility] who needed to look at therapy notes. Received call from Mony/Laurence who has accepted the patient but no beds available at [facility]. Spoke with patient who is agreeable to [facility]. Nancy significant other will be transporting patient. RN updated..        Discharge Codes    No documentation.       Expected Discharge Date and Time     Expected Discharge Date Expected Discharge Time    Sep 3, 2019             Nataly Islas RN

## 2019-09-03 NOTE — THERAPY TREATMENT NOTE
Patient Name: Ronni Asif  : 1970    MRN: [id number]                              Today's Date: 9/3/2019       Admit Date: 2019    Visit Dx:     ICD-10-CM ICD-9-CM   1. Hyperglycemia R73.9 790.29   2. Left foot drop M21.372 736.79   3. Lumbar radiculopathy M54.16 724.4   4. Congenital spondylolysis, lumbosacral region Q76.2 756.11     Patient Active Problem List   Diagnosis   • Lumbar radiculopathy   • Congenital spondylolysis, lumbosacral region   • Transient cerebral ischemia   • Pain in thoracic spine   • Neck pain   • Degeneration of thoracic intervertebral disc   • Degeneration of intervertebral disc at C5-C6 level   • Type 2 diabetes mellitus without complication, without long-term current use of insulin (CMS/[facility])   • Obstructive sleep apnea, adult   • Essential hypertension   • Moderate asthma without complication   • Gastroesophageal reflux disease   • Diarrhea   • History of colon polyps   • Hyperglycemia   • Foot drop, left   • Lumbar disc herniation with radiculopathy     Past Medical History:   Diagnosis Date   • Anxiety    • Asthma    • Cancer (CMS/HCC)     skin   • Diabetes mellitus (CMS/HCC)    • Dizziness    • GERD (gastroesophageal reflux disease)    • Headache    • Hyperlipidemia    • Hypertension    • Injury of back 2016    Pt fell 20 feet    • Kidney stone    • Neuromuscular disorder (CMS/[facility])    • Pneumonia    • Sleep apnea     cpap   • Stroke (CMS/[facility])     tia   • Syncope and collapse      Past Surgical History:   Procedure Laterality Date   • COLONOSCOPY     • COLONOSCOPY N/A 2019    Procedure: COLONOSCOPY to Cecum with Hot and Cold Polypectomy x 2;  Surgeon: Navid Zafar Jr., MD;  Location: [facility] ENDOSCOPY;  Service: General   • ENDOSCOPY N/A 2019    Procedure: ESOPHAGOGASTRODUODENOSCOPY with Bx's;  Surgeon: Navid Zafar Jr., MD;  Location: [facility] ENDOSCOPY;  Service: General   • HERNIA REPAIR  10/2015   • LUMBAR DISCECTOMY FUSION INSTRUMENTATION  Bilateral 8/30/2019    Procedure: LUMBAR 5 TO SACRAL 1 OPEN DECOMPRESSION WITH  POSTERIOR LUMBAR INTERBODY FUSION, CAGE AND SCREW;  Surgeon: Jake Tripathi MD;  Location: [facility];  Service: Neurosurgery     General Information     Row Name 09/03/19 1421          PT Evaluation Time/Intention    Document Type  therapy note (daily note)  -     Mode of Treatment  individual therapy;physical therapy  -Critical access hospital Name 09/03/19 1421          General Information    Existing Precautions/Restrictions  fall;spinal  -     Row Name 09/03/19 1421          Cognitive Assessment/Intervention- PT/OT    Orientation Status (Cognition)  oriented x 4  -       User Key  (r) = Recorded By, (t) = Taken By, (c) = Cosigned By    Initials Name Provider Type     Archana Jones PTA Physical Therapy Assistant        Mobility     Row Name 09/03/19 1422          Bed Mobility Assessment/Treatment    Rolling Right Ida (Bed Mobility)  verbal cues;minimum assist (75% patient effort)  -     Supine-Sit Ida (Bed Mobility)  moderate assist (50% patient effort);verbal cues  -     Sit-Supine Ida (Bed Mobility)  moderate assist (50% patient effort);verbal cues  -     Comment (Bed Mobility)  pt performed bed mobility without using bedrails and with elevated bed to simulate bed at home. verbal cueing for logroll technique  -     Row Name 09/03/19 1422          Sit-Stand Transfer    Sit-Stand Ida (Transfers)  minimum assist (75% patient effort)  -     Assistive Device (Sit-Stand Transfers)  walker, front-wheeled  -Critical access hospital Name 09/03/19 1422          Gait/Stairs Assessment/Training    Ida Level (Gait)  contact guard  -     Assistive Device (Gait)  walker, front-wheeled  -     Distance in Feet (Gait)  100  -     Deviations/Abnormal Patterns (Gait)  gait speed decreased;antalgic;joe decreased  -     Ida Level (Stairs)  verbal cues;minimum assist (75% patient effort)   -EH     Assistive Device (Stairs)  walker, front-wheeled  -     Handrail Location (Stairs)  none  -EH     Number of Steps (Stairs)  3  -EH     Descending Technique (Stairs)  step-to-step  -       User Key  (r) = Recorded By, (t) = Taken By, (c) = Cosigned By    Initials Name Provider Type     Archana Jones PTA Physical Therapy Assistant        Obj/Interventions    No documentation.       Goals/Plan    No documentation.       Clinical Impression     Row Name 09/03/19 1424          Pain Scale: Numbers Pre/Post-Treatment    Pain Location - Orientation  incisional  -     Pain Location  back  -     Pain Intervention(s)  Ambulation/increased activity;Repositioned  -     Row Name 09/03/19 1424          Pain Scale: FACES Pre/Post-Treatment    Pain: FACES Scale, Pretreatment  8-->hurts whole lot  -EH     Pain: FACES Scale, Post-Treatment  10-->hurts worst  -EH     Row Name 09/03/19 1424          Plan of Care Review    Plan of Care Reviewed With  patient  -[facility] Name 09/03/19 1424          Vital Signs    O2 Delivery Pre Treatment  room air  -     O2 Delivery Intra Treatment  room air  -EH     O2 Delivery Post Treatment  room air  -     Row Name 09/03/19 1424          Positioning and Restraints    Pre-Treatment Position  in bed  -EH     Post Treatment Position  bed  -EH     In Bed  side lying right;call light within reach;encouraged to call for assist;exit alarm on;with family/caregiver  -       User Key  (r) = Recorded By, (t) = Taken By, (c) = Cosigned By    Initials Name Provider Type     Archana Jones PTA Physical Therapy Assistant        Outcome Measures     Row Name 09/03/19 1425          How much help from another person do you currently need...    Turning from your back to your side while in flat bed without using bedrails?  3  -EH     Moving from lying on back to sitting on the side of a flat bed without bedrails?  2  -EH     Moving to and from a bed to a chair (including a wheelchair)?  3   -EH     Standing up from a chair using your arms (e.g., wheelchair, bedside chair)?  3  -EH     Climbing 3-5 steps with a railing?  2  -EH     To walk in hospital room?  3  -EH     AM-PAC 6 Clicks Score (PT)  16  -       User Key  (r) = Recorded By, (t) = Taken By, (c) = Cosigned By    Initials Name Provider Type     Archana Jones PTA Physical Therapy Assistant        Physical Therapy Education     Title: PT OT SLP Therapies (Done)     Topic: Physical Therapy (Done)     Point: Mobility training (Done)     Learning Progress Summary           Patient Acceptance, E,D, VU,DU,NR by  at 9/3/2019  2:28 PM    Comment:  Educated pt extensively with bed mobility, steps, and  gait. Pt expressing concerns with bed mobility, steps and other functional task for home.     by  at 9/3/2019  2:25 PM    Acceptance, E,TB, VU,NR by  at 9/2/2019 10:36 AM    Acceptance, E,TB, VU,NR by  at 9/1/2019  1:14 PM    Acceptance, E,TB, VU,NR by  at 8/31/2019 11:22 AM    Eager, E, VU,DU by  at 8/28/2019  3:30 PM                   Point: Body mechanics (Done)     Learning Progress Summary           Patient Acceptance, E,D, VU,DU,NR by  at 9/3/2019  2:28 PM    Comment:  Educated pt extensively with bed mobility, steps, and  gait. Pt expressing concerns with bed mobility, steps and other functional task for home.     by  at 9/3/2019  2:25 PM    Acceptance, E,TB, VU,NR by CS at 9/2/2019 10:36 AM    Acceptance, E,TB, VU,NR by CS at 9/1/2019  1:14 PM    Acceptance, E,TB, VU,NR by CS at 8/31/2019 11:22 AM    Eager, E, VU,DU by DB at 8/28/2019  3:30 PM                   Point: Precautions (Done)     Learning Progress Summary           Patient Acceptance, E,D, VU,DU,NR by  at 9/3/2019  2:28 PM    Comment:  Educated pt extensively with bed mobility, steps, and  gait. Pt expressing concerns with bed mobility, steps and other functional task for home.     by  at 9/3/2019  2:25 PM    Acceptance, CONSTANTIN BURR, VU,NR by CS at 9/2/2019 10:36 AM     Acceptance, E,TB, VU,NR by  at 9/1/2019  1:14 PM    Acceptance, E,TB, VU,NR by  at 8/31/2019 11:22 AM    Eager, AMINAH, DARNELL,DU by DB at 8/28/2019  3:30 PM                               User Key     Initials Effective Dates Name Provider Type Discipline     08/19/18 -  Archana Jones PTA Physical Therapy Assistant PT     05/14/18 -  Shane Bowman, PT Physical Therapist PT    DB 08/19/19 -  Matti Sepulveda PT Student PT Student PT              PT Recommendation and Plan     Outcome Summary/Treatment Plan (PT)  Anticipated Discharge Disposition (PT): skilled nursing facility  Plan of Care Reviewed With: patient  Progress: improving  Outcome Summary: Pt tolerated treatment with c/o pain. Pt worked on bed mobility to simulate bed at home with no bedrails with bed elevated. Pt required ModA for bed mobility. Pt required Martha for sit/stand transfers. Pt ambulated 100 feet with rwx, CGA. Pt has 2-3 steps to get into home with no HRs. Pt used  rwx with Martha ascending/descending 3 steps. Pt would continue to benefit from PT (SNF) to improve strength and overall functional mobility as pt lives home alone with no help and would need to be independent with all functional mobility to decrease falls risk and return to home and full-time work safely.      Time Calculation:   PT Charges     Row Name 09/03/19 1426             Time Calculation    Start Time  1313  -      Stop Time  1407  -      Time Calculation (min)  54 min  -      PT Received On  09/03/19  -      PT - Next Appointment  09/04/19  -         Time Calculation- PT    Total Timed Code Minutes- PT  54 minute(s)  -        User Key  (r) = Recorded By, (t) = Taken By, (c) = Cosigned By    Initials Name Provider Type     Archana Jones PTA Physical Therapy Assistant        Therapy Charges for Today     Code Description Service Date Service Provider Modifiers Qty    99636475300 HC PT THER PROC EA 15 MIN 9/3/2019 Archana Jones PTA GP 4          PT  G-Codes  Outcome Measure Options: AM-PAC 6 Clicks Basic Mobility (PT)  AM-[facility] 6 Clicks Score (PT): 16    Archana Jones, PTA  9/3/2019

## 2019-09-03 NOTE — PROGRESS NOTES
Postoperative day 3  Status post L5-S1 decompression and fusion  Drain out  Coming off of IV pain medications  Moving all 4 extremities well  Will need home health  Probably home tomorrow  Incision fine  No motor deficits  Vitals:    09/02/19 0848 09/02/19 0933 09/02/19 1417 09/02/19 1645   BP: 102/69  140/77 114/64   BP Location: Left arm  Left arm Left arm   Patient Position: Standing  Sitting Lying   Pulse: 118  93 77   Resp: 20  18 18   Temp:  97.3 °F (36.3 °C) 97.8 °F (36.6 °C)    TempSrc:   Oral    SpO2: 98%  98% 98%   Weight:       Height:         Lab Results   Component Value Date    GLUCOSE 241 (H) 09/02/2019    BUN 10 09/02/2019    CREATININE 0.59 (L) 09/02/2019    EGFRIFNONA 146 09/02/2019    BCR 16.9 09/02/2019    K 4.1 09/02/2019    CO2 26.8 09/02/2019    CALCIUM 8.6 09/02/2019    ALBUMIN 4.40 08/25/2019    LABIL2 1.4 07/23/2019    AST 19 08/25/2019    ALT 31 08/25/2019     WBC   Date Value Ref Range Status   09/02/2019 13.02 (H) 3.40 - 10.80 10*3/mm3 Final     RBC   Date Value Ref Range Status   09/02/2019 3.62 (L) 4.14 - 5.80 10*6/mm3 Final     Hemoglobin   Date Value Ref Range Status   09/02/2019 10.3 (L) 13.0 - 17.7 g/dL Final     Hematocrit   Date Value Ref Range Status   09/02/2019 31.7 (L) 37.5 - 51.0 % Final     MCV   Date Value Ref Range Status   09/02/2019 87.6 79.0 - 97.0 fL Final     MCH   Date Value Ref Range Status   09/02/2019 28.5 26.6 - 33.0 pg Final     MCHC   Date Value Ref Range Status   09/02/2019 32.5 31.5 - 35.7 g/dL Final     RDW   Date Value Ref Range Status   09/02/2019 13.9 12.3 - 15.4 % Final     RDW-SD   Date Value Ref Range Status   09/02/2019 44.4 37.0 - 54.0 fl Final     MPV   Date Value Ref Range Status   09/02/2019 9.5 6.0 - 12.0 fL Final     Platelets   Date Value Ref Range Status   09/02/2019 155 140 - 450 10*3/mm3 Final

## 2019-09-03 NOTE — DISCHARGE SUMMARY
NAME: Ronni sAif ADMIT: 2019   : 1970  PCP: Gil Chambers MD    MRN: [id number] LOS: 6 days   AGE/SEX: 49 y.o. male  ROOM: P581/1     Date of Admission:  2019  Date of Discharge:  9/3/2019    PCP: Gil Chambers MD    CHIEF COMPLAINT  Back Pain      DISCHARGE DIAGNOSIS  Active Hospital Problems    Diagnosis  POA   • **Lumbar disc herniation with radiculopathy [M51.16]  Yes   • Hyperglycemia [R73.9]  Yes   • Foot drop, left [M21.372]  Yes   • Type 2 diabetes mellitus without complication, without long-term current use of insulin (CMS/[facility]) [E11.9]  Yes   • Obstructive sleep apnea, adult [G47.33]  Yes   • Congenital spondylolysis, lumbosacral region [Q76.2]  Not Applicable   • Lumbar radiculopathy [M54.16]  Yes      Resolved Hospital Problems   No resolved problems to display.       SECONDARY DIAGNOSES  Past Medical History:   Diagnosis Date   • Anxiety    • Asthma    • Cancer (CMS/[facility])     skin   • Diabetes mellitus (CMS/HCC)    • Dizziness    • GERD (gastroesophageal reflux disease)    • Headache    • Hyperlipidemia    • Hypertension    • Injury of back 2016    Pt fell 20 feet    • Kidney stone    • Neuromuscular disorder (CMS/[facility])    • Pneumonia    • Sleep apnea     cpap   • Stroke (CMS/[facility])     tia   • Syncope and collapse        CONSULTS   JEFFRY    HOSPITAL COURSE  Gentleman admitted with lumbar disc herniation and radiculopathy with left foot drop.  His radiculopathy got better with pain control and steroid.  Neurosurgery kept him because a left foot drop and he underwent L5/S1 decompression Shell procedure with PLIF on .  PCA has been weaned and he is on oral pain control mostly now.  He has had difficult to control diabetes with an A1c of 8.9.  Though this was not listed as a home med, further discussion with the patient actually he had been started on insulin by his endocrinologist.  He should continue insulin with titration for his blood glucose as  instructed by his endocrinologist.  As he has been on and off steroids for the past month I will have him on low-dose prednisone 10 mg for 5 additional days to wean off.  He is feeling better and states he is ready for discharge today.  He will likely go home with home health, unless he is uncomfortable and then arrangements to skilled nursing facility can be made up to the patient.    DIAGNOSTICS    XR Spine Lumbar 2 or 3 View [[id number]] Junior as Reviewed   Order Status: Completed Collected: 09/02/19 1511    Updated: 09/02/19 1548   Narrative:     TWO-VIEW PORTABLE LUMBAR SPINE IN OR     HISTORY: Localization imaging during surgery.     FINDINGS:  Imaging in the operating room was performed at the time of  anterior and posterior fusion at L5-S1 that includes posterior plates  and pedicle screws. 3 images were obtained and the fluoroscopy time  measures 56 seconds.     This report was finalized on 9/2/2019 3:45 PM by Dr. Keven Rodriguez M.D.      FL C Arm During Surgery [[id number]] Review Not Required   Order Status: Completed Resulted: 08/30/19 1631    Updated: 08/30/19 1631   Narrative:     This procedure was auto-finalized with no dictation required.   XR Spine Lumbar Complete With Flex & Ext [[id number]] Junior as Reviewed   Order Status: Completed Collected: 08/27/19 1719    Updated: 08/28/19 0758   Narrative:     LUMBAR SPINE RADIOGRAPH     HISTORY:Back pain     TECHNIQUE: AP, lateral, flexion, extension, Spot view and oblique  radiographs of the lumbar spine     COMPARISON:Lumbar spine MRI 05/12/2016 and CT myelogram 08/27/2019      Impression:     FINDINGS AND IMPRESSION:  There is no fracture. Moderate anterolisthesis of L5 on S1 is present.  There is no abnormal motion on flexion or extension on these standing  radiographs.     Intrathecal contrast outlines multilevel degenerative changes which are  best seen on subsequent CT myelogram. Please refer to this dictation for  further evaluation.         PHYSICAL  EXAM  Objective    Alert  nad  No resp distress  RRR  pleasant    CONDITION ON DISCHARGE  Stable.      DISCHARGE DISPOSITION   Home or Self Care      DISCHARGE MEDICATIONS       Your medication list      START taking these medications      Instructions Last Dose Given Next Dose Due   acetaminophen 325 MG tablet  Commonly known as:  TYLENOL      Take 2 tablets by mouth Every 6 (Six) Hours As Needed for Mild Pain .       Insulin Degludec 100 UNIT/ML solution injection  Commonly known as:  TRESIBA      15 units daily or as otherwise directed by your Endocrinologist       insulin lispro 100 UNIT/ML injection  Commonly known as:  ADMELOG      Admelog 5 units at meals and scale as needed as per Endocrinologist       polyethylene glycol packet  Commonly known as:  MIRALAX      Take 17 g by mouth Daily As Needed (constipation).          CHANGE how you take these medications      Instructions Last Dose Given Next Dose Due   atorvastatin 20 MG tablet  Commonly known as:  LIPITOR  What changed:  how much to take      Take 1 tablet by mouth Daily.       HYDROcodone-acetaminophen  MG per tablet  Commonly known as:  NORCO  What changed:    · when to take this  · reasons to take this      Take 1 tablet by mouth Every 4 (Four) Hours As Needed for Moderate Pain .       predniSONE 10 MG tablet  Commonly known as:  DELTASONE  What changed:    · medication strength  · how much to take  · how to take this  · when to take this  · additional instructions      Take one tab po daily x 5 days          CONTINUE taking these medications      Instructions Last Dose Given Next Dose Due   ADVAIR DISKUS 250-50 MCG/DOSE DISKUS  Generic drug:  fluticasone-salmeterol      INL 1 PUFF ITL BID       albuterol (2.5 MG/3ML) 0.083% nebulizer solution 3 mL, albuterol (5 MG/ML) 0.5% nebulizer solution 0.5 mL      Inhale 4 (four) times a day as needed.       albuterol sulfate  (90 Base) MCG/ACT inhaler  Commonly known as:  PROVENTIL HFA;VENTOLIN  HFA;PROAIR HFA      Inhale 2 puffs every 4 (four) hours as needed for wheezing.       allopurinol 100 MG tablet  Commonly known as:  ZYLOPRIM      Take 100 mg by mouth Daily.       CloNIDine 0.1 MG tablet  Commonly known as:  CATAPRES      Take 1 tablet by mouth 2 (Two) Times a Day.       cyclobenzaprine 5 MG tablet  Commonly known as:  FLEXERIL      TAKE 1 TABLET BY MOUTH EVERY NIGHT AT BEDTIME AS NEEDED       cyclobenzaprine 5 MG tablet  Commonly known as:  FLEXERIL      TAKE 2 TABLETS BY MOUTH THREE TIMES DAILY AS NEEDED FOR MUSCLE SPASMS       Dulaglutide 0.75 MG/0.5ML solution pen-injector      Inject  under the skin into the appropriate area as directed.       gabapentin 300 MG capsule  Commonly known as:  NEURONTIN      TAKE 1 CAPSULE BY MOUTH THREE TIMES DAILY       metFORMIN  MG 24 hr tablet  Commonly known as:  GLUCOPHAGE-XR      Take 2 tablets by mouth 2 (Two) Times a Day.       pantoprazole 40 MG EC tablet  Commonly known as:  PROTONIX      Take 40 mg by mouth Daily.       vitamin D 48984 units capsule capsule  Commonly known as:  ERGOCALCIFEROL      Take 50,000 Units by mouth 1 (One) Time Per Week.          STOP taking these medications    ANDROGEL PUMP 20.25 MG/ACT (1.62%) gel  Generic drug:  Testosterone        ASPIRIN LOW DOSE 81 MG chewable tablet  Generic drug:  aspirin        dexamethasone 2 MG tablet  Commonly known as:  DECADRON              Where to Get Your Medications      These medications were sent to [id number] - [place], [place] - [address] [address] AT [facility] - [phone] [facility] [phone]   [address] [address] [address]    Phone:  [phone]   · HYDROcodone-acetaminophen  MG per tablet  · predniSONE 10 MG tablet     Information about where to get these medications is not yet available    Ask your nurse or doctor about these medications  · acetaminophen 325 MG tablet  · Insulin Degludec 100 UNIT/ML solution  injection  · insulin lispro 100 UNIT/ML injection  · polyethylene glycol packet          Future Appointments   Date Time Provider Department Center   9/13/2019  9:30 AM Basia Wilson PA-C [facility] None   12/17/2019 10:45 AM íVctor Conteh MD [facility] None     Additional Instructions for the Follow-ups that You Need to Schedule     Discharge Follow-up with PCP   As directed       Currently Documented PCP:    Gil Chambers MD    PCP Phone Number:    [phone]     Follow Up Details:  2-4 weeks         Discharge Follow-up with Specialty: Endocrinologist as previously scheduled but call on any questions on blood sugar   As directed      Specialty:  Endocrinologist as previously scheduled but call on any questions on blood sugar         Discharge Follow-up with Specialty: Neurosurgery; 2 Weeks   As directed      Specialty:  Neurosurgery    Follow Up:  2 Weeks           Follow-up Information     Gil Chambers MD .    Specialty:  Family Medicine  Why:  2-4 weeks  Contact information:  [address] [address]  [phone]                   TEST  RESULTS PENDING AT DISCHARGE         Edward West MD  [facility]  09/03/19  12:34 PM      Time: greater than 32 minutes on discharge  It was a pleasure taking care of this patient while in the hospital.

## 2019-09-03 NOTE — NURSING NOTE
Gave report to AGUSTIN Alva at [facility].  Randa Dias RN     [Follow-Up: _____] : a [unfilled] follow-up visit

## 2019-09-04 ENCOUNTER — READMISSION MANAGEMENT (OUTPATIENT)
Dept: CALL CENTER | Facility: HOSPITAL | Age: 49
End: 2019-09-04

## 2019-09-04 NOTE — PAYOR COMM NOTE
"UR CONTACT:  NERISSA  P: [phone]        F: [phone]  DISCHARGED. CONCURRENT REVIEW FAXED 9/3/19        Ronni Asif (49 y.o. Male)     Date of Birth Social Security Number Address Home Phone MRN    1970  [address] [facility] [address] [phone] [id number]    Taoist Marital Status          Holiness        Admission Date Admission Type Admitting Provider Attending Provider Department, Room/Bed    19 Emergency Anish Mcneil MD  [facility] [address] [place], P581/1    Discharge Date Discharge Disposition Discharge Destination        9/3/2019 Home or Self Care              Attending Provider:  (none)   Allergies:  No Known Allergies    Isolation:  None   Infection:  None   Code Status:  Prior    Ht:  177.8 cm (70\")   Wt:  132 kg (290 lb)    Admission Cmt:  None   Principal Problem:  Lumbar disc herniation with radiculopathy [M51.16] More...                 Active Insurance as of 2019     Primary Coverage     Payor Plan Insurance Group Employer/Plan Group    [employer] [id number]     Payor Plan Address Payor Plan Phone Number Payor Plan Fax Number Effective Dates    [address] [phone]  1997 - None Entered    [facility] [address]       Subscriber Name Subscriber Birth Date Member ID       RONNI ASIF 1970 [id number]                 Emergency Contacts      (Rel.) Home Phone Work Phone Mobile Phone    ALTA WOLFF (Significant Other) -- -- [phone]    Sergio Asif (Brother) [phone] -- [phone]               Discharge Summary      Edward West MD at 9/3/2019 12:34 PM                 NAME: Ronni Asif ADMIT: 2019   : 1970  PCP: Gil Chambers MD    MRN: [id number] LOS: 6 days   AGE/SEX: 49 y.o. male  ROOM: P581/1     Date of Admission:  2019  Date of Discharge:  9/3/2019    PCP: Gil Chambers MD    CHIEF COMPLAINT  Back " Pain      DISCHARGE DIAGNOSIS  Active Hospital Problems    Diagnosis  POA   • **Lumbar disc herniation with radiculopathy [M51.16]  Yes   • Hyperglycemia [R73.9]  Yes   • Foot drop, left [M21.372]  Yes   • Type 2 diabetes mellitus without complication, without long-term current use of insulin (CMS/[facility]) [E11.9]  Yes   • Obstructive sleep apnea, adult [G47.33]  Yes   • Congenital spondylolysis, lumbosacral region [Q76.2]  Not Applicable   • Lumbar radiculopathy [M54.16]  Yes      Resolved Hospital Problems   No resolved problems to display.       SECONDARY DIAGNOSES  Past Medical History:   Diagnosis Date   • Anxiety    • Asthma    • Cancer (CMS/[facility])     skin   • Diabetes mellitus (CMS/[facility])    • Dizziness    • GERD (gastroesophageal reflux disease)    • Headache    • Hyperlipidemia    • Hypertension    • Injury of back 03/2016    Pt fell 20 feet    • Kidney stone    • Neuromuscular disorder (CMS/[facility])    • Pneumonia    • Sleep apnea     cpap   • Stroke (CMS/[facility])     tia   • Syncope and collapse        CONSULTS   JEFFRY    HOSPITAL COURSE  Gentleman admitted with lumbar disc herniation and radiculopathy with left foot drop.  His radiculopathy got better with pain control and steroid.  Neurosurgery kept him because a left foot drop and he underwent L5/S1 decompression Shell procedure with PLIF on 8/30.  PCA has been weaned and he is on oral pain control mostly now.  He has had difficult to control diabetes with an A1c of 8.9.  Though this was not listed as a home med, further discussion with the patient actually he had been started on insulin by his endocrinologist.  He should continue insulin with titration for his blood glucose as instructed by his endocrinologist.  As he has been on and off steroids for the past month I will have him on low-dose prednisone 10 mg for 5 additional days to wean off.  He is feeling better and states he is ready for discharge today.  He will likely go home with home health, unless he is  uncomfortable and then arrangements to skilled nursing facility can be made up to the patient.    DIAGNOSTICS    XR Spine Lumbar 2 or 3 View [[id number]] Junior as Reviewed   Order Status: Completed Collected: 09/02/19 1511    Updated: 09/02/19 1548   Narrative:     TWO-VIEW PORTABLE LUMBAR SPINE IN OR     HISTORY: Localization imaging during surgery.     FINDINGS:  Imaging in the operating room was performed at the time of  anterior and posterior fusion at L5-S1 that includes posterior plates  and pedicle screws. 3 images were obtained and the fluoroscopy time  measures 56 seconds.     This report was finalized on 9/2/2019 3:45 PM by Dr. Keven Rodriguez M.D.      FL C Arm During Surgery [[id number]] Review Not Required   Order Status: Completed Resulted: 08/30/19 1631    Updated: 08/30/19 1631   Narrative:     This procedure was auto-finalized with no dictation required.   XR Spine Lumbar Complete With Flex & Ext [[id number]] Junior as Reviewed   Order Status: Completed Collected: 08/27/19 1719    Updated: 08/28/19 0758   Narrative:     LUMBAR SPINE RADIOGRAPH     HISTORY:Back pain     TECHNIQUE: AP, lateral, flexion, extension, Spot view and oblique  radiographs of the lumbar spine     COMPARISON:Lumbar spine MRI 05/12/2016 and CT myelogram 08/27/2019      Impression:     FINDINGS AND IMPRESSION:  There is no fracture. Moderate anterolisthesis of L5 on S1 is present.  There is no abnormal motion on flexion or extension on these standing  radiographs.     Intrathecal contrast outlines multilevel degenerative changes which are  best seen on subsequent CT myelogram. Please refer to this dictation for  further evaluation.         PHYSICAL EXAM  Objective    Alert  nad  No resp distress  RRR  pleasant    CONDITION ON DISCHARGE  Stable.      DISCHARGE DISPOSITION   Home or Self Care      DISCHARGE MEDICATIONS       Your medication list      START taking these medications      Instructions Last Dose Given Next Dose Due    acetaminophen 325 MG tablet  Commonly known as:  TYLENOL      Take 2 tablets by mouth Every 6 (Six) Hours As Needed for Mild Pain .       Insulin Degludec 100 UNIT/ML solution injection  Commonly known as:  TRESIBA      15 units daily or as otherwise directed by your Endocrinologist       insulin lispro 100 UNIT/ML injection  Commonly known as:  ADMELOG      Admelog 5 units at meals and scale as needed as per Endocrinologist       polyethylene glycol packet  Commonly known as:  MIRALAX      Take 17 g by mouth Daily As Needed (constipation).          CHANGE how you take these medications      Instructions Last Dose Given Next Dose Due   atorvastatin 20 MG tablet  Commonly known as:  LIPITOR  What changed:  how much to take      Take 1 tablet by mouth Daily.       HYDROcodone-acetaminophen  MG per tablet  Commonly known as:  NORCO  What changed:    · when to take this  · reasons to take this      Take 1 tablet by mouth Every 4 (Four) Hours As Needed for Moderate Pain .       predniSONE 10 MG tablet  Commonly known as:  DELTASONE  What changed:    · medication strength  · how much to take  · how to take this  · when to take this  · additional instructions      Take one tab po daily x 5 days          CONTINUE taking these medications      Instructions Last Dose Given Next Dose Due   ADVAIR DISKUS 250-50 MCG/DOSE DISKUS  Generic drug:  fluticasone-salmeterol      INL 1 PUFF ITL BID       albuterol (2.5 MG/3ML) 0.083% nebulizer solution 3 mL, albuterol (5 MG/ML) 0.5% nebulizer solution 0.5 mL      Inhale 4 (four) times a day as needed.       albuterol sulfate  (90 Base) MCG/ACT inhaler  Commonly known as:  PROVENTIL HFA;VENTOLIN HFA;PROAIR HFA      Inhale 2 puffs every 4 (four) hours as needed for wheezing.       allopurinol 100 MG tablet  Commonly known as:  ZYLOPRIM      Take 100 mg by mouth Daily.       CloNIDine 0.1 MG tablet  Commonly known as:  CATAPRES      Take 1 tablet by mouth 2 (Two) Times a  Day.       cyclobenzaprine 5 MG tablet  Commonly known as:  FLEXERIL      TAKE 1 TABLET BY MOUTH EVERY NIGHT AT BEDTIME AS NEEDED       cyclobenzaprine 5 MG tablet  Commonly known as:  FLEXERIL      TAKE 2 TABLETS BY MOUTH THREE TIMES DAILY AS NEEDED FOR MUSCLE SPASMS       Dulaglutide 0.75 MG/0.5ML solution pen-injector      Inject  under the skin into the appropriate area as directed.       gabapentin 300 MG capsule  Commonly known as:  NEURONTIN      TAKE 1 CAPSULE BY MOUTH THREE TIMES DAILY       metFORMIN  MG 24 hr tablet  Commonly known as:  GLUCOPHAGE-XR      Take 2 tablets by mouth 2 (Two) Times a Day.       pantoprazole 40 MG EC tablet  Commonly known as:  PROTONIX      Take 40 mg by mouth Daily.       vitamin D 46279 units capsule capsule  Commonly known as:  ERGOCALCIFEROL      Take 50,000 Units by mouth 1 (One) Time Per Week.          STOP taking these medications    ANDROGEL PUMP 20.25 MG/ACT (1.62%) gel  Generic drug:  Testosterone        ASPIRIN LOW DOSE 81 MG chewable tablet  Generic drug:  aspirin        dexamethasone 2 MG tablet  Commonly known as:  DECADRON              Where to Get Your Medications      These medications were sent to [id number] [place], [place] - [address] [address] AT [facility] & [address] - [phone] [facility] [phone]   [address] [place] [address]    Phone:  [phone]   · HYDROcodone-acetaminophen  MG per tablet  · predniSONE 10 MG tablet     Information about where to get these medications is not yet available    Ask your nurse or doctor about these medications  · acetaminophen 325 MG tablet  · Insulin Degludec 100 UNIT/ML solution injection  · insulin lispro 100 UNIT/ML injection  · polyethylene glycol packet          Future Appointments   Date Time Provider Department Center   9/13/2019  9:30 AM Basia Wilson PA-C [facility] None   12/17/2019 10:45 AM Víctor Conteh MD [facility] None     Additional  Instructions for the Follow-ups that You Need to Schedule     Discharge Follow-up with PCP   As directed       Currently Documented PCP:    Gil Chambers MD    PCP Phone Number:    [phone]     Follow Up Details:  2-4 weeks         Discharge Follow-up with Specialty: Endocrinologist as previously scheduled but call on any questions on blood sugar   As directed      Specialty:  Endocrinologist as previously scheduled but call on any questions on blood sugar         Discharge Follow-up with Specialty: Neurosurgery; 2 Weeks   As directed      Specialty:  Neurosurgery    Follow Up:  2 Weeks           Follow-up Information     Gil Chambers MD .    Specialty:  Family Medicine  Why:  2-4 weeks  Contact information:  [address] [place] [address]  [phone]                   TEST  RESULTS PENDING AT DISCHARGE         Edward West MD  [facility]  09/03/19  12:34 PM      Time: greater than 32 minutes on discharge  It was a pleasure taking care of this patient while in the hospital.       Electronically signed by Edward West MD at 9/3/2019 12:41 PM

## 2019-09-04 NOTE — OUTREACH NOTE
Prep Survey      Responses   Facility patient discharged from?  [facility]   Is patient eligible?  No   What are the reasons patient is not eligible?  [facility]   Discharge diagnosis  Lumbar decompression with fusion this visit   Does the patient have one of the following disease processes/diagnoses(primary or secondary)?  General Surgery   Prep survey completed?  Yes          Colleen Kendrick RN

## 2019-09-04 NOTE — PROGRESS NOTES
Case Management Discharge Note    Final Note: Discharged to [facility] via          Transportation Services  Private: Car    Final Discharge Disposition Code: 03 - skilled nursing facility (SNF)

## 2019-09-05 RX ORDER — GABAPENTIN 300 MG/1
CAPSULE ORAL
Qty: 90 CAPSULE | Refills: 3 | Status: SHIPPED | OUTPATIENT
Start: 2019-09-05 | End: 2019-09-18 | Stop reason: DRUGHIGH

## 2019-09-05 NOTE — TELEPHONE ENCOUNTER
He is 6 days out from having a Miniopen L5/S1 decompression/Shell procedure with posterior interbody fusion (PLIF) with bilateral Elevate cages and percutaneous Sextant pedicle screw fixation L5/S1 with rods by Dr. Tripathi on 08/30/19, Gabapentin 300 mg TID was last filled on 08/02/19. Is it okay to refill?    Scheduled to come in on 09/13/19.

## 2019-09-09 NOTE — PROGRESS NOTES
Subjective   Patient ID: Ronni Asif is a 49 y.o. male is here today for follow-up. He is 2 weeks out from an mini L5/S1 decompression/Shell procedure with posterior interbody fusion/PLIF by Dr. Tripathi 8/30/19.  [facility] where [facility] was getting PT and OT daily. He will be starting home PT soon.  He complains of left leg, shin pain.  He states his incision has been bleeding.  Mr. Asif takes Gabapentin 300 mg TID, Cyclobenzaprine 5 mg TID, Hydrocodone 10/325 two every 4-6 hours for pain.       Leg Pain    The pain is present in the left leg. The quality of the pain is described as cramping. The pain is at a severity of 5/10. The pain is moderate.       The following portions of the patient's history were reviewed and updated as appropriate: allergies, current medications, past family history, past medical history, past social history, past surgical history and problem list.    Review of Systems   Genitourinary: Negative for difficulty urinating.       Objective   Physical Exam   Neurological:   Incision okay     Neurologic Exam    Assessment/Plan   Independent Review of Radiographic Studies:      Medical Decision Making:    Mr. Asif is 2wks s/p a mini open L5-S1 decompression/Shell procedure with PLIF.  He is doing well but continues to complain of low back pain with some left lower leg pain.  His incision is healing well without signs of infection.  He continues to use pain medication and states that as long as he takes the pain medication he is relatively pain-free.  I explained to him that he really should not be taking it to prevent pain but only in response to moderate to severe pain and needs to begin weaning off of the medication.  We discussed increasing his nighttime gabapentin and possibly increasing his daytime dose as well to 600 mg each time in an effort to reduce the amount of hydrocodone he is using.  He also admits that he is fairly sedentary.  He  just recently got discharged from subacute rehab and has not yet started home PT but it has been arranged.  I encouraged him to begin walking more.  We discussed his restrictions.  He will follow-up in 1 month and progress to outpatient PT whenever he is discharged from home therapy.  In addition he will get repeat x-rays prior to his next appointment.  Ronni was seen today for post-op.    Diagnoses and all orders for this visit:    Surgery follow-up examination  -     Ambulatory Referral to Physical Therapy Evaluate and treat (2-3 times per week for 4wks)  -     XR Spine Lumbar Complete With Flex & Ext; Future      Return in about 4 weeks (around 10/11/2019).

## 2019-09-11 ENCOUNTER — TELEPHONE (OUTPATIENT)
Dept: NEUROSURGERY | Facility: CLINIC | Age: 49
End: 2019-09-11

## 2019-09-13 ENCOUNTER — OFFICE VISIT (OUTPATIENT)
Dept: NEUROSURGERY | Facility: CLINIC | Age: 49
End: 2019-09-13

## 2019-09-13 VITALS
HEART RATE: 96 BPM | WEIGHT: 290 LBS | DIASTOLIC BLOOD PRESSURE: 83 MMHG | SYSTOLIC BLOOD PRESSURE: 130 MMHG | BODY MASS INDEX: 41.52 KG/M2 | HEIGHT: 70 IN

## 2019-09-13 DIAGNOSIS — Z09 SURGERY FOLLOW-UP EXAMINATION: Primary | ICD-10-CM

## 2019-09-13 PROCEDURE — 99024 POSTOP FOLLOW-UP VISIT: CPT | Performed by: PHYSICIAN ASSISTANT

## 2019-09-13 RX ORDER — ASPIRIN 81 MG/1
TABLET ORAL
COMMUNITY
Start: 2019-06-06 | End: 2021-07-22

## 2019-09-17 DIAGNOSIS — M54.16 LUMBAR RADICULOPATHY: Primary | ICD-10-CM

## 2019-09-17 NOTE — TELEPHONE ENCOUNTER
Ok, increase gabapentin as Basia suggested. Give hydrocodone 7.5/325, #40, 1 po q 6 hr prn, no refills

## 2019-09-17 NOTE — TELEPHONE ENCOUNTER
Pt is requesting Gabapentin refill. Basia wanted him to increase it to 600 mg TID.  He is also requesting Hydrocodone refill. He is taking 10/325 wanting 7.5/325 mg.

## 2019-09-18 RX ORDER — GABAPENTIN 600 MG/1
600 TABLET ORAL 3 TIMES DAILY
Qty: 90 TABLET | Refills: 0 | Status: SHIPPED | OUTPATIENT
Start: 2019-09-18 | End: 2019-10-11 | Stop reason: ALTCHOICE

## 2019-09-18 RX ORDER — HYDROCODONE BITARTRATE AND ACETAMINOPHEN 7.5; 325 MG/1; MG/1
1 TABLET ORAL EVERY 6 HOURS PRN
Qty: 40 TABLET | Refills: 0 | Status: SHIPPED | OUTPATIENT
Start: 2019-09-18 | End: 2019-10-11 | Stop reason: DRUGHIGH

## 2019-09-18 RX ORDER — CYCLOBENZAPRINE HCL 5 MG
TABLET ORAL
Qty: 30 TABLET | Refills: 0 | Status: SHIPPED | OUTPATIENT
Start: 2019-09-18 | End: 2019-10-11 | Stop reason: ALTCHOICE

## 2019-09-18 NOTE — TELEPHONE ENCOUNTER
RX refill request from pharmacy    Patient was last seen: 09/13/19 by you    Patients next appointment:10/11/19 with Eli      Is this okay

## 2019-09-20 ENCOUNTER — HOSPITAL ENCOUNTER (OUTPATIENT)
Dept: GENERAL RADIOLOGY | Facility: HOSPITAL | Age: 49
Discharge: HOME | End: 2019-09-20
Admitting: PHYSICIAN ASSISTANT

## 2019-09-20 ENCOUNTER — TELEPHONE (OUTPATIENT)
Dept: NEUROSURGERY | Facility: CLINIC | Age: 49
End: 2019-09-20

## 2019-09-20 DIAGNOSIS — Z09 SURGERY FOLLOW-UP EXAMINATION: ICD-10-CM

## 2019-09-20 PROCEDURE — 72114 X-RAY EXAM L-S SPINE BENDING: CPT

## 2019-09-20 NOTE — TELEPHONE ENCOUNTER
Patient called and said he was sleeping and felt something pop and is now having sciatica down his leg.  He has taken some gabapentin and hydrocodone and wasn't sure what to do.

## 2019-09-20 NOTE — TELEPHONE ENCOUNTER
Spoke with patient and informed him of Basia's response, he said he does not have any new weakness and no urinary or bowel incontinence.    He said that he will go this weekend and get the xrays and we will call with results

## 2019-09-20 NOTE — TELEPHONE ENCOUNTER
Probably fine but to be safe just tell him to get the L spine XR that were ordered at his last appt now.  He can get them today or over the weekend or even Monday. Please make sure no new weakness or bowel or bladder incontinence.

## 2019-09-20 NOTE — TELEPHONE ENCOUNTER
Basia please, he still has some residual back pain, not severe.  You just saw him om 9.13.19 and he has fu appt on 10.11.19

## 2019-09-21 NOTE — TELEPHONE ENCOUNTER
Dr SHAFFER, he had plain films at [facility] Friday evening.  Please look at films and let me know what to tell him

## 2019-09-21 NOTE — TELEPHONE ENCOUNTER
Tell him I reviewed the x-rays and they all look fine.  No problems with the hardware.  Follow-up at his regularly scheduled visit.

## 2019-09-23 RX ORDER — CYCLOBENZAPRINE HCL 5 MG
TABLET ORAL
Qty: 60 TABLET | Refills: 0 | OUTPATIENT
Start: 2019-09-23

## 2019-10-03 NOTE — PROGRESS NOTES
"Subjective   History of Present Illness: Ronni Asif is a 49 y.o. male is here today for post op appt after PT and with lumbar spine plain films.  Patient had surgery 8.30.19, L5-S1 open decompression with posterior lumbar interbody fusion by Dr Tripathi    Patient was last seen 9.13.19 for post op appt and was doing well, but had residual low back and left leg pain.     Patient called office on 9.20.19 to report that he felt a \"pop\" in his back while sleeping and was having increased leg pain.  Dr SHAFFER ordered lumbar spine plain films which he reviewed and patient was told that there were no problems with the hardware.    Today he is still in physical therapy and just has some back soreness nothing severe. He is having some mild bilateral leg pain.      The patient is doing quite well.  He is now 6 weeks out from the above-stated surgery.  He is still having some left leg pain.  Although it is not nearly as severe as it was before surgery, is still bothersome.  He would like to try increasing his gabapentin some.        The following portions of the patient's history were reviewed and updated as appropriate: allergies, current medications, past family history, past medical history, past social history, past surgical history and problem list.    Review of Systems   Respiratory: Negative for chest tightness and shortness of breath.    Cardiovascular: Negative for chest pain.   Musculoskeletal: Positive for arthralgias and back pain.        Bilateral leg pain   All other systems reviewed and are negative.      Objective     Vitals:    10/11/19 1246   BP: 149/85   Pulse: 100     There is no height or weight on file to calculate BMI.      Physical Exam   Constitutional: He appears well-developed. No distress.   Neurological:   No motor or sensory deficits on exam.   Skin: Skin is warm and dry.   Lumbar incisions are healed.   Psychiatric: He has a normal mood and affect.   Vitals reviewed.    Neurologic " Exam      Assessment/Plan     Medical Decision Making:      Mr. Asif is feeling better than he was before surgery.  He is ambulating as recommended.  He is still having issues with radicular pain.  I will increase the gabapentin from 600 mg 3 times daily to 800 mg 3 times daily.  We will reduce the hydrocodone further.  I will renew a prescription for Norco 5/325 mg.  The patient to take 1 tablet every 8 hours as needed for pain.  He will try some Robaxin as well for spasm relief.    Continued walking was encouraged.  We will see the patient back in the office in 6 weeks.    Ronni was seen today for post-op.    Diagnoses and all orders for this visit:    Surgery follow-up examination    Lumbar radiculitis    Lumbar spine pain    Other orders  -     gabapentin (NEURONTIN) 800 MG tablet; Take 1 tablet by mouth 3 (Three) Times a Day.  -     HYDROcodone-acetaminophen (NORCO) 5-325 MG per tablet; Take 1 tablet by mouth Every 8 (Eight) Hours As Needed for Moderate Pain .  -     methocarbamol (ROBAXIN) 750 MG tablet; 1 tab q 6-8 hours prn back spasms. May take 2 tabs for severe spasm.      Return in about 6 weeks (around 11/22/2019).        Neurological Surgery

## 2019-10-07 ENCOUNTER — TREATMENT (OUTPATIENT)
Dept: PHYSICAL THERAPY | Facility: CLINIC | Age: 49
End: 2019-10-07

## 2019-10-07 DIAGNOSIS — R26.2 DIFFICULTY IN WALKING: ICD-10-CM

## 2019-10-07 DIAGNOSIS — Z98.1 S/P LUMBAR SPINAL FUSION: ICD-10-CM

## 2019-10-07 DIAGNOSIS — M51.16 LUMBAR DISC HERNIATION WITH RADICULOPATHY: Primary | ICD-10-CM

## 2019-10-07 PROCEDURE — 97161 PT EVAL LOW COMPLEX 20 MIN: CPT | Performed by: PHYSICAL THERAPIST

## 2019-10-07 NOTE — PROGRESS NOTES
Physical Therapy Initial Evaluation and Plan of Care    Patient: Ronni Asif   : 1970  Diagnosis/ICD-10 Code:  Lumbar disc herniation with radiculopathy [M51.16]  Referring practitioner: Basia Wilson PA-C    Subjective Evaluation    History of Present Illness  Date of surgery: 2019  Mechanism of injury: Pt is a 50 y/o WM who reports to the clinic with left leg weakness and LBP after having a lumbar spinal fusion 2019.  Pt states he went to [facility] for one week-discharged to home health PT for 3 weeks with the last visit being on 2019.  Pt has had prior LBP and received conservative treatment with PT, epidural shots, and dry needling.  Pt states approximately 2-3 weeks to having surgery pt noticed increased pain and weakness in left LE-pt was out of town in [place]-went to ER and he said they wanted to do surgery, but he waited until he got home-followed up with Dr. Melgar Pt had to go to [facility] up with Dr. Tripathi-reviewed his MRI-scheduled Myelogram-scheduled surgery.      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 find a new job    Precautions and Work Restrictions: no more lifting more than 20 lbs Quality of life: good    Pain  Current pain ratin  At worst pain ratin  Location: lower lumbar pain, left posterior thigh pain  Quality: tight and sharp (stiffness, sciatic nerve )  Relieving factors: change in position, medications, ice and rest (hydrocodone, gabepentin )  Aggravating factors: movement, lifting, prolonged positioning and standing    Hand dominance: right    Diagnostic Tests  X-ray: abnormal  MRI studies: abnormal (mild to moderate anteriorolisthesis L5-S1 )    Treatments  Previous treatment: physical therapy, injection treatment and chiropractic  Current treatment: home therapy, injection treatment and medication  Patient Goals  Patient goals for therapy: decreased pain, increased motion,  increased strength, independence with ADLs/IADLs and return to work             Objective       Postural Observations  Seated posture: good    Additional Postural Observation Details  Standing flattened lumbar lordosis     Palpation     Additional Palpation Details  Mild point tenderness over the B L-S PS     Tenderness     Left Hip   Tenderness in the PSIS.     Additional Tenderness Details  Left piriformis, posterior thigh    Neurological Testing     Sensation     Lumbar   Left   Intact: light touch    Right   Intact: light touch    Reflexes   Left   Patellar (L4): trace (1+)    Right   Patellar (L4): trace (1+)    Active Range of Motion     Lumbar   Flexion: 25 degrees with pain  Extension: Active lumbar extension: NT secondary to lumbar fusion    Left lateral flexion: 10 degrees with pain  Right lateral flexion: 25 degrees with pain  Left rotation: 25 degrees   Right rotation: 25 degrees     Strength/Myotome Testing     Left Hip   Planes of Motion   Flexion: 3+    Right Hip   Planes of Motion   Flexion: 5    Left Knee   Flexion: 5  Extension: 5    Right Knee   Flexion: 5  Extension: 5    Left Ankle/Foot   Dorsiflexion: 5  Plantar flexion: 4  Eversion: 4  Great toe extension: 5    Right Ankle/Foot   Dorsiflexion: 5  Plantar flexion: 5  Eversion: 5  Great toe extension: 5    Tests     Lumbar     Left   Positive passive SLR.     Right   Positive passive SLR.     Left Hip   Positive piriformis.   Negative Ely's and EMILY.   90/90 SLR: Negative.   SLR: Negative.     Right Hip   Positive piriformis.   Negative Ely's and EMILY.   90/90 SLR: Negative.  SLR: Negative.     Additional Tests Details  Pt with minimal piriformis tightness B  B mild hamstring tightness          Assessment & Plan     Assessment  Impairments: abnormal or restricted ROM, activity intolerance, impaired physical strength, lacks appropriate home exercise program and pain with function  Assessment details: Pt is a 48 y/o WM who reports to the clinic  with LBP, tenderness, decreased flexibility, decreased left LE strength, and decreased functional mobility secondary to s/p L5-S1 lumbar fusion.    Prognosis: good  Functional Limitations: carrying objects, lifting, walking, pulling, pushing, uncomfortable because of pain, sitting, standing and unable to perform repetitive tasks  Goals  Plan Goals: STGs: 3-4 weeks  1.  Decrease LBP to a 5/10 with sitting and standing.    2.  Increase lumbar AROM with Flex, SB, and Rot to 50%.     3.  Decrease B lumbar PS tenderness to minimal with palpation   4.  Increase B hamstring flexibility to minimal tightness with 90-90 test     LTGs: 6-8 weeks  1.  Decrease LBP to a 3/10 with sitting and standing   2.  Increase lumbar AROM to 75% with Flex, B Rot and SB.    3.  Increase B piriformis flexibility to WNL   4.  Pt is independent with HEP  5.  Pt is able to complete his normal daily routine and walking program with < or = 3/10 LBP.    6.  Increase left hip flexor, PF and Ever strength to 4+-5/5 strength.        Plan  Therapy options: will be seen for skilled physical therapy services  Planned modality interventions: cryotherapy, electrical stimulation/Russian stimulation and thermotherapy (hydrocollator packs)  Planned therapy interventions: abdominal trunk stabilization, flexibility, functional ROM exercises, home exercise program, stretching, strengthening, therapeutic activities and soft tissue mobilization  Treatment plan discussed with: patient  Plan details: 24 visits         Manual Therapy:         mins  37503;  Therapeutic Exercise:         mins  14941;     Neuromuscular Kamini:        mins  30022;    Therapeutic Activity:          mins  41245;     Gait Training:           mins  95757;     Ultrasound:          mins  70820;    Electrical Stimulation:         mins  36902 ( );  Dry Needling          mins self-pay    Timed Treatment:      mins   Total Treatment:     31   mins    PT SIGNATURE: Jigna Ahuja, PT   DATE  TREATMENT INITIATED: 10/7/2019    Initial Certification  Certification Period: 1/5/2020  I certify that the therapy services are furnished while this patient is under my care.  The services outlined above are required by this patient, and will be reviewed every 90 days.     PHYSICIAN: Basia Wilson PA-C      DATE:     Please sign and return via fax to [phone].. Thank you, [facility].

## 2019-10-08 ENCOUNTER — OFFICE VISIT (OUTPATIENT)
Dept: NEUROLOGY | Facility: CLINIC | Age: 49
End: 2019-10-08

## 2019-10-08 VITALS
WEIGHT: 304.2 LBS | BODY MASS INDEX: 43.55 KG/M2 | SYSTOLIC BLOOD PRESSURE: 138 MMHG | OXYGEN SATURATION: 98 % | DIASTOLIC BLOOD PRESSURE: 86 MMHG | HEART RATE: 113 BPM | HEIGHT: 70 IN

## 2019-10-08 DIAGNOSIS — R55 VASOVAGAL SYNCOPE: Primary | ICD-10-CM

## 2019-10-08 PROCEDURE — 99214 OFFICE O/P EST MOD 30 MIN: CPT | Performed by: PSYCHIATRY & NEUROLOGY

## 2019-10-08 RX ORDER — INSULIN LISPRO 100 U/ML
INJECTION, SOLUTION SUBCUTANEOUS
Refills: 4 | Status: ON HOLD | COMMUNITY
Start: 2019-09-30 | End: 2021-11-30

## 2019-10-08 RX ORDER — LANCETS 28 GAUGE
EACH MISCELLANEOUS
Refills: 3 | COMMUNITY
Start: 2019-09-27

## 2019-10-08 RX ORDER — BLOOD-GLUCOSE METER
KIT MISCELLANEOUS
Refills: 3 | COMMUNITY
Start: 2019-09-27 | End: 2021-07-22

## 2019-10-08 RX ORDER — RANITIDINE 150 MG/1
150 TABLET ORAL DAILY
Refills: 11 | COMMUNITY
Start: 2019-09-27 | End: 2020-08-26 | Stop reason: HOSPADM

## 2019-10-08 RX ORDER — TESTOSTERONE CYPIONATE 200 MG/ML
INJECTION, SOLUTION INTRAMUSCULAR
Refills: 0 | Status: ON HOLD | COMMUNITY
Start: 2019-09-16 | End: 2021-06-01

## 2019-10-08 RX ORDER — INSULIN DEGLUDEC INJECTION 100 U/ML
INJECTION, SOLUTION SUBCUTANEOUS
Refills: 3 | Status: ON HOLD | COMMUNITY
Start: 2019-09-19 | End: 2021-06-01

## 2019-10-08 RX ORDER — FLURBIPROFEN SODIUM 0.3 MG/ML
SOLUTION/ DROPS OPHTHALMIC
Refills: 3 | COMMUNITY
Start: 2019-09-13

## 2019-10-08 RX ORDER — SYRINGE WITH NEEDLE, 1 ML 25GX5/8"
SYRINGE, EMPTY DISPOSABLE MISCELLANEOUS
Refills: 1 | COMMUNITY
Start: 2019-09-16

## 2019-10-08 NOTE — PROGRESS NOTES
Chief Complaint   Patient presents with   • Syncope       Patient ID: Ronni Asif is a 49 y.o. male.    HPI: I met the pleasure of seeing your patient again today.  His me know he is a 49-year-old gentleman with history of what sounds to be is vasovagal syncope.  He has had a couple of episodes since last follow-up.  He is trying to drink more water.  We have been trying to send him to the [facility] for an evaluation however due to paperwork issues that has not yet been done.  He denies any new symptoms from a neurological standpoint.  No focal weakness or numbness.  No recent head injuries.  No new medications.    The following portions of the patient's history were reviewed and updated as appropriate: allergies, current medications, past family history, past medical history, past social history, past surgical history and problem list.    Review of Systems   Constitutional: Negative for activity change, appetite change and fatigue.   HENT: Negative for ear pain, facial swelling and trouble swallowing.    Eyes: Negative for photophobia, pain and visual disturbance.   Respiratory: Negative for cough, chest tightness and shortness of breath.    Cardiovascular: Negative for chest pain, palpitations and leg swelling.   Gastrointestinal: Negative for abdominal pain, nausea and vomiting.   Endocrine: Negative for cold intolerance, heat intolerance and polydipsia.   Musculoskeletal: Positive for back pain. Negative for gait problem and neck pain.   Skin: Negative for color change, rash and wound.   Allergic/Immunologic: Negative for environmental allergies, food allergies and immunocompromised state.   Neurological: Positive for dizziness and light-headedness. Negative for tremors, seizures, syncope, facial asymmetry, speech difficulty, weakness, numbness and headaches.   Hematological: Negative for adenopathy. Does not bruise/bleed easily.   Psychiatric/Behavioral: Negative for agitation, behavioral  problems, confusion, decreased concentration, dysphoric mood, hallucinations, self-injury, sleep disturbance and suicidal ideas. The patient is not nervous/anxious and is not hyperactive.       I reviewed and agree with the above review of systems completed by the medical assistant.    Vitals:    10/08/19 1520   BP: 138/86   Pulse: 113   SpO2: 98%       Neurologic Exam     Mental Status   Oriented to person, place, and time.   Concentration: normal.   Level of consciousness: alert  Knowledge: consistent with education (No deficits found.).     Cranial Nerves     CN II   Visual fields full to confrontation.     CN III, IV, VI   Pupils are equal, round, and reactive to light.  Extraocular motions are normal.   CN III: no CN III palsy  CN VI: no CN VI palsy    CN V   Facial sensation intact.     CN VII   Facial expression full, symmetric.     CN VIII   CN VIII normal.     CN IX, X   CN IX normal.   CN X normal.     CN XI   CN XI normal.     CN XII   CN XII normal.     Motor Exam     Strength   Right neck flexion: 5/5  Left neck flexion: 5/5  Right neck extension: 5/5  Left neck extension: 5/5  Right deltoid: 5/5  Left deltoid: 5/5  Right biceps: 5/5  Left biceps: 5/5  Right triceps: 5/5  Left triceps: 5/5  Right wrist flexion: 5/5  Left wrist flexion: 5/5  Right wrist extension: 5/5  Left wrist extension: 5/5  Right interossei: 5/5  Left interossei: 5/5  Right abdominals: 5/5  Left abdominals: 5/5  Right iliopsoas: 5/5  Left iliopsoas: 5/5  Right quadriceps: 5/5  Left quadriceps: 5/5  Right hamstrin/5  Left hamstrin/5  Right glutei: 5/5  Left glutei: 5/5  Right anterior tibial: 5/5  Left anterior tibial: 5/5  Right posterior tibial: 5/5  Left posterior tibial: 5/5  Right peroneal: 5/5  Left peroneal: 5/5  Right gastroc: 5/5  Left gastroc: 5/5    Sensory Exam   Light touch normal.   Vibration normal.     Gait, Coordination, and Reflexes     Gait  Gait: normal    Reflexes   Right brachioradialis: 2+  Left  brachioradialis: 2+  Right biceps: 2+  Left biceps: 2+  Right triceps: 2+  Left triceps: 2+  Right patellar: 2+  Left patellar: 2+  Right achilles: 2+  Left achilles: 2+  Right : 2+  Left : 2+Station is normal.       Physical Exam   Constitutional: He is oriented to person, place, and time. He appears well-developed and well-nourished.   HENT:   Head: Normocephalic and atraumatic.   Eyes: EOM are normal. Pupils are equal, round, and reactive to light.   Cardiovascular: Normal rate and regular rhythm.   Pulmonary/Chest: Breath sounds normal.   Musculoskeletal: Normal range of motion.   Neurological: He is oriented to person, place, and time. Gait normal.   Reflex Scores:       Tricep reflexes are 2+ on the right side and 2+ on the left side.       Bicep reflexes are 2+ on the right side and 2+ on the left side.       Brachioradialis reflexes are 2+ on the right side and 2+ on the left side.       Patellar reflexes are 2+ on the right side and 2+ on the left side.       Achilles reflexes are 2+ on the right side and 2+ on the left side.  Skin: Skin is warm.   Vitals reviewed.      Procedures    Assessment/Plan: We will try and get the process started for him to see the folks at [facility] for evaluation of the vasovagal issues.  I would like for him to have autonomic nervous system testing.  We will see him back after this.    Patient was counseled regarding prognosis, patient education and impressions.  Total time of encounter was 30 minutes of which greater than 50% was spent counseling and coordinating care.       Ronni was seen today for syncope.    Diagnoses and all orders for this visit:    Vasovagal syncope           Andrade Boykin II, MD

## 2019-10-09 ENCOUNTER — TREATMENT (OUTPATIENT)
Dept: PHYSICAL THERAPY | Facility: CLINIC | Age: 49
End: 2019-10-09

## 2019-10-09 DIAGNOSIS — M51.16 LUMBAR DISC HERNIATION WITH RADICULOPATHY: Primary | ICD-10-CM

## 2019-10-09 DIAGNOSIS — R26.2 DIFFICULTY IN WALKING: ICD-10-CM

## 2019-10-09 DIAGNOSIS — M54.16 LUMBAR RADICULOPATHY: ICD-10-CM

## 2019-10-09 DIAGNOSIS — Z98.1 S/P LUMBAR SPINAL FUSION: ICD-10-CM

## 2019-10-09 PROCEDURE — 97014 ELECTRIC STIMULATION THERAPY: CPT | Performed by: PHYSICAL THERAPIST

## 2019-10-09 PROCEDURE — 97110 THERAPEUTIC EXERCISES: CPT | Performed by: PHYSICAL THERAPIST

## 2019-10-09 NOTE — PROGRESS NOTES
Physical Therapy Daily Progress Note        Patient: Ronni Asif   : 1970  Diagnosis/ICD-10 Code:  Lumbar disc herniation with radiculopathy [M51.16]  Referring practitioner: Basia Wilson PA-C  Date of Initial Visit: Type: THERAPY  Noted: 10/7/2019  Today's Date: 10/9/2019  Patient seen for 2 sessions         Ronni Asif reports: he was a little sore after last session and after going to [place] yesterday.         Subjective     Objective   See Exercise, Manual, and Modality Logs for complete treatment.       Assessment/Plan  Pt's initial treatment session. He reported incision discomfort with lying on his back thus utilized towel for extra padding however he reported increased stiffness in back with exercises thus added heat with improved tolerance reported.  Issued initial HEP this date and no questions reported. No increased pain with exercises and he reported relief with hamstring stretch. He reported relief at the end of the session. Continue to monitor tolerance to exercises and progress if able next session based on symptoms.    Progress per Plan of Care           Manual Therapy:         mins  27310;  Therapeutic Exercise:    45     mins  71120;     Neuromuscular Kamini:        mins  84863;    Therapeutic Activity:          mins  69614;     Gait Training:           mins  11197;     Ultrasound:          mins  79814;    Electrical Stimulation:    15     mins  35639 ( );  Dry Needling          mins self-pay    Timed Treatment:   45   mins   Total Treatment:     70   mins    Tracey Ambrocio PTA  Physical Therapist Assistant

## 2019-10-11 ENCOUNTER — OFFICE VISIT (OUTPATIENT)
Dept: NEUROSURGERY | Facility: CLINIC | Age: 49
End: 2019-10-11

## 2019-10-11 VITALS — SYSTOLIC BLOOD PRESSURE: 149 MMHG | DIASTOLIC BLOOD PRESSURE: 85 MMHG | HEART RATE: 100 BPM

## 2019-10-11 DIAGNOSIS — Z09 SURGERY FOLLOW-UP EXAMINATION: Primary | ICD-10-CM

## 2019-10-11 DIAGNOSIS — M54.16 LUMBAR RADICULITIS: ICD-10-CM

## 2019-10-11 DIAGNOSIS — M54.50 LUMBAR SPINE PAIN: ICD-10-CM

## 2019-10-11 PROCEDURE — 99024 POSTOP FOLLOW-UP VISIT: CPT | Performed by: NURSE PRACTITIONER

## 2019-10-11 RX ORDER — GABAPENTIN 800 MG/1
800 TABLET ORAL 3 TIMES DAILY
Qty: 90 TABLET | Refills: 3 | Status: SHIPPED | OUTPATIENT
Start: 2019-10-11 | End: 2019-11-21 | Stop reason: DRUGHIGH

## 2019-10-11 RX ORDER — HYDROCODONE BITARTRATE AND ACETAMINOPHEN 5; 325 MG/1; MG/1
1 TABLET ORAL EVERY 8 HOURS PRN
Qty: 30 TABLET | Refills: 0 | Status: SHIPPED | OUTPATIENT
Start: 2019-10-11 | End: 2020-01-08

## 2019-10-11 RX ORDER — METHOCARBAMOL 750 MG/1
TABLET, FILM COATED ORAL
Qty: 90 TABLET | Refills: 2 | Status: SHIPPED | OUTPATIENT
Start: 2019-10-11 | End: 2019-12-23

## 2019-10-15 DIAGNOSIS — M54.16 LUMBAR RADICULOPATHY: ICD-10-CM

## 2019-10-15 RX ORDER — GABAPENTIN 600 MG/1
TABLET ORAL
Qty: 90 TABLET | Refills: 0 | OUTPATIENT
Start: 2019-10-15

## 2019-10-16 ENCOUNTER — TREATMENT (OUTPATIENT)
Dept: PHYSICAL THERAPY | Facility: CLINIC | Age: 49
End: 2019-10-16

## 2019-10-16 DIAGNOSIS — R26.2 DIFFICULTY IN WALKING: ICD-10-CM

## 2019-10-16 DIAGNOSIS — Z98.1 S/P LUMBAR SPINAL FUSION: ICD-10-CM

## 2019-10-16 DIAGNOSIS — M51.16 LUMBAR DISC HERNIATION WITH RADICULOPATHY: Primary | ICD-10-CM

## 2019-10-16 PROCEDURE — 97110 THERAPEUTIC EXERCISES: CPT | Performed by: PHYSICAL THERAPIST

## 2019-10-16 PROCEDURE — 97014 ELECTRIC STIMULATION THERAPY: CPT | Performed by: PHYSICAL THERAPIST

## 2019-10-16 NOTE — PROGRESS NOTES
Physical Therapy Daily Progress Note        Patient: Ronni Asif   : 1970  Diagnosis/ICD-10 Code:  Lumbar disc herniation with radiculopathy [M51.16]  Referring practitioner: Basia Wilson PA-C  Date of Initial Visit: Type: THERAPY  Noted: 10/7/2019  Today's Date: 10/16/2019  Patient seen for 3 sessions         Ronni Asif reports: no problems since last session and he was able to walk about 2 miles over the last 2 days.   He was released to drive. He also said the only pain he has had since last session was on the (R) side. He said he also went swimming and felt a lot better after.         Subjective     Objective   See Exercise, Manual, and Modality Logs for complete treatment.       Assessment/Plan  Pt's second full treatment session. He was able to progress exercises without increased pain including standing exercises. Plan to further progress core strengthening in standing to improve tolerance to more dynamic activities/ADLs.  Continued to provide cues for technique with exercises and pt able to return demonstrated improvements.      Progress per Plan of Care           Manual Therapy:         mins  70175;  Therapeutic Exercise:    55     mins  98984;     Neuromuscular Kamini:        mins  35446;    Therapeutic Activity:          mins  68370;     Gait Training:           mins  55484;     Ultrasound:          mins  77257;    Electrical Stimulation:    15     mins  00156 ( );  Dry Needling          mins self-pay    Timed Treatment:   55   mins   Total Treatment:     75   mins    Tracey Ambrocio PTA  Physical Therapist Assistant

## 2019-10-17 ENCOUNTER — TREATMENT (OUTPATIENT)
Dept: PHYSICAL THERAPY | Facility: CLINIC | Age: 49
End: 2019-10-17

## 2019-10-17 DIAGNOSIS — R26.2 DIFFICULTY IN WALKING: ICD-10-CM

## 2019-10-17 DIAGNOSIS — M51.16 LUMBAR DISC HERNIATION WITH RADICULOPATHY: Primary | ICD-10-CM

## 2019-10-17 DIAGNOSIS — Z98.1 S/P LUMBAR SPINAL FUSION: ICD-10-CM

## 2019-10-17 PROCEDURE — 97110 THERAPEUTIC EXERCISES: CPT | Performed by: PHYSICAL THERAPIST

## 2019-10-21 ENCOUNTER — TREATMENT (OUTPATIENT)
Dept: PHYSICAL THERAPY | Facility: CLINIC | Age: 49
End: 2019-10-21

## 2019-10-21 DIAGNOSIS — R26.2 DIFFICULTY IN WALKING: ICD-10-CM

## 2019-10-21 DIAGNOSIS — Z98.1 S/P LUMBAR SPINAL FUSION: ICD-10-CM

## 2019-10-21 DIAGNOSIS — M51.16 LUMBAR DISC HERNIATION WITH RADICULOPATHY: Primary | ICD-10-CM

## 2019-10-21 PROCEDURE — 97110 THERAPEUTIC EXERCISES: CPT | Performed by: PHYSICAL THERAPIST

## 2019-10-21 PROCEDURE — G0283 ELEC STIM OTHER THAN WOUND: HCPCS | Performed by: PHYSICAL THERAPIST

## 2019-10-21 NOTE — PROGRESS NOTES
Physical Therapy Daily Progress Note        Patient: Ronni Asif   : 1970  Diagnosis/ICD-10 Code:  Lumbar disc herniation with radiculopathy [M51.16]  Referring practitioner: Basia Wilson PA-C  Date of Initial Visit: Type: THERAPY  Noted: 10/7/2019  Today's Date: 10/21/2019  Patient seen for 5 sessions         Ronni Asif reports: he may have broken his foot. He said he doesn't know what he did he said it started on Friday. He said it hurts on the outside of the foot to touch and to put weight on it.         Subjective     Objective   See Exercise, Manual, and Modality Logs for complete treatment.       Assessment/Plan  Held all standing exercises and hamstring stretches due pain in foot especially in weightbearing.  Completed core strengthening to tolerance.  Encouraged to follow up regarding foot pain prior to next session due to limitations with exercises.    Progress per Plan of Care           Manual Therapy:         mins  57621;  Therapeutic Exercise:    15     mins  51951;     Neuromuscular Kamini:        mins  03072;    Therapeutic Activity:          mins  96956;     Gait Training:           mins  76133;     Ultrasound:          mins  57205;    Electrical Stimulation:    15     mins  40587 ( );  Dry Needling          mins self-pay    Timed Treatment:   15   mins   Total Treatment:     45   mins    Tracey Ambrocio PTA  Physical Therapist Assistant

## 2019-10-28 ENCOUNTER — TREATMENT (OUTPATIENT)
Dept: PHYSICAL THERAPY | Facility: CLINIC | Age: 49
End: 2019-10-28

## 2019-10-28 DIAGNOSIS — R26.2 DIFFICULTY IN WALKING: ICD-10-CM

## 2019-10-28 DIAGNOSIS — Z98.1 S/P LUMBAR SPINAL FUSION: ICD-10-CM

## 2019-10-28 DIAGNOSIS — M54.16 LUMBAR RADICULOPATHY: ICD-10-CM

## 2019-10-28 DIAGNOSIS — M51.16 LUMBAR DISC HERNIATION WITH RADICULOPATHY: Primary | ICD-10-CM

## 2019-10-28 PROCEDURE — 97014 ELECTRIC STIMULATION THERAPY: CPT | Performed by: PHYSICAL THERAPIST

## 2019-10-28 PROCEDURE — 97110 THERAPEUTIC EXERCISES: CPT | Performed by: PHYSICAL THERAPIST

## 2019-10-28 NOTE — PROGRESS NOTES
Physical Therapy Daily Progress Note        Patient: Ronni Asif   : 1970  Diagnosis/ICD-10 Code:  Lumbar disc herniation with radiculopathy [M51.16]  Referring practitioner: Basia Wilson PA-C  Date of Initial Visit: Type: THERAPY  Noted: 10/7/2019  Today's Date: 10/28/2019  Patient seen for 6 sessions         Ronni Asif reports: his foot is still hurting especially if you touch the side. He went to the MD and nothing is broken but the pain hasn't gotten any better.   He reported he woke up with a spasm in his middle back and his back has been sore since then.       Subjective     Objective       Strength/Myotome Testing     Left Hip   Planes of Motion   Flexion: 4    Right Hip   Planes of Motion   Flexion: 4+  Abduction: 4-     See Exercise, Manual, and Modality Logs for complete treatment.       Assessment/Plan  No increased pain in foot despite reintegration of standing exercises however increased soreness in hips with progression of supine MIP and SLR.  He also reported discomfort in back with standing hip ABD instructed pt to decrease excursion for improved posture which decreased pain.  Will continue to monitor symptoms in back and foot to progress appropriately.  Plan to add paloff strengthening and leg press next session.     Progress per Plan of Care           Manual Therapy:         mins  13262;  Therapeutic Exercise:    40     mins  72451;     Neuromuscular Kamini:        mins  51477;    Therapeutic Activity:          mins  79716;     Gait Training:           mins  04354;     Ultrasound:          mins  22039;    Electrical Stimulation:    15     mins  00863 ( );  Dry Needling          mins self-pay    Timed Treatment:   40   mins   Total Treatment:     60   mins    Tracey Ambrocio PTA  Physical Therapist Assistant

## 2019-10-30 ENCOUNTER — TREATMENT (OUTPATIENT)
Dept: PHYSICAL THERAPY | Facility: CLINIC | Age: 49
End: 2019-10-30

## 2019-10-30 DIAGNOSIS — R26.2 DIFFICULTY IN WALKING: ICD-10-CM

## 2019-10-30 DIAGNOSIS — Z98.1 S/P LUMBAR SPINAL FUSION: ICD-10-CM

## 2019-10-30 DIAGNOSIS — M51.16 LUMBAR DISC HERNIATION WITH RADICULOPATHY: Primary | ICD-10-CM

## 2019-10-30 DIAGNOSIS — M54.16 LUMBAR RADICULOPATHY: ICD-10-CM

## 2019-10-30 PROCEDURE — 97110 THERAPEUTIC EXERCISES: CPT | Performed by: PHYSICAL THERAPIST

## 2019-10-30 PROCEDURE — 97014 ELECTRIC STIMULATION THERAPY: CPT | Performed by: PHYSICAL THERAPIST

## 2019-11-19 NOTE — PROGRESS NOTES
Subjective   Patient ID: Ronni Asif is a 49 y.o. male is here today for follow-up for back leg pain.  He is now 12 weeks out from an L5-S1 open decompression with posterior lumbar interbody fusion by Dr. Tripathi 8/30/19.  He is still going to PT twice a week and noticing improvement. Mr Asif takes Gabapentin 800 mg TID and Robaxin 750 mg BID for pain.        HPI:     Mr. Asif is doing well. He has noticed improvement in the left foot weakness since surgery. He is having mild leg pain, but the gabapentin has made it more manageable. He feels ready to begin weaning the gabapentin. His PT was some interruption in his PT due to insurance issues. He has that worked out now. He is walking daily.         The following portions of the patient's history were reviewed and updated as appropriate: allergies, current medications, past family history, past medical history, past social history, past surgical history and problem list.    Review of Systems   Musculoskeletal: Positive for arthralgias and back pain.   All other systems reviewed and are negative.      Objective   Physical Exam   Constitutional: He appears well-developed. No distress.   Neurological:   No motor or sensory deficits. DTR's normal. SLR and Tyler's negative bilaterally. Able to briefly bear weight on heels and toes bilaterally.      Skin: Skin is warm and dry.   All lumbar incisions are well healed.    Psychiatric: He has a normal mood and affect.   Vitals reviewed.    Neurologic Exam    Assessment/Plan     Medical Decision Making:      Mr. sAif is doing well. He tells me that he is planning to undergo lap band surgery with Dr. Curtis Yu in January. He is still managing the leg pain well with gabapentin. He feels ready to titrate down on gabapentin. He is aware that if symptoms return, he can call the office and we can instruct him on increasing the dose if needed. Mr. Asif will continue with PT. Walking was  also encouraged. He will return to the office in 6 weeks.       Ronni was seen today for back pain and leg pain.    Diagnoses and all orders for this visit:    Surgical follow-up care    Morbidly obese (CMS/HCC)    Other orders  -     gabapentin (NEURONTIN) 600 MG tablet; Take 1 tablet by mouth 3 (Three) Times a Day.      Return in about 6 weeks (around 1/2/2020).

## 2019-11-21 ENCOUNTER — OFFICE VISIT (OUTPATIENT)
Dept: NEUROSURGERY | Facility: CLINIC | Age: 49
End: 2019-11-21

## 2019-11-21 VITALS
DIASTOLIC BLOOD PRESSURE: 81 MMHG | HEIGHT: 70 IN | WEIGHT: 310.4 LBS | SYSTOLIC BLOOD PRESSURE: 122 MMHG | BODY MASS INDEX: 44.44 KG/M2 | HEART RATE: 91 BPM

## 2019-11-21 DIAGNOSIS — Z09 SURGICAL FOLLOW-UP CARE: Primary | ICD-10-CM

## 2019-11-21 DIAGNOSIS — E66.01 MORBIDLY OBESE (HCC): ICD-10-CM

## 2019-11-21 PROBLEM — M21.372 FOOT DROP, LEFT: Status: RESOLVED | Noted: 2019-08-25 | Resolved: 2019-11-21

## 2019-11-21 PROCEDURE — 99024 POSTOP FOLLOW-UP VISIT: CPT | Performed by: NURSE PRACTITIONER

## 2019-11-21 RX ORDER — GABAPENTIN 600 MG/1
600 TABLET ORAL 3 TIMES DAILY
Qty: 90 TABLET | Refills: 2 | Status: SHIPPED | OUTPATIENT
Start: 2019-11-21 | End: 2019-12-24 | Stop reason: SDUPTHER

## 2019-11-25 ENCOUNTER — TELEPHONE (OUTPATIENT)
Dept: NEUROSURGERY | Facility: CLINIC | Age: 49
End: 2019-11-25

## 2019-11-25 NOTE — TELEPHONE ENCOUNTER
PA for Gabapentin faxed to [place] [facility] Prior Auth Form to [facility] at [phone]

## 2019-12-04 ENCOUNTER — TREATMENT (OUTPATIENT)
Dept: PHYSICAL THERAPY | Facility: CLINIC | Age: 49
End: 2019-12-04

## 2019-12-04 DIAGNOSIS — Z98.1 S/P LUMBAR SPINAL FUSION: ICD-10-CM

## 2019-12-04 DIAGNOSIS — M51.16 LUMBAR DISC HERNIATION WITH RADICULOPATHY: Primary | ICD-10-CM

## 2019-12-04 DIAGNOSIS — R26.2 DIFFICULTY IN WALKING: ICD-10-CM

## 2019-12-04 DIAGNOSIS — M54.16 LUMBAR RADICULOPATHY: ICD-10-CM

## 2019-12-04 PROCEDURE — 97110 THERAPEUTIC EXERCISES: CPT | Performed by: PHYSICAL THERAPIST

## 2019-12-04 PROCEDURE — 97164 PT RE-EVAL EST PLAN CARE: CPT | Performed by: PHYSICAL THERAPIST

## 2019-12-04 NOTE — PROGRESS NOTES
Re-Assessment / Re-Certification      Patient: Ronni Asif   : 1970  Diagnosis/ICD-10 Code:  Lumbar disc herniation with radiculopathy [M51.16]  Referring practitioner: Basia Wilson PA-C  Date of Initial Visit: 2019  Today's Date: 2019  Patient seen for 8 sessions      Subjective:   Ronni Asif reports: he feels he is doing fairly well since his last visit in PT at the end of October.  Pt states he has continued his HEP and returned to [place] doing limited work-outs.  Pt states he walks on the TM for 3 miles 2x/week and uses the weight machines for his hips at low weights.  Pt states at times he still gets some stiffness and soreness in his back rating it a 2-3/10 and gets a twinge in the left foot and burning in the right foot.  Pt returns to [facility] 2020.    Subjective Questionnaire: Oswestry: back index 24/50-48%  Clinical Progress: improved  Home Program Compliance: Yes  Treatment has included: therapeutic exercise, therapeutic activity, electrical stimulation, cryotherapy and pt has been instructed in a HEP    Subjective   Objective       Postural Observations    Additional Postural Observation Details  3 lumbar spine incisions have healed well without signs of scar adhesions or infection.      Palpation     Additional Palpation Details  Pt with some left minimal lower PS tenderness with palpation     Tenderness     Left Hip   Tenderness in the PSIS.     Active Range of Motion     Lumbar   Flexion: 50 degrees   Extension: Active lumbar extension: NT secondary to lower lumbar fusion    Left lateral flexion: 25 degrees   Right lateral flexion: 50 degrees   Left rotation: WFL  Right rotation: WFL    Strength/Myotome Testing     Left Hip   Planes of Motion   Flexion: 4+    Right Hip   Planes of Motion   Flexion: 5    Left Knee   Flexion: 5  Extension: 5    Right Knee   Flexion: 5  Extension: 5    Left Ankle/Foot   Dorsiflexion: 4+  Plantar flexion: 5  Inversion:  5  Eversion: 4  Great toe extension: 4    Right Ankle/Foot   Dorsiflexion: 5  Plantar flexion: 5  Inversion: 5  Eversion: 4+  Great toe extension: 4+    Additional Strength Details  Upper abdominal strength 2/5    Tests     Lumbar     Left   Negative crossed SLR and passive SLR.     Right   Negative crossed SLR and passive SLR.     Left Hip   Negative piriformis.   SLR: Negative.     Right Hip   Negative piriformis.   SLR: Negative.      Assessment & Plan     Assessment  Assessment details: Pt is doing well with improved ROM, strength, decreasing pain, improved flexibility, and improved functional mobility.  Pt has met 6/10 goals and tolerated all increases with his strengthening exercises today, but still has some core and left foot weakness.  Feel pt could benefit from additional PT for exercise progression then discharge to a HEP in 3-4 visits.        Progress toward previous goals: Partially Met   MET 6/10 GOALS     Plan Goals: STGs: 3-4 weeks  1.  Decrease LBP to a 5/10 with sitting and standing.  MET   2.  Increase lumbar AROM with Flex, SB, and Rot to 50%.   PARTIALLY MET   3.  Decrease B lumbar PS tenderness to minimal with palpation  MET   4.  Increase B hamstring flexibility to minimal tightness with 90-90 test  MET WITH SLR     LTGs: 6-8 weeks  1.  Decrease LBP to a 3/10 with sitting and standing  MET   2.  Increase lumbar AROM to 75% with Flex, B Rot and SB.  NOT MET   3.  Increase B piriformis flexibility to WNL MET   4.  Pt is independent with HEP  MET   5.  Pt is able to complete his normal daily routine and walking program with < or = 3/10 LBP.  MET   6.  Increase left hip flexor, PF and Ever strength to 4+-5/5 strength.  PARTIALLY MET       Recommendations: Continue with recommendations decreasing frequency to once per week for one more month then discharge to [facility]   Timeframe: 1 month  Prognosis to achieve goals: good    PT Signature: Jigna Ahuja, PT      Based upon review of the patient's  progress and continued therapy plan, it is my medical opinion that Ronni Asif should continue physical therapy treatment at [facility]  [address] [place] [id number] [phone].    Signature: __________________________________  Basia Wilson PA-C    Manual Therapy:         mins  19884;  Therapeutic Exercise:   30      mins  15049;     Neuromuscular Kamini:        mins  93642;    Therapeutic Activity:          mins  64172;     Gait Training:           mins  12989;     Ultrasound:          mins  77999;    Electrical Stimulation:         mins  12783 ( );  Dry Needling          mins self-pay    Timed Treatment:  30    mins   Total Treatment:     72   mins

## 2019-12-20 ENCOUNTER — TELEPHONE (OUTPATIENT)
Dept: NEUROSURGERY | Facility: CLINIC | Age: 49
End: 2019-12-20

## 2019-12-20 ENCOUNTER — OFFICE VISIT (OUTPATIENT)
Dept: CARDIOLOGY | Facility: CLINIC | Age: 49
End: 2019-12-20

## 2019-12-20 VITALS
HEIGHT: 70 IN | HEART RATE: 110 BPM | BODY MASS INDEX: 45.1 KG/M2 | WEIGHT: 315 LBS | DIASTOLIC BLOOD PRESSURE: 92 MMHG | SYSTOLIC BLOOD PRESSURE: 122 MMHG | OXYGEN SATURATION: 98 %

## 2019-12-20 DIAGNOSIS — R55 SYNCOPE AND COLLAPSE: Primary | ICD-10-CM

## 2019-12-20 DIAGNOSIS — G47.33 OSA ON CPAP: ICD-10-CM

## 2019-12-20 DIAGNOSIS — E11.59 TYPE 2 DIABETES MELLITUS WITH OTHER CIRCULATORY COMPLICATION, WITHOUT LONG-TERM CURRENT USE OF INSULIN (HCC): ICD-10-CM

## 2019-12-20 DIAGNOSIS — E66.01 MORBIDLY OBESE (HCC): ICD-10-CM

## 2019-12-20 DIAGNOSIS — Z99.89 OSA ON CPAP: ICD-10-CM

## 2019-12-20 DIAGNOSIS — R09.89 LABILE BLOOD PRESSURE: ICD-10-CM

## 2019-12-20 DIAGNOSIS — I25.10 CORONARY ARTERY DISEASE INVOLVING NATIVE CORONARY ARTERY OF NATIVE HEART WITHOUT ANGINA PECTORIS: ICD-10-CM

## 2019-12-20 PROBLEM — E29.1 SECONDARY MALE HYPOGONADISM: Status: ACTIVE | Noted: 2017-08-21

## 2019-12-20 PROBLEM — G89.29 CHRONIC BACK PAIN: Status: ACTIVE | Noted: 2017-08-21

## 2019-12-20 PROBLEM — F41.8 MIXED ANXIETY DEPRESSIVE DISORDER: Status: ACTIVE | Noted: 2017-08-21

## 2019-12-20 PROBLEM — E55.9 VITAMIN D DEFICIENCY: Status: ACTIVE | Noted: 2019-01-30

## 2019-12-20 PROBLEM — M75.42 SHOULDER IMPINGEMENT SYNDROME, LEFT: Status: ACTIVE | Noted: 2019-01-17

## 2019-12-20 PROBLEM — M10.9 GOUT: Status: ACTIVE | Noted: 2019-01-30

## 2019-12-20 PROBLEM — M54.9 CHRONIC BACK PAIN: Status: ACTIVE | Noted: 2017-08-21

## 2019-12-20 PROBLEM — R07.9 CHEST PAIN: Status: ACTIVE | Noted: 2018-10-11

## 2019-12-20 PROCEDURE — 99214 OFFICE O/P EST MOD 30 MIN: CPT | Performed by: NURSE PRACTITIONER

## 2019-12-20 PROCEDURE — 93000 ELECTROCARDIOGRAM COMPLETE: CPT | Performed by: NURSE PRACTITIONER

## 2019-12-20 NOTE — TELEPHONE ENCOUNTER
----- Message from Brandee Holt MA sent at 12/20/2019  7:59 AM EST -----  Regarding: FW: Visit Follow-Up Question  Contact: [phone]      ----- Message -----  From: Ronni Asif  Sent: 12/19/2019  11:45 PM EST  To: [facility]  Subject: Visit Follow-Up Question                         Hello     So I have recently had some burning feeling in my right foot.  I have had xrays on my foot in October and December.  With no signs of a fracture...  Spoke to  with maybe it could be my diabetes..  They doubt that ...  Spoke to Dr Chambers my pcp..  About this also.. he's going to set me up with a podiatrist..    How ever could this have any chance it's part of my fusion?    Birgit Mejia   Thank you  Ronni Davidson

## 2019-12-20 NOTE — TELEPHONE ENCOUNTER
Please tell the patient that I do not suspect that it is unless he was having pain or numbness in that location before his surgery.

## 2019-12-20 NOTE — PROGRESS NOTES
"Date of Office Visit: 19  Encounter Provider: THEO Ibrahim  Primary Cardiologist: Dr. Conteh  Place of Service: [facility]  Patient Name: Ronni Asif  :1970      Subjective:     Chief Complaint: Intermittent syncope, hypertension, newly diagnosed \"mild CAD\"      History of Present Illness:  Ronni Asif is a pleasant 49 y.o. male who is new to me .  Outside records have been obtained and reviewed by me.     This is a patient with complex history including diabetes, hypertension, intermittent syncope.  He had multiple evaluation without any specific etiology of syncope and had a complete evaluation at [facility].  There was suspicion that he may have autonomic dysfunction secondary to his diabetes that he struggles to control.  He is morbidly obese.  He has been using clonidine for control of his blood pressure.  Dr. Conteh gave him permission to use Viagra at his last office visit as he was not on any nitrates and did not have a history of coronary artery disease.    10/18/2019 he had labs his hemoglobin A1c was slightly better at 7.4%, lipid panel showed LDL 48 HDL low 34 but improved, total cholesterol 101 and triglycerides 134, thyroid labs were normal, vitamin D slightly low at 29, CMP within normal limits CBC unremarkable.    He is presenting today for a six-month follow-up visit.  Since his last visit he had a lumbar fusion and has recovered.  He reports that he saw Dr. Posey a few months ago S he was supposed to go to the autonomic dysfunction clinic that close down.  As part of his work-up he had coronary calcium score which sounded like he had elevated calcium and disease in the RCA.  He had a stress test and echo that he believes were normal.  I do not have these results.  He also reports that neurology and cardiology have both signed off on a referral to [facility] for his autonomic dysfunction.  He still has " on average weekly syncopal episode that occurs at random times.  Is not more pronounced with orthostasis and there is no reported preceding symptoms.  He tries to remain active and is not having chest pain, shortness of breath, palpitations, edema, dizziness.  He has gained some weight and intends to have a LAP-BAND procedure within the next month or 2.  He is unsure when his appointment at [facility] will be.  He is a little confused about his medications today.  Sounds like his atorvastatin was increased from 20 mg daily to 40 mg daily by Dr. Posey.  In the past it sounds like he would take both clonidine 0.1 mg and fludrocortisone as needed for fluctuations in his blood pressure.  Apparently his blood pressures were getting high and the fludrocortisone was discontinued about a year ago.  He follows with neurology as well as endocrinology for management of his diabetes.      Past Medical History:   Diagnosis Date   • Anxiety    • Asthma    • Cancer (CMS/HCC)     skin   • DDD (degenerative disc disease), lumbar    • Diabetes mellitus (CMS/HCC)    • Dizziness    • ED (erectile dysfunction)    • GERD (gastroesophageal reflux disease)    • Headache    • Hyperlipidemia    • Hypertension    • Injury of back 03/2016    Pt fell 20 feet    • Kidney stone    • Neuromuscular disorder (CMS/HCC)    • Pneumonia    • Sleep apnea     cpap   • Stroke (CMS/HCC)     tia   • Syncope and collapse    • TIA (transient ischemic attack)      Past Surgical History:   Procedure Laterality Date   • COLONOSCOPY  2014   • COLONOSCOPY N/A 5/23/2019    Procedure: COLONOSCOPY to Cecum with Hot and Cold Polypectomy x 2;  Surgeon: Navid Zafar Jr., MD;  Location: [facility] ENDOSCOPY;  Service: General   • ENDOSCOPY N/A 5/23/2019    Procedure: ESOPHAGOGASTRODUODENOSCOPY with Bx's;  Surgeon: Navid Zafar Jr., MD;  Location: [facility] ENDOSCOPY;  Service: General   • HERNIA REPAIR  10/2015   • LUMBAR DISCECTOMY FUSION INSTRUMENTATION Bilateral  "8/30/2019    Procedure: LUMBAR 5 TO SACRAL 1 OPEN DECOMPRESSION WITH  POSTERIOR LUMBAR INTERBODY FUSION, CAGE AND SCREW;  Surgeon: Jake Tripathi MD;  Location: [facility];  Service: Neurosurgery     Outpatient Medications Prior to Visit   Medication Sig Dispense Refill   • acetaminophen (TYLENOL) 325 MG tablet Take 2 tablets by mouth Every 6 (Six) Hours As Needed for Mild Pain .     • ADMELOG SOLOSTAR 100 UNIT/ML solution pen-injector INJECT 5 UNITS INTO THE SKIN 3 TIMES DAILY BEFORE MEALS  4   • albuterol (2.5 MG/3ML) 0.083% nebulizer solution 3 mL, albuterol (5 MG/ML) 0.5% nebulizer solution 0.5 mL Inhale 4 (four) times a day as needed.     • albuterol (PROVENTIL HFA;VENTOLIN HFA) 108 (90 BASE) MCG/ACT inhaler Inhale 2 puffs every 4 (four) hours as needed for wheezing.     • allopurinol (ZYLOPRIM) 100 MG tablet Take 100 mg by mouth Daily.     • aspirin (ASPIRIN LOW DOSE) 81 MG EC tablet TK 1 T PO QD     • B-D 3CC LUER-ALECIA SYR 15LT4-8/2 23G X 1-1/2\" 3 ML misc INJECT 1 EACH IM Q 14 DAYS UTD  1   • B-D UF III MINI PEN NEEDLES 31G X 5 MM misc USE 1 QID  3   • BREO ELLIPTA 200-25 MCG/INH inhaler INHALE 1 PO PUFF QD  5   • CloNIDine (CATAPRES) 0.1 MG tablet Take 1 tablet by mouth 2 (Two) Times a Day. 60 tablet 5   • Dulaglutide 0.75 MG/0.5ML solution pen-injector Inject  under the skin into the appropriate area as directed.     • fluticasone-salmeterol (ADVAIR DISKUS) 250-50 MCG/DOSE DISKUS INL 1 PUFF ITL BID     • FREESTYLE LITE test strip TEST ONCE D  3   • gabapentin (NEURONTIN) 600 MG tablet Take 1 tablet by mouth 3 (Three) Times a Day. 90 tablet 2   • HYDROcodone-acetaminophen (NORCO) 5-325 MG per tablet Take 1 tablet by mouth Every 8 (Eight) Hours As Needed for Moderate Pain . 30 tablet 0   • insulin lispro (ADMELOG) 100 UNIT/ML injection Admelog 5 units at meals and scale as needed as per Endocrinologist  12   • Lancets (FREESTYLE) lancets TEST QD UTD  3   • metFORMIN ER (GLUCOPHAGE-XR) 500 MG 24 hr " tablet Take 2 tablets by mouth 2 (Two) Times a Day.  1   • methocarbamol (ROBAXIN) 750 MG tablet 1 tab q 6-8 hours prn back spasms. May take 2 tabs for severe spasm. 90 tablet 2   • pantoprazole (PROTONIX) 40 MG EC tablet Take 40 mg by mouth Daily.     • polyethylene glycol (MIRALAX) packet Take 17 g by mouth Daily As Needed (constipation).     • predniSONE (DELTASONE) 10 MG tablet Take one tab po daily x 5 days 5 tablet 0   • raNITIdine (ZANTAC) 150 MG tablet Take 150 mg by mouth Daily.  11   • Testosterone Cypionate (DEPOTESTOTERONE CYPIONATE) 200 MG/ML injection   0   • TRESIBA FLEXTOUCH 100 UNIT/ML solution pen-injector injection INJECT 15 UNITS INTO THE SKIN DAILY  3   • vitamin D (ERGOCALCIFEROL) 85048 units capsule capsule Take 50,000 Units by mouth 1 (One) Time Per Week.     • atorvastatin (LIPITOR) 20 MG tablet Take 1 tablet by mouth Daily. (Patient taking differently: Take 40 mg by mouth Daily.) 30 tablet 5     No facility-administered medications prior to visit.        Allergies as of 12/20/2019   • (No Known Allergies)     Social History     Socioeconomic History   • Marital status:      Spouse name: Not on file   • Number of children: Not on file   • Years of education: Not on file   • Highest education level: Not on file   Tobacco Use   • Smoking status: Never Smoker   • Smokeless tobacco: Never Used   • Tobacco comment: no caffeine    Substance and Sexual Activity   • Alcohol use: No     Comment: caffeine weekly   • Drug use: No   • Sexual activity: Defer     Family History   Problem Relation Age of Onset   • Brain cancer Father    • Stroke Father    • Hypertension Father    • Atrial fibrillation Father    • Heart disease Father    • Hypertension Mother    • Diabetes Mother    • Heart disease Mother    • Hypertension Brother    • Colon cancer Maternal Grandmother    • Lung cancer Maternal Grandfather    • Bone cancer Maternal Grandfather      Review of Systems   Constitution: Positive for  "weight gain (10lbs). Negative for chills, fever and malaise/fatigue.   HENT: Negative for ear pain, hearing loss, nosebleeds and sore throat.    Eyes: Negative for double vision, pain, vision loss in left eye and vision loss in right eye.   Cardiovascular: Positive for syncope. Negative for chest pain, claudication, dyspnea on exertion, irregular heartbeat, leg swelling, near-syncope, orthopnea, palpitations and paroxysmal nocturnal dyspnea.   Respiratory: Negative for cough, shortness of breath, snoring and wheezing.    Endocrine: Negative for cold intolerance and heat intolerance.   Hematologic/Lymphatic: Negative for bleeding problem.   Skin: Negative for color change, itching, rash and unusual hair distribution.   Musculoskeletal: Negative for joint pain and joint swelling.   Gastrointestinal: Negative for abdominal pain, diarrhea, hematochezia, melena, nausea and vomiting.   Genitourinary: Negative for decreased libido, frequency, hematuria, hesitancy and incomplete emptying.   Neurological: Negative for excessive daytime sleepiness, dizziness, headaches, light-headedness, loss of balance, numbness, paresthesias and seizures.   Psychiatric/Behavioral: Negative for depression.          Objective:     Vitals:    12/20/19 1359 12/20/19 1430   BP: 122/92    BP Location: Right arm    Patient Position: Sitting    Cuff Size: Adult    Pulse: 104 110   SpO2:  98%   Weight: (!) 144 kg (317 lb 9.6 oz)    Height: 177.8 cm (70\")      Body mass index is 45.57 kg/m².    PHYSICAL EXAM:  Physical Exam   Constitutional: He is oriented to person, place, and time. He appears well-developed and well-nourished. No distress.   Morbidly obese   HENT:   Head: Normocephalic and atraumatic.   Eyes: Pupils are equal, round, and reactive to light. Conjunctivae and EOM are normal.   Neck: Carotid bruit is not present.   Difficult to assess JVD d/t body habitus   Cardiovascular: Regular rhythm, normal heart sounds and intact distal pulses. " Bradycardia present.   No murmur heard.  Pulses:       Radial pulses are 2+ on the right side, and 2+ on the left side.        Posterior tibial pulses are 2+ on the right side, and 2+ on the left side.   Nonpitting mild BLE edema   Pulmonary/Chest: Effort normal and breath sounds normal. No accessory muscle usage. No tachypnea. No respiratory distress. He has no decreased breath sounds. He has no wheezes. He has no rhonchi. He has no rales. He exhibits no tenderness.   Abdominal: Soft. Bowel sounds are normal. He exhibits no distension. There is no tenderness. There is no rebound and no guarding.   Obese   Musculoskeletal: Normal range of motion. He exhibits no edema.   Neurological: He is alert and oriented to person, place, and time.   Skin: Skin is warm, dry and intact. He is not diaphoretic. No erythema.   Psychiatric: He has a normal mood and affect. His speech is normal and behavior is normal. Judgment and thought content normal. Cognition and memory are normal.   Nursing note and vitals reviewed.        ECG 12 Lead  Date/Time: 12/20/2019 2:04 PM  Performed by: Janie Garnica APRN  Authorized by: Janie Garnica APRN   Comparison: compared with previous ECG from 6/6/2019  Comparison to previous ECG: HR faster  Rhythm: sinus tachycardia  Rate: tachycardic  BPM: 104  QRS axis: normal  Other findings: non-specific ST-T wave changes  Comments: Otherwise normal not significantly changed ECG  Indication: Autonomic dysfunction            Assessment:       Diagnosis Plan   1. Syncope and collapse     2. Labile blood pressure     3. Coronary artery disease involving native coronary artery of native heart without angina pectoris     4. DEVAUGHN on CPAP     5. Morbidly obese (CMS/[facility])     6. Type 2 diabetes mellitus with other circulatory complication, without long-term current use of insulin (CMS/[facility])         Plan:     1.  Hypertension: Stable at present  2.  Syncope: Intermittent episodes without injury. This was felt  possibly to be related to autonomic dysfunction.  He plans on having an evaluation at [facility] which Dr. Conteh has supported. He has also been following with Dr. Posey locally.  3.  Diabetes mellitus: The patient is struggling with control.  He is working with an endocrinologist- recent OCt. 2019 labs have improved some.    4. Morbid Obesity: He has gained weight since his back surgery.  He is planning to have lap band surgery in the near future.  5.  Coronary artery disease: This is a relatively new diagnosis apparently he had an abnormal coronary calcium score with some disease in the RCA.  He reportedly had a stress test and echo with Dr. Posey in the last few months.  I have requested those records to review.  His atorvastatin was increased from 20 to 40 mg daily which I agree with.    At this point time no med changes will be made.  I have discussed this case in detail with Dr. Conteh including previous meds current meds and slightly elevated heart rate.  We will get his records from Dr. Posey and have asked him to contact us after he has had his evaluation at [facility].  For now we will set up a one-year appointment with Dr. Conteh unless otherwise needed sooner.          I advised the patient to contact our office with any questions or concerns.     It has been a pleasure to participate in this patient's care. Please feel free to contact me with any questions or concerns.     Janie Garnica, THEO  12/20/19             Your medication list           Accurate as of December 20, 2019  3:21 PM. If you have any questions, ask your nurse or doctor.               CONTINUE taking these medications      Instructions Last Dose Given Next Dose Due   acetaminophen 325 MG tablet  Commonly known as:  TYLENOL      Take 2 tablets by mouth Every 6 (Six) Hours As Needed for Mild Pain .       ADVAIR DISKUS 250-50 MCG/DOSE DISKUS  Generic drug:  fluticasone-salmeterol      INL 1 PUFF ITL BID       albuterol  "(2.5 MG/3ML) 0.083% nebulizer solution 3 mL, albuterol (5 MG/ML) 0.5% nebulizer solution 0.5 mL      Inhale 4 (four) times a day as needed.       albuterol sulfate  (90 Base) MCG/ACT inhaler  Commonly known as:  PROVENTIL HFA;VENTOLIN HFA;PROAIR HFA      Inhale 2 puffs every 4 (four) hours as needed for wheezing.       allopurinol 100 MG tablet  Commonly known as:  ZYLOPRIM      Take 100 mg by mouth Daily.       ASPIRIN LOW DOSE 81 MG EC tablet  Generic drug:  aspirin      TK 1 T PO QD       B-D 3CC LUER-ALECIA SYR 28SG8-7/2 23G X 1-1/2\" 3 ML misc  Generic drug:  Syringe/Needle (Disp)      INJECT 1 EACH IM Q 14 DAYS UTD       B-D UF III MINI PEN NEEDLES 31G X 5 MM misc  Generic drug:  Insulin Pen Needle      USE 1 QID       BREO ELLIPTA 200-25 MCG/INH inhaler  Generic drug:  Fluticasone Furoate-Vilanterol      INHALE 1 PO PUFF QD       cloNIDine 0.1 MG tablet  Commonly known as:  CATAPRES      Take 1 tablet by mouth 2 (Two) Times a Day.       Dulaglutide 0.75 MG/0.5ML solution pen-injector      Inject  under the skin into the appropriate area as directed.       freestyle lancets      TEST QD UTD       FREESTYLE LITE test strip  Generic drug:  glucose blood      TEST ONCE D       gabapentin 600 MG tablet  Commonly known as:  NEURONTIN      Take 1 tablet by mouth 3 (Three) Times a Day.       HYDROcodone-acetaminophen 5-325 MG per tablet  Commonly known as:  NORCO      Take 1 tablet by mouth Every 8 (Eight) Hours As Needed for Moderate Pain .       insulin lispro 100 UNIT/ML injection  Commonly known as:  ADMELOG      Admelog 5 units at meals and scale as needed as per Endocrinologist       ADMELOG SOLOSTAR 100 UNIT/ML solution pen-injector  Generic drug:  Insulin Lispro (1 Unit Dial)      INJECT 5 UNITS INTO THE SKIN 3 TIMES DAILY BEFORE MEALS       metFORMIN  MG 24 hr tablet  Commonly known as:  GLUCOPHAGE-XR      Take 2 tablets by mouth 2 (Two) Times a Day.       methocarbamol 750 MG tablet  Commonly known " as:  ROBAXIN      1 tab q 6-8 hours prn back spasms. May take 2 tabs for severe spasm.       pantoprazole 40 MG EC tablet  Commonly known as:  PROTONIX      Take 40 mg by mouth Daily.       polyethylene glycol packet  Commonly known as:  MIRALAX      Take 17 g by mouth Daily As Needed (constipation).       predniSONE 10 MG tablet  Commonly known as:  DELTASONE      Take one tab po daily x 5 days       raNITIdine 150 MG tablet  Commonly known as:  ZANTAC      Take 150 mg by mouth Daily.       Testosterone Cypionate 200 MG/ML injection  Commonly known as:  DEPOTESTOTERONE CYPIONATE           TRESIBA FLEXTOUCH 100 UNIT/ML solution pen-injector injection  Generic drug:  insulin degludec      INJECT 15 UNITS INTO THE SKIN DAILY       vitamin D 1.25 MG (00804 UT) capsule capsule  Commonly known as:  ERGOCALCIFEROL      Take 50,000 Units by mouth 1 (One) Time Per Week.          STOP taking these medications    atorvastatin 20 MG tablet  Commonly known as:  LIPITOR  Stopped by:  THEO Ibrahim               The above medication changes may not have been made by this provider.  Medication list was updated to reflect medications patient is currently taking including medication changes and discontinuations made by other healthcare providers.     Dictated utilizing Dragon Dictation System.

## 2019-12-23 DIAGNOSIS — M54.16 LUMBAR RADICULOPATHY: Primary | ICD-10-CM

## 2019-12-23 RX ORDER — METHOCARBAMOL 750 MG/1
TABLET, FILM COATED ORAL
Qty: 90 TABLET | Refills: 0 | Status: SHIPPED | OUTPATIENT
Start: 2019-12-23 | End: 2020-01-29

## 2019-12-23 NOTE — TELEPHONE ENCOUNTER
Last OV 10/11/19  Next appt 1/6/20.  Last Rf on Gabapentin 600mg #90 tid +2 RF  Last Methocarbamol 750mg #90 1 q 6-8 hrs + 2. -- see below as pt wants to switch pharmacy.

## 2019-12-23 NOTE — TELEPHONE ENCOUNTER
Pt says that the Gabapentin 600 mg  1 pill 3 x day needs to be sent to [facility] in [place].  He also needs his methocarbamol 750 mg  1  to 3 pills a day.     He says that there were different  dosages that were filled at [facility].  He has moved all of his prescriptions from [facility] to [facility].

## 2019-12-24 RX ORDER — GABAPENTIN 600 MG/1
600 TABLET ORAL 3 TIMES DAILY
Qty: 90 TABLET | Refills: 2 | Status: SHIPPED | OUTPATIENT
Start: 2019-12-24 | End: 2020-01-28 | Stop reason: SDUPTHER

## 2019-12-26 ENCOUNTER — TELEPHONE (OUTPATIENT)
Dept: CARDIOLOGY | Facility: CLINIC | Age: 49
End: 2019-12-26

## 2019-12-26 NOTE — TELEPHONE ENCOUNTER
I received records from [facility] cardiology.  I reviewed office note from 7/11/2019 they report he was taking metoprolol succinate 25 mg daily which she was again increased to 50 mg daily due to his heart rate being in the 1 teens and a systolic of 138.  Is reported that he had PRN clonidine but was not taking it frequently and only taken about a month ago.  11/2018 patient had a coronary calcium score showed minimal coronary artery calcium with Agatston score of 12.  12/10/2018 patient had a stress echo which reported normal appears to handle stress views, borderline LVH with normal LV function, borderline LV diastolic dysfunction, mildly enlarged right ventricle, minimal aortic sclerosis without stenosis or regurgitation, mild MAC minimal mitral valve thickening with trace to mild MR, lipomatous septum with mild to moderately enlarged left atrium.  11/9/2018 patient also had a Holter monitor which appeared to be 24 hours and was predominantly normal sinus rhythm with occasional sinus tach average heart rate 88 heart rate ranging 62-1 36 with no ventricular ectopy rare PACs no significant arrhythmias was considered normal monitor.  At that time is reported that lisinopril would be stopped and metoprolol succinate 50 mg daily would be used.    Cara- can you please let him know I got his records from [facility] and placed them in scanning to be put in his chart in Epic. Can you find out if he is taking metoprolol succinate and which dose as listed in his records from Dr. Posey's note?     Thanks.

## 2019-12-27 NOTE — TELEPHONE ENCOUNTER
Ok thank you. He should be advised when he is seeing several different cardiologist and specialists that work in different systems that he needs to bring his updated medication bottles and/or medication list to every single appointment every single time.  This helps avoid confusion or possibly duplicate medication. Thank you!

## 2019-12-31 DIAGNOSIS — M51.16 LUMBAR DISC HERNIATION WITH RADICULOPATHY: ICD-10-CM

## 2019-12-31 DIAGNOSIS — Z09 SURGERY FOLLOW-UP EXAMINATION: Primary | ICD-10-CM

## 2019-12-31 DIAGNOSIS — M54.16 LUMBAR RADICULOPATHY: ICD-10-CM

## 2020-01-03 ENCOUNTER — HOSPITAL ENCOUNTER (OUTPATIENT)
Dept: GENERAL RADIOLOGY | Facility: HOSPITAL | Age: 50
Discharge: HOME | End: 2020-01-03
Admitting: NURSE PRACTITIONER

## 2020-01-03 DIAGNOSIS — Z09 SURGERY FOLLOW-UP EXAMINATION: ICD-10-CM

## 2020-01-03 DIAGNOSIS — M51.16 LUMBAR DISC HERNIATION WITH RADICULOPATHY: ICD-10-CM

## 2020-01-03 PROCEDURE — 72114 X-RAY EXAM L-S SPINE BENDING: CPT

## 2020-01-03 NOTE — PROGRESS NOTES
Subjective   Patient ID: Ronni Asif is a 49 y.o. male is here today for follow-up for back and leg pain. He is now 4 months out from an L5-S1 open decompression with posterior lumbar interbody fusion by .     He woke up with back pack pain 12/27/19. Lumbar XR was ordered. He complains of intermittent back pain and R leg pain. Mr. Asif takes Gabapentin 600 mg TID, methocarbamol 750 mg TID, Cyclobenzaprine 5 mg prn for pain.     Back Pain   This is a new problem. The current episode started more than 1 month ago. The problem occurs constantly. The problem is unchanged. The pain is present in the thoracic spine and lumbar spine. The pain is at a severity of 6/10. The pain is moderate. The symptoms are aggravated by position, bending and twisting. Associated symptoms include leg pain, tingling and weakness. Pertinent negatives include no bladder incontinence, bowel incontinence, numbness or perianal numbness. He has tried muscle relaxant, heat and ice for the symptoms. The treatment provided mild relief.   Leg Pain    The incident occurred more than 1 week ago. There was no injury mechanism. The pain is present in the right thigh. The quality of the pain is described as aching and stabbing. The pain is at a severity of 6/10. The pain is moderate. The pain has been intermittent since onset. Associated symptoms include tingling. Pertinent negatives include no muscle weakness or numbness. He has tried ice and heat for the symptoms. The treatment provided mild relief.       The following portions of the patient's history were reviewed and updated as appropriate: allergies, current medications, past family history, past medical history, past social history, past surgical history and problem list.    Review of Systems   Gastrointestinal: Negative for bowel incontinence.   Genitourinary: Negative for bladder incontinence and difficulty urinating.   Musculoskeletal: Positive for back pain (R thigh  ).   Neurological: Positive for tingling and weakness. Negative for numbness.   All other systems reviewed and are negative.      Objective   Physical Exam   Constitutional: He is oriented to person, place, and time. He appears well-developed and well-nourished.   HENT:   Head: Normocephalic and atraumatic.   Right Ear: External ear normal.   Left Ear: External ear normal.   Eyes: Pupils are equal, round, and reactive to light. Conjunctivae and EOM are normal. Right eye exhibits no discharge. Left eye exhibits no discharge.   Neck: Normal range of motion. Neck supple. No tracheal deviation present.   Pulmonary/Chest: Effort normal. No stridor. No respiratory distress.   Musculoskeletal: Normal range of motion. He exhibits no edema, tenderness or deformity.   Neurological: He is alert and oriented to person, place, and time. He has normal strength and normal reflexes. He displays no atrophy, no tremor and normal reflexes. No cranial nerve deficit or sensory deficit. He exhibits normal muscle tone. He displays a negative Romberg sign. He displays no seizure activity. Coordination and gait normal.   No long tract signs   Skin: Skin is warm and dry.   Psychiatric: He has a normal mood and affect. His behavior is normal. Judgment and thought content normal.   Nursing note and vitals reviewed.    Neurologic Exam     Mental Status   Oriented to person, place, and time.     Cranial Nerves     CN III, IV, VI   Pupils are equal, round, and reactive to light.  Extraocular motions are normal.     Motor Exam     Strength   Strength 5/5 throughout.       Assessment/Plan   Independent Review of Radiographic Studies:    I reviewed lumbar spine x-rays from January 3 which showed the previous fusion but no acute pathology.  Multilevel degenerative disc disease.  Medical Decision Making:    Mr. Asif underwent a previous open decompression with PLIF L5-S1 4-1/2 months ago.  He did well postoperatively but now returns with  increasing low back pain that radiates into the right anterolateral thigh as well as new thoracic pain localized to the mid thoracic region which does radiate into the ribs bilaterally.  No change in strength or sensation or incontinence.  He has been using over-the-counter medications but had to stop aspirin and Tylenol as well as NSAIDs recently because of an upcoming lap band surgery.  He has been doing his usual exercises without relief.  The thoracic back pain and right anterolateral thigh pain are both quite debilitating.  Pain is worse with any prolonged activity.    His exam does not reveal any focal weakness.  No long track signs.    Given the increased radicular pain in the new thoracic radiculopathy I do think imaging is required.  I will order a new thoracic and lumbar MRI and have him follow-up thereafter.  He is tentatively again scheduled for lap band procedure in the next few weeks assuming he is cleared by cardiology.  We discussed possibly trying some steroids but he experienced severe hyperglycemia and early DKA the last time he used oral steroids and therefore we will avoid them.  He will follow-up after the imaging and we can make additional recommendations at that time.  Ronni was seen today for back pain and leg pain.    Diagnoses and all orders for this visit:    Lumbar radiculopathy  -     MRI Lumbar Spine With & Without Contrast; Future    Thoracic radiculopathy  -     MRI Thoracic Spine Without Contrast; Future    Morbidly obese (CMS/HCC)    Type 2 diabetes mellitus with other circulatory complication, without long-term current use of insulin (CMS/HCC)    Other orders  -     cyclobenzaprine (FLEXERIL) 5 MG tablet; Take 2 tablets by mouth 3 (Three) Times a Day As Needed for Muscle Spasms.      Return for follow up after radiology test.

## 2020-01-08 ENCOUNTER — OFFICE VISIT (OUTPATIENT)
Dept: NEUROSURGERY | Facility: CLINIC | Age: 50
End: 2020-01-08

## 2020-01-08 VITALS
BODY MASS INDEX: 44.9 KG/M2 | SYSTOLIC BLOOD PRESSURE: 131 MMHG | WEIGHT: 313.6 LBS | DIASTOLIC BLOOD PRESSURE: 79 MMHG | HEIGHT: 70 IN | HEART RATE: 105 BPM

## 2020-01-08 DIAGNOSIS — E66.01 MORBIDLY OBESE (HCC): ICD-10-CM

## 2020-01-08 DIAGNOSIS — E11.59 TYPE 2 DIABETES MELLITUS WITH OTHER CIRCULATORY COMPLICATION, WITHOUT LONG-TERM CURRENT USE OF INSULIN (HCC): ICD-10-CM

## 2020-01-08 DIAGNOSIS — M54.14 THORACIC RADICULOPATHY: ICD-10-CM

## 2020-01-08 DIAGNOSIS — M54.16 LUMBAR RADICULOPATHY: Primary | ICD-10-CM

## 2020-01-08 PROBLEM — M51.16 LUMBAR DISC HERNIATION WITH RADICULOPATHY: Status: RESOLVED | Noted: 2019-08-25 | Resolved: 2020-01-08

## 2020-01-08 PROCEDURE — 99214 OFFICE O/P EST MOD 30 MIN: CPT | Performed by: PHYSICIAN ASSISTANT

## 2020-01-08 RX ORDER — ATORVASTATIN CALCIUM 40 MG/1
TABLET, FILM COATED ORAL
COMMUNITY
Start: 2020-01-04

## 2020-01-08 RX ORDER — CYCLOBENZAPRINE HCL 5 MG
10 TABLET ORAL 3 TIMES DAILY PRN
Qty: 90 TABLET | Refills: 0 | Status: SHIPPED | OUTPATIENT
Start: 2020-01-08 | End: 2020-01-21

## 2020-01-08 RX ORDER — FLUTICASONE PROPIONATE 50 MCG
1 SPRAY, SUSPENSION (ML) NASAL DAILY
COMMUNITY
Start: 2019-11-22 | End: 2021-05-26 | Stop reason: SDUPTHER

## 2020-01-08 RX ORDER — CYCLOBENZAPRINE HCL 5 MG
5 TABLET ORAL 3 TIMES DAILY PRN
COMMUNITY
End: 2020-01-08 | Stop reason: SDUPTHER

## 2020-01-08 RX ORDER — TADALAFIL 10 MG/1
TABLET ORAL
COMMUNITY
Start: 2019-12-23 | End: 2020-11-09

## 2020-01-17 ENCOUNTER — HOSPITAL ENCOUNTER (OUTPATIENT)
Dept: MRI IMAGING | Facility: HOSPITAL | Age: 50
Discharge: HOME | End: 2020-01-17

## 2020-01-17 ENCOUNTER — HOSPITAL ENCOUNTER (OUTPATIENT)
Dept: MRI IMAGING | Facility: HOSPITAL | Age: 50
Discharge: HOME | End: 2020-01-17
Admitting: PHYSICIAN ASSISTANT

## 2020-01-17 DIAGNOSIS — M54.16 LUMBAR RADICULOPATHY: ICD-10-CM

## 2020-01-17 DIAGNOSIS — M54.14 THORACIC RADICULOPATHY: ICD-10-CM

## 2020-01-17 LAB — CREAT BLDA-MCNC: 0.9 MG/DL (ref 0.6–1.3)

## 2020-01-17 PROCEDURE — 82565 ASSAY OF CREATININE: CPT

## 2020-01-17 PROCEDURE — 72146 MRI CHEST SPINE W/O DYE: CPT

## 2020-01-17 PROCEDURE — 72158 MRI LUMBAR SPINE W/O & W/DYE: CPT

## 2020-01-17 PROCEDURE — 0 GADOBENATE DIMEGLUMINE 529 MG/ML SOLUTION: Performed by: PHYSICIAN ASSISTANT

## 2020-01-17 PROCEDURE — A9577 INJ MULTIHANCE: HCPCS | Performed by: PHYSICIAN ASSISTANT

## 2020-01-17 RX ADMIN — GADOBENATE DIMEGLUMINE 20 ML: 529 INJECTION, SOLUTION INTRAVENOUS at 09:14

## 2020-01-21 ENCOUNTER — PATIENT MESSAGE (OUTPATIENT)
Dept: NEUROSURGERY | Facility: CLINIC | Age: 50
End: 2020-01-21

## 2020-01-21 RX ORDER — CYCLOBENZAPRINE HCL 5 MG
TABLET ORAL
Qty: 90 TABLET | Refills: 0 | Status: SHIPPED | OUTPATIENT
Start: 2020-01-21 | End: 2020-01-28 | Stop reason: SDUPTHER

## 2020-01-27 NOTE — PROGRESS NOTES
Subjective   Patient ID: Ronni Asif is a 49 y.o. male is here today for follow-up with a new thoracic and lumbar MRI that was ordered for intermittent back pain and R leg pain. He had a L5-S1 open decompression with posterior lumbar interbody fusion on 08/27/19. Today, Mr. Asif reports that he is having constant back pain and R leg pain. He is having tingling, weakness, and shooting pain in the middle of his back. No bowel or bladder issues. No numbness or tingling in the R leg but he is having some weakness. He has been to a podiatrist who diagnosed a pinched nerve from an extra bone in his R foot. He has tried ice, heat.he is also taking Robaxin 750mg, and gabapentin 600mg and they do seem to help relax him but not take away the pain. He has been taking advil and muscle relaxers for the back pain.     Mr. Asif returns the office today for reevaluation with a new thoracic and lumbar MRI.  He is undergone previous L5-S1 fusion by Dr. Tripathi last August.  Initially his leg pain symptoms had gotten  better.  He soon had some recurrent radicular pain and was placed on gabapentin which seemed to manage his symptoms.  He is still on a dose of gabapentin 600 mg 3 times daily. He is scheduled for lap band surgery with Dr. Delbert Yu this Thursday pending lab work. She is seeing Dr. Zapata for diabetes management. He is going to [employer] and is wanting a doctor's note to work with a  there.     Back Pain   This is a chronic problem. The current episode started more than 1 month ago. The problem occurs constantly. The problem has been gradually worsening since onset. The pain is present in the lumbar spine. The quality of the pain is described as shooting and stabbing. Radiates to: Does radiate to the hip. The pain is at a severity of 6/10. The pain is mild. The pain is worse during the day. Associated symptoms include tingling (R buttock, and leg) and weakness (in low back).  Pertinent negatives include no bladder incontinence, bowel incontinence, chest pain or numbness. (R buttock pain.) He has tried heat, ice, home exercises, muscle relaxant and bed rest for the symptoms. The treatment provided mild relief.   Leg Pain    The pain is present in the right hip. The quality of the pain is described as stabbing. The pain is at a severity of 7/10. The pain has been constant since onset. Associated symptoms include tingling (R buttock, and leg). Pertinent negatives include no loss of sensation, muscle weakness or numbness. He has tried ice, heat and rest for the symptoms. The treatment provided mild relief.       The following portions of the patient's history were reviewed and updated as appropriate: allergies, current medications, past family history, past medical history, past social history, past surgical history and problem list.    Review of Systems   Constitutional: Negative for activity change.   Respiratory: Negative for chest tightness and shortness of breath.    Cardiovascular: Negative for chest pain.   Gastrointestinal: Negative for bowel incontinence.   Genitourinary: Negative for bladder incontinence, difficulty urinating and enuresis.   Musculoskeletal: Positive for arthralgias and back pain.   Neurological: Positive for tingling (R buttock, and leg) and weakness (in low back). Negative for numbness.   Psychiatric/Behavioral: Negative for sleep disturbance.   All other systems reviewed and are negative.      Objective   Physical Exam   Constitutional: He is oriented to person, place, and time. He appears well-developed and well-nourished. He is cooperative. No distress.   HENT:   Head: Normocephalic and atraumatic.   Eyes: Conjunctivae are normal. Right eye exhibits no discharge. Left eye exhibits no discharge.   Neck: Normal range of motion. Neck supple. No tracheal deviation present.   Cardiovascular: Normal rate and intact distal pulses.   Pulmonary/Chest: Effort normal. No  respiratory distress.   Abdominal: Soft. He exhibits no distension. There is no tenderness.   Musculoskeletal: Normal range of motion. He exhibits tenderness (lower lumbar region to palpation). He exhibits no edema.   Neurological: He is alert and oriented to person, place, and time. He has normal reflexes. He displays normal reflexes. No sensory deficit. He exhibits normal muscle tone. Coordination normal. GCS eye subscore is 4. GCS verbal subscore is 5. GCS motor subscore is 6.   No motor or sensory deficits. DTR's normal. Negative Saez's; negative clonus. SLR and Tyler's negative bilaterally.  Able to bear weight on heels and toes bilaterally.     Skin: Skin is warm and dry. He is not diaphoretic. No erythema.   Psychiatric: He has a normal mood and affect. Thought content normal.   Vitals reviewed.    Neurologic Exam     Mental Status   Oriented to person, place, and time.       Assessment/Plan   Independent Review of Radiographic Studies:      I have independently reviewed the MRI images of the thoracic spine without contrast and MRI images of the lumbar spine with and without contrast dated January 17, 2020 today in the office.  The thoracic MRI showed no evidence of disc herniation, fracture, canal stenosis or cord compression.  The lumbar MRI reveals previous L5-S1 fusion.  Grade 1 anterolisthesis of L5 on S1 is 5 mm stable and unchanged.  Disc desiccation noted adjacent levels L3-4 and L4-5.  Previous facetectomies noted L5-S1 with surrounding granulation tissue.  There is a very small inferiorly directed disc herniation to the left at L3-4 which approaches the traversing L4 nerve root.  This also is unchanged from previous examination dated August 17, 2019 as well as CT lumbar spine dated August 27, 2019.    Medical Decision Making:      Mr. Asif returns to the office for reevaluation with a new thoracic and lumbar MRI.  Neither of the study showed evidence of disc herniation or severe canal  stenosis.  The lumbar MRI appears similar to the previous study that he had back in August.    Overall the patient feels that he is doing better.  He was able to pinpoint the source of the pain and tingling in his right foot.  He will continue working with podiatry on that.  We reduced his Neurontin dosage from 800 mg 3 times daily to 600 mg 3 times daily at his last office visit.  He is doing fine with that.  He will go ahead and try reducing the gabapentin to 600 mg twice daily.  He will notify me if his symptoms begin to worsen.  He is taking muscle relaxers for spasm relief but does get some drowsiness therefore I recommended that he use the methocarbamol during the daytime and reserve the Flexeril for severe nighttime spasms.  He verbalized understanding.    The patient will be having a lap band surgery soon. If he has worsening symptoms, Mr. Garcia will call the office otherwise I will see him back in 6 months.    Ronni was seen today for follow-up, back pain and leg pain.    Diagnoses and all orders for this visit:    Lumbar spine pain  -     Ambulatory Referral to Physical Therapy Evaluate and treat (for core and leg strengthening with mild increase to home exercise and regular work out routine IE in the gym); Heat, Electrotherapy; Ultrasound; E-stim; Stretching, Strengthening    Lumbar degenerative disc disease  -     Ambulatory Referral to Physical Therapy Evaluate and treat (for core and leg strengthening with mild increase to home exercise and regular work out routine IE in the gym); Heat, Electrotherapy; Ultrasound; E-stim; Stretching, Strengthening    Numbness and tingling  -     Ambulatory Referral to Physical Therapy Evaluate and treat (for core and leg strengthening with mild increase to home exercise and regular work out routine IE in the gym); Heat, Electrotherapy; Ultrasound; E-stim; Stretching, Strengthening    History of lumbar fusion  -     Ambulatory Referral to Physical Therapy Evaluate and  treat (for core and leg strengthening with mild increase to home exercise and regular work out routine IE in the gym); Heat, Electrotherapy; Ultrasound; E-stim; Stretching, Strengthening      Return in about 6 months (around 7/28/2020).

## 2020-01-28 ENCOUNTER — OFFICE VISIT (OUTPATIENT)
Dept: NEUROSURGERY | Facility: CLINIC | Age: 50
End: 2020-01-28

## 2020-01-28 VITALS
DIASTOLIC BLOOD PRESSURE: 84 MMHG | WEIGHT: 313.05 LBS | BODY MASS INDEX: 44.82 KG/M2 | HEIGHT: 70 IN | HEART RATE: 97 BPM | SYSTOLIC BLOOD PRESSURE: 137 MMHG

## 2020-01-28 DIAGNOSIS — M54.50 LUMBAR SPINE PAIN: Primary | ICD-10-CM

## 2020-01-28 DIAGNOSIS — M54.16 LUMBAR RADICULOPATHY: ICD-10-CM

## 2020-01-28 DIAGNOSIS — M51.36 LUMBAR DEGENERATIVE DISC DISEASE: ICD-10-CM

## 2020-01-28 DIAGNOSIS — Z98.1 HISTORY OF LUMBAR FUSION: ICD-10-CM

## 2020-01-28 DIAGNOSIS — R20.2 NUMBNESS AND TINGLING: ICD-10-CM

## 2020-01-28 DIAGNOSIS — R20.0 NUMBNESS AND TINGLING: ICD-10-CM

## 2020-01-28 PROCEDURE — 99213 OFFICE O/P EST LOW 20 MIN: CPT | Performed by: NURSE PRACTITIONER

## 2020-01-28 RX ORDER — METOPROLOL SUCCINATE 50 MG/1
TABLET, EXTENDED RELEASE ORAL
COMMUNITY
Start: 2020-01-23 | End: 2020-08-26 | Stop reason: HOSPADM

## 2020-01-28 RX ORDER — CYCLOBENZAPRINE HCL 5 MG
10 TABLET ORAL 3 TIMES DAILY PRN
Qty: 90 TABLET | Refills: 0 | Status: SHIPPED | OUTPATIENT
Start: 2020-01-28 | End: 2020-06-16

## 2020-01-28 RX ORDER — LANSOPRAZOLE 30 MG/1
CAPSULE, DELAYED RELEASE ORAL
COMMUNITY
Start: 2020-01-21 | End: 2021-05-26 | Stop reason: SDUPTHER

## 2020-01-28 RX ORDER — GABAPENTIN 600 MG/1
600 TABLET ORAL 3 TIMES DAILY
Qty: 90 TABLET | Refills: 2 | Status: SHIPPED | OUTPATIENT
Start: 2020-01-28 | End: 2020-06-15 | Stop reason: SDUPTHER

## 2020-01-29 RX ORDER — METHOCARBAMOL 500 MG/1
500 TABLET, FILM COATED ORAL EVERY 8 HOURS PRN
Qty: 30 TABLET | Refills: 0 | Status: SHIPPED | OUTPATIENT
Start: 2020-01-29 | End: 2020-06-15 | Stop reason: SDUPTHER

## 2020-01-29 RX ORDER — METHOCARBAMOL 750 MG/1
TABLET, FILM COATED ORAL
Qty: 90 TABLET | Refills: 0 | OUTPATIENT
Start: 2020-01-29

## 2020-02-11 ENCOUNTER — TREATMENT (OUTPATIENT)
Dept: PHYSICAL THERAPY | Facility: CLINIC | Age: 50
End: 2020-02-11

## 2020-02-11 DIAGNOSIS — Z98.1 S/P LUMBAR SPINAL FUSION: ICD-10-CM

## 2020-02-11 DIAGNOSIS — R26.2 DIFFICULTY IN WALKING: ICD-10-CM

## 2020-02-11 DIAGNOSIS — M51.16 LUMBAR DISC HERNIATION WITH RADICULOPATHY: Primary | ICD-10-CM

## 2020-02-11 PROCEDURE — 97164 PT RE-EVAL EST PLAN CARE: CPT | Performed by: PHYSICAL THERAPIST

## 2020-02-11 PROCEDURE — 97110 THERAPEUTIC EXERCISES: CPT | Performed by: PHYSICAL THERAPIST

## 2020-02-11 NOTE — PROGRESS NOTES
Re-Assessment / Re-Certification        Patient: Ronni Asif   : 1970  Diagnosis/ICD-10 Code:  Lumbar disc herniation with radiculopathy [M51.16]  Referring practitioner: THEO Martin  Date of Initial Visit: Type: THERAPY  Noted: 10/7/2019  Today's Date: 2020  Patient seen for 9 sessions      Subjective:   Ronni Meinaseemzoe reports:   Subjective Questionnaire: Oswestry: 54  Clinical Progress: improved  Home Program Compliance: Yes  Treatment has included: therapeutic exercise    Subjective Evaluation    History of Present Illness  Date of onset: 2017  Date of surgery: 2019  Mechanism of injury: S/p L5-S1 fusion last August and did well with PT but insurance confusion led to no coverage thus pt was on his own at a gym until MD follow up 20. New MRI of T spine shows Schmorl's Nodes and end plate changes at T6-12 with plans for AREN soon. Reports lumbar pain improving but had to go to ED waking up with mid back pain recently. Also plans for lap band next 4 weeks if insurance covers. Pt has lost 19# on his own now at 292# and hopes wt loss will help his metabolic syndrome issues. No plans for RTW until stronger and various issues resolved. Also possibility of disability vs. Employment. Still on 10# lift limit and BLT precautions. -120 in the am. BP ok and ready for ex. Hopes to be indep at his gym but needs a letter from surgeon for /gym at [employer]. Lap band will only take him out of PT for a week per pt but will investigate this further. States ongoing issue with L eversion weakness of ankle but denies foot drag or dangerous gait.    Subjective comment: Did well post op with surgery and PT helping but insurance issue caused a stop in PT. c/o weakness L ankle and new T spine pain  Patient Occupation:    Precautions and Work Restrictions: 10# lift limit. no TM walking per MD due to t spine painQuality of life: good    Pain  Current pain rating: 3  At  best pain ratin  At worst pain ratin  Location: thoracic spine and sometimes R LE  Quality: sharp and discomfort  Relieving factors: heat, ice, medications, relaxation, rest, support and change in position  Aggravating factors: lifting, movement, standing, sleeping, squatting and ambulation  Progression: improved    Social Support  Lives in: multiple-level home  Lives with: spouse    Hand dominance: right    Diagnostic Tests  X-ray: abnormal  MRI studies: abnormal    Treatments  Previous treatment: physical therapy  Current treatment: physical therapy  Patient Goals  Patient goals for therapy: increased strength, decreased pain, increased motion, return to sport/leisure activities and return to work  Patient goal: outdoor activities       Objective       Static Posture     Thoracic Spine  Flattened thoracic spine.    Lumbar Spine   Lumbar spine (Left): Tilted.     Palpation   Left   Hypertonic in the cervical paraspinals.     Right   Hypertonic in the cervical paraspinals.     Neurological Testing     Sensation     Lumbar   Left   Intact: light touch    Right   Intact: light touch    Reflexes   Left   Patellar (L4): normal (2+)  Achilles (S1): trace (1+)    Right   Patellar (L4): normal (2+)  Achilles (S1): trace (1+)    Active Range of Motion     Lumbar   Flexion: 60 degrees   Extension: 10 degrees with pain  Left lateral flexion: 10 degrees with pain  Right lateral flexion: 10 degrees with pain    Passive Range of Motion   Left Hip   Normal passive range of motion    Right Hip   External rotation (90/90): 60 degrees with pain    Additional Passive Range of Motion Details  Limited R vs. L hip ER affecting hand to shoe. Using long shoe horn since surgery    Strength/Myotome Testing     Left Hip   Planes of Motion   Flexion: 4  Abduction: 4+  Adduction: 5    Right Hip   Planes of Motion   Flexion: 4+  Abduction: 4+  Adduction: 5    Left Knee   Flexion: 4+  Extension: 4+    Right Knee   Flexion: 4+  Extension:  4+    Left Ankle/Foot   Dorsiflexion: 4  Plantar flexion: 4-  Inversion: 4  Eversion: 4-    Right Ankle/Foot   Dorsiflexion: 4+  Plantar flexion: 4  Inversion: 4+  Eversion: 4+    Additional Strength Details  Body weight affects ability to do FWB testing against gravity but unable to lift L heel vs. R     Tests     Lumbar     Left   Negative passive SLR.     Right   Negative passive SLR.     Ambulation   Weight-Bearing Status   Assistive device used: none    Ambulation: Stairs   Ascend stairs: independent  Pattern: reciprocal  Railings: without rails  Descend stairs: independent  Pattern: reciprocal  Railings: without rails    Observational Gait   Decreased walking speed and stride length.      Assessment & Plan     Assessment  Impairments: abnormal gait, abnormal or restricted ROM, activity intolerance, impaired physical strength, lacks appropriate home exercise program and pain with function  Assessment details: Lumbar pain stable post fusion but new thoracic pain evolving with recent ER visit and MRI. Pain has subsided since that severe episode but patient worried about new issue. Co-morbidity of obesity but losing weight on his own and also hopes to have his first lap band soon. Also motivated to exercise in his gym but needs release from us then surgeon to return to indep ex. Personal factor of returning to work as a  vs. New job vs. Disability in 2020. Will re-establish exercise routine addressing L ankle weakness, core weakness with careful rotational ex, and upper trunk pain via UE ex.   Barriers to therapy: new thoracic pain  Prognosis: fair  Functional Limitations: carrying objects, lifting, sleeping, walking, pulling, pushing, uncomfortable because of pain, sitting, standing, stooping and unable to perform repetitive tasks  Plan  Therapy options: will be seen for skilled physical therapy services  Planned modality interventions: TENS  Planned therapy interventions: soft tissue mobilization,  strengthening, stretching, home exercise program, therapeutic activities and abdominal trunk stabilization  Duration in visits: 20  Duration in weeks: 12  Treatment plan discussed with: patient      Progress toward previous goals: Partially Met       Short-term goals (STG):     Plan Goals: STGs: 3-4 weeks  1.  Decrease LBP/thoracic pain to a 5/10 with sitting and standing. New    2.  Increase lumbar AROM with Flex, SB, and Rot to 50%.   MET   3.  Decrease B lumbar PS tenderness to minimal with palpation  MET   4. Decrease thoracic pain tenderness to minimal with palp. New  5.  Increase B hamstring flexibility from -30 to -15 with 90-90 test. Ongoing   6. Pt to return to gym indep and walking program 20-30 minutes with no pain. New      Long-term goals (LTG):     LTGs: 6-8 weeks  1.  Decrease LBP/thoracic pain to a 3/10 with ADLs.  New   2.  Increase lumbar AROM to 75% with Flex, B Rot and SB.  MET   3.  Increase B piriformis flexibility to WNL. MET   4.  Pt is independent with HEP. New ex goals  5.  Increase R hip external rotation 10 degrees for easier hand to sock/shoe  6.  Increase left hip flexor, PF and Ever strength to 4+/5 strength.  Ongoing  7. Pt to return to work if possible. New   8. JASMYN to improve from 54 to < 40% by dc       Recommendations: Continue as planned  Timeframe: 3 months  Prognosis to achieve goals: fair    PT Signature: Zorre Zeno Kimura, MARIAH      Based upon review of the patient's progress and continued therapy plan, it is my medical opinion that Ronni Asif should continue physical therapy treatment at [facility]  [address] [place] [id number] [phone].    Signature: __________________________________  Eli Mcmillan APRN    Timed:  Manual Therapy:         mins  08763;  Therapeutic Exercise:    15     mins  72610;     Neuromuscular Kamini:        mins  12969;    Therapeutic Activity:          mins  24598;     Gait  Training:           mins  60198;     Ultrasound:          mins  04539;    Electrical Stimulation:         mins  23471 ( );    Untimed:  Electrical Stimulation:         mins  55344 ( );  Mechanical Traction:         mins  55637;     Timed Treatment:   15   mins   Total Treatment:     45   mins

## 2020-02-13 ENCOUNTER — TREATMENT (OUTPATIENT)
Dept: PHYSICAL THERAPY | Facility: CLINIC | Age: 50
End: 2020-02-13

## 2020-02-13 DIAGNOSIS — Z98.1 S/P LUMBAR SPINAL FUSION: ICD-10-CM

## 2020-02-13 DIAGNOSIS — M51.16 LUMBAR DISC HERNIATION WITH RADICULOPATHY: Primary | ICD-10-CM

## 2020-02-13 DIAGNOSIS — M54.16 LUMBAR RADICULOPATHY: ICD-10-CM

## 2020-02-13 PROCEDURE — 97110 THERAPEUTIC EXERCISES: CPT | Performed by: PHYSICAL THERAPIST

## 2020-02-15 NOTE — PROGRESS NOTES
Physical Therapy Daily Progress Note    Visit # : 10  Ronni Asif reports: he is feeling pretty good.  Denies having any issues or increased pain with his HEP.      Subjective     Objective   See Exercise, Manual, and Modality Logs for complete treatment.       Assessment & Plan     Assessment  Assessment details: Pt is doing well with all stretches and stabilization exercises.  Pt tolerated the new stabilization exercises without pain.  Will continue to see pt 1-2x/week for improved core and thoracic-lumbar paraspinal muscle strengthening.          Progress per Plan of Care           Manual Therapy:         mins  27652;  Therapeutic Exercise:     30    mins  78043;     Neuromuscular Kamini:        mins  34706;    Therapeutic Activity:          mins  75777;     Gait Training:           mins  16105;     Ultrasound:          mins  88764;    Electrical Stimulation:         mins  52746 ( );  Dry Needling          mins self-pay    Timed Treatment:   30   mins   Total Treatment:     55  mins    Jigna Ahuja, PT  Physical Therapist

## 2020-02-17 ENCOUNTER — TREATMENT (OUTPATIENT)
Dept: PHYSICAL THERAPY | Facility: CLINIC | Age: 50
End: 2020-02-17

## 2020-02-17 DIAGNOSIS — R26.2 DIFFICULTY IN WALKING: ICD-10-CM

## 2020-02-17 DIAGNOSIS — M54.16 LUMBAR RADICULOPATHY: ICD-10-CM

## 2020-02-17 DIAGNOSIS — Z98.1 S/P LUMBAR SPINAL FUSION: ICD-10-CM

## 2020-02-17 DIAGNOSIS — M51.16 LUMBAR DISC HERNIATION WITH RADICULOPATHY: Primary | ICD-10-CM

## 2020-02-17 PROCEDURE — 97110 THERAPEUTIC EXERCISES: CPT | Performed by: PHYSICAL THERAPIST

## 2020-02-17 RX ORDER — ATORVASTATIN CALCIUM 20 MG/1
20 TABLET, FILM COATED ORAL DAILY
Qty: 30 TABLET | Refills: 5 | OUTPATIENT
Start: 2020-02-17

## 2020-02-17 NOTE — PROGRESS NOTES
Physical Therapy Daily Progress Note    Visit # : 11  Ronni Asif reports: he has been feeling good and denies having any increased pain with the HEP.  Pt woke up this morning almost pain free.  Pt states he was having a little pain over the right L-S area, but it did not go down his leg.      Subjective     Objective   See Exercise, Manual, and Modality Logs for complete treatment.       Assessment & Plan     Assessment  Assessment details: Pt tolerated all exercises well and continues to increase reps and weight with stabilization exercises.  Added walking on TM today for 6 minutes and pt denied having any increased back pain.  Plan to continue seeing pt 1-2x/week for stabilization/strengthening exercises.  Add standing Paloff press next visit.          Progress per Plan of Care           Manual Therapy:         mins  99900;  Therapeutic Exercise:  35       mins  20942;     Neuromuscular Kamini:        mins  87516;    Therapeutic Activity:          mins  28165;     Gait Training:           mins  72583;     Ultrasound:          mins  10049;    Electrical Stimulation:         mins  95275 ( );  Dry Needling          mins self-pay    Timed Treatment:  35    mins   Total Treatment:     60   mins    Jigna Ahuja, PT  Physical Therapist

## 2020-02-20 ENCOUNTER — TREATMENT (OUTPATIENT)
Dept: PHYSICAL THERAPY | Facility: CLINIC | Age: 50
End: 2020-02-20

## 2020-02-20 DIAGNOSIS — M51.16 LUMBAR DISC HERNIATION WITH RADICULOPATHY: Primary | ICD-10-CM

## 2020-02-20 DIAGNOSIS — Z98.1 S/P LUMBAR SPINAL FUSION: ICD-10-CM

## 2020-02-20 DIAGNOSIS — M54.16 LUMBAR RADICULOPATHY: ICD-10-CM

## 2020-02-20 PROCEDURE — 97110 THERAPEUTIC EXERCISES: CPT | Performed by: PHYSICAL THERAPIST

## 2020-02-21 NOTE — PROGRESS NOTES
Physical Therapy Daily Progress Note    Visit # : 12  Ronni Asfi reports: his LBP is about a 5/10 from carrying in some firewood yesterday.      Subjective     Objective   See Exercise, Manual, and Modality Logs for complete treatment.       Assessment & Plan     Assessment  Assessment details: Pt tolerated all exercises well and continues to increase reps and tolerate all new stabilization exercises.  Pt with some increase in pain today, but pt was lifting some fire wood yesterday.  Plan to continue seeing pt 1-2x/week for stabilization/strengthening exercises.          Progress per Plan of Care           Manual Therapy:         mins  89858;  Therapeutic Exercise:   30      mins  57388;     Neuromuscular Kamini:        mins  43055;    Therapeutic Activity:          mins  61538;     Gait Training:           mins  41362;     Ultrasound:          mins  19249;    Electrical Stimulation:         mins  01717 ( );  Dry Needling          mins self-pay    Timed Treatment:   30   mins   Total Treatment:     60   mins    Jigna Ahuja, PT  Physical Therapist

## 2020-02-26 ENCOUNTER — TREATMENT (OUTPATIENT)
Dept: PHYSICAL THERAPY | Facility: CLINIC | Age: 50
End: 2020-02-26

## 2020-02-26 DIAGNOSIS — R26.2 DIFFICULTY IN WALKING: ICD-10-CM

## 2020-02-26 DIAGNOSIS — M54.16 LUMBAR RADICULOPATHY: ICD-10-CM

## 2020-02-26 DIAGNOSIS — M51.16 LUMBAR DISC HERNIATION WITH RADICULOPATHY: Primary | ICD-10-CM

## 2020-02-26 DIAGNOSIS — Z98.1 S/P LUMBAR SPINAL FUSION: ICD-10-CM

## 2020-02-26 PROCEDURE — 97110 THERAPEUTIC EXERCISES: CPT | Performed by: PHYSICAL THERAPIST

## 2020-02-26 NOTE — PROGRESS NOTES
Physical Therapy Daily Progress Note    Visit # : 13  Ronni Asif reports: his back is sore and stiff, but thinks it is just the type of day.      Subjective     Objective   See Exercise, Manual, and Modality Logs for complete treatment.       Assessment & Plan     Assessment  Assessment details: Pt continues to do well with all stretches, stabilization exercises, and continues to increases his time and speed on the TM.  Pt denies having any increased pain with his exercises.   Plan to continue seeing pt 1-2x/week for stabilization/strengthening exercises.          Progress per Plan of Care           Manual Therapy:         mins  74414;  Therapeutic Exercise:  35       mins  92779;     Neuromuscular Kamini:        mins  63010;    Therapeutic Activity:          mins  01012;     Gait Training:           mins  51975;     Ultrasound:          mins  52541;    Electrical Stimulation:         mins  97000 ( );  Dry Needling          mins self-pay    Timed Treatment:  35    mins   Total Treatment:     62   mins    Jigna Ahuja, PT  Physical Therapist

## 2020-02-28 ENCOUNTER — TREATMENT (OUTPATIENT)
Dept: PHYSICAL THERAPY | Facility: CLINIC | Age: 50
End: 2020-02-28

## 2020-02-28 DIAGNOSIS — Z98.1 S/P LUMBAR SPINAL FUSION: ICD-10-CM

## 2020-02-28 DIAGNOSIS — M51.16 LUMBAR DISC HERNIATION WITH RADICULOPATHY: Primary | ICD-10-CM

## 2020-02-28 DIAGNOSIS — R26.2 DIFFICULTY IN WALKING: ICD-10-CM

## 2020-02-28 DIAGNOSIS — M54.16 LUMBAR RADICULOPATHY: ICD-10-CM

## 2020-02-28 PROCEDURE — 97110 THERAPEUTIC EXERCISES: CPT | Performed by: PHYSICAL THERAPIST

## 2020-02-28 NOTE — PROGRESS NOTES
Physical Therapy Daily Progress Note        Patient: Ronni Asif   : 1970  Diagnosis/ICD-10 Code:  Lumbar disc herniation with radiculopathy [M51.16]  Referring practitioner: THEO Martin  Date of Initial Visit: Type: THERAPY  Noted: 10/7/2019  Today's Date: 2020  Patient seen for 14 sessions         Ronni Asif reports: no problems since last session.         Subjective     Objective   See Exercise, Manual, and Modality Logs for complete treatment.       Assessment/Plan  Pt tolerated exercises well and stated no complaints during the session including with progression of exercises. Cued for abdominal stabilization with all exercises.  Will continue to monitor symptoms and progress strengthening as tolerated towards goals and previous level of function.      Progress per Plan of Care           Manual Therapy:         mins  41388;  Therapeutic Exercise:    45     mins  66559;     Neuromuscular Kamini:        mins  68227;    Therapeutic Activity:          mins  64184;     Gait Training:           mins  27548;     Ultrasound:          mins  45359;    Electrical Stimulation:         mins  09772 ( );  Dry Needling          mins self-pay    Timed Treatment:   45   mins   Total Treatment:     60   mins    Tracey Ambrocio PTA  Physical Therapist Assistant

## 2020-03-04 ENCOUNTER — TREATMENT (OUTPATIENT)
Dept: PHYSICAL THERAPY | Facility: CLINIC | Age: 50
End: 2020-03-04

## 2020-03-04 DIAGNOSIS — R26.2 DIFFICULTY IN WALKING: ICD-10-CM

## 2020-03-04 DIAGNOSIS — M51.16 LUMBAR DISC HERNIATION WITH RADICULOPATHY: Primary | ICD-10-CM

## 2020-03-04 DIAGNOSIS — Z98.1 S/P LUMBAR SPINAL FUSION: ICD-10-CM

## 2020-03-04 DIAGNOSIS — M54.16 LUMBAR RADICULOPATHY: ICD-10-CM

## 2020-03-04 PROCEDURE — 97110 THERAPEUTIC EXERCISES: CPT | Performed by: PHYSICAL THERAPIST

## 2020-03-04 NOTE — PROGRESS NOTES
Physical Therapy Daily Progress Note        Patient: Ronni Asif   : 1970  Diagnosis/ICD-10 Code:  Lumbar disc herniation with radiculopathy [M51.16]  Referring practitioner: THEO Martin  Date of Initial Visit: Type: THERAPY  Noted: 10/7/2019  Today's Date: 3/4/2020  Patient seen for 15 sessions         Ronni Asif reports: he is doing pretty good but he would like to get off his pain medicine so he can go back to work.        Subjective     Objective       Palpation   Left   Tenderness of the cervical paraspinals.     Right Tenderness of the cervical paraspinals.     Additional Palpation Details  Lower thoracic paraspinals     See Exercise, Manual, and Modality Logs for complete treatment.       Assessment/Plan  Pt tolerated exercises well and reported no increased pain.  Continued point tenderness in lower thoracic paraspinals thus added gentle prayer stretch and pt reported relief.  Will continue to monitor tolerance to exercises and overall pain level to progress core stability to decrease use of pain medicine and return to previous level of function.     Progress per Plan of Care           Manual Therapy:         mins  48127;  Therapeutic Exercise:    40     mins  21656;     Neuromuscular Kamini:        mins  26157;    Therapeutic Activity:          mins  69458;     Gait Training:           mins  59243;     Ultrasound:          mins  47389;    Electrical Stimulation:         mins  60049 ( );  Dry Needling          mins self-pay    Timed Treatment:   40   mins   Total Treatment:     62   mins    Tracey Ambrocio PTA  Physical Therapist Assistant

## 2020-03-05 ENCOUNTER — TREATMENT (OUTPATIENT)
Dept: PHYSICAL THERAPY | Facility: CLINIC | Age: 50
End: 2020-03-05

## 2020-03-05 DIAGNOSIS — M54.16 LUMBAR RADICULOPATHY: ICD-10-CM

## 2020-03-05 DIAGNOSIS — M51.16 LUMBAR DISC HERNIATION WITH RADICULOPATHY: Primary | ICD-10-CM

## 2020-03-05 DIAGNOSIS — Z98.1 S/P LUMBAR SPINAL FUSION: ICD-10-CM

## 2020-03-05 DIAGNOSIS — M54.6 PAIN IN THORACIC SPINE: ICD-10-CM

## 2020-03-05 DIAGNOSIS — R26.2 DIFFICULTY IN WALKING: ICD-10-CM

## 2020-03-05 PROCEDURE — 97110 THERAPEUTIC EXERCISES: CPT | Performed by: PHYSICAL THERAPIST

## 2020-03-05 NOTE — PROGRESS NOTES
Physical Therapy Daily Progress Note        Patient: Ronni Asif   : 1970  Diagnosis/ICD-10 Code:  Lumbar disc herniation with radiculopathy [M51.16]  Referring practitioner: THEO Martin  Date of Initial Visit: Type: THERAPY  Noted: 10/7/2019  Today's Date: 3/5/2020  Patient seen for 16 sessions         Ronni Asif reports: he was worn out after yesterday and slept well. He stated not much pain just soreness mainly in the muscles and pointed to lower thoracic and upper lumbar.        Subjective     Objective   See Exercise, Manual, and Modality Logs for complete treatment.       Assessment/Plan  Pt reported fatigue and soreness following yesterday's session thus held progressions this date due to this and back to back sessions. Continued to provide cues as appropriate for improved technique.  Pt scheduled to have one more session prior to surgery next week.  Pt doing well with exercises with some soreness reported following however continued daily back pain.     Progress per Plan of Care           Manual Therapy:         mins  01977;  Therapeutic Exercise:    30     mins  60239;     Neuromuscular Kamini:        mins  60019;    Therapeutic Activity:          mins  25600;     Gait Training:           mins  63132;     Ultrasound:          mins  92935;    Electrical Stimulation:         mins  70052 ( );  Dry Needling          mins self-pay    Timed Treatment:   30   mins   Total Treatment:     53   mins    Tracey Ambrocio PTA  Physical Therapist Assistant

## 2020-03-10 ENCOUNTER — TREATMENT (OUTPATIENT)
Dept: PHYSICAL THERAPY | Facility: CLINIC | Age: 50
End: 2020-03-10

## 2020-03-10 DIAGNOSIS — Z98.1 S/P LUMBAR SPINAL FUSION: ICD-10-CM

## 2020-03-10 DIAGNOSIS — M54.6 PAIN IN THORACIC SPINE: ICD-10-CM

## 2020-03-10 DIAGNOSIS — M51.16 LUMBAR DISC HERNIATION WITH RADICULOPATHY: Primary | ICD-10-CM

## 2020-03-10 DIAGNOSIS — R26.2 DIFFICULTY IN WALKING: ICD-10-CM

## 2020-03-10 PROCEDURE — 97110 THERAPEUTIC EXERCISES: CPT | Performed by: PHYSICAL THERAPIST

## 2020-03-10 NOTE — PROGRESS NOTES
Physical Therapy Daily Progress Note/30 day re-assess    Patient: Ronni Asif   : 1970  Diagnosis/ICD-10 Code:  Lumbar disc herniation with radiculopathy [M51.16]  Referring practitioner: THEO Martin  Date of Initial Visit: Type: THERAPY  Noted: 10/7/2019  Today's Date: 3/10/2020  Patient seen for 17 sessions           Subjective Evaluation    History of Present Illness    Subjective comment: lumbar pain much improved per JASMYN but persistent new lower thoracic pain is severe 8-9/10 thus JASMYN has gone from 54 to 56% vs. better. Lap band scheduled for tomorrow and hopes that 100# wt loss over the next year will ease mid back pain. Still might have AREN to mid back though.        Objective   See Exercise, Manual, and Modality Logs for complete treatment.       Assessment & Plan     Assessment  Assessment details: Pt seen for a total of 17 sessions since eval but just 8 since re-eval last month. Lumbar pain and ROM doing well but constant ache in mid back is bothersome and preventing better disability self rating. Hoping that lap band goes well and he'll return to PT in 2 weeks or so and resume program with MD harrison. Pt working hard with excellent motivation. Also considering alternative work that allows more change in positions with less lifting demands.   PT to see upon return to clinic post wt loss surgery.         Short-term goals (STG):      Plan Goals: STGs: 3-4 weeks  1.  Decrease LBP/thoracic pain to a 5/10 with sitting and standing. NOT met. 7-9/10  2.  Increase lumbar AROM with Flex, SB, and Rot to 50%.   MET   3.  Decrease B lumbar PS tenderness to minimal with palpation  MET   4. Decrease thoracic pain tenderness to minimal with palp. Progressing to min mod  5.  Increase B hamstring flexibility from -30 to -15 with 90-90 test. Ongoing. -25.   6. Pt to return to gym indep and walking program 20-30 minutes with no pain. NOT met. Walking 10 min with us but not released for indep gym  ex per PT and surgeon yet.        Long-term goals (LTG):     LTGs: 6-8 weeks  1.  Decrease LBP/thoracic pain to a 3/10 with ADLs.  NOT met  2.  Increase lumbar AROM to 75% with Flex, B Rot and SB.  MET   3.  Increase B piriformis flexibility to WNL. MET   4.  Pt is independent with HEP. Progressing as new ex added  5.  Increase R hip external rotation 10 degrees for easier hand to sock/shoe. Progressing but still very tight EMILY.   6.  Increase left hip flexor, PF and Ever strength to 4+/5 strength.  Progressing but 4/5  7. Pt to return to work if possible. Not met. Focus on health and surgeries now vs. RTW.   8. JASMYN to improve from 54 to < 40% by dc. NOT met. 56%                      Timed:    Manual Therapy:         mins  42654;  Therapeutic Exercise:    60     mins  09771;     Neuromuscular Kamini:        mins  94768;    Therapeutic Activity:          mins  72705;     Gait Training:           mins  89422;     Ultrasound:          mins  23975;    Electrical Stimulation:         mins  26105 ( );  Iontophoresis         mins 10638;  Aquatic Therapy         mins 65881;  Dry Needling                   mins    Untimed:  Electrical Stimulation:         mins  66733 ( );  Mechanical Traction:         mins  22956;     Timed Treatment:   60   mins   Total Treatment:     75   mins  Zorre Zeno Kimura, PT  Physical Therapist

## 2020-03-13 ENCOUNTER — TREATMENT (OUTPATIENT)
Dept: PHYSICAL THERAPY | Facility: CLINIC | Age: 50
End: 2020-03-13

## 2020-03-13 DIAGNOSIS — R26.2 DIFFICULTY IN WALKING: ICD-10-CM

## 2020-03-13 DIAGNOSIS — Z98.1 S/P LUMBAR SPINAL FUSION: ICD-10-CM

## 2020-03-13 DIAGNOSIS — M54.6 PAIN IN THORACIC SPINE: ICD-10-CM

## 2020-03-13 DIAGNOSIS — M51.16 LUMBAR DISC HERNIATION WITH RADICULOPATHY: Primary | ICD-10-CM

## 2020-03-13 PROCEDURE — 97110 THERAPEUTIC EXERCISES: CPT | Performed by: PHYSICAL THERAPIST

## 2020-03-13 NOTE — PROGRESS NOTES
Physical Therapy Daily Progress Note        Patient: Ronni Asif   : 1970  Diagnosis/ICD-10 Code:  Lumbar disc herniation with radiculopathy [M51.16]  Referring practitioner: No ref. provider found  Date of Initial Visit: Type: THERAPY  Noted: 10/7/2019  Today's Date: 3/13/2020  Patient seen for 18 sessions         Ronni Linnkalliezoe reports: his procedure was canceled and rescheduled for April.  He stated he is doing pretty good today.  His mid back is still bothering him most.        Subjective     Objective   See Exercise, Manual, and Modality Logs for complete treatment.       Assessment/Plan  Pt continued to tolerate strengthening without complaints.  Added mid thoracic stretch this date without complaints.  Plan to add pec stretch, (B) ER, and possibly quadruped if tolerated.  Will continue to monitor tolerance to exercises and progress as able towards goals.     Progress per Plan of Care           Manual Therapy:         mins  51886;  Therapeutic Exercise:    30     mins  06145;     Neuromuscular Kamini:        mins  05119;    Therapeutic Activity:          mins  90230;     Gait Training:           mins  52121;     Ultrasound:          mins  49546;    Electrical Stimulation:         mins  88336 ( );  Dry Needling          mins self-pay    Timed Treatment:   30   mins   Total Treatment:     78   mins    Tracey Ambrocio PTA  Physical Therapist Assistant

## 2020-03-16 ENCOUNTER — TREATMENT (OUTPATIENT)
Dept: PHYSICAL THERAPY | Facility: CLINIC | Age: 50
End: 2020-03-16

## 2020-03-16 DIAGNOSIS — M54.6 PAIN IN THORACIC SPINE: ICD-10-CM

## 2020-03-16 DIAGNOSIS — M51.16 LUMBAR DISC HERNIATION WITH RADICULOPATHY: Primary | ICD-10-CM

## 2020-03-16 DIAGNOSIS — Z98.1 S/P LUMBAR SPINAL FUSION: ICD-10-CM

## 2020-03-16 DIAGNOSIS — R26.2 DIFFICULTY IN WALKING: ICD-10-CM

## 2020-03-16 PROCEDURE — 97110 THERAPEUTIC EXERCISES: CPT | Performed by: PHYSICAL THERAPIST

## 2020-03-16 NOTE — PROGRESS NOTES
Physical Therapy Daily Progress Note        Patient: Ronni Asif   : 1970  Diagnosis/ICD-10 Code:  Lumbar disc herniation with radiculopathy [M51.16]  Referring practitioner: THEO Martin  Date of Initial Visit: Type: THERAPY  Noted: 10/7/2019  Today's Date: 3/16/2020  Patient seen for 19 sessions         Ronni Asif reports: he had a busy weekend and is sore from that.  He said he got some sharp pains in his back and was wondering if he was overdoing it.        Subjective     Objective   See Exercise, Manual, and Modality Logs for complete treatment.       Assessment/Plan  Pt educated to stop activities that cause sharp pains. No complaints reported during the session this date however some difficulty reported with progressions. Verbal and tactile cues required for addition of (R) shoulder ER.  Will continue to monitor tolerance to progressions and add pec stretch over foam roll and quadruped if tolerated.     Progress per Plan of Care           Manual Therapy:         mins  42858;  Therapeutic Exercise:    65     mins  46646;     Neuromuscular Kamini:        mins  23079;    Therapeutic Activity:          mins  26264;     Gait Training:           mins  06343;     Ultrasound:          mins  93855;    Electrical Stimulation:         mins  12353 ( );  Dry Needling          mins self-pay    Timed Treatment:   65   mins   Total Treatment:     75   mins    Tracey Ambrocio PTA  Physical Therapist Assistant

## 2020-04-01 NOTE — PROGRESS NOTES
Physical Therapy Daily Progress Note        Patient: Ronni Asif   : 1970  Diagnosis/ICD-10 Code:  Lumbar disc herniation with radiculopathy [M51.16]  Referring practitioner: Basia Wilson PA-C  Date of Initial Visit: Type: THERAPY  Noted: 10/7/2019  Today's Date: 10/30/2019  Patient seen for 7 sessions         Ronni Asif reports: he did something stupid and pulled a muscle in his back.  He pointed to the (R) mid back.  He reported his foot is about the same.        Subjective     Objective   See Exercise, Manual, and Modality Logs for complete treatment.     Tender to palpation (R) thoracic paraspinals     Assessment/Plan   Closely monitored pt tolerance due to increased mid back pain and foot pain. Deferred PPT with shoulder flexion due to aggravation of mid back however able to reintroduce all standing exercises this date.  Pt also added the leg press for progression of strengthening and MIP to progress hip flexion strengthening due to difficulty noted with lifting foot onto nustep and leg press.  Will continue to progress strengthening within tolerance with monitoring of mid back and foot.     Progress per Plan of Care           Manual Therapy:         mins  51548;  Therapeutic Exercise:    30     mins  66474;     Neuromuscular Kamini:        mins  66767;    Therapeutic Activity:          mins  35710;     Gait Training:           mins  05656;     Ultrasound:          mins  38525;    Electrical Stimulation:    15     mins  32226 ( );  Dry Needling          mins self-pay    Timed Treatment:   30   mins   Total Treatment:     82   mins    Tracey Ambrocio PTA  Physical Therapist Assistant   C/o lower back pain, states has increased urgency. C/o fever today. States has had hx of E-coli in urine.  Denies prior kidney stones

## 2020-04-16 ENCOUNTER — TREATMENT (OUTPATIENT)
Dept: PHYSICAL THERAPY | Facility: CLINIC | Age: 50
End: 2020-04-16

## 2020-04-16 DIAGNOSIS — M54.6 PAIN IN THORACIC SPINE: ICD-10-CM

## 2020-04-16 DIAGNOSIS — R26.2 DIFFICULTY IN WALKING: ICD-10-CM

## 2020-04-16 DIAGNOSIS — M51.16 LUMBAR DISC HERNIATION WITH RADICULOPATHY: Primary | ICD-10-CM

## 2020-04-16 DIAGNOSIS — Z98.1 S/P LUMBAR SPINAL FUSION: ICD-10-CM

## 2020-04-16 DIAGNOSIS — M54.16 LUMBAR RADICULOPATHY: ICD-10-CM

## 2020-04-16 PROCEDURE — 97110 THERAPEUTIC EXERCISES: CPT | Performed by: PHYSICAL THERAPIST

## 2020-04-16 PROCEDURE — 97530 THERAPEUTIC ACTIVITIES: CPT | Performed by: PHYSICAL THERAPIST

## 2020-04-16 NOTE — PROGRESS NOTES
"Re-Assessment / Re-Certification Telehealth visit         Patient: Ronni Asif   : 1970  Diagnosis/ICD-10 Code:  Lumbar disc herniation with radiculopathy [M51.16]  Referring practitioner: THEO Martin  Date of Initial Visit: 2020  Today's Date: 2020  Patient seen for 20 sessions  This patient has chosen to receive care through a telehealth visit today.   This patient has verbally consented to use a video/telephone visit for their medical care today? Yes    Subjective:   Ronni Asif reports: thoracic area, a little bit better.  I think I am overdoing it still.  Primary complaint is thoracic. 3-4/10 using Ibuprofen and Flexeril for pain, Methocarbamol, Gabapentin.  Decreased dosages so I know I am better. Soreness in lumbar much better.  No trauma to thoracic. MRI. (See report)    Lapband surgery delayed due to Covid.  Trying to stay active, do most of exercises, hard time with foot exercises by myself.  Left legs are getting stronger.  Ice and exercises with back.  Pain is below shoulder in center of spine.  More sore in morning, lifting and bending, laundry and dishes. Relief with ice twice a day. Denies numbness or tingling.     Subjective Questionnaire: Oswestry: 50%  Clinical Progress: improved  Home Program Compliance: Yes  Treatment has included: therapeutic exercise, neuromuscular re-education and cryotherapy    Subjective Evaluation    Pain  Current pain ratin         Objective       Postural Observations    Additional Postural Observation Details  Spinal incisions are well healed.  Kyphotic posturing, decreased lordosis.    Tenderness     Additional Tenderness Details  Lower thoracic midline spine per patient and visual assessment    Neurological Testing     Sensation     Lumbar   Left   Intact: light touch    Right   Intact: light touch    Active Range of Motion   Cervical/Thoracic Spine     Thoracic   Flexion: Active thoracic flexion: 25% observed \"pressure\" "   Extension: WFL  Left rotation: Active left thoracic rotation: 25% tight no pain.   Right rotation: Active right thoracic rotation: 25% tight no pain.     Lumbar   Flexion: Active lumbar flexion: 50%   Extension: 0 degrees   Left lateral flexion: Active left lumbar lateral flexion: ~20 degrees.   Right lateral flexion: Active right lumbar lateral flexion: ~ 30 degrees.   Left rotation: WFL  Right rotation: WFL    Strength/Myotome Testing     Lumbar   Left   Heel walk: normal  Toe walk: normal    Right   Heel walk: normal  Toe walk: normal    Additional Strength Details  Functionally able to squat partially but limited by pain not strength.  Can rise bilaterally on his toes and heels.  Demonstrates slow motor control with attempts of AROM on left ankle but good ROM.    Tests     Additional Tests Details  (-) active SLR observed     Verbally or observed all exercises, progressed thoracic.    Assessment/Plan  Progress toward previous goals: Partially Met  Encouraged patient to continue walking program on flat surfaces such as sidewalk.  Focus on gait pattern, balance, and control of the foot.  Do not use treadmill unsupervised due to falls risks as ankle strength is not fully returned.  Continue HEP with updates today for thoracic spine.  Will continue to meet for Telehealth every week to two weeks until able to return to clinic in person.   Plan Goals: STGs: 3-4 weeks  1.  Decrease LBP to a 5/10 with sitting and standing.  MET   2.  Increase lumbar AROM with Flex, SB, and Rot to 50%.     MET   3.  Decrease B lumbar PS tenderness to minimal with palpation  MET   4.  Increase B hamstring flexibility to minimal tightness with 90-90 test  MET WITH SLR   5.  Improve thoracic spine mobility to 75% flexion without pain. NOT MET      LTGs: 6-8 weeks  1.  Decrease LBP to a 3/10 with sitting and standing  MET   2.  Increase lumbar AROM to 75% with Flex, B Rot and SB.  NOT MET   3.  Increase B piriformis flexibility to WNL MET    4.  Pt is independent with HEP  MET   5.  Pt is able to complete his normal daily routine and walking program with < or = 3/10 LBP.  MET   6.  Increase left hip flexor, PF and Ever strength to 4+-5/5 strength.  PARTIALLY MET  *  7.  Perform ADL's with laundry, cooking, etc lifting weight required with tolerable symptoms.  Not MET      Recommendations: Continue with recommendations Telehealth during COVID 2-4 X month until able to return to clinic  Timeframe: 1 month  Prognosis to achieve goals: good    PT Signature: Shannan Reeves, PT      Based upon review of the patient's progress and continued therapy plan, it is my medical opinion that Ronni Asif should continue physical therapy treatment at [facility]  [address] [place] [id number] [phone].    Signature: __________________________________  Eli Mcmillan, APRN    Manual Therapy:    -     mins  19651;  Therapeutic Exercise:    15     mins  79339;     Neuromuscular Kamini:    -    mins  36239;    Therapeutic Activity:     40     mins  75172;     Gait Training:      -     mins  15998;     Ultrasound:     -     mins  70077;    Electrical Stimulation:    -     mins  47919 ( );  Dry Needling     -     mins self-pay    Timed Treatment:   55   mins   Total Treatment:     55   mins with documentation, records review

## 2020-05-05 ENCOUNTER — TREATMENT (OUTPATIENT)
Dept: PHYSICAL THERAPY | Facility: CLINIC | Age: 50
End: 2020-05-05

## 2020-05-05 DIAGNOSIS — Z98.1 S/P LUMBAR SPINAL FUSION: ICD-10-CM

## 2020-05-05 DIAGNOSIS — M51.16 LUMBAR DISC HERNIATION WITH RADICULOPATHY: Primary | ICD-10-CM

## 2020-05-05 DIAGNOSIS — R26.2 DIFFICULTY IN WALKING: ICD-10-CM

## 2020-05-05 DIAGNOSIS — M54.16 LUMBAR RADICULOPATHY: ICD-10-CM

## 2020-05-05 DIAGNOSIS — M54.6 PAIN IN THORACIC SPINE: ICD-10-CM

## 2020-05-05 PROCEDURE — 97110 THERAPEUTIC EXERCISES: CPT | Performed by: PHYSICAL THERAPIST

## 2020-05-05 NOTE — PROGRESS NOTES
Physical Therapy Daily Progress Note    Visit # : 21  Ronni Reyesjakezoe reports: he passed out and fell Sat and hurt his right shoulder.  Pt is scheduled to see an Orthopedic surgery tomorrow secondary to the Urgent care thinking he may have a Fx.  Pt states his back is doing pretty well.  States he gets some soreness and throbbing pain in his thoracic spine rating it a 3-5/10.  Pt states he is able to perform all his normal ADLs without any increases in his back pain over a 5/10.  Pt is independent with his HEP.  Pt states his left leg and foot are a lot stronger than they were when he first started.  Pt is still waiting on his lap band surgery.       Subjective     Objective          Palpation     Additional Palpation Details  T10-T12 pt reports minimal tenderness and tightness over the left intervertebral space     Active Range of Motion   Cervical/Thoracic Spine     Thoracic   Flexion: 25 degrees with pain  Extension: 25 degrees   Left lateral flexion: 50 degrees   Right lateral flexion: 50 degrees   Left rotation: WFL  Right rotation: WFL    Lumbar   Flexion: 50 degrees   Extension: 25 degrees   Left lateral flexion: 50 degrees   Right lateral flexion: 50 degrees   Left rotation: WFL  Right rotation: WFL    Additional Active Range of Motion Details  Minimal pain in the thoracic, but denies pain in the lumbar.  Pt states some stretching pain with B SB    Strength/Myotome Testing     Left Hip   Planes of Motion   Flexion: 5    Right Hip   Planes of Motion   Flexion: 5    Left Knee   Flexion: 5  Extension: 5    Right Knee   Flexion: 5  Extension: 5    Left Ankle/Foot   Dorsiflexion: 5  Plantar flexion: 5  Inversion: 5  Eversion: 5    Right Ankle/Foot   Dorsiflexion: 5  Plantar flexion: 5  Inversion: 5  Eversion: 5      See Exercise, Manual, and Modality Logs for complete treatment.       Assessment & Plan     Assessment  Assessment details: Pt is doing very well with minimal pain and tenderness symptoms.  Pt with  improved ROM and LE strength.  Pt met 4/5 STGs and 6/7 LTGs.   Pt is independent with HEP and feel pt can continue managing his symptoms with his HEP.  Discussed HEP and pt's limitations with pt prior to leaving the clinic.  Pt was comfortable with his exercises and plan of treatment.           Plan Goals: STGs: 3-4 weeks  1.  Decrease LBP to a 5/10 with sitting and standing.  MET   2.  Increase lumbar AROM with Flex, SB, and Rot to 50%.     MET   3.  Decrease B lumbar PS tenderness to minimal with palpation  MET   4.  Increase B hamstring flexibility to minimal tightness with 90-90 test  MET WITH SLR   5.  Improve thoracic spine mobility to 75% flexion without pain. NOT MET      LTGs: 6-8 weeks  1.  Decrease LBP to a 3/10 with sitting and standing  MET   2.  Increase lumbar AROM to 75% with Flex, B Rot and SB.  NOT MET   3.  Increase B piriformis flexibility to WNL MET   4.  Pt is independent with HEP  MET   5.  Pt is able to complete his normal daily routine and walking program with < or = 3/10 LBP.  MET   6.  Increase left hip flexor, PF and Ever strength to 4+-5/5 strength.  MET    7.  Perform ADL's with laundry, cooking, etc lifting weight required with tolerable symptoms.  MET                Other         Manual Therapy:         mins  76723;  Therapeutic Exercise:   35      mins  96108;     Neuromuscular Kamini:        mins  50504;    Therapeutic Activity:          mins  17061;     Gait Training:           mins  47430;     Ultrasound:          mins  26345;    Electrical Stimulation:         mins  48811 ( );  Dry Needling          mins self-pay    Timed Treatment:  35    mins   Total Treatment:     42   mins    Jigna Ahuja, PT  Physical Therapist

## 2020-05-05 NOTE — PROGRESS NOTES
Discharge Summary  Discharge Summary from Physical Therapy Report    Patient Information  Ronni Asif  1970    Dates  PT visit: 10/7/2019-5/5/2020  Number of Visits: 21     Discharge Status of Patient: See Re-eval on 4/20/2020 and Progress note on 5/5/2020 for objective measures.      Goals: Partially Met    Visit Diagnoses:    ICD-10-CM ICD-9-CM   1. Lumbar disc herniation with radiculopathy M51.16 722.10     724.4   2. S/P lumbar spinal fusion Z98.1 V45.4   3. Pain in thoracic spine M54.6 724.1   4. Difficulty in walking R26.2 719.7   5. Lumbar radiculopathy M54.16 724.4       Discharge Plan: Continue with current home exercise program as instructed    Comments  Pt is doing well with his HEP and pt may have Fx his right shoulder from a fall this weekend.  Pt is seeing an Ortho MD tomorrow 5/5/2020, so need to have his insurance visits for additional PT for his shoulder.      Date of Discharge 5/5/2020        Jigna Ahuja, PT  Physical Therapist

## 2020-06-15 DIAGNOSIS — M54.16 LUMBAR RADICULOPATHY: ICD-10-CM

## 2020-06-16 RX ORDER — GABAPENTIN 600 MG/1
600 TABLET ORAL 3 TIMES DAILY
Qty: 90 TABLET | Refills: 2 | Status: SHIPPED | OUTPATIENT
Start: 2020-06-16 | End: 2020-07-20 | Stop reason: SDUPTHER

## 2020-06-16 RX ORDER — METHOCARBAMOL 500 MG/1
500 TABLET, FILM COATED ORAL EVERY 8 HOURS PRN
Qty: 30 TABLET | Refills: 0 | Status: SHIPPED | OUTPATIENT
Start: 2020-06-16 | End: 2020-07-20 | Stop reason: SDUPTHER

## 2020-06-16 RX ORDER — CYCLOBENZAPRINE HCL 5 MG
10 TABLET ORAL 3 TIMES DAILY PRN
Qty: 90 TABLET | Refills: 0 | OUTPATIENT
Start: 2020-06-16

## 2020-07-12 DIAGNOSIS — M54.16 LUMBAR RADICULOPATHY: ICD-10-CM

## 2020-07-13 ENCOUNTER — TELEPHONE (OUTPATIENT)
Dept: NEUROLOGY | Facility: CLINIC | Age: 50
End: 2020-07-13

## 2020-07-13 DIAGNOSIS — R55 VASOVAGAL SYNCOPE: Primary | ICD-10-CM

## 2020-07-13 RX ORDER — GABAPENTIN 600 MG/1
600 TABLET ORAL 3 TIMES DAILY
Qty: 90 TABLET | Refills: 2 | OUTPATIENT
Start: 2020-07-13

## 2020-07-13 RX ORDER — METHOCARBAMOL 500 MG/1
500 TABLET, FILM COATED ORAL EVERY 8 HOURS PRN
Qty: 30 TABLET | Refills: 0 | OUTPATIENT
Start: 2020-07-13

## 2020-07-13 NOTE — TELEPHONE ENCOUNTER
PT WAS LAST SEEN BY DR. ARROYO IN October OF 2019 AND WAS RECOMMENDED TO BE SEEN AT [facility] BUT PT HASN'T HEARD ANYTHING. PT'S CARDIOLOGIST IS WANTING PT TO BE SEEN BY DR. KEENAN HOLLIS AT [facility]. PLEASE ADVISE. HIS NUMBER -[phone] AND HE'S A SPECIALIST IN AUTONOMIC DISORDER        CALL BACK- [phone]

## 2020-07-13 NOTE — TELEPHONE ENCOUNTER
Spoke with [facility], they have created patient a chart with all needed information. With patient having passport, the [facility] sent a referral to the financial advisers. It takes 10-14 business days to come back with either a denial or approval for patient to be seen.

## 2020-07-13 NOTE — TELEPHONE ENCOUNTER
Please advise. This pt does not have a f/u last seen in October 2019. It your office visit your stated gettting started for [facility] however there is not an order in chart for this please advise. Pt does not have a f/u scheduled. Thank you.

## 2020-07-20 DIAGNOSIS — M54.16 LUMBAR RADICULOPATHY: ICD-10-CM

## 2020-07-20 RX ORDER — METHOCARBAMOL 500 MG/1
500 TABLET, FILM COATED ORAL EVERY 8 HOURS PRN
Qty: 30 TABLET | Refills: 0 | Status: SHIPPED | OUTPATIENT
Start: 2020-07-20 | End: 2020-08-26

## 2020-07-20 RX ORDER — GABAPENTIN 600 MG/1
600 TABLET ORAL 3 TIMES DAILY
Qty: 90 TABLET | Refills: 2 | Status: SHIPPED | OUTPATIENT
Start: 2020-07-20 | End: 2020-12-24

## 2020-07-20 NOTE — TELEPHONE ENCOUNTER
RX refill request from pharmacy    Patient was last seen: 01/28/20    Patients next appointment: 08/26/20    Patients tired to refill Gabapentin 600 mg TID and Robaxin 500mg Q8hrs PRN but it was denied. Im not sure why it was denied. However, this patients appointment keeps getting moved out due to schedule changes. Can you please refill his medications. There were last filled on 06/16/20

## 2020-08-12 ENCOUNTER — TELEPHONE (OUTPATIENT)
Dept: NEUROLOGY | Facility: CLINIC | Age: 50
End: 2020-08-12

## 2020-08-18 ENCOUNTER — TELEPHONE (OUTPATIENT)
Dept: NEUROLOGY | Facility: CLINIC | Age: 50
End: 2020-08-18

## 2020-08-18 NOTE — TELEPHONE ENCOUNTER
Please advise. I saw the message in there from 08/12/2020 regarding passport and the referral to [facility]. Let me know if I need to do or tell pt anything thank you. I didn't know if you requested this from the pt or not. Thank you.

## 2020-08-18 NOTE — TELEPHONE ENCOUNTER
PT CALLING STATING THAT PASSPORT DOES NOT HAVE A REQUEST LETTER FROM DR. ARROYO FOR THE PT TO BE SEEN IN THE [facility]. PT SAID HERE IS THE PROVIDER SERVICES NUMBER [phone] FOR PASSPORT FOR DR. ARROYO TO CALL ABOUT THE REQUEST LETTER TO BE SEEN AT THE [facility]. IF YOU HAVE AND QUESTIONS PLEASE CALL HIM BACK [phone]

## 2020-08-19 ENCOUNTER — APPOINTMENT (OUTPATIENT)
Dept: GENERAL RADIOLOGY | Facility: HOSPITAL | Age: 50
End: 2020-08-19

## 2020-08-19 ENCOUNTER — TELEPHONE (OUTPATIENT)
Dept: NEUROSURGERY | Facility: CLINIC | Age: 50
End: 2020-08-19

## 2020-08-19 ENCOUNTER — HOSPITAL ENCOUNTER (EMERGENCY)
Facility: HOSPITAL | Age: 50
Discharge: HOME | End: 2020-08-19
Attending: EMERGENCY MEDICINE | Admitting: EMERGENCY MEDICINE

## 2020-08-19 VITALS
BODY MASS INDEX: 41.8 KG/M2 | SYSTOLIC BLOOD PRESSURE: 109 MMHG | HEART RATE: 82 BPM | OXYGEN SATURATION: 94 % | DIASTOLIC BLOOD PRESSURE: 67 MMHG | RESPIRATION RATE: 18 BRPM | HEIGHT: 70 IN | TEMPERATURE: 98.4 F | WEIGHT: 292 LBS

## 2020-08-19 DIAGNOSIS — M54.6 ACUTE MIDLINE THORACIC BACK PAIN: Primary | ICD-10-CM

## 2020-08-19 PROCEDURE — 72072 X-RAY EXAM THORAC SPINE 3VWS: CPT

## 2020-08-19 PROCEDURE — 99283 EMERGENCY DEPT VISIT LOW MDM: CPT

## 2020-08-19 RX ORDER — ACETAMINOPHEN 500 MG
1000 TABLET ORAL ONCE
Status: COMPLETED | OUTPATIENT
Start: 2020-08-19 | End: 2020-08-19

## 2020-08-19 RX ORDER — TRAMADOL HYDROCHLORIDE 50 MG/1
50 TABLET ORAL ONCE
Status: COMPLETED | OUTPATIENT
Start: 2020-08-19 | End: 2020-08-19

## 2020-08-19 RX ORDER — TRAMADOL HYDROCHLORIDE 50 MG/1
50 TABLET ORAL EVERY 8 HOURS PRN
Qty: 12 TABLET | Refills: 0 | Status: SHIPPED | OUTPATIENT
Start: 2020-08-19 | End: 2020-08-28 | Stop reason: SDUPTHER

## 2020-08-19 RX ADMIN — TRAMADOL HYDROCHLORIDE 50 MG: 50 TABLET, FILM COATED ORAL at 17:49

## 2020-08-19 RX ADMIN — ACETAMINOPHEN 1000 MG: 500 TABLET ORAL at 17:49

## 2020-08-19 NOTE — ED TRIAGE NOTES
Pt to ER via [facility]. Pt c/o mid-thoracic back pain that is causing tingling in bilateral legs. Pt had fusions last August and believes he's injured it somehow.       Patient in mask. This RN in appropriate PPE - including mask, goggles, and gloves during all of patient care.

## 2020-08-19 NOTE — TELEPHONE ENCOUNTER
"Provider: ARACELY PALOMO  Caller: PATIENT  Reason for Call: PATIENT REQUESTING MEDICAL ADVICE REGARDING SEVERE PAIN IN BACK FOR THE PAST 3 DAYS. PATIENT BELIEVES HE POSSIBLY \"BLEW OUT BACK AGAIN\". PATIENT HAS AN UPCOMING APPT ON 08/26 BUT CONSIDERING GOING TO [facility] ER DUE TO PAIN. PATIENT HAD A FUSION IN AUG 2019.  When was the patient last seen: 01/28/20  When did it start: 3 DAYS  Where is it located: MID/LOWER BACK  Characteristics of symptom/severity: SEVERE PAIN  Timing- Is it constant or intermittent: CONSTANT  What makes it worse: SITTING, DRIVING  What therapies/medications have you tried: GABAPENTIN, MUSCLE RELAXER ROBAXIN (3), HEAT & ICE    [phone]  "

## 2020-08-19 NOTE — ED NOTES
Shamar RN and Ana SOTELO wore mask and goggles when in pt room      Yesy Griffin RN  08/19/20 1257

## 2020-08-19 NOTE — ED PROVIDER NOTES
EMERGENCY DEPARTMENT ENCOUNTER    Room Number:  42/42  Date seen:  8/19/2020  PCP: Gil Chambers MD  Historian: Patient      HPI:  Chief Complaint: Mid back pain  A complete HPI/ROS/PMH/PSH/SH/FH are unobtainable due to: Nothing  Context: Ronni Asif is a 50 y.o. male who presents to the ED c/o mid back pain is been ongoing for a few days.  He reports that over the weekend he had mowed his grass which is approximately 8 acres on a riding lawnmower.  He thinks it may have aggravated his back.  He has a history of disc herniation at L5-S1 status post fusion with Dr. Tripathi.  He also has a history of diabetes that is relatively well controlled with recent A1c around 6.5.  Since his back pain has flared up, he has been taking his usual gabapentin 600 3 times daily, methocarbamol 500 mg 3 times daily as needed, naproxen and ibuprofen with minimal relief.  He is also been using a TENS unit.  He denies fever or chills.  He has had no urinary or stool incontinence.  He reports occasional tingling in his legs.  He also reports that while he was napping the other day his wife was a nurse practitioner reported that his feet looked blue.  He has fallen a couple times since his surgery and injured his right shoulder but denies known injury to his back.            PAST MEDICAL HISTORY  Active Ambulatory Problems     Diagnosis Date Noted   • Lumbar radiculopathy 05/03/2016   • Congenital spondylolysis, lumbosacral region 06/10/2016   • Transient cerebral ischemia 08/17/2016   • Pain in thoracic spine 03/06/2018   • Neck pain 03/06/2018   • Degeneration of thoracic intervertebral disc 05/07/2018   • Degeneration of intervertebral disc at C5-C6 level 05/07/2018   • Type 2 diabetes mellitus with circulatory disorder, without long-term current use of insulin (CMS/[facility]) 07/24/2018   • Obstructive sleep apnea, adult 07/24/2018   • Essential hypertension 07/24/2018   • Moderate asthma without complication  03/05/2019   • Gastroesophageal reflux disease 05/14/2019   • Diarrhea 05/14/2019   • History of colon polyps 05/14/2019   • Hyperglycemia 08/25/2019   • Surgery follow-up examination 09/13/2019   • Morbidly obese (CMS/HCC) 11/21/2019   • Chest pain 10/11/2018   • Chronic back pain 08/21/2017   • Coronary artery disease 01/14/2019   • Gout 01/30/2019   • Labile blood pressure 09/27/2018   • Mixed anxiety depressive disorder 08/21/2017   • Secondary male hypogonadism 08/21/2017   • Shoulder impingement syndrome, left 01/17/2019   • Syncope and collapse 08/21/2017   • Vitamin D deficiency 01/30/2019   • DEVAUGHN on CPAP 08/21/2017   • Thoracic radiculopathy 01/08/2020     Resolved Ambulatory Problems     Diagnosis Date Noted   • Foot drop, left 08/25/2019   • Lumbar disc herniation with radiculopathy 08/25/2019     Past Medical History:   Diagnosis Date   • Anxiety    • Asthma    • Cancer (CMS/[facility])    • DDD (degenerative disc disease), lumbar    • Diabetes mellitus (CMS/[facility])    • Dizziness    • ED (erectile dysfunction)    • GERD (gastroesophageal reflux disease)    • Headache    • Hyperlipidemia    • Hypertension    • Injury of back 03/2016   • Kidney stone    • Neuromuscular disorder (CMS/[facility])    • Pneumonia    • Sleep apnea    • Stroke (CMS/[facility])    • TIA (transient ischemic attack)          PAST SURGICAL HISTORY  Past Surgical History:   Procedure Laterality Date   • COLONOSCOPY  2014   • COLONOSCOPY N/A 5/23/2019    Procedure: COLONOSCOPY to Cecum with Hot and Cold Polypectomy x 2;  Surgeon: Navid Zafar Jr., MD;  Location: [facility] ENDOSCOPY;  Service: General   • ENDOSCOPY N/A 5/23/2019    Procedure: ESOPHAGOGASTRODUODENOSCOPY with Bx's;  Surgeon: Navid Zafar Jr., MD;  Location: [facility] ENDOSCOPY;  Service: General   • HERNIA REPAIR  10/2015   • LAPAROSCOPIC GASTRIC BANDING      June 2020   • LUMBAR DISCECTOMY FUSION INSTRUMENTATION Bilateral 8/30/2019    Procedure: LUMBAR 5 TO SACRAL 1 OPEN DECOMPRESSION WITH   POSTERIOR LUMBAR INTERBODY FUSION, CAGE AND SCREW;  Surgeon: Jake Tripathi MD;  Location: [facility] OR;  Service: Neurosurgery         FAMILY HISTORY  Family History   Problem Relation Age of Onset   • Brain cancer Father    • Stroke Father    • Hypertension Father    • Atrial fibrillation Father    • Heart disease Father    • Hypertension Mother    • Diabetes Mother    • Heart disease Mother    • Hypertension Brother    • Colon cancer Maternal Grandmother    • Lung cancer Maternal Grandfather    • Bone cancer Maternal Grandfather          SOCIAL HISTORY  Social History     Socioeconomic History   • Marital status:      Spouse name: Not on file   • Number of children: Not on file   • Years of education: Not on file   • Highest education level: Not on file   Tobacco Use   • Smoking status: Never Smoker   • Smokeless tobacco: Never Used   • Tobacco comment: no caffeine    Substance and Sexual Activity   • Alcohol use: No     Comment: caffeine weekly   • Drug use: No   • Sexual activity: Defer         ALLERGIES  Patient has no known allergies.        REVIEW OF SYSTEMS  Review of Systems   Review of all 14 systems is negative other than stated in the HPI above.      PHYSICAL EXAM  ED Triage Vitals   Temp Heart Rate Resp BP SpO2   08/19/20 1537 08/19/20 1537 08/19/20 1537 08/19/20 1708 08/19/20 1537   98.4 °F (36.9 °C) 105 18 115/62 96 %      Temp src Heart Rate Source Patient Position BP Location FiO2 (%)   -- 08/19/20 1708 08/19/20 1708 08/19/20 1708 --    Monitor Sitting Left arm          GENERAL: Awake and alert, no acute distress  HENT: nares patent  EYES: no scleral icterus, pupils 3 mm reactive bilaterally  CV: regular rhythm, normal rate  RESPIRATORY: normal effort  ABDOMEN: soft, nondistended, nontender throughout  MUSCULOSKELETAL: no deformity there is mild point tenderness of the lower thoracic spine, no midline L-spine tenderness.  Positive straight leg raise on the right at 30 degrees and on  the left at 40 degrees.  NEURO: 5/5 strength with hip flexion, knee flex/ext, plantarflexion, and dorsiflexion of the feet BLE. Normal sensation to light touch and pin prick throughout BLE. 2+ DTR at the knee BLE.  2+ DP/PT pulses BLE.    SKIN: warm, dry    Vital signs and nursing notes reviewed.        RADIOLOGY  Xr Spine Thoracic 3 View    Result Date: 8/19/2020  HISTORY: Mid back pain  COMPARISON: Thoracic spine x-ray 03/06/2018  FINDINGS: Vertebral body heights are maintained. Stable multilevel degenerative changes with multilevel osteophyte formation.  Alignment is normal. Lap band      Stable multilevel degenerative changes.  This report was finalized on 8/19/2020 7:05 PM by Dr. Marty Ambrocio M.D.        Ordered the above noted radiological studies. Reviewed by me in PACS.            PROCEDURES  Procedures            MEDICATIONS GIVEN IN ER  Medications   acetaminophen (TYLENOL) tablet 1,000 mg (1,000 mg Oral Given 8/19/20 1749)   traMADol (ULTRAM) tablet 50 mg (50 mg Oral Given 8/19/20 1749)                   MEDICAL DECISION MAKING, PROGRESS, and CONSULTS    ED Course as of Aug 19 1947   Wed Aug 19, 2020   1713 Reviewed MRI of the thoracic and lumbar spine from 1/17/2020.  A thoracic spine was essentially normal.  On the lumbar spine there was evidence of prior fusion from L5-S1 with posterior rods and screws.  There was a small inferiorly directed disc herniation at L3-4 which approached the left L4 traversing nerve roots, similar to a previous examination.    [JR]   1733 50-year-old male with acute on chronic back pain localized to the thoracic region.  He currently has a normal neurologic exam with normal sensation throughout bilateral lower extremities, 2+ DTRs, and normal strength.  He has no red flag historical symptoms that would warrant emergent neuroimaging.  I ordered plain films of the thoracic spine to assess for possible fracture or malalignment.  I had a long discussion with him about the  risk and benefits of oral steroid therapy in his circumstance.  Ultimately we decided to continue with NSAIDs rather than steroids given his history of diabetes, and previous history of severe hyperglycemia and DKA while on oral steroids in [place].  I recommended a short course of tramadol PRN for his pain and close follow-up with neurosurgery spine.    [JR]      ED Course User Index  [JR] Maxi Tadeo MD       [facility]       I wore a surgical mask, gloves, and eye protection during this patient encounter.  Patient also wearing a surgical mask.  Hand hygeine performed before and after seeing the patient.    DIAGNOSIS  Final diagnoses:   Acute midline thoracic back pain         DISPOSITION  DISCHARGE    Patient discharged in stable condition.    Reviewed implications of results, diagnosis, meds, responsibility to follow up, warning signs and symptoms of possible worsening, potential complications and reasons to return to ER.    Patient/Family voiced understanding of above instructions.    Discussed plan for discharge, as there is no emergent indication for admission. Patient referred to primary care provider for BP management due to today's BP. Pt/family is agreeable and understands need for follow up and repeat testing.  Pt is aware that discharge does not mean that nothing is wrong but it indicates no emergency is present that requires admission and they must continue care with follow-up as given below or physician of their choice.     FOLLOW-UP  Jake Tripathi MD  [address] [id number] [phone]    Schedule an appointment as soon as possible for a visit            Medication List      New Prescriptions    traMADol 50 MG tablet  Commonly known as:  ULTRAM  Take 1 tablet by mouth Every 8 (Eight) Hours As Needed for Moderate Pain .                      Latest Documented Vital Signs:  As of 19:47  BP- 109/67 HR- 82 Temp- 98.4 °F (36.9 °C) O2 sat- 94%        --    Please note  that portions of this were completed with a voice recognition program.          Maxi Tadeo MD  08/19/20 1948

## 2020-08-21 NOTE — PROGRESS NOTES
"Subjective   History of Present Illness: Ronni Asif is a 50 y.o. male is here today for follow-up.    He was seen last in office 1/28/20 for back and R leg pain and weakness.  Gabapentin was reduced to 600 mg BID, methocarbamol to take during daytime and Cyclobenzaprine at night. He was also referred to PT.     Mr. Rhoades called 8/19/20 for increased mid and lower back pain. He states he believes he injured his back while cutting grass. He was seen at [facility] ER 8/19/20, thoracic XR in [facility]. He is using TENs Unit. Mr. Johnson takes Gabapentin 600 mg TID for pain.     History of Present Illness  He says he used a riding mower and had low back pain afterwards. He went to ER and got Tramadol. He says the pain has improved but not completely gone. He says ER was going to place him on a steroid dose pack but they were reluctant due to the fact he had just gotten his A1C down with his endocrinologist.  Since he was last seen he had LAP-BAND surgery and has lost 14 pounds  He also say he had an autonomic nerve dysfunction that he is going to go to [facility] to have evaluated.   He is out of therapy visits for the year but continues the exercises on his own.    The following portions of the patient's history were reviewed and updated as appropriate: allergies, current medications, past family history, past medical history, past social history, past surgical history and problem list.    Review of Systems   Constitutional: Negative for chills and fatigue.   HENT: Positive for ear pain. Negative for sore throat.    Musculoskeletal: Positive for back pain.   Neurological: Positive for weakness (in back/ R leg ) and headaches. Negative for numbness.   All other systems reviewed and are negative.      Objective     ./79   Pulse 86   Temp 97.8 °F (36.6 °C)   Ht 177.8 cm (70\")   Wt 132 kg (291 lb 6.4 oz)   BMI 41.81 kg/m²    Body mass index is 41.81 kg/m².      Physical Exam   Constitutional: He " is oriented to person, place, and time. He appears well-developed and well-nourished.   HENT:   Head: Normocephalic.   Eyes: Pupils are equal, round, and reactive to light. EOM are normal.   Neck: Normal range of motion.   Cardiovascular: Normal rate.   Pulmonary/Chest: Effort normal.   Musculoskeletal: Normal range of motion.   Neurological: He is alert and oriented to person, place, and time. Gait normal.   Reflex Scores:       Tricep reflexes are 0 on the right side and 0 on the left side.       Bicep reflexes are 0 on the right side and 0 on the left side.       Brachioradialis reflexes are 0 on the right side and 0 on the left side.       Patellar reflexes are 0 on the right side and 0 on the left side.       Achilles reflexes are 0 on the right side and 0 on the left side.  Skin: Skin is warm and dry.   Psychiatric: He has a normal mood and affect. His speech is normal.   Vitals reviewed.    Neurologic Exam     Mental Status   Oriented to person, place, and time.   Speech: speech is normal   Level of consciousness: alert    Cranial Nerves     CN III, IV, VI   Pupils are equal, round, and reactive to light.  Extraocular motions are normal.     Motor Exam   Muscle bulk: normal  Overall muscle tone: normal    Gait, Coordination, and Reflexes     Gait  Gait: normal    Reflexes   Right brachioradialis: 0  Left brachioradialis: 0  Right biceps: 0  Left biceps: 0  Right triceps: 0  Left triceps: 0  Right patellar: 0  Left patellar: 0  Right achilles: 0  Left achilles: 0      SLRT Left negative, Right negative   Tyler's Test negative bilaterally       Assessment/Plan   Independent Review of Radiographic Studies:      I personally reviewed the images from the following studies.    Thoracic xray 8/19/2020 at [facility] was reviewed and reveals mild degenerative change, no subluxation or fractures    Medical Decision Making:      Complains of mostly mid and low back pain.  He has no radicular symptoms at this point.   He is no obvious loss of nerve function on exam.  He continues to therapy exercises on his own.  He is out of therapy visits for the year.  We discussed a Medrol Dosepak but with the fact that he has been hospitalized with ketoacidosis in the past I am not comfortable doing so.  He asks that we increase the dose of methocarbamol which is reasonable.  He asked what else he can do for pain.  He has had epidurals in the past so we will refer him back to pain management.  He cannot recall who did his injections in the past, but it was off of [facility].  I would prefer that he see Dr. Gonzalez in the [facility] system so we will refer him to discuss the options with Dr. Gonzalez at this time.  For now since he is doing fairly well with standard recovery from lumbar fusion in a year, we will plan on seeing him back on an as-needed basis.  We discussed the fact that after lumbar fusion patients generally have a two-step forward one step back recovery which seems to be what he has experienced.  He is encouraged to call in the future if he has any increased back or leg symptoms.  Ronni was seen today for back pain.    Diagnoses and all orders for this visit:    Mid back pain  -     Ambulatory Referral to Pain Management    History of lumbar fusion  -     Ambulatory Referral to Pain Management    Other orders  -     methocarbamol (ROBAXIN) 750 MG tablet; Take 1 tablet by mouth Every 8 (Eight) Hours As Needed for Muscle Spasms.      Return if symptoms worsen or fail to improve.

## 2020-08-26 ENCOUNTER — OFFICE VISIT (OUTPATIENT)
Dept: NEUROSURGERY | Facility: CLINIC | Age: 50
End: 2020-08-26

## 2020-08-26 VITALS
DIASTOLIC BLOOD PRESSURE: 79 MMHG | SYSTOLIC BLOOD PRESSURE: 127 MMHG | BODY MASS INDEX: 41.72 KG/M2 | HEART RATE: 86 BPM | HEIGHT: 70 IN | WEIGHT: 291.4 LBS | TEMPERATURE: 97.8 F

## 2020-08-26 DIAGNOSIS — Z98.1 HISTORY OF LUMBAR FUSION: ICD-10-CM

## 2020-08-26 DIAGNOSIS — M54.9 MID BACK PAIN: Primary | ICD-10-CM

## 2020-08-26 PROCEDURE — 99213 OFFICE O/P EST LOW 20 MIN: CPT | Performed by: NURSE PRACTITIONER

## 2020-08-26 RX ORDER — ALLOPURINOL 100 MG/1
100 TABLET ORAL DAILY
COMMUNITY
Start: 2020-08-18

## 2020-08-26 RX ORDER — CALCIUM CARBONATE 300MG(750)
1 TABLET,CHEWABLE ORAL
COMMUNITY
Start: 2020-08-20

## 2020-08-26 RX ORDER — FAMOTIDINE 20 MG/1
TABLET ORAL
COMMUNITY
Start: 2020-08-24 | End: 2020-11-09

## 2020-08-26 RX ORDER — MULTIVITAMIN WITH FOLIC ACID 400 MCG
1 TABLET ORAL DAILY
COMMUNITY
Start: 2019-09-10

## 2020-08-26 RX ORDER — ONDANSETRON 4 MG/1
TABLET, FILM COATED ORAL
Status: ON HOLD | COMMUNITY
Start: 2020-05-21 | End: 2021-06-29

## 2020-08-26 RX ORDER — METOPROLOL TARTRATE 50 MG/1
TABLET, FILM COATED ORAL
COMMUNITY
Start: 2020-08-08 | End: 2021-05-26 | Stop reason: SDUPTHER

## 2020-08-26 RX ORDER — METHOCARBAMOL 750 MG/1
750 TABLET, FILM COATED ORAL EVERY 8 HOURS PRN
Qty: 60 TABLET | Refills: 2 | Status: SHIPPED | OUTPATIENT
Start: 2020-08-26 | End: 2020-10-20

## 2020-08-26 RX ORDER — FINASTERIDE 1 MG/1
1 TABLET, FILM COATED ORAL DAILY
COMMUNITY
Start: 2020-08-10 | End: 2021-05-26 | Stop reason: SDUPTHER

## 2020-08-26 RX ORDER — MELOXICAM 15 MG/1
TABLET ORAL
COMMUNITY
Start: 2020-08-12 | End: 2020-12-07 | Stop reason: DRUGHIGH

## 2020-08-28 ENCOUNTER — TELEPHONE (OUTPATIENT)
Dept: NEUROSURGERY | Facility: CLINIC | Age: 50
End: 2020-08-28

## 2020-08-28 DIAGNOSIS — M54.6 ACUTE MIDLINE THORACIC BACK PAIN: ICD-10-CM

## 2020-08-31 RX ORDER — TRAMADOL HYDROCHLORIDE 50 MG/1
50 TABLET ORAL 2 TIMES DAILY
Qty: 40 TABLET | Refills: 0 | Status: SHIPPED | OUTPATIENT
Start: 2020-08-31 | End: 2020-09-01 | Stop reason: SDUPTHER

## 2020-09-01 DIAGNOSIS — M54.6 ACUTE MIDLINE THORACIC BACK PAIN: ICD-10-CM

## 2020-09-01 RX ORDER — TRAMADOL HYDROCHLORIDE 50 MG/1
50 TABLET ORAL 2 TIMES DAILY
Qty: 40 TABLET | Refills: 0 | Status: SHIPPED | OUTPATIENT
Start: 2020-09-01 | End: 2020-09-08 | Stop reason: SDUPTHER

## 2020-09-08 DIAGNOSIS — M54.6 ACUTE MIDLINE THORACIC BACK PAIN: ICD-10-CM

## 2020-09-08 RX ORDER — TRAMADOL HYDROCHLORIDE 50 MG/1
50 TABLET ORAL 2 TIMES DAILY
Qty: 40 TABLET | Refills: 0 | Status: SHIPPED | OUTPATIENT
Start: 2020-09-08 | End: 2020-11-21 | Stop reason: SDUPTHER

## 2020-10-20 RX ORDER — METHOCARBAMOL 750 MG/1
TABLET, FILM COATED ORAL
Qty: 60 TABLET | Refills: 1 | Status: SHIPPED | OUTPATIENT
Start: 2020-10-20 | End: 2021-02-28 | Stop reason: SDUPTHER

## 2020-11-02 ENCOUNTER — TELEPHONE (OUTPATIENT)
Dept: NEUROLOGY | Facility: CLINIC | Age: 50
End: 2020-11-02

## 2020-11-02 DIAGNOSIS — R55 VASOVAGAL SYNCOPE: Primary | ICD-10-CM

## 2020-11-02 NOTE — TELEPHONE ENCOUNTER
PLEASE ADVISE.  PT SENT [facility] MESSAGE TO ...    So in the past 6 months I've passed out 9 times...some falls have caused other injuries.      I wanted to get Dr Ashutosh arevalo on going to Dr Ronni Cartagena. He's with [facility] and deals with autonomic nervous system..        PLEASE LET ME KNOW IF YOU WANT TO SEE PATIENT OR IF AN ORDER CAN BE PUT IN FOR THIS. THANK YOU.

## 2020-11-06 ENCOUNTER — TELEPHONE (OUTPATIENT)
Dept: NEUROLOGY | Facility: CLINIC | Age: 50
End: 2020-11-06

## 2020-11-06 NOTE — TELEPHONE ENCOUNTER
DONALD FROM [facility] CALLED REGARDING THE REFERRAL FOR [facility] THAT HAD BEEN SENT OVER ON THE PT. SHE STATES THEIR PROVIDERS REQUIRE THE PT BE SEEN BY CARDIOLOGY FOR ANY [facility] REFERRALS PRIOR TO THE NEUROLOGIST AND WANTED TO KNOW WHO PT HAS SEEN WITH CARDIOLOGY IN THE PAST. PLEASE REVIEW AND ADVISE      CALL BACK- [phone]

## 2020-11-06 NOTE — TELEPHONE ENCOUNTER
Spoke to referrals specialist at [facility]. Explained that Pt is established with cardio. Also informed them that I would reach out to patient and have them discuss issue with Cardiologist prior to appt with Dr. Cartagena. Pt states that he is established with Dr. Posey at [facility] and was recently seen in July. He has been placed on wait list for Dr. Cartagena.

## 2020-11-09 ENCOUNTER — OFFICE VISIT (OUTPATIENT)
Dept: PAIN MEDICINE | Facility: CLINIC | Age: 50
End: 2020-11-09

## 2020-11-09 VITALS
BODY MASS INDEX: 41.12 KG/M2 | SYSTOLIC BLOOD PRESSURE: 128 MMHG | RESPIRATION RATE: 18 BRPM | WEIGHT: 287.2 LBS | HEART RATE: 89 BPM | HEIGHT: 70 IN | DIASTOLIC BLOOD PRESSURE: 76 MMHG | OXYGEN SATURATION: 96 % | TEMPERATURE: 97.1 F

## 2020-11-09 DIAGNOSIS — G89.4 CHRONIC PAIN SYNDROME: ICD-10-CM

## 2020-11-09 DIAGNOSIS — M47.814 SPONDYLOSIS WITHOUT MYELOPATHY OR RADICULOPATHY, THORACIC REGION: Primary | ICD-10-CM

## 2020-11-09 LAB
POC AMPHETAMINES: NEGATIVE
POC BARBITURATES: NEGATIVE
POC BENZODIAZEPHINES: NEGATIVE
POC COCAINE: NEGATIVE
POC METHADONE: NEGATIVE
POC METHAMPHETAMINE SCREEN URINE: NEGATIVE
POC OPIATES: NEGATIVE
POC OXYCODONE: NEGATIVE
POC PHENCYCLIDINE: NEGATIVE
POC PROPOXYPHENE: NEGATIVE
POC THC: NEGATIVE
POC TRICYCLIC ANTIDEPRESSANTS: NEGATIVE

## 2020-11-09 PROCEDURE — 99204 OFFICE O/P NEW MOD 45 MIN: CPT | Performed by: ANESTHESIOLOGY

## 2020-11-09 PROCEDURE — 80305 DRUG TEST PRSMV DIR OPT OBS: CPT | Performed by: ANESTHESIOLOGY

## 2020-11-09 RX ORDER — ERTUGLIFLOZIN 5 MG/1
5 TABLET, FILM COATED ORAL EVERY MORNING
Status: ON HOLD | COMMUNITY
Start: 2020-10-28 | End: 2021-11-30

## 2020-11-09 RX ORDER — TRIAMCINOLONE ACETONIDE 1 MG/G
CREAM TOPICAL
COMMUNITY
Start: 2020-09-21 | End: 2021-05-26 | Stop reason: SDUPTHER

## 2020-11-09 RX ORDER — DULAGLUTIDE 1.5 MG/.5ML
1.5 INJECTION, SOLUTION SUBCUTANEOUS
COMMUNITY
Start: 2020-11-06 | End: 2021-05-26 | Stop reason: SDUPTHER

## 2020-11-09 RX ORDER — TADALAFIL 20 MG/1
20 TABLET ORAL
COMMUNITY
Start: 2020-10-28 | End: 2021-05-26 | Stop reason: SDUPTHER

## 2020-11-09 RX ORDER — INSULIN LISPRO 100 [IU]/ML
INJECTION, SOLUTION INTRAVENOUS; SUBCUTANEOUS
Status: ON HOLD | COMMUNITY
Start: 2020-10-28 | End: 2022-06-07

## 2020-11-09 NOTE — PROGRESS NOTES
CHIEF COMPLAINT  Mr. Asif states that he has had back pain for 5 years. He states that he had back surgery Aug. 2019. He states that is currently sees Dr. Tripathi for low back pain. He has previously been in pain management with Dr. Nair  4-5 months ago and at [facility]"" [facility]ist 2 years ago. He has previously had epidural injections in his back and about 5 or RF's done for his back pain.    Subjective   Ronni Asif is a 50 y.o. male.   He presents to the office for evaluation of back pain.  Of note, his primary complaint is thoracic area of back pain. He was referred here by THEO Ibrahim at [facility] neurosurgery.         Back Pain  This is a chronic problem. The current episode started more than 1 year ago. The problem occurs daily. The problem has been gradually worsening since onset. The pain is present in the thoracic spine (T>L). The quality of the pain is described as aching. The pain does not radiate. The pain is severe. The symptoms are aggravated by bending, standing and twisting. Stiffness is present in the morning. Pertinent negatives include no numbness or weakness. Risk factors include obesity. He has tried analgesics, heat, ice, NSAIDs, muscle relaxant and home exercises for the symptoms. The treatment provided mild relief.        PEG Assessment   What number best describes your pain on average in the past week?6  What number best describes how, during the past week, pain has interfered with your enjoyment of life?6  What number best describes how, during the past week, pain has interfered with your general activity?  9    --  The aforementioned information the Chief Complaint section and above subjective data including any HPI data, and also the Review of Systems data, has been personally reviewed and affirmed.  --          Current Outpatient Medications:   •  acetaminophen (TYLENOL) 325 MG tablet, Take 2 tablets by mouth Every 6 (Six) Hours As Needed for Mild Pain .,  "Disp: , Rfl:   •  ADMELOG SOLOSTAR 100 UNIT/ML solution pen-injector, INJECT 5 UNITS INTO THE SKIN 3 TIMES DAILY BEFORE MEALS, Disp: , Rfl: 4  •  albuterol (2.5 MG/3ML) 0.083% nebulizer solution 3 mL, albuterol (5 MG/ML) 0.5% nebulizer solution 0.5 mL, Inhale 4 (four) times a day as needed., Disp: , Rfl:   •  albuterol (PROVENTIL HFA;VENTOLIN HFA) 108 (90 BASE) MCG/ACT inhaler, Inhale 2 puffs every 4 (four) hours as needed for wheezing., Disp: , Rfl:   •  allopurinol (ZYLOPRIM) 100 MG tablet, , Disp: , Rfl:   •  aspirin (ASPIRIN LOW DOSE) 81 MG EC tablet, TK 1 T PO QD, Disp: , Rfl:   •  atorvastatin (LIPITOR) 40 MG tablet, , Disp: , Rfl:   •  B-D 3CC LUER-ALECIA SYR 92HI2-5/2 23G X 1-1/2\" 3 ML misc, INJECT 1 EACH IM Q 14 DAYS UTD, Disp: , Rfl: 1  •  B-D UF III MINI PEN NEEDLES 31G X 5 MM misc, USE 1 QID, Disp: , Rfl: 3  •  BREO ELLIPTA 200-25 MCG/INH inhaler, INHALE 1 PO PUFF QD, Disp: , Rfl: 5  •  CloNIDine (CATAPRES) 0.1 MG tablet, Take 1 tablet by mouth 2 (Two) Times a Day., Disp: 60 tablet, Rfl: 5  •  Dulaglutide (Trulicity) 1.5 MG/0.5ML solution pen-injector, Inject 1.5 mg under the skin into the appropriate area as directed., Disp: , Rfl:   •  finasteride (PROPECIA) 1 MG tablet, Take 1 mg by mouth Daily., Disp: , Rfl:   •  fluticasone (FLONASE) 50 MCG/ACT nasal spray, 1 spray into the nostril(s) as directed by provider Daily., Disp: , Rfl:   •  fluticasone-salmeterol (ADVAIR DISKUS) 250-50 MCG/DOSE DISKUS, INL 1 PUFF ITL BID, Disp: , Rfl:   •  FREESTYLE LITE test strip, TEST ONCE D, Disp: , Rfl: 3  •  gabapentin (Neurontin) 600 MG tablet, Take 1 tablet by mouth 3 (Three) Times a Day., Disp: 90 tablet, Rfl: 2  •  Insulin Lispro, 1 Unit Dial, (HUMALOG) 100 UNIT/ML solution pen-injector, 6 units breakfast and lunch and 8 units supper., Disp: , Rfl:   •  Lancets (FREESTYLE) lancets, TEST QD UTD, Disp: , Rfl: 3  •  lansoprazole (PREVACID) 30 MG capsule, , Disp: , Rfl:   •  meloxicam (MOBIC) 15 MG tablet, , Disp: , " Rfl:   •  metFORMIN ER (GLUCOPHAGE-XR) 500 MG 24 hr tablet, Take 2 tablets by mouth 2 (Two) Times a Day., Disp: , Rfl: 1  •  methocarbamol (ROBAXIN) 750 MG tablet, TAKE 1 TABLET BY MOUTH EVERY 8 HOURS AS NEEDED FOR MUSCLE SPASMS, Disp: 60 tablet, Rfl: 1  •  metoprolol tartrate (LOPRESSOR) 50 MG tablet, , Disp: , Rfl:   •  multivitamin (DAILY CANDY) tablet tablet, Take 1 tablet by mouth Daily., Disp: , Rfl:   •  Nerve Stimulator (TENS THERAPY REPLACE BACK PADS) misc, 1 each., Disp: , Rfl:   •  ondansetron (ZOFRAN) 4 MG tablet, , Disp: , Rfl:   •  pantoprazole (PROTONIX) 40 MG EC tablet, Take 40 mg by mouth Daily., Disp: , Rfl:   •  polyethylene glycol (MIRALAX) packet, Take 17 g by mouth Daily As Needed (constipation)., Disp: , Rfl:   •  tadalafil (CIALIS) 20 MG tablet, Take 20 mg by mouth., Disp: , Rfl:   •  Testosterone Cypionate (DEPOTESTOTERONE CYPIONATE) 200 MG/ML injection, , Disp: , Rfl: 0  •  traMADol (ULTRAM) 50 MG tablet, Take 1 tablet by mouth 2 (two) times a day., Disp: 40 tablet, Rfl: 0  •  TRESIBA FLEXTOUCH 100 UNIT/ML solution pen-injector injection, INJECT 15 UNITS INTO THE SKIN DAILY, Disp: , Rfl: 3  •  vitamin D (ERGOCALCIFEROL) 44226 units capsule capsule, Take 50,000 Units by mouth 1 (One) Time Per Week., Disp: , Rfl:   •  Steglatro 5 MG tablet, , Disp: , Rfl:   •  triamcinolone (KENALOG) 0.1 % cream, APPLY TO AFFECTED THREE TIMES A DAY, Disp: , Rfl:     The following portions of the patient's history were reviewed and updated as appropriate: allergies, current medications, past family history, past medical history, past social history, past surgical history and problem list.    -------    The following portions of the patient's history were reviewed and updated as appropriate: allergies, current medications, past family history, past medical history, past social history, past surgical history and problem list.    No Known Allergies    Current Outpatient Medications on File Prior to Visit  "  Medication Sig Dispense Refill   • acetaminophen (TYLENOL) 325 MG tablet Take 2 tablets by mouth Every 6 (Six) Hours As Needed for Mild Pain .     • ADMELOG SOLOSTAR 100 UNIT/ML solution pen-injector INJECT 5 UNITS INTO THE SKIN 3 TIMES DAILY BEFORE MEALS  4   • albuterol (2.5 MG/3ML) 0.083% nebulizer solution 3 mL, albuterol (5 MG/ML) 0.5% nebulizer solution 0.5 mL Inhale 4 (four) times a day as needed.     • albuterol (PROVENTIL HFA;VENTOLIN HFA) 108 (90 BASE) MCG/ACT inhaler Inhale 2 puffs every 4 (four) hours as needed for wheezing.     • allopurinol (ZYLOPRIM) 100 MG tablet      • aspirin (ASPIRIN LOW DOSE) 81 MG EC tablet TK 1 T PO QD     • atorvastatin (LIPITOR) 40 MG tablet      • B-D 3CC LUER-ALECIA SYR 30FA2-5/2 23G X 1-1/2\" 3 ML misc INJECT 1 EACH IM Q 14 DAYS UTD  1   • B-D UF III MINI PEN NEEDLES 31G X 5 MM misc USE 1 QID  3   • BREO ELLIPTA 200-25 MCG/INH inhaler INHALE 1 PO PUFF QD  5   • CloNIDine (CATAPRES) 0.1 MG tablet Take 1 tablet by mouth 2 (Two) Times a Day. 60 tablet 5   • Dulaglutide (Trulicity) 1.5 MG/0.5ML solution pen-injector Inject 1.5 mg under the skin into the appropriate area as directed.     • finasteride (PROPECIA) 1 MG tablet Take 1 mg by mouth Daily.     • fluticasone (FLONASE) 50 MCG/ACT nasal spray 1 spray into the nostril(s) as directed by provider Daily.     • fluticasone-salmeterol (ADVAIR DISKUS) 250-50 MCG/DOSE DISKUS INL 1 PUFF ITL BID     • FREESTYLE LITE test strip TEST ONCE D  3   • gabapentin (Neurontin) 600 MG tablet Take 1 tablet by mouth 3 (Three) Times a Day. 90 tablet 2   • Insulin Lispro, 1 Unit Dial, (HUMALOG) 100 UNIT/ML solution pen-injector 6 units breakfast and lunch and 8 units supper.     • Lancets (FREESTYLE) lancets TEST QD UTD  3   • lansoprazole (PREVACID) 30 MG capsule      • meloxicam (MOBIC) 15 MG tablet      • metFORMIN ER (GLUCOPHAGE-XR) 500 MG 24 hr tablet Take 2 tablets by mouth 2 (Two) Times a Day.  1   • methocarbamol (ROBAXIN) 750 MG tablet " TAKE 1 TABLET BY MOUTH EVERY 8 HOURS AS NEEDED FOR MUSCLE SPASMS 60 tablet 1   • metoprolol tartrate (LOPRESSOR) 50 MG tablet      • multivitamin (DAILY CANDY) tablet tablet Take 1 tablet by mouth Daily.     • Nerve Stimulator (TENS THERAPY REPLACE BACK PADS) misc 1 each.     • ondansetron (ZOFRAN) 4 MG tablet      • pantoprazole (PROTONIX) 40 MG EC tablet Take 40 mg by mouth Daily.     • polyethylene glycol (MIRALAX) packet Take 17 g by mouth Daily As Needed (constipation).     • tadalafil (CIALIS) 20 MG tablet Take 20 mg by mouth.     • Testosterone Cypionate (DEPOTESTOTERONE CYPIONATE) 200 MG/ML injection   0   • traMADol (ULTRAM) 50 MG tablet Take 1 tablet by mouth 2 (two) times a day. 40 tablet 0   • TRESIBA FLEXTOUCH 100 UNIT/ML solution pen-injector injection INJECT 15 UNITS INTO THE SKIN DAILY  3   • vitamin D (ERGOCALCIFEROL) 84595 units capsule capsule Take 50,000 Units by mouth 1 (One) Time Per Week.     • Steglatro 5 MG tablet      • triamcinolone (KENALOG) 0.1 % cream APPLY TO AFFECTED THREE TIMES A DAY       No current facility-administered medications on file prior to visit.        Patient Active Problem List   Diagnosis   • Lumbar radiculopathy   • Congenital spondylolysis, lumbosacral region   • Transient cerebral ischemia   • Pain in thoracic spine   • Neck pain   • Degeneration of thoracic intervertebral disc   • Degeneration of intervertebral disc at C5-C6 level   • Type 2 diabetes mellitus with circulatory disorder, without long-term current use of insulin (CMS/HCC)   • Obstructive sleep apnea, adult   • Essential hypertension   • Moderate asthma without complication   • Gastroesophageal reflux disease   • Diarrhea   • History of colon polyps   • Hyperglycemia   • Surgery follow-up examination   • Morbidly obese (CMS/HCC)   • Chest pain   • Chronic back pain   • Coronary artery disease   • Gout   • Labile blood pressure   • Mixed anxiety depressive disorder   • Secondary male hypogonadism   •  Shoulder impingement syndrome, left   • Syncope and collapse   • Vitamin D deficiency   • DEVAUGHN on CPAP   • Thoracic radiculopathy       Past Medical History:   Diagnosis Date   • Anxiety    • Asthma    • Cancer (CMS/HCC)     skin   • DDD (degenerative disc disease), lumbar    • Diabetes mellitus (CMS/HCC)    • Dizziness    • ED (erectile dysfunction)    • GERD (gastroesophageal reflux disease)    • Headache    • Hyperlipidemia    • Hypertension    • Injury of back 03/2016    Pt fell 20 feet    • Kidney stone    • Neuromuscular disorder (CMS/[facility])    • Pneumonia    • Sleep apnea     cpap   • Stroke (CMS/[facility])     tia   • Syncope and collapse    • TIA (transient ischemic attack)        Past Surgical History:   Procedure Laterality Date   • COLONOSCOPY  2014   • COLONOSCOPY N/A 5/23/2019    Procedure: COLONOSCOPY to Cecum with Hot and Cold Polypectomy x 2;  Surgeon: Navid Zafar Jr., MD;  Location: [facility] ENDOSCOPY;  Service: General   • ENDOSCOPY N/A 5/23/2019    Procedure: ESOPHAGOGASTRODUODENOSCOPY with Bx's;  Surgeon: Navid Zafar Jr., MD;  Location: [facility] ENDOSCOPY;  Service: General   • HERNIA REPAIR  10/2015   • LAPAROSCOPIC GASTRIC BANDING      June 2020   • LUMBAR DISCECTOMY FUSION INSTRUMENTATION Bilateral 8/30/2019    Procedure: LUMBAR 5 TO SACRAL 1 OPEN DECOMPRESSION WITH  POSTERIOR LUMBAR INTERBODY FUSION, CAGE AND SCREW;  Surgeon: Jake Tripathi MD;  Location: [facility] OR;  Service: Neurosurgery       Family History   Problem Relation Age of Onset   • Brain cancer Father    • Stroke Father    • Hypertension Father    • Atrial fibrillation Father    • Heart disease Father    • Hypertension Mother    • Diabetes Mother    • Heart disease Mother    • Hypertension Brother    • Colon cancer Maternal Grandmother    • Lung cancer Maternal Grandfather    • Bone cancer Maternal Grandfather        Social History     Socioeconomic History   • Marital status:      Spouse name: Not on file   • Number  "of children: Not on file   • Years of education: Not on file   • Highest education level: Not on file   Tobacco Use   • Smoking status: Never Smoker   • Smokeless tobacco: Never Used   • Tobacco comment: no caffeine    Substance and Sexual Activity   • Alcohol use: No     Comment: caffeine weekly   • Drug use: No   • Sexual activity: Defer       -------          Review of Systems   Constitutional: Negative for fatigue.   HENT: Negative for congestion.    Eyes: Negative for visual disturbance.   Respiratory: Negative for cough, shortness of breath and wheezing.    Cardiovascular: Negative.    Gastrointestinal: Negative for constipation and diarrhea.   Genitourinary: Negative for difficulty urinating.   Musculoskeletal: Positive for back pain and joint swelling (left knee).   Skin: Negative for rash.   Allergic/Immunologic: Negative for immunocompromised state.   Neurological: Negative for weakness and numbness.   Hematological: Does not bruise/bleed easily.   Psychiatric/Behavioral: Positive for sleep disturbance. Negative for suicidal ideas. The patient is not nervous/anxious.          Vitals:    11/09/20 1107   BP: 128/76   Pulse: 89   Resp: 18   Temp: 97.1 °F (36.2 °C)   SpO2: 96%   Weight: 130 kg (287 lb 3.2 oz)   Height: 177.8 cm (70\")   PainSc:   5   PainLoc: Back         Objective   Physical Exam  Vitals signs and nursing note reviewed.   Constitutional:       Appearance: Normal appearance. He is well-developed. He is obese.   HENT:      Head: Normocephalic and atraumatic.      Right Ear: Hearing and external ear normal.      Left Ear: Hearing and external ear normal.      Nose: Nose normal.      Mouth/Throat:      Pharynx: Uvula midline.   Eyes:      Conjunctiva/sclera: Conjunctivae normal.      Pupils: Pupils are equal, round, and reactive to light.   Neck:      Musculoskeletal: Neck supple.   Cardiovascular:      Rate and Rhythm: Normal rate and regular rhythm.      Heart sounds: Normal heart sounds. "   Pulmonary:      Effort: Pulmonary effort is normal.      Breath sounds: Normal breath sounds.   Abdominal:      General: Bowel sounds are normal.      Palpations: Abdomen is soft. There is no splenomegaly.      Tenderness: There is no abdominal tenderness. There is no right CVA tenderness or left CVA tenderness.   Musculoskeletal:      Thoracic back: He exhibits decreased range of motion and bony tenderness (bilateral lower thoracic facets). He exhibits no edema.   Lymphadenopathy:      Cervical: No cervical adenopathy.   Skin:     General: Skin is warm and dry.   Neurological:      Mental Status: He is alert and oriented to person, place, and time.      Cranial Nerves: No cranial nerve deficit.      Sensory: Sensation is intact.      Motor: Motor function is intact. No tremor.      Coordination: Coordination is intact. Coordination normal.      Gait: Gait is intact. Gait normal.      Deep Tendon Reflexes:      Reflex Scores:       Patellar reflexes are 0 on the right side and 0 on the left side.  Psychiatric:         Behavior: Behavior normal.         Thought Content: Thought content normal.         Judgment: Judgment normal.         Assessment/Plan   Diagnoses and all orders for this visit:    1. Spondylosis without myelopathy or radiculopathy, thoracic region (Primary)  -     Case Request    2. Chronic pain syndrome  -     Urine Drug Screen Confirmation - Urine, Clean Catch; Future  -     POC Urine Drug Screen, Triage          --- Follow-up for procedure... thoracic medial branch block (bilateral T9-11 vs T8-10) x2, 1-2 wks apart - Diagnostic (no steroid)    --We talked about other options for his spine related pain including the lumbar area pain with a post laminectomy element and I gave him an introduction to spinal cord stimulation given some materials for study and research     -------  Education about Medial Branch Blockade and RF Therapy:    This medial branch blockade (MBB) suggested is intended for  "diagnostic purposes, with the intent of offering the patient Radiofrequency thermal rhizotomy (RF) if the MBB is diagnostically effective.  The diagnostic blockade is necessary to determine the likelihood that RF therapy could be efficacious in providing long term relief to the patient.    Medial branches are sensory nerve branches that connect to a facet joint and transmit sensations & pain signals from that joint.  Facet is a term for the type of joints found in the spine.  Medial branches are the nerves that go to a facet, and therefore are also sometimes called \"facet joint nerves\" (FJNs).      In a medial branch blockade procedure, xray fluoroscopy is used to verify the locations of the outside of the joint lines which are being targeted.  Under xray guidance, needles are placed to these areas.  Contrast dye is injected to confirm proper placement, with dye flowing over the joint area, and to ensure that the dye does not flow into unintended areas such as a vein.  When this is confirmed, local anesthetic is injected to block the medial branch at that joint level.      If MBBs are diagnostically successful in blocking pain, then the patient is most likely a great candidate for Radiofrequency of those facet joint nerves.  In the RF procedure, needles are placed to the joint lines in the same fashion, and after testing, the needle tips are heated to thermally treat the nerves, blocking the nerves by in essence damaging the nerves with the heat treatment.       Medically, a successful RF procedure should provide a patient with 50% pain relief or more for at least 6 months.  Clinical experience suggests that successful patients receive relief more in the range of 12 months on average.  We also discussed that a fortunate minority of patients receive therapeutic success from the MBB, and may not require RF ablation.  If a patient receives more than 8 weeks of relief from MBB, then occasional repeat MBB for therapeutic " purposes is a very reasonable alternative therapy.  This course of therapy is consistent with our LCDs according to our CMS  in the area, and therefore other insurance providers should follow accordingly.  We will monitor our patients to screen for these therapeutic responders and will offer RF therapy only when necessary.        We discussed that MBB & RF are not without risks.  Guidelines regarding anticoagulant use & neuraxial procedures will be respected.  Patients that are ill or otherwise may be at risk for sepsis will not have their spines accessed by neuraxial injections of any type.  This patient will not be offered these therapies if there is an increased risk.   We discussed that there is a risk of postprocedural pain and also a risk of worsening of clinical picture with these procedures as with any neuraxial procedure.    -------                  EMR Dragon/Transcription disclaimer:   Much of this encounter note is an electronic transcription/translation of spoken language to printed text. The electronic translation of spoken language may permit erroneous, or at times, nonsensical words or phrases to be inadvertently transcribed; Although I have reviewed the note for such errors, some may still exist.

## 2020-11-14 ENCOUNTER — RESULTS ENCOUNTER (OUTPATIENT)
Dept: PAIN MEDICINE | Facility: CLINIC | Age: 50
End: 2020-11-14

## 2020-11-14 DIAGNOSIS — G89.4 CHRONIC PAIN SYNDROME: ICD-10-CM

## 2020-11-17 ENCOUNTER — TELEPHONE (OUTPATIENT)
Dept: NEUROSURGERY | Facility: CLINIC | Age: 50
End: 2020-11-17

## 2020-11-17 DIAGNOSIS — M54.16 LUMBAR RADICULOPATHY: Primary | ICD-10-CM

## 2020-11-17 NOTE — TELEPHONE ENCOUNTER
----- Message from Jake Tripathi MD sent at 11/11/2020  5:48 PM EST -----  Regarding: FW: Prescription Question  Contact: [phone]      ----- Message -----  From: Brandee Holt MA  Sent: 11/11/2020   8:13 AM EST  To: Jake Tripathi MD  Subject: FW: Prescription Question                          ----- Message -----  From: Ronni Asif  Sent: 11/10/2020   9:59 PM EST  To: [facility]  Subject: Prescription Question                            [place]  I saw Dr Gonzalez in pain management the other day.  He is recommended a [employer] spinal cord stimulation system for pain.  I was trying to send this message to Dr. Pires but his name is no longer in my chart.  Please talk to him about this and if he would support this procedure.   Thank you  Ronni asif

## 2020-11-18 ENCOUNTER — TELEPHONE (OUTPATIENT)
Dept: NEUROSURGERY | Facility: CLINIC | Age: 50
End: 2020-11-18

## 2020-11-18 ENCOUNTER — HOSPITAL ENCOUNTER (OUTPATIENT)
Dept: GENERAL RADIOLOGY | Facility: HOSPITAL | Age: 50
Discharge: HOME | End: 2020-11-18
Admitting: NEUROLOGICAL SURGERY

## 2020-11-18 DIAGNOSIS — M54.16 LUMBAR RADICULOPATHY: ICD-10-CM

## 2020-11-18 PROCEDURE — 72110 X-RAY EXAM L-2 SPINE 4/>VWS: CPT

## 2020-11-19 NOTE — PROGRESS NOTES
Subjective   History of Present Illness: Ronni Asif is a 50 y.o. male is here today for follow-up.    Patient had XR lumbar spine 11.18.2020 at [facility].    Patient reports today that he wants Dr. Tripathi's opinion on a pain stimulator that his pain management doctor wants him to have.     Patient states that his back pain ia stabbing soreness type of pain that is constantly present. Patient states that his valentin back pain down not radiate down his legs.    Patient reports that he is currently taking Gabapentin 600 mg TID for pain, Tramadol 50 mg TID.    It has been about 15 months since I did an L5-S1 decompression and fusion and that helped this patient quite a bit. He has no radiculopathy but does have some chronic low back pain. The thing that is bothering him the most recently is thoracic pain. He has been following and is being evaluated for autonomic dysfunction. The worst of the pain actually is in the mid to lower thoracic region right in the middle. Extension tends to make it a little bit worse. There was some concern by Dr. Gonzalez that he might have had a compression fracture, but I have reviewed his films and I do not think he does. He does have a lot of facet disease and disc space collapse, which I think probably is the main source of his thoracic back pain. Dr. Gonzalez discussed options with him including spinal cord stimulator or an ablation in the thoracic region. I think the latter is actually probably a better option, at least at this point. The patient was a bit reluctant about the stimulator. I think it is reasonable since surgery is not an option there, which I discussed with him to do an ablation and to see how he does. He is also on tramadol and I told him to have a discussion with Dr. Gonzalez about how aggressive to be with pain medications. Will see him in 6 months. Again, I am not opposed to a spinal cord stimulator, but I do think an ablation should be tried first.       Back  "Pain  This is a recurrent problem. The problem occurs constantly. The problem has been gradually worsening since onset. The pain is present in the lumbar spine. The quality of the pain is described as stabbing. The pain is at a severity of 5/10. The pain is moderate. The pain is the same all the time. The symptoms are aggravated by sitting, twisting, standing and bending. Associated symptoms include weakness. Pertinent negatives include no bladder incontinence, bowel incontinence, dysuria, fever, leg pain, numbness or tingling. He has tried ice and NSAIDs for the symptoms. The treatment provided mild relief.       The following portions of the patient's history were reviewed and updated as appropriate: allergies, current medications, past family history, past medical history, past social history, past surgical history and problem list.    Review of Systems   Constitutional: Negative for chills and fever.   HENT: Negative for congestion.    Gastrointestinal: Negative for bowel incontinence.   Genitourinary: Negative for bladder incontinence, difficulty urinating and dysuria.   Musculoskeletal: Positive for back pain. Negative for gait problem and joint swelling.   Neurological: Positive for weakness. Negative for tingling and numbness.   All other systems reviewed and are negative.          Objective     Vitals:    11/23/20 0834   BP: 118/74   Cuff Size: Adult   Pulse: 82   Temp: 98 °F (36.7 °C)   Weight: 130 kg (287 lb)   Height: 177.8 cm (70\")     Body mass index is 41.18 kg/m².      Physical Exam  Constitutional:       Appearance: He is well-developed.   HENT:      Head: Normocephalic and atraumatic.   Eyes:      Extraocular Movements: EOM normal.      Conjunctiva/sclera: Conjunctivae normal.      Pupils: Pupils are equal, round, and reactive to light.      Funduscopic exam:     Right eye: No papilledema.         Left eye: No papilledema.   Neck:      Vascular: No carotid bruit.   Neurological:      Mental Status: " He is oriented to person, place, and time.      Coordination: Finger-Nose-Finger Test and Heel to Shin Test normal.      Gait: Gait is intact.      Deep Tendon Reflexes:      Reflex Scores:       Tricep reflexes are 2+ on the right side and 2+ on the left side.       Bicep reflexes are 2+ on the right side and 2+ on the left side.       Brachioradialis reflexes are 2+ on the right side and 2+ on the left side.       Patellar reflexes are 2+ on the right side and 2+ on the left side.       Achilles reflexes are 2+ on the right side and 2+ on the left side.  Psychiatric:         Speech: Speech normal.       Neurologic Exam     Mental Status   Oriented to person, place, and time.   Registration of memory: Good recent and remote memory.   Attention: normal. Concentration: normal.   Speech: speech is normal   Level of consciousness: alert  Knowledge: consistent with education.     Cranial Nerves     CN II   Visual fields full to confrontation.   Visual acuity: normal    CN III, IV, VI   Pupils are equal, round, and reactive to light.  Extraocular motions are normal.     CN V   Facial sensation intact.   Right corneal reflex: normal  Left corneal reflex: normal    CN VII   Facial expression full, symmetric.   Right facial weakness: none  Left facial weakness: none    CN VIII   Hearing: intact    CN IX, X   Palate: symmetric    CN XI   Right sternocleidomastoid strength: normal  Left sternocleidomastoid strength: normal    CN XII   Tongue: not atrophic  Tongue deviation: none    Motor Exam   Muscle bulk: normal  Right arm tone: normal  Left arm tone: normal  Right leg tone: normal  Left leg tone: normal    Strength   Strength 5/5 except as noted.     Sensory Exam   Light touch normal.     Gait, Coordination, and Reflexes     Gait  Gait: normal    Coordination   Finger to nose coordination: normal  Heel to shin coordination: normal    Reflexes   Right brachioradialis: 2+  Left brachioradialis: 2+  Right biceps: 2+  Left  biceps: 2+  Right triceps: 2+  Left triceps: 2+  Right patellar: 2+  Left patellar: 2+  Right achilles: 2+  Left achilles: 2+  Right : 2+  Left : 2+          Assessment/Plan   Independent Review of Radiographic Studies:      I personally reviewed the images from the following studies.    I reviewed a thoracic MRI done in January 2020 as well as plain x-rays done in August 2020.  There is no obvious compression fracture.  He does have a lot of disc disease and osteophyte formation both anteriorly and posteriorly.  Agree with the report.        Medical Decision Making:      I told him to work with Dr. Gonzalez and strongly consider a thoracic radiofrequency ablation preceded by medial branch blocks.  I would do that before considering a spinal cord stimulator.  We will see him in 6 months for follow-up.  If his leg started bothering him again, he will let us know.      Diagnoses and all orders for this visit:    1. Chronic bilateral thoracic back pain (Primary)    2. Chronic midline low back pain without sciatica    3. History of lumbar spinal fusion      Return in about 6 months (around 5/23/2021) for face to face.

## 2020-11-21 DIAGNOSIS — M54.6 ACUTE MIDLINE THORACIC BACK PAIN: ICD-10-CM

## 2020-11-23 ENCOUNTER — OFFICE VISIT (OUTPATIENT)
Dept: NEUROSURGERY | Facility: CLINIC | Age: 50
End: 2020-11-23

## 2020-11-23 VITALS
HEIGHT: 70 IN | TEMPERATURE: 98 F | WEIGHT: 287 LBS | HEART RATE: 82 BPM | BODY MASS INDEX: 41.09 KG/M2 | DIASTOLIC BLOOD PRESSURE: 74 MMHG | SYSTOLIC BLOOD PRESSURE: 118 MMHG

## 2020-11-23 DIAGNOSIS — M54.6 CHRONIC BILATERAL THORACIC BACK PAIN: Primary | ICD-10-CM

## 2020-11-23 DIAGNOSIS — G89.29 CHRONIC MIDLINE LOW BACK PAIN WITHOUT SCIATICA: ICD-10-CM

## 2020-11-23 DIAGNOSIS — Z98.1 HISTORY OF LUMBAR SPINAL FUSION: ICD-10-CM

## 2020-11-23 DIAGNOSIS — M54.50 CHRONIC MIDLINE LOW BACK PAIN WITHOUT SCIATICA: ICD-10-CM

## 2020-11-23 DIAGNOSIS — G89.29 CHRONIC BILATERAL THORACIC BACK PAIN: Primary | ICD-10-CM

## 2020-11-23 PROCEDURE — 99214 OFFICE O/P EST MOD 30 MIN: CPT | Performed by: NEUROLOGICAL SURGERY

## 2020-11-24 RX ORDER — TRAMADOL HYDROCHLORIDE 50 MG/1
50 TABLET ORAL 2 TIMES DAILY
Qty: 40 TABLET | Refills: 0 | Status: SHIPPED | OUTPATIENT
Start: 2020-11-24 | End: 2020-12-30 | Stop reason: SDUPTHER

## 2020-12-07 ENCOUNTER — OFFICE VISIT (OUTPATIENT)
Dept: PAIN MEDICINE | Facility: CLINIC | Age: 50
End: 2020-12-07

## 2020-12-07 VITALS
RESPIRATION RATE: 20 BRPM | BODY MASS INDEX: 42.23 KG/M2 | HEART RATE: 96 BPM | OXYGEN SATURATION: 96 % | TEMPERATURE: 98 F | HEIGHT: 70 IN | WEIGHT: 295 LBS | SYSTOLIC BLOOD PRESSURE: 109 MMHG | DIASTOLIC BLOOD PRESSURE: 74 MMHG

## 2020-12-07 DIAGNOSIS — G89.29 CHRONIC MIDLINE LOW BACK PAIN WITHOUT SCIATICA: ICD-10-CM

## 2020-12-07 DIAGNOSIS — Z98.1 HISTORY OF LUMBAR SPINAL FUSION: ICD-10-CM

## 2020-12-07 DIAGNOSIS — G89.29 CHRONIC BILATERAL THORACIC BACK PAIN: Primary | ICD-10-CM

## 2020-12-07 DIAGNOSIS — M54.6 CHRONIC BILATERAL THORACIC BACK PAIN: Primary | ICD-10-CM

## 2020-12-07 DIAGNOSIS — G89.4 CHRONIC PAIN SYNDROME: ICD-10-CM

## 2020-12-07 DIAGNOSIS — M54.16 LUMBAR RADICULOPATHY: ICD-10-CM

## 2020-12-07 DIAGNOSIS — M54.50 CHRONIC MIDLINE LOW BACK PAIN WITHOUT SCIATICA: ICD-10-CM

## 2020-12-07 PROCEDURE — 99214 OFFICE O/P EST MOD 30 MIN: CPT | Performed by: NURSE PRACTITIONER

## 2020-12-07 RX ORDER — MELOXICAM 7.5 MG/1
7.5 TABLET ORAL DAILY
Qty: 30 TABLET | Refills: 0 | Status: SHIPPED | OUTPATIENT
Start: 2020-12-07 | End: 2021-01-04

## 2020-12-07 RX ORDER — ERGOCALCIFEROL 1.25 MG/1
50000 CAPSULE ORAL
COMMUNITY
Start: 2020-12-01 | End: 2021-05-26 | Stop reason: SDUPTHER

## 2020-12-07 NOTE — PROGRESS NOTES
CHIEF COMPLAINT  F/u back pain. Pt sts pain is the same. Pt sts he recently saw Dr. Tripathi, was told not a candidate for SCS therapy.     Initial evaluation by THEO Agrawal    Subjective   Ronni Asif is a 50 y.o. male  who presents for follow-up.  He has a history of back pain.  Office note from Dr. Gonzalez from 11/9/2020 reviewed.  Patient was referred to our clinic by THEO Ibrahim for evaluation and treatment of back pain.  Patient has a history of mid back pain without radiculopathy.  Dr. Gonzalez recommended consideration of thoracic medial branch block x2 1 to 2 weeks apart (diagnostic).  Spinal cord stimulation for lumbar area pain with postlaminectomy element was also discussed during this office visit.    Office note from 11/23/2020 with Dr. Tripathi reviewed.  Patient has significant facet disease and disc space collapse which is likely the main source of his thoracic back pain.  Dr. Tripathi agrees with proceeding with thoracic region ablation, surgery is not an option.  Spinal cord stimulation may be reasonable but recommend trialing ablation first.  Dr. Tripathi also asks for opinion regarding pain medication.    Today his pain is 5/10VAS in severity. His pain is primarily in the thoracic area with no radiculopathy.  This pain is 5/10VAS in severity.  He is pending Bilateral Thoracic MBB x 2 with goal of performing RFL.  This has yet to be auth'd by insurance. He has previously been maintained on Tramadol 50mg q6h.  He states that he doesn't get good relief with this medication. He reports 10-20% relief from this medication. He denies any side effects.  He requests pain medication today as his pain is interfering with his quality of life and his functionality.      Patient remained masked during entire encounter. No cough present. I donned a mask and eye protection throughout entire visit. Prior to donning mask and eye protection, hand hygiene was performed, as well as when  it was doffed.  I was closer than 6 feet, but not for an extended period of time. No obvious exposure to any bodily fluids.    Back Pain  This is a chronic problem. The current episode started more than 1 year ago. The problem occurs daily. The problem has been gradually worsening since onset. The pain is present in the thoracic spine and lumbar spine. The quality of the pain is described as aching. The pain does not radiate. The pain is at a severity of 5/10. The pain is severe. The symptoms are aggravated by bending, standing and twisting. Stiffness is present in the morning. Pertinent negatives include no numbness or weakness. Risk factors include obesity. He has tried analgesics, heat, ice, NSAIDs, muscle relaxant and home exercises for the symptoms. The treatment provided mild relief.      PEG Assessment   What number best describes your pain on average in the past week?5  What number best describes how, during the past week, pain has interfered with your enjoyment of life?5  What number best describes how, during the past week, pain has interfered with your general activity?  5    The following portions of the patient's history were reviewed and updated as appropriate: allergies, current medications, past family history, past medical history, past social history, past surgical history and problem list.    Review of Systems   Constitutional: Negative for fatigue.   HENT: Negative for congestion.    Eyes: Negative for visual disturbance.   Respiratory: Negative for cough, shortness of breath and wheezing.    Cardiovascular: Negative.    Gastrointestinal: Negative for constipation and diarrhea.   Genitourinary: Negative for difficulty urinating.   Musculoskeletal: Positive for back pain (thoracic and lumbar) and joint swelling (left knee).   Skin: Negative for rash.   Allergic/Immunologic: Negative for immunocompromised state.   Neurological: Negative for weakness and numbness.   Hematological: Does not  "bruise/bleed easily.   Psychiatric/Behavioral: Positive for sleep disturbance. Negative for suicidal ideas. The patient is not nervous/anxious.      --  The aforementioned information the Chief Complaint section and above subjective data including any HPI data, and also the Review of Systems data, has been personally reviewed and affirmed.  --    Vitals:    12/07/20 1546   BP: 109/74   Pulse: 96   Resp: 20   Temp: 98 °F (36.7 °C)   SpO2: 96%   Weight: 134 kg (295 lb)   Height: 177.8 cm (70\")   PainSc:   5   PainLoc: Back     Objective   Physical Exam  Vitals signs and nursing note reviewed.   Constitutional:       Appearance: Normal appearance. He is well-developed.   HENT:      Head: Normocephalic and atraumatic.   Eyes:      General: Lids are normal.      Conjunctiva/sclera: Conjunctivae normal.   Neck:      Musculoskeletal: Normal range of motion.      Trachea: Trachea normal.   Cardiovascular:      Rate and Rhythm: Normal rate.   Pulmonary:      Effort: Pulmonary effort is normal.   Musculoskeletal:      Thoracic back: He exhibits decreased range of motion, tenderness and bony tenderness.      Lumbar back: He exhibits decreased range of motion and tenderness.      Comments: POS Pain with palpation of thoracic region   Skin:     General: Skin is warm and dry.   Neurological:      Mental Status: He is alert and oriented to person, place, and time.      Gait: Gait normal.   Psychiatric:         Attention and Perception: Attention normal.         Mood and Affect: Mood normal.         Speech: Speech normal.         Behavior: Behavior normal.         Judgment: Judgment normal.       Assessment/Plan   Diagnoses and all orders for this visit:    1. Chronic bilateral thoracic back pain (Primary)    2. Chronic midline low back pain without sciatica    3. Chronic pain syndrome  -     Ambulatory Referral to Psychology    4. History of lumbar spinal fusion    5. Lumbar radiculopathy    Other orders  -     meloxicam (MOBIC) " 7.5 MG tablet; Take 1 tablet by mouth Daily.  Dispense: 30 tablet; Refill: 0      --- Request records from Dr. Nair  --- Request records from [facility] pain management center. (Dr. León)  --- Refer for opioid risk assessment.   --- Discussed with the patient regarding long-term side effects of opioids including but not limited to dependence, addiction, sedation, respiratory depression, opioid induced hormonal suppression, hyperalgesia, and elevated risk of myocardial infarction.  --- Discussed expectation with opioid treatment for chronic pain including that all that can be expected is a 30% reduction in pain with opioids alone.   --- Proceed with previously ordered Thoracic MBB x 2, 1-2 weeks apart with goal of performing RFL.   Reviewed the procedure at length with the patient.  Included in the review was expectations, complications, risk and benefits.The procedure was described in detail and the risks, benefits and alternatives were discussed with the patient (including but not limited to: bleeding, infection, nerve damage, worsening of pain, inability to perform injection, paralysis, seizures, and death) who agreed to proceed.  Discussed the potential for sedation if warranted/wanted.  The procedure will plan to be performed at [facility] with fluoroscopic guidance(unless ultrasound is indicated) and could potentially have steroids and contrast dye used. Questions were answered and in a way the patient could understand.  Patient verbalized understanding and wishes to proceed.  This intervention will be ordered.  Discussed with patient that all procedures are part of a multimodal plan of care and include either formal PT or a home exercise program.  Patient has no evidence of coagulopathy or current infection.  --- Trial Mobic 7.5 mg daily, take with food.   --- Follow-up after procedure     ROSI REPORT    ROSI report has been reviewed and scanned into the patient's chart.    As the  clinician, I personally reviewed the ROSI from 12/7/2020 while the patient was in the office today.    EMR Dragon/Transcription disclaimer:   Much of this encounter note is an electronic transcription/translation of spoken language to printed text. The electronic translation of spoken language may permit erroneous, or at times, nonsensical words or phrases to be inadvertently transcribed; Although I have reviewed the note for such errors, some may still exist.

## 2020-12-23 ENCOUNTER — OFFICE VISIT (OUTPATIENT)
Dept: CARDIOLOGY | Facility: CLINIC | Age: 50
End: 2020-12-23

## 2020-12-23 VITALS
SYSTOLIC BLOOD PRESSURE: 140 MMHG | HEART RATE: 84 BPM | WEIGHT: 291.4 LBS | BODY MASS INDEX: 41.72 KG/M2 | HEIGHT: 70 IN | OXYGEN SATURATION: 98 % | DIASTOLIC BLOOD PRESSURE: 90 MMHG

## 2020-12-23 DIAGNOSIS — R09.89 LABILE BLOOD PRESSURE: Primary | ICD-10-CM

## 2020-12-23 DIAGNOSIS — I25.10 CORONARY ARTERY DISEASE INVOLVING NATIVE CORONARY ARTERY OF NATIVE HEART WITHOUT ANGINA PECTORIS: ICD-10-CM

## 2020-12-23 DIAGNOSIS — R55 SYNCOPE AND COLLAPSE: ICD-10-CM

## 2020-12-23 PROCEDURE — 93000 ELECTROCARDIOGRAM COMPLETE: CPT | Performed by: INTERNAL MEDICINE

## 2020-12-23 PROCEDURE — 99214 OFFICE O/P EST MOD 30 MIN: CPT | Performed by: INTERNAL MEDICINE

## 2020-12-23 NOTE — PROGRESS NOTES
Date of Office Visit: 2020    Patient Name: Ronni Asif  : 1970    Encounter Provider: Víctor Conteh MD  Referring Provider: No ref. provider found  Place of Service: [facility] CARDIOLOGY  Patient Care Team:  Gil Chambers MD as PCP - General (Family Medicine)      Chief Complaint   Patient presents with   • Hypertension     History of Present Illness    The patient is a 50-year-old white male with a history of diabetes, labile hypertension associated with intermittent syncope.  The suspicion is that the patient probably has autonomic dysfunction secondary to his diabetes.  He has struggled for control over the past few years.  He is also been morbidly obese and recently underwent a lap band procedure.  So far he has lost about 20 pounds.    The patient does have coronary disease as evidenced by positive calcium score specifically in the right coronary artery.  He does not complain of any angina pectoris.    The patient has had several different evaluations over the past few years including in [employer] and here locally.  He is now being referred to a physician in the [place] system who deals with autonomic dysfunction issues.    Past Medical History:   Diagnosis Date   • Anxiety    • Asthma    • Cancer (CMS/HCC)     skin   • DDD (degenerative disc disease), lumbar    • Diabetes mellitus (CMS/HCC)    • Dizziness    • ED (erectile dysfunction)    • GERD (gastroesophageal reflux disease)    • Headache    • Hyperlipidemia    • Hypertension    • Injury of back 2016    Pt fell 20 feet    • Kidney stone    • Neuromuscular disorder (CMS/HCC)    • Pneumonia    • Sleep apnea     cpap   • Stroke (CMS/HCC)     tia   • Syncope and collapse    • TIA (transient ischemic attack)          Past Surgical History:   Procedure Laterality Date   • COLONOSCOPY     • COLONOSCOPY N/A 2019    Procedure: COLONOSCOPY to Cecum with Hot and Cold  "Polypectomy x 2;  Surgeon: Navid Zafar Jr., MD;  Location: [facility] ENDOSCOPY;  Service: General   • ENDOSCOPY N/A 5/23/2019    Procedure: ESOPHAGOGASTRODUODENOSCOPY with Bx's;  Surgeon: Navid Zafar Jr., MD;  Location: [facility] ENDOSCOPY;  Service: General   • HERNIA REPAIR  10/2015   • LAPAROSCOPIC GASTRIC BANDING      June 2020   • LUMBAR DISCECTOMY FUSION INSTRUMENTATION Bilateral 8/30/2019    Procedure: LUMBAR 5 TO SACRAL 1 OPEN DECOMPRESSION WITH  POSTERIOR LUMBAR INTERBODY FUSION, CAGE AND SCREW;  Surgeon: Jake Tripathi MD;  Location: [facility] MAIN OR;  Service: Neurosurgery           Current Outpatient Medications:   •  acetaminophen (TYLENOL) 325 MG tablet, Take 2 tablets by mouth Every 6 (Six) Hours As Needed for Mild Pain ., Disp: , Rfl:   •  ADMELOG SOLOSTAR 100 UNIT/ML solution pen-injector, INJECT 5 UNITS INTO THE SKIN 3 TIMES DAILY BEFORE MEALS, Disp: , Rfl: 4  •  albuterol (2.5 MG/3ML) 0.083% nebulizer solution 3 mL, albuterol (5 MG/ML) 0.5% nebulizer solution 0.5 mL, Inhale 4 (four) times a day as needed., Disp: , Rfl:   •  albuterol (PROVENTIL HFA;VENTOLIN HFA) 108 (90 BASE) MCG/ACT inhaler, Inhale 2 puffs every 4 (four) hours as needed for wheezing., Disp: , Rfl:   •  allopurinol (ZYLOPRIM) 100 MG tablet, , Disp: , Rfl:   •  aspirin (ASPIRIN LOW DOSE) 81 MG EC tablet, TK 1 T PO QD, Disp: , Rfl:   •  atorvastatin (LIPITOR) 40 MG tablet, , Disp: , Rfl:   •  B-D 3CC LUER-ALECIA SYR 75BE3-1/2 23G X 1-1/2\" 3 ML misc, INJECT 1 EACH IM Q 14 DAYS UTD, Disp: , Rfl: 1  •  B-D UF III MINI PEN NEEDLES 31G X 5 MM misc, USE 1 QID, Disp: , Rfl: 3  •  BREO ELLIPTA 200-25 MCG/INH inhaler, INHALE 1 PO PUFF QD, Disp: , Rfl: 5  •  CloNIDine (CATAPRES) 0.1 MG tablet, Take 1 tablet by mouth 2 (Two) Times a Day., Disp: 60 tablet, Rfl: 5  •  Dulaglutide (Trulicity) 1.5 MG/0.5ML solution pen-injector, Inject 1.5 mg under the skin into the appropriate area as directed., Disp: , Rfl:   •  finasteride (PROPECIA) 1 MG " tablet, Take 1 mg by mouth Daily., Disp: , Rfl:   •  fluticasone-salmeterol (ADVAIR DISKUS) 250-50 MCG/DOSE DISKUS, INL 1 PUFF ITL BID, Disp: , Rfl:   •  FREESTYLE LITE test strip, TEST ONCE D, Disp: , Rfl: 3  •  gabapentin (Neurontin) 600 MG tablet, Take 1 tablet by mouth 3 (Three) Times a Day., Disp: 90 tablet, Rfl: 2  •  Insulin Lispro, 1 Unit Dial, (HUMALOG) 100 UNIT/ML solution pen-injector, 6 units breakfast and lunch and 8 units supper., Disp: , Rfl:   •  Lancets (FREESTYLE) lancets, TEST QD UTD, Disp: , Rfl: 3  •  lansoprazole (PREVACID) 30 MG capsule, , Disp: , Rfl:   •  meloxicam (MOBIC) 7.5 MG tablet, Take 1 tablet by mouth Daily., Disp: 30 tablet, Rfl: 0  •  metFORMIN ER (GLUCOPHAGE-XR) 500 MG 24 hr tablet, Take 2 tablets by mouth 2 (Two) Times a Day., Disp: , Rfl: 1  •  methocarbamol (ROBAXIN) 750 MG tablet, TAKE 1 TABLET BY MOUTH EVERY 8 HOURS AS NEEDED FOR MUSCLE SPASMS, Disp: 60 tablet, Rfl: 1  •  metoprolol tartrate (LOPRESSOR) 50 MG tablet, , Disp: , Rfl:   •  multivitamin (DAILY CANDY) tablet tablet, Take 1 tablet by mouth Daily., Disp: , Rfl:   •  Nerve Stimulator (TENS THERAPY REPLACE BACK PADS) misc, 1 each., Disp: , Rfl:   •  ondansetron (ZOFRAN) 4 MG tablet, , Disp: , Rfl:   •  pantoprazole (PROTONIX) 40 MG EC tablet, Take 40 mg by mouth Daily., Disp: , Rfl:   •  polyethylene glycol (MIRALAX) packet, Take 17 g by mouth Daily As Needed (constipation)., Disp: , Rfl:   •  Steglatro 5 MG tablet, , Disp: , Rfl:   •  tadalafil (CIALIS) 20 MG tablet, Take 20 mg by mouth., Disp: , Rfl:   •  Testosterone Cypionate (DEPOTESTOTERONE CYPIONATE) 200 MG/ML injection, , Disp: , Rfl: 0  •  traMADol (ULTRAM) 50 MG tablet, Take 1 tablet by mouth 2 (two) times a day., Disp: 40 tablet, Rfl: 0  •  TRESIBA FLEXTOUCH 100 UNIT/ML solution pen-injector injection, INJECT 15 UNITS INTO THE SKIN DAILY, Disp: , Rfl: 3  •  triamcinolone (KENALOG) 0.1 % cream, APPLY TO AFFECTED THREE TIMES A DAY, Disp: , Rfl:   •  vitamin D  "(ERGOCALCIFEROL) 1.25 MG (06854 UT) capsule capsule, Take 50,000 Units by mouth., Disp: , Rfl:   •  vitamin D (ERGOCALCIFEROL) 19753 units capsule capsule, Take 50,000 Units by mouth 1 (One) Time Per Week., Disp: , Rfl:   •  fluticasone (FLONASE) 50 MCG/ACT nasal spray, 1 spray into the nostril(s) as directed by provider Daily., Disp: , Rfl:       Social History     Socioeconomic History   • Marital status:      Spouse name: Not on file   • Number of children: Not on file   • Years of education: Not on file   • Highest education level: Not on file   Tobacco Use   • Smoking status: Never Smoker   • Smokeless tobacco: Never Used   • Tobacco comment: no caffeine    Substance and Sexual Activity   • Alcohol use: No     Comment: caffeine weekly   • Drug use: No   • Sexual activity: Defer         Review of Systems   Constitution: Negative.   HENT: Negative.    Eyes: Negative.    Cardiovascular: Negative.    Respiratory: Negative.    Endocrine: Negative.    Skin: Negative.    Musculoskeletal: Negative.    Gastrointestinal: Negative.    Neurological: Negative.    Psychiatric/Behavioral: Negative.        Procedures      ECG 12 Lead    Date/Time: 12/23/2020 2:34 PM  Performed by: Víctor Conteh MD  Authorized by: Víctor Conteh MD   Comparison: compared with previous ECG from 12/20/2019  Comparison to previous ECG: Heart rate is slower  Rhythm: sinus rhythm  Rate: normal  Conduction: conduction normal  ST Segments: ST segments normal  QRS axis: normal                  Objective:    /90 (BP Location: Left arm, Patient Position: Sitting, Cuff Size: Adult)   Pulse 84   Ht 177.8 cm (70\")   Wt 132 kg (291 lb 6.4 oz)   SpO2 98%   BMI 41.81 kg/m²         Vitals signs reviewed.   Constitutional:       Appearance: Well-developed. Morbidly obese.   Eyes:      Pupils: Pupils are equal, round, and reactive to light.   HENT:      Head: Normocephalic.   Neck:      Musculoskeletal: Normal range of motion.      " Thyroid: No thyromegaly.      Vascular: No carotid bruit or JVD.   Pulmonary:      Effort: Pulmonary effort is normal.      Breath sounds: Normal breath sounds.   Cardiovascular:      Normal rate. Regular rhythm.      No gallop.   Pulses:     Intact distal pulses.   Edema:     Peripheral edema absent.   Abdominal:      General: Bowel sounds are normal.      Palpations: Abdomen is soft.   Skin:     General: Skin is warm and dry.      Findings: No erythema.   Neurological:      Mental Status: Alert and oriented to person, place, and time.             Assessment:       Diagnosis Plan   1. Labile blood pressure     2. Syncope and collapse     3. Coronary artery disease involving native coronary artery of native heart without angina pectoris       1.  Labile hypertension: The basis that this is probably multifactorial.  He probably has a significant component of autonomic dysfunction secondary to his diabetes.  2.  History of syncope: Suspected related to autonomic dysfunction of blood pressure control  3.  Coronary atherosclerosis: Positive calcium score.  No angina  4.  Morbid obesity: Status post recent lap band  5.  Diabetes mellitus, type II on medical therapy       Plan:       Point the patient is going to be followed by physician that deals with autonomic dysfunction.  I think this is a good course for him.  I will be happy to see him back as needed

## 2020-12-24 DIAGNOSIS — M54.16 LUMBAR RADICULOPATHY: ICD-10-CM

## 2020-12-24 RX ORDER — GABAPENTIN 600 MG/1
600 TABLET ORAL 3 TIMES DAILY
Qty: 90 TABLET | Refills: 2 | Status: SHIPPED | OUTPATIENT
Start: 2020-12-24 | End: 2021-02-28 | Stop reason: SDUPTHER

## 2020-12-30 DIAGNOSIS — M54.6 ACUTE MIDLINE THORACIC BACK PAIN: ICD-10-CM

## 2020-12-31 RX ORDER — TRAMADOL HYDROCHLORIDE 50 MG/1
50 TABLET ORAL 2 TIMES DAILY
Qty: 40 TABLET | Refills: 0 | Status: SHIPPED | OUTPATIENT
Start: 2020-12-31 | End: 2021-02-28 | Stop reason: SDUPTHER

## 2021-01-04 RX ORDER — MELOXICAM 7.5 MG/1
TABLET ORAL
Qty: 30 TABLET | Refills: 0 | Status: SHIPPED | OUTPATIENT
Start: 2021-01-04 | End: 2021-02-12 | Stop reason: SDDI

## 2021-02-10 ENCOUNTER — TELEPHONE (OUTPATIENT)
Dept: PAIN MEDICINE | Facility: CLINIC | Age: 51
End: 2021-02-10

## 2021-02-10 NOTE — TELEPHONE ENCOUNTER
Caller: SHONDA ACKERMAN    Relationship to patient: SELF    Best call back number: [phone]    Chief complaint: RECEIVED MESSAGE ON [employer] TO SCHEDULE APPT    Type of visit: NOT CERTAIN IF PROCEDURE OR FOLLOW UP    Requested date: ASAP

## 2021-02-12 ENCOUNTER — OFFICE VISIT (OUTPATIENT)
Dept: PAIN MEDICINE | Facility: CLINIC | Age: 51
End: 2021-02-12

## 2021-02-12 ENCOUNTER — PREP FOR SURGERY (OUTPATIENT)
Dept: SURGERY | Facility: SURGERY CENTER | Age: 51
End: 2021-02-12

## 2021-02-12 VITALS
BODY MASS INDEX: 41.09 KG/M2 | HEART RATE: 89 BPM | HEIGHT: 70 IN | SYSTOLIC BLOOD PRESSURE: 129 MMHG | WEIGHT: 287 LBS | TEMPERATURE: 97.3 F | OXYGEN SATURATION: 96 % | RESPIRATION RATE: 18 BRPM | DIASTOLIC BLOOD PRESSURE: 87 MMHG

## 2021-02-12 DIAGNOSIS — M54.16 LUMBAR RADICULOPATHY: ICD-10-CM

## 2021-02-12 DIAGNOSIS — M54.2 NECK PAIN: ICD-10-CM

## 2021-02-12 DIAGNOSIS — Z98.1 HISTORY OF LUMBAR SPINAL FUSION: ICD-10-CM

## 2021-02-12 DIAGNOSIS — M54.6 CHRONIC BILATERAL THORACIC BACK PAIN: Primary | ICD-10-CM

## 2021-02-12 DIAGNOSIS — G89.29 CHRONIC MIDLINE LOW BACK PAIN WITHOUT SCIATICA: Primary | ICD-10-CM

## 2021-02-12 DIAGNOSIS — G89.29 CHRONIC BILATERAL THORACIC BACK PAIN: Primary | ICD-10-CM

## 2021-02-12 DIAGNOSIS — M54.6 CHRONIC BILATERAL THORACIC BACK PAIN: ICD-10-CM

## 2021-02-12 DIAGNOSIS — M54.6 PAIN IN THORACIC SPINE: ICD-10-CM

## 2021-02-12 DIAGNOSIS — M54.50 CHRONIC MIDLINE LOW BACK PAIN WITHOUT SCIATICA: Primary | ICD-10-CM

## 2021-02-12 DIAGNOSIS — G89.29 CHRONIC BILATERAL THORACIC BACK PAIN: ICD-10-CM

## 2021-02-12 DIAGNOSIS — M47.814 ARTHROPATHY OF THORACIC FACET JOINT: ICD-10-CM

## 2021-02-12 PROCEDURE — 99215 OFFICE O/P EST HI 40 MIN: CPT | Performed by: NURSE PRACTITIONER

## 2021-02-12 RX ORDER — SODIUM CHLORIDE 0.9 % (FLUSH) 0.9 %
10 SYRINGE (ML) INJECTION AS NEEDED
Status: CANCELLED | OUTPATIENT
Start: 2021-02-12

## 2021-02-12 RX ORDER — SODIUM CHLORIDE 0.9 % (FLUSH) 0.9 %
10 SYRINGE (ML) INJECTION EVERY 12 HOURS SCHEDULED
Status: CANCELLED | OUTPATIENT
Start: 2021-02-12

## 2021-02-12 RX ORDER — ACETAMINOPHEN AND CODEINE PHOSPHATE 300; 30 MG/1; MG/1
1 TABLET ORAL EVERY 12 HOURS PRN
Qty: 60 TABLET | Refills: 0 | Status: SHIPPED | OUTPATIENT
Start: 2021-02-12 | End: 2021-04-30 | Stop reason: CLARIF

## 2021-02-12 NOTE — PROGRESS NOTES
CHIEF COMPLAINT  Back pain has increased since last visit    Subjective   Ronni Asif is a 50 y.o. male  who presents for follow-up.  He has a history of back pain.  Office note from 11/23/2020 with Dr. Tripathi reviewed.  Patient has significant facet disease and disc space collapse which is likely the main source of his thoracic back pain.  Dr. Tripathi agrees with proceeding with thoracic region ablation, surgery is not an option.  Spinal cord stimulation may be reasonable but recommend trialing ablation first.  Dr. Tripathi also asks for opinion regarding pain medication.    Today his pain is 5/10VAS in severity. Thoracic medial branch blocks with goal of proceeding with RFL previously ordered.  It is unclear why this procedure wasn't scheduled.  Patient states he was never contacted to schedule this. Today patient states his goal is to get off of disability and return to work.  He has previously trialed tramadol with minimal benefit to his pain and he states that Norco caused drowsiness which interfered with his work in the past.  Discussed with patient that when he comes to treating his chronic back pain with opioids the CDC states that only a 30% reduction in pain can be expected.  Discussed long term risk of opioids with patient including but not limited to hyper-algesia, mental and/or physical dependence and addiction, hormone imbalance, and MI.  Discussed with patient that we can trial tylenol #3 BID PRN for severe pain, but our goal will be to proceed with medial branch blocks and hopefully RFL for his chronic back pain.      We also discussed that if he does not have any improvement with ablation our next step will be to consider SCS therapy. Discussed that we have no plans to escalate opioid therapy in the future. Patient states understanding.     He is currently maintained on Gabapentin 600 mg TID, methocarbamol 750 mg, and states that when his pain is severe he will take up to 15-20  ibuprofen in a day.  He is also on meloxicam.  Patient informed that he can NOT take meloxicam and ibuprofen together.  We will discontinue his Meloxicam today.  Patient also states he had a lap band procedure last year.  I informed the patient that it is possible he should NOT be taking any NSAIDs at all.  He state he will contact his surgeon, Dr. Yu, and verify if he can continue with Ibuprofen.  Patient educated that IF Dr. Yu OK's ibuprofen use he is not to exceed the recommended daily amount listed on the bottle. Patient states understanding.     Patient remained masked during entire encounter. No cough present. I donned a mask and eye protection throughout entire visit. Prior to donning mask and eye protection, hand hygiene was performed, as well as when it was doffed.  I was closer than 6 feet, but not for an extended period of time. No obvious exposure to any bodily fluids.    Back Pain  This is a chronic problem. The current episode started more than 1 year ago. The problem occurs daily. The problem has been gradually worsening since onset. The pain is present in the thoracic spine and lumbar spine. The quality of the pain is described as aching. The pain does not radiate. The pain is at a severity of 5/10. The pain is severe. The symptoms are aggravated by bending, standing and twisting. Stiffness is present in the morning. Pertinent negatives include no chest pain, dysuria, fever, headaches, numbness or weakness. Risk factors include obesity. He has tried analgesics, heat, ice, NSAIDs, muscle relaxant and home exercises for the symptoms. The treatment provided mild relief.      PEG Assessment   What number best describes your pain on average in the past week?5  What number best describes how, during the past week, pain has interfered with your enjoyment of life?4  What number best describes how, during the past week, pain has interfered with your general activity?  5    The following portions of  "the patient's history were reviewed and updated as appropriate: allergies, current medications, past family history, past medical history, past social history, past surgical history and problem list.    Review of Systems   Constitutional: Negative for chills and fever.   Respiratory: Negative for shortness of breath.    Cardiovascular: Negative for chest pain.   Gastrointestinal: Negative for constipation, diarrhea, nausea and vomiting.   Genitourinary: Negative for difficulty urinating, dysuria and enuresis.   Musculoskeletal: Positive for back pain.   Neurological: Negative for dizziness, weakness, light-headedness, numbness and headaches.   Psychiatric/Behavioral: Negative for confusion, hallucinations, self-injury, sleep disturbance and suicidal ideas. The patient is not nervous/anxious.    All other systems reviewed and are negative.    --  The aforementioned information the Chief Complaint section and above subjective data including any HPI data, and also the Review of Systems data, has been personally reviewed and affirmed.  --        Vitals:    02/12/21 1320   BP: 129/87   Pulse: 89   Resp: 18   Temp: 97.3 °F (36.3 °C)   SpO2: 96%   Weight: 130 kg (287 lb)   Height: 177.8 cm (70\")   PainSc:   5   PainLoc: Back     Objective   Physical Exam  Vitals signs and nursing note reviewed.   Constitutional:       Appearance: Normal appearance. He is well-developed.   Eyes:      General: Lids are normal.   Neck:      Musculoskeletal: Normal range of motion.   Cardiovascular:      Rate and Rhythm: Normal rate.   Pulmonary:      Effort: Pulmonary effort is normal.   Musculoskeletal:      Thoracic back: He exhibits decreased range of motion and tenderness.      Lumbar back: He exhibits decreased range of motion and tenderness.      Comments: POS pain with palpation over thoracic spine   Neurological:      Mental Status: He is alert and oriented to person, place, and time.   Psychiatric:         Attention and Perception: " "Attention normal.         Mood and Affect: Mood normal.         Speech: Speech normal.         Behavior: Behavior normal.         Judgment: Judgment normal.       Assessment/Plan   Diagnoses and all orders for this visit:    1. Chronic midline low back pain without sciatica (Primary)    2. Chronic bilateral thoracic back pain    3. Neck pain    4. Pain in thoracic spine    5. Lumbar radiculopathy    6. History of lumbar spinal fusion    Other orders  -     acetaminophen-codeine (TYLENOL #3) 300-30 MG per tablet; Take 1 tablet by mouth Every 12 (Twelve) Hours As Needed for Moderate Pain .  Dispense: 60 tablet; Refill: 0      --- 46 minutes spent in chart review, face to face time with the patient, and documentation.   --- Discontinue Meloxicam  --- Discontinue Tramadol  --- Continue OTC ibuprofen ONLY if approved by Dr. Yu  --- Proceed with Bilateral thoracic medial branch blockades (T9-T11 vs. T8-T10) with NO STEROIDS x 2, 2-4 weeks apart  -------  Education about Medial Branch Blockade and RF Therapy:    This medial branch blockade (MBB) suggested is intended for diagnostic purposes, with the intent of offering the patient Radiofrequency thermal rhizotomy (RF) if the MBB is diagnostically effective.  The diagnostic blockade is necessary to determine the likelihood that RF therapy could be efficacious in providing long term relief to the patient.    Medial branches are sensory nerve branches that connect to a facet joint and transmit sensations & pain signals from that joint.  Facet is a term for the type of joints found in the spine.  Medial branches are the nerves that go to a facet, and therefore are also sometimes called \"facet joint nerves\" (FJNs).      In a medial branch blockade procedure, xray fluoroscopy is used to verify the locations of the outside of the joint lines which are being targeted.  Under xray guidance, needles are placed to these areas.  Contrast dye is injected to confirm proper " placement, with dye flowing over the joint area, and to ensure that the dye does not flow into unintended areas such as a vein.  When this is confirmed, local anesthetic is injected to block the medial branch at that joint level.      If MBBs are diagnostically successful in blocking pain, then the patient is most likely a great candidate for Radiofrequency of those facet joint nerves.  In the RF procedure, needles are placed to the joint lines in the same fashion, and after testing, the needle tips are heated to thermally treat the nerves, blocking the nerves by in essence damaging the nerves with the heat treatment.       Medically, a successful RF procedure should provide a patient with 50% pain relief or more for at least 6 months.  Clinical experience suggests that successful patients receive relief more in the range of 12 months on average.  We also discussed that a fortunate minority of patients receive therapeutic success from the MBB, and may not require RF ablation.  If a patient receives more than 8 weeks of relief from MBB, then occasional repeat MBB for therapeutic purposes is a very reasonable alternative therapy.  This course of therapy is consistent with our LCDs according to our CMS  in the area, and therefore other insurance providers should follow accordingly.  We will monitor our patients to screen for these therapeutic responders and will offer RF therapy only when necessary.        We discussed that MBB & RF are not without risks.  Guidelines regarding anticoagulant use & neuraxial procedures will be respected.  Patients that are ill or otherwise may be at risk for sepsis will not have their spines accessed by neuraxial injections of any type.  This patient will not be offered these therapies if there is an increased risk.   We discussed that there is a risk of postprocedural pain and also a risk of worsening of clinical picture with these procedures as with any neuraxial  procedure.    -------  --- Tylenol #3 BID PRN. Discussed medication with the patient.  Included in this discussion was the potential for side effects and adverse events.  Patient verbalized understanding and wished to proceed.  Prescription will be sent to pharmacy.  --- The urine drug screen confirmation from 11/9/2020 has been reviewed and the result is appropriate based on patient history and ROSI report  --- Opioid risk assessment: Low Risk   --- Patient to bring records from Dr. Nair to next office visit.  --- Follow-up 1 month or sooner if needed      ROSI REPORT  As part of the patient's treatment plan, I am prescribing controlled substances. The patient has been made aware of appropriate use of such medications, including potential risk of somnolence, limited ability to drive and/or work safely, and the potential for dependence or overdose. It has also bee made clear that these medications are for use by this patient only, without concomitant use of alcohol or other substances unless prescribed.     Patient has completed prescribing agreement detailing terms of continued prescribing of controlled substances, including monitoring ROSI reports, urine drug screening, and pill counts if necessary. The patient is aware that inappropriate use will results in cessation of prescribing such medications.    ROSI report has been reviewed and scanned into the patient's chart.    As the clinician, I personally reviewed the ROSI from 2/12/2021 while the patient was in the office today.    History and physical exam exhibit continued safe and appropriate use of controlled substances.    EMR Dragon/Transcription disclaimer:   Much of this encounter note is an electronic transcription/translation of spoken language to printed text. The electronic translation of spoken language may permit erroneous, or at times, nonsensical words or phrases to be inadvertently transcribed; Although I have reviewed the note for such  errors, some may still exist.

## 2021-02-15 ENCOUNTER — TELEPHONE (OUTPATIENT)
Dept: PAIN MEDICINE | Facility: CLINIC | Age: 51
End: 2021-02-15

## 2021-02-15 NOTE — TELEPHONE ENCOUNTER
Informed patient that his procedure order has to be sent off for authorization and once we receive the authorization iur  will call him to schedule.

## 2021-02-15 NOTE — TELEPHONE ENCOUNTER
Caller: SHONDA ACKERMAN    Relationship to patient: SELF    Best call back number: [phone]    Chief complaint:PATIENT REQUESTING STATUS OF ORDER FOR PROCEDURE WITH DR GALVAN     Type of visit: PAIN BLOCK    Requested date: ASAP

## 2021-02-18 ENCOUNTER — TELEPHONE (OUTPATIENT)
Dept: PAIN MEDICINE | Facility: CLINIC | Age: 51
End: 2021-02-18

## 2021-02-18 NOTE — TELEPHONE ENCOUNTER
Codeine tabs PA denied. Submitted an appeal today. Will let you know outcome. Please advise. Thank you.

## 2021-02-19 ENCOUNTER — TELEPHONE (OUTPATIENT)
Dept: PAIN MEDICINE | Facility: CLINIC | Age: 51
End: 2021-02-19

## 2021-02-19 ENCOUNTER — PRIOR AUTHORIZATION (OUTPATIENT)
Dept: PAIN MEDICINE | Facility: CLINIC | Age: 51
End: 2021-02-19

## 2021-02-19 NOTE — TELEPHONE ENCOUNTER
Tylenol #3 was denied by pt insurance after submitting PA. I sent an appeal on 2/18/21, [facility] faxed letter stg they do not process appeal requests per pt prescription benefit program. How would you like to proceed?  Please advise. Thank you.

## 2021-02-19 NOTE — TELEPHONE ENCOUNTER
I called [facility] to attempt an appeal. I was transferred to [place] appeals dept automated message std due to inclement weather call cannot be completed at this time. Before transfer rep gave me fax number for appeals. Rep std pt has medicaid so I faxed appeal request to another fax number in the hopes of review. I will keep you informed.

## 2021-02-19 NOTE — TELEPHONE ENCOUNTER
SHONDA ACKERMAN (Key: [id number])  Acetaminophen-Codeine #3 300-30MG tablets  Outcome: Denied    Created: February 12th, 2021 [phone]    Sent: February 16th, 2021    Open  Archive    Submitted PA appeal on 2/18/21 however:  Per [facility] they do not process appeal requests per pt prescription benefit program.

## 2021-02-28 DIAGNOSIS — M54.16 LUMBAR RADICULOPATHY: ICD-10-CM

## 2021-02-28 DIAGNOSIS — M54.6 ACUTE MIDLINE THORACIC BACK PAIN: ICD-10-CM

## 2021-03-01 RX ORDER — TRAMADOL HYDROCHLORIDE 50 MG/1
50 TABLET ORAL 2 TIMES DAILY
Qty: 40 TABLET | Refills: 0 | Status: SHIPPED | OUTPATIENT
Start: 2021-03-01 | End: 2021-04-30 | Stop reason: CLARIF

## 2021-03-01 RX ORDER — METHOCARBAMOL 750 MG/1
750 TABLET, FILM COATED ORAL EVERY 8 HOURS PRN
Qty: 60 TABLET | Refills: 1 | Status: SHIPPED | OUTPATIENT
Start: 2021-03-01 | End: 2021-07-13 | Stop reason: SDUPTHER

## 2021-03-01 NOTE — TELEPHONE ENCOUNTER
Last Robaxin 750 mg # 60 q 6 hrs + 1Rf on 10/20/20, Tramadol 50mg # 40 bid +0RF on 12/31/20.  Last OV 11/23/20, future appt 5/24/21.

## 2021-03-03 RX ORDER — GABAPENTIN 600 MG/1
600 TABLET ORAL 3 TIMES DAILY
Qty: 90 TABLET | Refills: 2 | Status: SHIPPED | OUTPATIENT
Start: 2021-03-03 | End: 2021-05-26 | Stop reason: SDUPTHER

## 2021-03-08 RX ORDER — MELOXICAM 7.5 MG/1
TABLET ORAL
Qty: 30 TABLET | Refills: 0 | OUTPATIENT
Start: 2021-03-08

## 2021-03-12 ENCOUNTER — OFFICE VISIT (OUTPATIENT)
Dept: PAIN MEDICINE | Facility: CLINIC | Age: 51
End: 2021-03-12

## 2021-03-12 VITALS
TEMPERATURE: 97.8 F | RESPIRATION RATE: 20 BRPM | HEIGHT: 70 IN | DIASTOLIC BLOOD PRESSURE: 82 MMHG | HEART RATE: 80 BPM | OXYGEN SATURATION: 96 % | WEIGHT: 287 LBS | BODY MASS INDEX: 41.09 KG/M2 | SYSTOLIC BLOOD PRESSURE: 121 MMHG

## 2021-03-12 DIAGNOSIS — M54.6 CHRONIC BILATERAL THORACIC BACK PAIN: ICD-10-CM

## 2021-03-12 DIAGNOSIS — G89.29 CHRONIC BILATERAL THORACIC BACK PAIN: ICD-10-CM

## 2021-03-12 DIAGNOSIS — G89.29 CHRONIC MIDLINE LOW BACK PAIN WITHOUT SCIATICA: Primary | ICD-10-CM

## 2021-03-12 DIAGNOSIS — Q76.2 CONGENITAL SPONDYLOLYSIS, LUMBOSACRAL REGION: ICD-10-CM

## 2021-03-12 DIAGNOSIS — M54.50 CHRONIC MIDLINE LOW BACK PAIN WITHOUT SCIATICA: Primary | ICD-10-CM

## 2021-03-12 DIAGNOSIS — Z98.1 HISTORY OF LUMBAR SPINAL FUSION: ICD-10-CM

## 2021-03-12 PROCEDURE — 99214 OFFICE O/P EST MOD 30 MIN: CPT | Performed by: NURSE PRACTITIONER

## 2021-03-12 RX ORDER — METOPROLOL SUCCINATE 50 MG/1
50 TABLET, EXTENDED RELEASE ORAL DAILY
COMMUNITY
Start: 2021-01-20 | End: 2021-12-17

## 2021-03-12 RX ORDER — DULAGLUTIDE 1.5 MG/.5ML
1.5 INJECTION, SOLUTION SUBCUTANEOUS WEEKLY
COMMUNITY
Start: 2021-02-23

## 2021-03-12 RX ORDER — MELOXICAM 7.5 MG/1
7.5 TABLET ORAL DAILY
COMMUNITY
End: 2021-04-30

## 2021-03-12 NOTE — PROGRESS NOTES
CHIEF COMPLAINT  F/u back pain. Pt sts pain is the same. Pt would like to discuss status of Bilateral thoracic medial branch blockades (T9-T11 vs. T8-T10) with NO STEROIDS x 2.    Subjective   Ronni Asif is a 50 y.o. male  who presents for follow-up.  He has a history of back pain.     Today his pain is 6/10VAS in severity. We trialed Tylenol # 3 with minimal improvement in his pain.  He returned to Tramadol which is being prescribed by Dr. Tripathi.  Discussed with patient that he CAN NOT get opioids from two different offices at the same time.  At this time we will Hold off on any prescriptions as his prescriptions continued to be refilled by Neurosurgery currently. Patient states understanding.  If Neurosurgery would like us to assume management of his opioids the patient will let us know.  Patient states understanding.    Plan continues to be to proceed with thoracic MBBs with goal of RFL.  I will check with scheduling regarding the authorization for this.     Patient remained masked during entire encounter. No cough present. I donned a mask and eye protection throughout entire visit. Prior to donning mask and eye protection, hand hygiene was performed, as well as when it was doffed.  I was closer than 6 feet, but not for an extended period of time. No obvious exposure to any bodily fluids.    Back Pain  This is a chronic problem. The current episode started more than 1 year ago. The problem occurs daily. The problem has been gradually worsening since onset. The pain is present in the thoracic spine and lumbar spine. The quality of the pain is described as aching. The pain does not radiate. The pain is at a severity of 6/10. The pain is severe. The symptoms are aggravated by bending, standing and twisting. Stiffness is present in the morning. Pertinent negatives include no chest pain, dysuria, fever, headaches, numbness or weakness. Risk factors include obesity. He has tried analgesics, heat, ice,  "NSAIDs, muscle relaxant and home exercises for the symptoms. The treatment provided mild relief.      PEG Assessment   What number best describes your pain on average in the past week?6  What number best describes how, during the past week, pain has interfered with your enjoyment of life?6  What number best describes how, during the past week, pain has interfered with your general activity?  6    The following portions of the patient's history were reviewed and updated as appropriate: allergies, current medications, past family history, past medical history, past social history, past surgical history and problem list.    Review of Systems   Constitutional: Negative for activity change, fatigue and fever.   HENT: Negative for congestion.    Eyes: Negative for visual disturbance.   Respiratory: Negative for cough and shortness of breath.    Cardiovascular: Negative for chest pain.   Gastrointestinal: Negative for constipation and diarrhea.   Endocrine: Negative for polyuria.   Genitourinary: Negative for difficulty urinating and dysuria.   Musculoskeletal: Positive for back pain.   Allergic/Immunologic: Negative for immunocompromised state.   Neurological: Negative for dizziness, weakness, light-headedness, numbness and headaches.   Psychiatric/Behavioral: Negative for agitation, self-injury, sleep disturbance and suicidal ideas. The patient is not nervous/anxious.      --  The aforementioned information the Chief Complaint section and above subjective data including any HPI data, and also the Review of Systems data, has been personally reviewed and affirmed.  --    Vitals:    03/12/21 1317   BP: 121/82   Pulse: 80   Resp: 20   Temp: 97.8 °F (36.6 °C)   SpO2: 96%   Weight: 130 kg (287 lb)   Height: 177.8 cm (70\")   PainSc:   6   PainLoc: Back     Objective   Physical Exam  Vitals and nursing note reviewed.   Constitutional:       Appearance: Normal appearance. He is well-developed.   Eyes:      General: Lids are " normal.   Cardiovascular:      Rate and Rhythm: Normal rate.   Pulmonary:      Effort: Pulmonary effort is normal.   Musculoskeletal:      Cervical back: Normal range of motion.      Thoracic back: Tenderness present. Decreased range of motion.   Neurological:      Mental Status: He is alert and oriented to person, place, and time.   Psychiatric:         Attention and Perception: Attention normal.         Mood and Affect: Mood normal.         Speech: Speech normal.         Behavior: Behavior normal.         Judgment: Judgment normal.       Assessment/Plan   Diagnoses and all orders for this visit:    1. Chronic midline low back pain without sciatica (Primary)    2. Chronic bilateral thoracic back pain    3. History of lumbar spinal fusion    4. Congenital spondylolysis, lumbosacral region      --- 37 minutes spent in face to face time with patient, chart review, and documentation   --- STOP Tylenol #3, he states that the tramadol was more helpful to his pain which  is currently being managed by Dr. Tripathi.  --- Proceed with Bilateral thoracic medial branch blockades (T9-T11 vs. T8-T10) with NO STEROIDS x 2, 2-4 weeks apart with goal of RFL, I will reach out to our  regarding why this has yet to be scheduled.  --- F/U after procedure     ROSI REPORT    ROSI report has been reviewed and scanned into the patient's chart.    As the clinician, I personally reviewed the ROSI from 3/12/2021 while the patient was in the office today.    EMR Dragon/Transcription disclaimer:   Much of this encounter note is an electronic transcription/translation of spoken language to printed text. The electronic translation of spoken language may permit erroneous, or at times, nonsensical words or phrases to be inadvertently transcribed; Although I have reviewed the note for such errors, some may still exist.

## 2021-03-15 PROBLEM — M47.814 ARTHROPATHY OF THORACIC FACET JOINT: Status: ACTIVE | Noted: 2021-03-15

## 2021-03-22 RX ORDER — MELOXICAM 7.5 MG/1
TABLET ORAL
Qty: 30 TABLET | Refills: 0 | OUTPATIENT
Start: 2021-03-22

## 2021-03-27 DIAGNOSIS — M54.16 LUMBAR RADICULOPATHY: ICD-10-CM

## 2021-03-29 RX ORDER — GABAPENTIN 600 MG/1
TABLET ORAL
Qty: 90 TABLET | Refills: 1 | OUTPATIENT
Start: 2021-03-29

## 2021-03-29 NOTE — TELEPHONE ENCOUNTER
Pt has received Rx 3/3/21 with two refills. He said he is out of town and tried to get it from pharmacy there but was unable.  He said that he is fine and do not need this refill at this time.

## 2021-04-02 RX ORDER — MELOXICAM 7.5 MG/1
TABLET ORAL
Qty: 30 TABLET | Refills: 0 | OUTPATIENT
Start: 2021-04-02

## 2021-04-10 ENCOUNTER — APPOINTMENT (OUTPATIENT)
Dept: LAB | Facility: SURGERY CENTER | Age: 51
End: 2021-04-10

## 2021-04-13 ENCOUNTER — TRANSCRIBE ORDERS (OUTPATIENT)
Dept: SURGERY | Facility: SURGERY CENTER | Age: 51
End: 2021-04-13

## 2021-04-13 DIAGNOSIS — Z41.9 SURGERY, ELECTIVE: Primary | ICD-10-CM

## 2021-04-21 DIAGNOSIS — M54.6 ACUTE MIDLINE THORACIC BACK PAIN: ICD-10-CM

## 2021-04-21 RX ORDER — METHOCARBAMOL 750 MG/1
750 TABLET, FILM COATED ORAL EVERY 8 HOURS PRN
Qty: 60 TABLET | Refills: 1 | OUTPATIENT
Start: 2021-04-21

## 2021-04-21 RX ORDER — TRAMADOL HYDROCHLORIDE 50 MG/1
50 TABLET ORAL 2 TIMES DAILY
Qty: 40 TABLET | Refills: 0 | OUTPATIENT
Start: 2021-04-21

## 2021-04-21 NOTE — TELEPHONE ENCOUNTER
RX refill request from pharmacy    Patient was last seen: 11-23-21    Patients next appointment: 05-24-21      Patient is seeing  pain management at [place].

## 2021-04-28 ENCOUNTER — TELEPHONE (OUTPATIENT)
Dept: PAIN MEDICINE | Facility: CLINIC | Age: 51
End: 2021-04-28

## 2021-04-28 NOTE — TELEPHONE ENCOUNTER
Provider: ELÍAS GALVAN  Caller: SHONDA ACKERMAN  Relationship to Patient: SELF    Phone Number: [phone]    Reason for Call: PATIENT REQUESTING TO SPEAK WITH [facility] HAS BEEN HAVING AN ISSUE AND WOULD LIKE TO SPEAK WITH SOMEONE IN [facility]

## 2021-04-30 ENCOUNTER — TRANSCRIBE ORDERS (OUTPATIENT)
Dept: LAB | Facility: SURGERY CENTER | Age: 51
End: 2021-04-30

## 2021-04-30 ENCOUNTER — TRANSCRIBE ORDERS (OUTPATIENT)
Dept: SURGERY | Facility: SURGERY CENTER | Age: 51
End: 2021-04-30

## 2021-04-30 ENCOUNTER — OFFICE VISIT (OUTPATIENT)
Dept: PAIN MEDICINE | Facility: CLINIC | Age: 51
End: 2021-04-30

## 2021-04-30 DIAGNOSIS — G89.29 CHRONIC BILATERAL THORACIC BACK PAIN: Primary | ICD-10-CM

## 2021-04-30 DIAGNOSIS — M54.6 CHRONIC BILATERAL THORACIC BACK PAIN: Primary | ICD-10-CM

## 2021-04-30 DIAGNOSIS — M54.50 CHRONIC MIDLINE LOW BACK PAIN WITHOUT SCIATICA: ICD-10-CM

## 2021-04-30 DIAGNOSIS — G89.29 CHRONIC MIDLINE LOW BACK PAIN WITHOUT SCIATICA: ICD-10-CM

## 2021-04-30 DIAGNOSIS — M47.814 SPONDYLOSIS WITHOUT MYELOPATHY OR RADICULOPATHY, THORACIC REGION: ICD-10-CM

## 2021-04-30 DIAGNOSIS — Z98.1 HISTORY OF LUMBAR SPINAL FUSION: ICD-10-CM

## 2021-04-30 DIAGNOSIS — Z01.818 OTHER SPECIFIED PRE-OPERATIVE EXAMINATION: Primary | ICD-10-CM

## 2021-04-30 DIAGNOSIS — M47.814 ARTHROPATHY OF THORACIC FACET JOINT: ICD-10-CM

## 2021-04-30 PROCEDURE — 99443 PR PHYS/QHP TELEPHONE EVALUATION 21-30 MIN: CPT | Performed by: NURSE PRACTITIONER

## 2021-04-30 RX ORDER — HYDROCODONE BITARTRATE AND ACETAMINOPHEN 5; 325 MG/1; MG/1
1 TABLET ORAL DAILY PRN
Qty: 30 TABLET | Refills: 0 | Status: SHIPPED | OUTPATIENT
Start: 2021-04-30 | End: 2021-06-14 | Stop reason: SDUPTHER

## 2021-04-30 NOTE — PROGRESS NOTES
"TELEPHONE VISIT    You have chosen to receive care through a telephone visit. Do you consent to use a telephone visit for your medical care today? Yes    CHIEF COMPLAINT: Back pain    Subjective   Ronni Asif is a 51 y.o. male  who presents for a telephonic follow-up.He has a history of back pain.  Today his pain is 9/10VAS in severity.  He states \"I over did it yesterday\".  He states he has been doing yard work, opening his pool, and doing various work around the house which has caused the flare in his pain.  He describes his pain as intermittent.  He states he wakes up sore and stiff.  This is improved by use of his heated mattress pad.  He states he has muscle pain which is more achy and a burning or shooting pain which occurs when his pain is the worst.     He has been utilizing Methocarbamol 3/day PRN (states he typically only has to utilize this a few times a week) and Gabapentin 600 mg TID. He states that the Gabapentin takes care of his nerve pain \"almost 99% of the time\".     Patient previously prescribed tramadol 50 mg with only minimal improvement in his pain, trial of Tylenol #3 provided no pain relief.  Patient states that he was previously on Norco 10/325 in the past and that this was too strong.  He states that it provided good pain relief, but he does not want to be sleepy.  He states his goal is to take minimal amounts of medication, and is hopeful that with procedures he may be able to wean some of his medication.    Back Pain  This is a chronic problem. The current episode started more than 1 year ago. The problem occurs daily. The problem has been gradually worsening since onset. The pain is present in the thoracic spine and lumbar spine. The quality of the pain is described as aching. The pain does not radiate. The pain is at a severity of 9/10. The pain is severe. The symptoms are aggravated by bending, standing and twisting. Stiffness is present in the morning. Pertinent negatives " include no chest pain, dysuria, fever, headaches, numbness or weakness. Risk factors include obesity. He has tried analgesics, heat, ice, NSAIDs, muscle relaxant and home exercises for the symptoms. The treatment provided mild relief.      The following portions of the patient's history were reviewed and updated as appropriate: allergies, current medications, past family history, past medical history, past social history, past surgical history and problem list.    Review of Systems   Constitutional: Negative for fever.   Cardiovascular: Negative for chest pain.   Genitourinary: Negative for dysuria.   Musculoskeletal: Positive for back pain.   Neurological: Negative for weakness, numbness and headaches.     Vitals:    04/30/21 1322   PainSc:   9   PainLoc: Back  Comment: mid-low back     Objective   Physical Exam  As this is a telephone check-in, the ability to perform a routine physical exam is extremely limited. The patient seems alert and is oriented appropriately.   On this phone call there is not any evidence of respiratory distress.   The patient seems of normal mood.   The remainder of a routine physical exam is deferred.    Assessment/Plan   Diagnoses and all orders for this visit:    1. Chronic bilateral thoracic back pain (Primary)    2. Arthropathy of thoracic facet joint    3. Spondylosis without myelopathy or radiculopathy, thoracic region    4. Chronic midline low back pain without sciatica    5. History of lumbar spinal fusion    Other orders  -     HYDROcodone-acetaminophen (NORCO) 5-325 MG per tablet; Take 1 tablet by mouth Daily As Needed for Severe Pain .  Dispense: 30 tablet; Refill: 0      ----------------    Our practice is offering alternative &/or electronic methods to continue to follow our patients while at the same time further the efforts toward social distancing, in accordance with our organizational policies, professional societies' guidance, and gubernatorial mandates.  I support the  Healthy at Home campaign and in this visit I have counseled the patient on our needs to limit in-person office visits and physical encounters with medical facilities whenever possible.  I have also educated the patient on the medical necessities of maintaining social distancing while we continue to function during this crisis period.      ----------------    --- This visit has been rescheduled as a phone visit to comply with patient safety concerns in accordance with CDC recommendations. Total time of discussion was 29 minutes.  --- I will assume the prescription of his Gabapentin 600 mg TID----He does have 1 more refill from [facility] that he will fill in May.   --- I will assume the prescription of his Robaxin 750 mg every 8 hours PRN  --- Start Norco 5/325 daily PRN for severe pain.  --- A medication management agreement reviewed verbally by myself with the patient today.  Reviewed with the patient that this contract is specific to controlled substances, specifically pain medication.  Reviewed core of pain medicine is to decrease pain and increase functional level. Reviewed the use of Andrey within the office setting.  Reviewed causes for potential of discontinuation of opiates,  including but not limited to consideration for diversion, obtaining other controlled substances from other license practitioners, or  inappropriate office behavior.  Patient understands to use a controlled substance as prescribed by physician and to avoid improper use of controlled substances.  Patient will also verbalized understanding that prescriptions to be filled at the same pharmacy  unless office staff is made aware of this.  Patient understands they can be submitted to random urine drug screens and/or random pill counts on any request.  Patient understands they are not to receive early refills.  The patient must produce an official police report for any effort to replace controlled substances that are lost or stolen.  No  use of illegal street drugs while receiving controlled substances from this prescribing provider.  The patient is to take medication as prescribed  and not deviate from the normal schedule without consultation from the provider.  Patient is not to share the medication with others or to take medication with alcohol or other sedatives.  Use caution when driving or operating machinery. Reviewed the use of controlled substances recreates a risk for respiratory depression, which may result in serious harm or even death.  Must always keep the prescription in the original container.  Patient is to store controlled substances in a locked cabinet or other secure storage unit that is cool, dry and out of sunlight.  Patient must immediately notify office if any controlled substances is stolen or improperly taken by another individual.  Reviewed risk for physical dependence, tolerance and addiction.  Patient states understanding of the above and would like to proceed with prescription of Norco 5/325 daily as needed for severe pain.  --- Reviewed with patient that he will need to sign a controlled substance agreement at his next office visit, patient states understanding  --- Proceed with previously scheduled Bilateral thoracic medial branch blockades (T9-T11 vs. T8-T10)  --- Follow-up 1 month or sooner if needed.      ROSI REPORT  As part of the patient's treatment plan, I am prescribing controlled substances. The patient has been made aware of appropriate use of such medications, including potential risk of somnolence, limited ability to drive and/or work safely, and the potential for dependence or overdose. It has also bee made clear that these medications are for use by this patient only, without concomitant use of alcohol or other substances unless prescribed.     I have explained the prescribing agreement detailing terms of continued prescribing of controlled substances, including monitoring ROSI reports, urine drug  screening, and pill counts if necessary. The patient is aware that inappropriate use will results in cessation of prescribing such medications.    ROSI report has been reviewed and scanned into the patient's chart.    As the clinician, I personally reviewed the ROSI from 4/30/2021 while the patient was in the office today.    History and physical exam exhibit continued safe and appropriate use of controlled substances.    -------    EMR Dragon/Transcription disclaimer:   Much of this encounter note is an electronic transcription/translation of spoken language to printed text. The electronic translation of spoken language may permit erroneous, or at times, nonsensical words or phrases to be inadvertently transcribed; Although I have reviewed the note for such errors, some may still exist.

## 2021-05-08 ENCOUNTER — APPOINTMENT (OUTPATIENT)
Dept: LAB | Facility: SURGERY CENTER | Age: 51
End: 2021-05-08

## 2021-05-11 ENCOUNTER — APPOINTMENT (OUTPATIENT)
Dept: GENERAL RADIOLOGY | Facility: SURGERY CENTER | Age: 51
End: 2021-05-11

## 2021-05-12 ENCOUNTER — TRANSCRIBE ORDERS (OUTPATIENT)
Dept: LAB | Facility: SURGERY CENTER | Age: 51
End: 2021-05-12

## 2021-05-12 DIAGNOSIS — Z01.818 OTHER SPECIFIED PRE-OPERATIVE EXAMINATION: Primary | ICD-10-CM

## 2021-05-18 ENCOUNTER — APPOINTMENT (OUTPATIENT)
Dept: GENERAL RADIOLOGY | Facility: SURGERY CENTER | Age: 51
End: 2021-05-18

## 2021-05-18 ENCOUNTER — TRANSCRIBE ORDERS (OUTPATIENT)
Dept: SURGERY | Facility: SURGERY CENTER | Age: 51
End: 2021-05-18

## 2021-05-18 DIAGNOSIS — Z41.9 SURGERY, ELECTIVE: Primary | ICD-10-CM

## 2021-05-20 ENCOUNTER — TELEPHONE (OUTPATIENT)
Dept: NEUROSURGERY | Facility: CLINIC | Age: 51
End: 2021-05-20

## 2021-05-26 ENCOUNTER — OFFICE VISIT (OUTPATIENT)
Dept: PAIN MEDICINE | Facility: CLINIC | Age: 51
End: 2021-05-26

## 2021-05-26 VITALS
BODY MASS INDEX: 40.52 KG/M2 | WEIGHT: 283 LBS | RESPIRATION RATE: 20 BRPM | DIASTOLIC BLOOD PRESSURE: 77 MMHG | TEMPERATURE: 96.9 F | OXYGEN SATURATION: 97 % | SYSTOLIC BLOOD PRESSURE: 127 MMHG | HEART RATE: 83 BPM | HEIGHT: 70 IN

## 2021-05-26 DIAGNOSIS — G89.29 CHRONIC MIDLINE LOW BACK PAIN WITHOUT SCIATICA: Primary | ICD-10-CM

## 2021-05-26 DIAGNOSIS — G89.29 CHRONIC BILATERAL THORACIC BACK PAIN: ICD-10-CM

## 2021-05-26 DIAGNOSIS — M47.814 ARTHROPATHY OF THORACIC FACET JOINT: ICD-10-CM

## 2021-05-26 DIAGNOSIS — M54.6 CHRONIC BILATERAL THORACIC BACK PAIN: ICD-10-CM

## 2021-05-26 DIAGNOSIS — M47.814 SPONDYLOSIS WITHOUT MYELOPATHY OR RADICULOPATHY, THORACIC REGION: ICD-10-CM

## 2021-05-26 DIAGNOSIS — G89.4 CHRONIC PAIN SYNDROME: ICD-10-CM

## 2021-05-26 DIAGNOSIS — M54.50 CHRONIC MIDLINE LOW BACK PAIN WITHOUT SCIATICA: Primary | ICD-10-CM

## 2021-05-26 DIAGNOSIS — M54.16 LUMBAR RADICULOPATHY: ICD-10-CM

## 2021-05-26 DIAGNOSIS — Z79.899 ENCOUNTER FOR LONG-TERM (CURRENT) USE OF HIGH-RISK MEDICATION: ICD-10-CM

## 2021-05-26 PROCEDURE — 80305 DRUG TEST PRSMV DIR OPT OBS: CPT | Performed by: NURSE PRACTITIONER

## 2021-05-26 PROCEDURE — 99214 OFFICE O/P EST MOD 30 MIN: CPT | Performed by: NURSE PRACTITIONER

## 2021-05-26 RX ORDER — FINASTERIDE 1 MG/1
1 TABLET, FILM COATED ORAL DAILY
COMMUNITY
Start: 2021-04-21 | End: 2021-07-22

## 2021-05-26 RX ORDER — FLUTICASONE PROPIONATE 50 MCG
1 SPRAY, SUSPENSION (ML) NASAL DAILY
Status: ON HOLD | COMMUNITY
Start: 2021-04-09 | End: 2021-06-29

## 2021-05-26 RX ORDER — AZELASTINE 1 MG/ML
SPRAY, METERED NASAL
Status: ON HOLD | COMMUNITY
Start: 2021-05-20 | End: 2021-06-29

## 2021-05-26 RX ORDER — TRIAMCINOLONE ACETONIDE 1 MG/G
CREAM TOPICAL 3 TIMES DAILY
COMMUNITY
Start: 2021-05-26

## 2021-05-26 RX ORDER — TESTOSTERONE CYPIONATE 200 MG/ML
100 INJECTION, SOLUTION INTRAMUSCULAR
COMMUNITY
Start: 2021-03-10 | End: 2022-06-03 | Stop reason: SDUPTHER

## 2021-05-26 RX ORDER — METOPROLOL SUCCINATE 50 MG/1
TABLET, EXTENDED RELEASE ORAL
COMMUNITY
Start: 2021-03-19 | End: 2021-05-26 | Stop reason: SDUPTHER

## 2021-05-26 RX ORDER — ERGOCALCIFEROL 1.25 MG/1
50000 CAPSULE ORAL
Status: ON HOLD | COMMUNITY
Start: 2021-02-23 | End: 2022-06-07

## 2021-05-26 RX ORDER — BLOOD-GLUCOSE METER
1 KIT MISCELLANEOUS
COMMUNITY
Start: 2021-04-21 | End: 2021-07-22

## 2021-05-26 RX ORDER — GABAPENTIN 600 MG/1
600 TABLET ORAL 3 TIMES DAILY
Qty: 90 TABLET | Refills: 2 | Status: SHIPPED | OUTPATIENT
Start: 2021-05-26 | End: 2021-08-11 | Stop reason: SDUPTHER

## 2021-05-26 RX ORDER — LANSOPRAZOLE 30 MG/1
30 CAPSULE, DELAYED RELEASE ORAL DAILY
COMMUNITY
Start: 2021-04-21 | End: 2021-12-17

## 2021-05-26 RX ORDER — TADALAFIL 20 MG/1
20 TABLET ORAL DAILY PRN
Status: ON HOLD | COMMUNITY
Start: 2021-04-21 | End: 2021-11-30

## 2021-05-26 NOTE — PROGRESS NOTES
CHIEF COMPLAINT  F/u back pain. Pt sts pain has worsened.     Subjective   Ronni Asif is a 51 y.o. male  who presents for follow-up.  He has a history of back pain. Today his pain is 4/10VAS in severity.  At his last office visit he was started on Norco 5/325 daily PRN.  He has only needed to utilize this approximately 8 times in the last month. He also continues with Gabapentin 600mg TID and methocarbamol 750 mg PRN as well as utilizes ice and heat. This regimen decreases his pain by a moderate amount and allows him to remain active. ADLs by self. He denies any side effects including somnolence or constipation.     Patient remained masked during entire encounter. No cough present. I donned a mask and eye protection throughout entire visit. Prior to donning mask and eye protection, hand hygiene was performed, as well as when it was doffed.  I was closer than 6 feet, but not for an extended period of time. No obvious exposure to any bodily fluids.    Back Pain  This is a chronic problem. The current episode started more than 1 year ago. The problem occurs daily. The problem has been gradually worsening since onset. The pain is present in the thoracic spine and lumbar spine. The quality of the pain is described as aching. The pain does not radiate. The pain is at a severity of 4/10. The pain is severe. The symptoms are aggravated by bending, standing and twisting. Stiffness is present in the morning. Pertinent negatives include no chest pain, dysuria, fever, headaches, numbness or weakness. Risk factors include obesity. He has tried analgesics, heat, ice, NSAIDs, muscle relaxant and home exercises for the symptoms. The treatment provided mild relief.      PEG Assessment   What number best describes your pain on average in the past week?4  What number best describes how, during the past week, pain has interfered with your enjoyment of life?4  What number best describes how, during the past week, pain has  "interfered with your general activity?  4    The following portions of the patient's history were reviewed and updated as appropriate: allergies, current medications, past family history, past medical history, past social history, past surgical history and problem list.    Review of Systems   Constitutional: Negative for activity change, fatigue and fever.   HENT: Negative for congestion.    Eyes: Negative for visual disturbance.   Respiratory: Negative for cough and shortness of breath.    Cardiovascular: Negative for chest pain.   Gastrointestinal: Negative for constipation and diarrhea.   Endocrine: Negative for polyuria.   Genitourinary: Negative for difficulty urinating and dysuria.   Musculoskeletal: Positive for back pain.   Allergic/Immunologic: Negative for immunocompromised state.   Neurological: Negative for dizziness, weakness, light-headedness, numbness and headaches.   Psychiatric/Behavioral: Negative for agitation, sleep disturbance and suicidal ideas. The patient is not nervous/anxious.      --  The aforementioned information the Chief Complaint section and above subjective data including any HPI data, and also the Review of Systems data, has been personally reviewed and affirmed.  --    Vitals:    05/26/21 1254   BP: 127/77   Pulse: 83   Resp: 20   Temp: 96.9 °F (36.1 °C)   SpO2: 97%   Weight: 128 kg (283 lb)   Height: 177.8 cm (70\")   PainSc:   4   PainLoc: Back     Objective   Physical Exam  Vitals and nursing note reviewed.   Constitutional:       Appearance: Normal appearance. He is well-developed.   Eyes:      General: Lids are normal.   Cardiovascular:      Rate and Rhythm: Normal rate.   Pulmonary:      Effort: Pulmonary effort is normal.   Musculoskeletal:      Cervical back: Normal range of motion.      Thoracic back: Tenderness and bony tenderness present. Decreased range of motion.      Lumbar back: Tenderness present.   Neurological:      Mental Status: He is alert and oriented to " person, place, and time.      Gait: Gait normal.   Psychiatric:         Attention and Perception: Attention normal.         Mood and Affect: Mood normal.         Speech: Speech normal.         Behavior: Behavior normal.         Judgment: Judgment normal.       Assessment/Plan   Diagnoses and all orders for this visit:    1. Chronic midline low back pain without sciatica (Primary)  -     Urine Drug Screen Confirmation - Urine, Clean Catch; Future  -     POC Urine Drug Screen, Triage    2. Chronic bilateral thoracic back pain  -     Urine Drug Screen Confirmation - Urine, Clean Catch; Future  -     POC Urine Drug Screen, Triage    3. Spondylosis without myelopathy or radiculopathy, thoracic region  -     Urine Drug Screen Confirmation - Urine, Clean Catch; Future  -     POC Urine Drug Screen, Triage    4. Lumbar radiculopathy  -     gabapentin (NEURONTIN) 600 MG tablet; Take 1 tablet by mouth 3 (Three) Times a Day.  Dispense: 90 tablet; Refill: 2  -     Urine Drug Screen Confirmation - Urine, Clean Catch; Future  -     POC Urine Drug Screen, Triage    5. Arthropathy of thoracic facet joint    6. Chronic pain syndrome    7. Encounter for long-term (current) use of high-risk medication      --- Proceed with previously scheduled Bilateral thoracic medial branch blockades (T9-T11 vs. T8-T10) with NO STEROIDS   --- Routine UDS in office today as part of monitoring requirements for controlled substances.  The specimen was viewed and the immunoassay result reviewed and is NEG (appropriate, utilize Norco sparingly).  This specimen will be sent to [facility] for confirmation.     --- Continue sparing use of Norco 5/325. No refill needed. Patient appears stable with current regimen. No adverse effects. Regarding continuation of opioids, there is no evidence of aberrant behavior or any red flags.  The patient continues with appropriate response to opioid therapy. ADL's remain intact by self.   --- CSA updated today on  5/26/2021  --- Follow-up after procedure     ROSI REPORT  As part of the patient's treatment plan, I am prescribing controlled substances. The patient has been made aware of appropriate use of such medications, including potential risk of somnolence, limited ability to drive and/or work safely, and the potential for dependence or overdose. It has also bee made clear that these medications are for use by this patient only, without concomitant use of alcohol or other substances unless prescribed.     Patient has completed prescribing agreement detailing terms of continued prescribing of controlled substances, including monitoring ROSI reports, urine drug screening, and pill counts if necessary. The patient is aware that inappropriate use will results in cessation of prescribing such medications.    ROSI report has been reviewed and scanned into the patient's chart.    As the clinician, I personally reviewed the ROSI from 5/26/2021 while the patient was in the office today.    History and physical exam exhibit continued safe and appropriate use of controlled substances.    EMR Dragon/Transcription disclaimer:   Much of this encounter note is an electronic transcription/translation of spoken language to printed text. The electronic translation of spoken language may permit erroneous, or at times, nonsensical words or phrases to be inadvertently transcribed; Although I have reviewed the note for such errors, some may still exist.

## 2021-06-01 ENCOUNTER — HOSPITAL ENCOUNTER (OUTPATIENT)
Facility: SURGERY CENTER | Age: 51
Setting detail: HOSPITAL OUTPATIENT SURGERY
Discharge: HOME | End: 2021-06-01
Attending: ANESTHESIOLOGY | Admitting: ANESTHESIOLOGY

## 2021-06-01 ENCOUNTER — HOSPITAL ENCOUNTER (OUTPATIENT)
Dept: GENERAL RADIOLOGY | Facility: SURGERY CENTER | Age: 51
Setting detail: HOSPITAL OUTPATIENT SURGERY
End: 2021-06-01

## 2021-06-01 VITALS
HEART RATE: 79 BPM | SYSTOLIC BLOOD PRESSURE: 128 MMHG | BODY MASS INDEX: 40.52 KG/M2 | WEIGHT: 283 LBS | RESPIRATION RATE: 16 BRPM | DIASTOLIC BLOOD PRESSURE: 77 MMHG | HEIGHT: 70 IN | TEMPERATURE: 98.6 F | OXYGEN SATURATION: 96 %

## 2021-06-01 DIAGNOSIS — Z41.9 SURGERY, ELECTIVE: ICD-10-CM

## 2021-06-01 DIAGNOSIS — M54.6 CHRONIC BILATERAL THORACIC BACK PAIN: ICD-10-CM

## 2021-06-01 DIAGNOSIS — M47.814 ARTHROPATHY OF THORACIC FACET JOINT: ICD-10-CM

## 2021-06-01 DIAGNOSIS — G89.29 CHRONIC BILATERAL THORACIC BACK PAIN: ICD-10-CM

## 2021-06-01 LAB — GLUCOSE BLDC GLUCOMTR-MCNC: 93 MG/DL (ref 70–130)

## 2021-06-01 PROCEDURE — 64493 INJ PARAVERT F JNT L/S 1 LEV: CPT | Performed by: ANESTHESIOLOGY

## 2021-06-01 PROCEDURE — 64494 INJ PARAVERT F JNT L/S 2 LEV: CPT | Performed by: ANESTHESIOLOGY

## 2021-06-01 PROCEDURE — 64491 INJ PARAVERT F JNT C/T 2 LEV: CPT | Performed by: ANESTHESIOLOGY

## 2021-06-01 PROCEDURE — 64490 INJ PARAVERT F JNT C/T 1 LEV: CPT | Performed by: ANESTHESIOLOGY

## 2021-06-01 PROCEDURE — 64495 INJ PARAVERT F JNT L/S 3 LEV: CPT | Performed by: ANESTHESIOLOGY

## 2021-06-01 PROCEDURE — 3E0T33Z INTRODUCTION OF ANTI-INFLAMMATORY INTO PERIPHERAL NERVES AND PLEXI, PERCUTANEOUS APPROACH: ICD-10-PCS | Performed by: ANESTHESIOLOGY

## 2021-06-01 PROCEDURE — 3E0T3BZ INTRODUCTION OF ANESTHETIC AGENT INTO PERIPHERAL NERVES AND PLEXI, PERCUTANEOUS APPROACH: ICD-10-PCS | Performed by: ANESTHESIOLOGY

## 2021-06-01 PROCEDURE — 82962 GLUCOSE BLOOD TEST: CPT | Performed by: ANESTHESIOLOGY

## 2021-06-01 RX ORDER — SODIUM CHLORIDE 0.9 % (FLUSH) 0.9 %
10 SYRINGE (ML) INJECTION AS NEEDED
Status: DISCONTINUED | OUTPATIENT
Start: 2021-06-01 | End: 2021-06-01 | Stop reason: HOSPADM

## 2021-06-01 RX ORDER — SODIUM CHLORIDE 0.9 % (FLUSH) 0.9 %
10 SYRINGE (ML) INJECTION EVERY 12 HOURS SCHEDULED
Status: DISCONTINUED | OUTPATIENT
Start: 2021-06-01 | End: 2021-06-01 | Stop reason: HOSPADM

## 2021-06-01 NOTE — DISCHARGE INSTRUCTIONS
PAIN DIARY               Ronni Reyespoonam  1970    Procedure:   Bilateral T8-10 TMBB  Date of Procedure:  06/01/21          PAIN SCORE (0-10)   Activity Level         Pre-procedure    8          1 hour after procedure:            2 hours after procedure:            3 hours after procedure:            4 hours after procedure:            5 hours after procedure:            6 hours after procedure:            7 hours after procedure:            8 hours after procedure:              PLEASE BRING THIS WITH YOU TO YOUR NEXT APPOINTMENT & TURN IT IN AT THE CHECK-IN WINDOW TO SCAN INTO YOUR CHART.              ----           [facility] [place] [facility] - Post-procedure Instructions          --  While there are no absolute restrictions, it is recommended that you do not perform strenuous activity today. In the morning, you may resume your level of activity as before your block.    --  If you have a band-aid at your injection site, please remove it later today. Observe the area for any redness, swelling, pus-like drainage, or a temperature over 101°. If any of these symptoms occur, please call your doctor at [phone]. If after office hours, leave a message and the on-call provider will return your call.    --  Ice may be applied to your injection site. It is recommended you avoid direct heat (heating pad; hot tub) for 1-2 days.    --  Call [facility] [place] at [phone] if you experience persistent headache, persistent bleeding from the injection site, or severe pain not relieved by heat or oral medication.    --  Do not make important decisions today.    --  Due to the effects of the block and/or the I.V. Sedation, DO NOT drive or operate hazardous machinery for 12 hours.    --  Do not drink alcohol for 12 hours.    -- You may return to work tomorrow, or as directed by your referring doctor.    --  Occasionally you may notice a slight increase in your pain after the procedure. This should start to improve  "within the next 24-48 hours. Radiofrequency ablation procedure pain may last 3-4 weeks.    --  It may take as long as 3-4 days before you notice a gradual improvement in your pain and/or other symptoms.    -- You may continue to take your prescribed pain medication as needed.    --  Some normal possible side effects of steroid use could include fluid retention, increased blood sugar, dull headache, increased sweating, increased appetite, mood swings and flushing.    --  Diabetics are recommended to watch their blood glucose level closely for 24-48 hours after the injection.    --  Must stay in PACU for 20 min upon arrival and prove no leg weakness before being discharged.    --  IN THE EVENT OF A LIFE THREATENING EMERGENCY, (CHEST PAIN, BREATHING DIFFICULTIES, PARALYSIS…) YOU SHOULD GO TO YOUR NEAREST EMERGENCY ROOM.    --  You should be contacted by our office within 2-3 days to schedule follow up or next appointment date.  If not contacted within 7 days, please call the office at [phone]      ---    -------  Education about Medial Branch Blockade and RF Therapy:    This medial branch blockade (MBB) suggested is intended for diagnostic purposes, with the intent of offering the patient Radiofrequency thermal rhizotomy (RF) if the MBB is diagnostically effective.  The diagnostic blockade is necessary to determine the likelihood that RF therapy could be efficacious in providing long term relief to the patient.    Medial branches are sensory nerve branches that connect to a facet joint and transmit sensations & pain signals from that joint.  Facet is a term for the type of joints found in the spine.  Medial branches are the nerves that go to a facet, and therefore are also sometimes called \"facet joint nerves\" (FJNs).      In a medial branch blockade procedure, xray fluoroscopy is used to verify the locations of the outside of the joint lines which are being targeted.  Under xray guidance, needles are placed to " these areas.  Contrast dye is injected to confirm proper placement, with dye flowing over the joint area, and to ensure that the dye does not flow into unintended areas such as a vein.  When this is confirmed, local anesthetic is injected to block the medial branch at that joint level.      If MBBs are diagnostically successful in blocking pain, then the patient is most likely a great candidate for Radiofrequency of those facet joint nerves.  In the RF procedure, needles are placed to the joint lines in the same fashion, and after testing, the needle tips are heated to thermally treat the nerves, blocking the nerves by in essence damaging the nerves with the heat treatment.       Medically, a successful RF procedure should provide a patient with 50% pain relief or more for at least 6 months.  Clinical experience suggests that successful patients receive relief more in the range of 12 months on average.  We also discussed that a fortunate minority of patients receive therapeutic success from the MBB, and may not require RF ablation.  If a patient receives more than 8 weeks of relief from MBB, then occasional repeat MBB for therapeutic purposes is a very reasonable alternative therapy.  This course of therapy is consistent with our LCDs according to our CMS  in the area, and therefore other insurance providers should follow accordingly.  We will monitor our patients to screen for these therapeutic responders and will offer RF therapy only when necessary.        We discussed that MBB & RF are not without risks.  Guidelines regarding anticoagulant use & neuraxial procedures will be respected.  Patients that are ill or otherwise may be at risk for sepsis will not have their spines accessed by neuraxial injections of any type.  This patient will not be offered these therapies if there is an increased risk.   We discussed that there is a risk of postprocedural pain and also a risk of worsening of clinical  picture with these procedures as with any neuraxial procedure.    -------

## 2021-06-01 NOTE — OP NOTE
Thoracic Medial Branch Blockades:  [facility]      PREOPERATIVE DIAGNOSIS:  Thoracic spondylosis without myelopathy    POSTOPERATIVE DIAGNOSIS:  Same as above.       PROCEDURE:   Diagnostic Thoracic Medial Branch Nerve Blockades, with fluoroscopy:   - LEVELS:  bilateral  T8, T9 and T10    PRE-PROCEDURE DISCUSSION WITH PATIENT:    Risks and complications were discussed with the patient prior to starting the procedure and informed consent was obtained.   We discussed various topics including but not limited to bleeding, infection, injury, nerve injury, paralysis, coma, death, pneumothorax, worsening of clinical picture, postprocedural soreness, and lack of pain relief.  We discussed the diagnostic nature of this procedure as well as the potential for possible long term therapeutic relief, and the odds of such.    SURGEON:  Abdirashid Gonzalez MD    REASON FOR PROCEDURE:    Mid back pain, verified painful levels under fluoroscopy.    SEDATION:  Patient declined administration of moderate sedation    ANESTHETIC:  Marcaine 0.5%  STEROID:  NONE  TOTAL VOLUME OF SOLUTION:  3    DESCRIPTON OF PROCEDURE:  After obtaining informed consent, IV access was  obtained in the preoperative area.   The patient was taken to the operating room.  The patient was placed in the prone position with a pillow under the abdomen. All pressure points were well padded.  EKG, blood pressure, and pulse oximeter were monitored.  The patient was monitored and sedated by the RN under my direction. The thoracic area was prepped with Chloraprep and draped in a sterile fashion.     Under fluoroscopic guidance at each of the levels to address, as above, the transverse processes those vertebrae at the junctions of the superior articular processes were identified.  Skin and subcutaneous tissue were anesthetized with 1% lidocaine above each of these points.     A spinal needle was introduced under fluoroscopic guidance toward each of the  above junctions, advancing under serial fluoroscopic images to ensure that the needle tip was overlying the transverse process throughout the course of each needle.  Then a lateral image was taken for each needle position to ensure proper depth and that the needles were near the joint.  Aspiration was negative for blood and CSF.  After confirming the position of the needle with fluoroscope in all views, 0.25 mL of Omnipaque was injected, and after seeing the proper spread a total of 0.5 mL of the anesthetic solution noted above was injected at each of these points.  Needles were removed intact from each of the areas.  Onset of analgesia was noted.  Vital signs remained stable throughout.        ESTIMATED BLOOD LOSS:  <5 mL  SPECIMENS:  none    COMPLICATIONS:   No complications were noted., There was no indication of vascular uptake on live injection of contrast dye., There was no indication of intrathecal uptake on live injection of contrast dye., There was not any evidence of dural puncture.   and The patient did not have any signs of postprocedure numbness nor weakness.    TOLERANCE & DISCHARGE CONDITION:    The patient tolerated the procedure well.  The patient was transported to the recovery area without difficulties.  The patient was discharged to home under the care of family in stable and satisfactory condition.    PLAN OF CARE:  1. The patient was given our standard instruction sheet.  2. We discussed that Medial Branch Blockade is a diagnostic procedure in consideration for radiofrequency ablation if two diagnostic procedures prove to be positive for significant benefit.  If sustained relief of 6 to eight weeks is obtained, then an alternative plan could be therapeutic lumbar branch blockades.  3. The patient is asked to keep a pain log each hour for 8 hours after the procedure today.  4. The patient will  Return to clinic 1 wk  5. The patient will resume all medications as per the medication reconciliation  sheet.

## 2021-06-14 ENCOUNTER — OFFICE VISIT (OUTPATIENT)
Dept: PAIN MEDICINE | Facility: CLINIC | Age: 51
End: 2021-06-14

## 2021-06-14 ENCOUNTER — PREP FOR SURGERY (OUTPATIENT)
Dept: SURGERY | Facility: SURGERY CENTER | Age: 51
End: 2021-06-14

## 2021-06-14 ENCOUNTER — TELEPHONE (OUTPATIENT)
Dept: PAIN MEDICINE | Facility: CLINIC | Age: 51
End: 2021-06-14

## 2021-06-14 VITALS
OXYGEN SATURATION: 95 % | RESPIRATION RATE: 20 BRPM | HEIGHT: 70 IN | SYSTOLIC BLOOD PRESSURE: 107 MMHG | BODY MASS INDEX: 40.23 KG/M2 | DIASTOLIC BLOOD PRESSURE: 70 MMHG | WEIGHT: 281 LBS | HEART RATE: 90 BPM | TEMPERATURE: 96.9 F

## 2021-06-14 DIAGNOSIS — Z98.1 HISTORY OF LUMBAR SPINAL FUSION: ICD-10-CM

## 2021-06-14 DIAGNOSIS — Z79.899 ENCOUNTER FOR LONG-TERM (CURRENT) USE OF HIGH-RISK MEDICATION: Primary | ICD-10-CM

## 2021-06-14 DIAGNOSIS — G89.29 CHRONIC MIDLINE LOW BACK PAIN WITHOUT SCIATICA: ICD-10-CM

## 2021-06-14 DIAGNOSIS — M54.6 CHRONIC BILATERAL THORACIC BACK PAIN: ICD-10-CM

## 2021-06-14 DIAGNOSIS — M54.50 CHRONIC MIDLINE LOW BACK PAIN WITHOUT SCIATICA: ICD-10-CM

## 2021-06-14 DIAGNOSIS — M47.814 SPONDYLOSIS WITHOUT MYELOPATHY OR RADICULOPATHY, THORACIC REGION: ICD-10-CM

## 2021-06-14 DIAGNOSIS — G89.29 CHRONIC BILATERAL THORACIC BACK PAIN: Primary | ICD-10-CM

## 2021-06-14 DIAGNOSIS — G89.29 CHRONIC BILATERAL THORACIC BACK PAIN: ICD-10-CM

## 2021-06-14 DIAGNOSIS — M54.6 CHRONIC BILATERAL THORACIC BACK PAIN: Primary | ICD-10-CM

## 2021-06-14 DIAGNOSIS — M47.814 ARTHROPATHY OF THORACIC FACET JOINT: ICD-10-CM

## 2021-06-14 PROCEDURE — 99214 OFFICE O/P EST MOD 30 MIN: CPT | Performed by: NURSE PRACTITIONER

## 2021-06-14 RX ORDER — FLURBIPROFEN SODIUM 0.3 MG/ML
SOLUTION/ DROPS OPHTHALMIC
COMMUNITY
Start: 2021-06-06

## 2021-06-14 RX ORDER — HYDROCODONE BITARTRATE AND ACETAMINOPHEN 5; 325 MG/1; MG/1
1 TABLET ORAL DAILY PRN
Qty: 30 TABLET | Refills: 0 | Status: SHIPPED | OUTPATIENT
Start: 2021-06-14 | End: 2021-08-11 | Stop reason: SDUPTHER

## 2021-06-14 RX ORDER — SODIUM CHLORIDE 0.9 % (FLUSH) 0.9 %
10 SYRINGE (ML) INJECTION AS NEEDED
Status: CANCELLED | OUTPATIENT
Start: 2021-06-14

## 2021-06-14 RX ORDER — SODIUM CHLORIDE 0.9 % (FLUSH) 0.9 %
10 SYRINGE (ML) INJECTION EVERY 12 HOURS SCHEDULED
Status: CANCELLED | OUTPATIENT
Start: 2021-06-14

## 2021-06-14 NOTE — PROGRESS NOTES
"CHIEF COMPLAINT  F/u back pain. Pt had thoracic medial branch block (bilateral T9-11 vs T8-10) x2, 1-2 wks apart - diagnostic and sts receiving no relief from inj.     Subjective   Ronni Asif is a 51 y.o. male  who presents for follow-up.  He has a history of mid and low back pain. He completed Bilateral T8-T10 Medial Branch Blockade on 6/1/2021 performed by Dr. Gonzalez for mid back pain. He reports 80-90% relief from this procedure the day of the procedure. His pain returned to baseline the next day.     Today his pain is 4/10VAS in severity. He also continues with Gabapentin 600mg TID, methocarbamol 750 mg PRN, and sparing use of Norco 5/325. This medication regimen \"takes the edge off\". ADLs by self. He denies any side effects including somnolence or constipation.     He has started going to the gym and has started trying to walk at least 3 miles a week.     Patient remained masked during entire encounter. No cough present. I donned a mask and eye protection throughout entire visit. Prior to donning mask and eye protection, hand hygiene was performed, as well as when it was doffed.  I was closer than 6 feet, but not for an extended period of time. No obvious exposure to any bodily fluids.    Back Pain  This is a chronic problem. The current episode started more than 1 year ago. The problem occurs daily. The problem has been gradually worsening since onset. The pain is present in the thoracic spine and lumbar spine. The quality of the pain is described as aching. The pain does not radiate. The pain is at a severity of 4/10. The pain is severe. The symptoms are aggravated by bending, standing and twisting. Stiffness is present in the morning. Pertinent negatives include no chest pain, dysuria, fever, headaches, numbness or weakness. Risk factors include obesity. He has tried analgesics, heat, ice, NSAIDs, muscle relaxant and home exercises (Norco 5/325) for the symptoms. Improvement on treatment: T8-T10 " "MBB-      PEG Assessment   What number best describes your pain on average in the past week?4  What number best describes how, during the past week, pain has interfered with your enjoyment of life?4  What number best describes how, during the past week, pain has interfered with your general activity?  4    The following portions of the patient's history were reviewed and updated as appropriate: allergies, current medications, past family history, past medical history, past social history, past surgical history and problem list.    Review of Systems   Constitutional: Negative for activity change, fatigue and fever.   HENT: Negative for congestion.    Eyes: Negative for visual disturbance.   Respiratory: Negative for cough and shortness of breath.    Cardiovascular: Negative for chest pain.   Gastrointestinal: Negative for constipation and diarrhea.   Genitourinary: Negative for difficulty urinating and dysuria.   Musculoskeletal: Positive for back pain. Negative for gait problem.   Neurological: Negative for dizziness, weakness, light-headedness, numbness and headaches.   Psychiatric/Behavioral: Negative for agitation, sleep disturbance and suicidal ideas. The patient is not nervous/anxious.      --  The aforementioned information the Chief Complaint section and above subjective data including any HPI data, and also the Review of Systems data, has been personally reviewed and affirmed.  --    Vitals:    06/14/21 1415   BP: 107/70   Pulse: 90   Resp: 20   Temp: 96.9 °F (36.1 °C)   SpO2: 95%   Weight: 127 kg (281 lb)   Height: 177.8 cm (70\")   PainSc:   4   PainLoc: Back     Objective   Physical Exam  Vitals and nursing note reviewed.   Constitutional:       Appearance: Normal appearance. He is well-developed.   Eyes:      General: Lids are normal.   Cardiovascular:      Rate and Rhythm: Normal rate.   Pulmonary:      Effort: Pulmonary effort is normal.   Musculoskeletal:      Cervical back: Normal range of motion.     " " Thoracic back: Tenderness and bony tenderness present. Decreased range of motion.      Lumbar back: Tenderness present.   Neurological:      Mental Status: He is alert and oriented to person, place, and time.   Psychiatric:         Speech: Speech normal.         Behavior: Behavior normal.         Judgment: Judgment normal.       Assessment/Plan   Diagnoses and all orders for this visit:    1. Encounter for long-term (current) use of high-risk medication (Primary)    2. Spondylosis without myelopathy or radiculopathy, thoracic region    3. Arthropathy of thoracic facet joint    4. Chronic bilateral thoracic back pain    5. Chronic midline low back pain without sciatica    6. History of lumbar spinal fusion    Other orders  -     HYDROcodone-acetaminophen (NORCO) 5-325 MG per tablet; Take 1 tablet by mouth Daily As Needed for Severe Pain .  Dispense: 30 tablet; Refill: 0      --- Repeat Bilateral T8-T10 medial branch block with goal of performing RFL  -------  Education about Medial Branch Blockade and RF Therapy:    This medial branch blockade (MBB) suggested is intended for diagnostic purposes, with the intent of offering the patient Radiofrequency thermal rhizotomy (RF) if the MBB is diagnostically effective.  The diagnostic blockade is necessary to determine the likelihood that RF therapy could be efficacious in providing long term relief to the patient.    Medial branches are sensory nerve branches that connect to a facet joint and transmit sensations & pain signals from that joint.  Facet is a term for the type of joints found in the spine.  Medial branches are the nerves that go to a facet, and therefore are also sometimes called \"facet joint nerves\" (FJNs).      In a medial branch blockade procedure, xray fluoroscopy is used to verify the locations of the outside of the joint lines which are being targeted.  Under xray guidance, needles are placed to these areas.  Contrast dye is injected to confirm proper " placement, with dye flowing over the joint area, and to ensure that the dye does not flow into unintended areas such as a vein.  When this is confirmed, local anesthetic is injected to block the medial branch at that joint level.      If MBBs are diagnostically successful in blocking pain, then the patient is most likely a great candidate for Radiofrequency of those facet joint nerves.  In the RF procedure, needles are placed to the joint lines in the same fashion, and after testing, the needle tips are heated to thermally treat the nerves, blocking the nerves by in essence damaging the nerves with the heat treatment.       Medically, a successful RF procedure should provide a patient with 50% pain relief or more for at least 6 months.  Clinical experience suggests that successful patients receive relief more in the range of 12 months on average.  We also discussed that a fortunate minority of patients receive therapeutic success from the MBB, and may not require RF ablation.  If a patient receives more than 8 weeks of relief from MBB, then occasional repeat MBB for therapeutic purposes is a very reasonable alternative therapy.  This course of therapy is consistent with our LCDs according to our CMS  in the area, and therefore other insurance providers should follow accordingly.  We will monitor our patients to screen for these therapeutic responders and will offer RF therapy only when necessary.        We discussed that MBB & RF are not without risks.  Guidelines regarding anticoagulant use & neuraxial procedures will be respected.  Patients that are ill or otherwise may be at risk for sepsis will not have their spines accessed by neuraxial injections of any type.  This patient will not be offered these therapies if there is an increased risk.   We discussed that there is a risk of postprocedural pain and also a risk of worsening of clinical picture with these procedures as with any neuraxial  procedure.    -------  --- Refill [place] 5/325. Patient appears stable with current regimen. No adverse effects. Regarding continuation of opioids, there is no evidence of aberrant behavior or any red flags.  The patient continues with appropriate response to opioid therapy. ADL's remain intact by self.   --- The urine drug screen confirmation from 5/26/2021 has been reviewed and the result is appropriate based on patient history and ROSI report  --- CSA updated 5/26/2021  --- Follow-up after procedure     ROSI REPORT  As part of the patient's treatment plan, I am prescribing controlled substances. The patient has been made aware of appropriate use of such medications, including potential risk of somnolence, limited ability to drive and/or work safely, and the potential for dependence or overdose. It has also bee made clear that these medications are for use by this patient only, without concomitant use of alcohol or other substances unless prescribed.     Patient has completed prescribing agreement detailing terms of continued prescribing of controlled substances, including monitoring ROSI reports, urine drug screening, and pill counts if necessary. The patient is aware that inappropriate use will results in cessation of prescribing such medications.    As the clinician, I personally reviewed the ROSI from 6/14/2021 while the patient was in the office today.    History and physical exam exhibit continued safe and appropriate use of controlled substances.    EMR Dragon/Transcription disclaimer:   Much of this encounter note is an electronic transcription/translation of spoken language to printed text. The electronic translation of spoken language may permit erroneous, or at times, nonsensical words or phrases to be inadvertently transcribed; Although I have reviewed the note for such errors, some may still exist.

## 2021-06-15 NOTE — TELEPHONE ENCOUNTER
Patient needs to try using the Robaxin only for now  
Pt requesting RF of Cyclobenzaprine 5mg  # 90 2 tid.  Last RF 1/28/20 .  Last OV 1/28/20, future appt 7/28/20.  
No

## 2021-06-29 ENCOUNTER — HOSPITAL ENCOUNTER (OUTPATIENT)
Facility: SURGERY CENTER | Age: 51
Setting detail: HOSPITAL OUTPATIENT SURGERY
Discharge: HOME | End: 2021-06-29
Attending: ANESTHESIOLOGY | Admitting: ANESTHESIOLOGY

## 2021-06-29 ENCOUNTER — TRANSCRIBE ORDERS (OUTPATIENT)
Dept: SURGERY | Facility: SURGERY CENTER | Age: 51
End: 2021-06-29

## 2021-06-29 ENCOUNTER — HOSPITAL ENCOUNTER (OUTPATIENT)
Dept: GENERAL RADIOLOGY | Facility: SURGERY CENTER | Age: 51
Setting detail: HOSPITAL OUTPATIENT SURGERY
End: 2021-06-29

## 2021-06-29 VITALS
TEMPERATURE: 98 F | DIASTOLIC BLOOD PRESSURE: 88 MMHG | SYSTOLIC BLOOD PRESSURE: 130 MMHG | OXYGEN SATURATION: 99 % | WEIGHT: 280 LBS | HEART RATE: 79 BPM | HEIGHT: 70 IN | BODY MASS INDEX: 40.09 KG/M2 | RESPIRATION RATE: 19 BRPM

## 2021-06-29 DIAGNOSIS — Z41.9 SURGERY, ELECTIVE: ICD-10-CM

## 2021-06-29 DIAGNOSIS — G89.29 CHRONIC BILATERAL THORACIC BACK PAIN: ICD-10-CM

## 2021-06-29 DIAGNOSIS — M54.6 CHRONIC BILATERAL THORACIC BACK PAIN: ICD-10-CM

## 2021-06-29 DIAGNOSIS — M47.814 SPONDYLOSIS WITHOUT MYELOPATHY OR RADICULOPATHY, THORACIC REGION: ICD-10-CM

## 2021-06-29 DIAGNOSIS — Z41.9 SURGERY, ELECTIVE: Primary | ICD-10-CM

## 2021-06-29 LAB — GLUCOSE BLDC GLUCOMTR-MCNC: 106 MG/DL (ref 70–130)

## 2021-06-29 PROCEDURE — 64492 INJ PARAVERT F JNT C/T 3 LEV: CPT | Performed by: ANESTHESIOLOGY

## 2021-06-29 PROCEDURE — 3E0T33Z INTRODUCTION OF ANTI-INFLAMMATORY INTO PERIPHERAL NERVES AND PLEXI, PERCUTANEOUS APPROACH: ICD-10-PCS | Performed by: ANESTHESIOLOGY

## 2021-06-29 PROCEDURE — 3E0T3BZ INTRODUCTION OF ANESTHETIC AGENT INTO PERIPHERAL NERVES AND PLEXI, PERCUTANEOUS APPROACH: ICD-10-PCS | Performed by: ANESTHESIOLOGY

## 2021-06-29 PROCEDURE — BR15ZZZ FLUOROSCOPY OF THORACIC FACET JOINT(S): ICD-10-PCS | Performed by: ANESTHESIOLOGY

## 2021-06-29 PROCEDURE — 0 IOHEXOL 300 MG/ML SOLUTION 10 ML VIAL: Performed by: ANESTHESIOLOGY

## 2021-06-29 PROCEDURE — 64491 INJ PARAVERT F JNT C/T 2 LEV: CPT | Performed by: ANESTHESIOLOGY

## 2021-06-29 PROCEDURE — 64490 INJ PARAVERT F JNT C/T 1 LEV: CPT | Performed by: ANESTHESIOLOGY

## 2021-06-29 RX ORDER — SODIUM CHLORIDE 0.9 % (FLUSH) 0.9 %
10 SYRINGE (ML) INJECTION AS NEEDED
Status: DISCONTINUED | OUTPATIENT
Start: 2021-06-29 | End: 2021-06-29 | Stop reason: HOSPADM

## 2021-06-29 RX ORDER — SODIUM CHLORIDE 0.9 % (FLUSH) 0.9 %
10 SYRINGE (ML) INJECTION EVERY 12 HOURS SCHEDULED
Status: DISCONTINUED | OUTPATIENT
Start: 2021-06-29 | End: 2021-06-29 | Stop reason: HOSPADM

## 2021-07-13 ENCOUNTER — OFFICE VISIT (OUTPATIENT)
Dept: PAIN MEDICINE | Facility: CLINIC | Age: 51
End: 2021-07-13

## 2021-07-13 ENCOUNTER — PREP FOR SURGERY (OUTPATIENT)
Dept: SURGERY | Facility: SURGERY CENTER | Age: 51
End: 2021-07-13

## 2021-07-13 VITALS
OXYGEN SATURATION: 96 % | RESPIRATION RATE: 20 BRPM | DIASTOLIC BLOOD PRESSURE: 79 MMHG | SYSTOLIC BLOOD PRESSURE: 118 MMHG | WEIGHT: 274 LBS | TEMPERATURE: 96.8 F | HEIGHT: 70 IN | HEART RATE: 91 BPM | BODY MASS INDEX: 39.22 KG/M2

## 2021-07-13 DIAGNOSIS — M51.34 DEGENERATION OF THORACIC INTERVERTEBRAL DISC: Primary | ICD-10-CM

## 2021-07-13 DIAGNOSIS — Z79.899 ENCOUNTER FOR LONG-TERM (CURRENT) USE OF HIGH-RISK MEDICATION: ICD-10-CM

## 2021-07-13 DIAGNOSIS — M47.814 SPONDYLOSIS WITHOUT MYELOPATHY OR RADICULOPATHY, THORACIC REGION: ICD-10-CM

## 2021-07-13 DIAGNOSIS — M47.814 SPONDYLOSIS WITHOUT MYELOPATHY OR RADICULOPATHY, THORACIC REGION: Primary | ICD-10-CM

## 2021-07-13 DIAGNOSIS — M47.814 ARTHROPATHY OF THORACIC FACET JOINT: ICD-10-CM

## 2021-07-13 DIAGNOSIS — M54.6 CHRONIC BILATERAL THORACIC BACK PAIN: ICD-10-CM

## 2021-07-13 DIAGNOSIS — G89.29 CHRONIC BILATERAL THORACIC BACK PAIN: ICD-10-CM

## 2021-07-13 PROCEDURE — 99214 OFFICE O/P EST MOD 30 MIN: CPT | Performed by: NURSE PRACTITIONER

## 2021-07-13 RX ORDER — METHOCARBAMOL 750 MG/1
750 TABLET, FILM COATED ORAL 2 TIMES DAILY PRN
Qty: 60 TABLET | Refills: 3 | Status: SHIPPED | OUTPATIENT
Start: 2021-07-13 | End: 2021-08-11 | Stop reason: SDUPTHER

## 2021-07-13 RX ORDER — ALBUTEROL SULFATE 90 UG/1
2 AEROSOL, METERED RESPIRATORY (INHALATION)
COMMUNITY
Start: 2021-07-06 | End: 2022-06-03 | Stop reason: SDUPTHER

## 2021-07-13 RX ORDER — FINASTERIDE 1 MG/1
1 TABLET, FILM COATED ORAL DAILY
COMMUNITY
Start: 2021-07-06 | End: 2022-07-06

## 2021-07-13 RX ORDER — ASPIRIN 81 MG/1
81 TABLET ORAL DAILY
COMMUNITY
Start: 2021-07-06

## 2021-07-13 RX ORDER — TADALAFIL 10 MG/1
10 TABLET ORAL
COMMUNITY
Start: 2021-07-06 | End: 2021-12-17

## 2021-07-13 RX ORDER — LANSOPRAZOLE 30 MG/1
30 CAPSULE, DELAYED RELEASE ORAL DAILY
Status: ON HOLD | COMMUNITY
Start: 2021-07-06 | End: 2021-11-30

## 2021-07-13 RX ORDER — SODIUM CHLORIDE 0.9 % (FLUSH) 0.9 %
10 SYRINGE (ML) INJECTION AS NEEDED
Status: CANCELLED | OUTPATIENT
Start: 2021-07-13

## 2021-07-13 RX ORDER — CLONIDINE HYDROCHLORIDE 0.1 MG/1
TABLET ORAL
Status: ON HOLD | COMMUNITY
Start: 2021-07-06 | End: 2021-10-07

## 2021-07-13 RX ORDER — BLOOD-GLUCOSE METER
1 KIT MISCELLANEOUS
COMMUNITY
Start: 2021-07-06

## 2021-07-13 RX ORDER — SODIUM CHLORIDE 0.9 % (FLUSH) 0.9 %
10 SYRINGE (ML) INJECTION EVERY 12 HOURS SCHEDULED
Status: CANCELLED | OUTPATIENT
Start: 2021-07-13

## 2021-07-13 NOTE — PROGRESS NOTES
CHIEF COMPLAINT  F/u back pain. Pt had Bilateral T8-T10 MEDIAL BRANCH BLOCK and sts receiving 80% relief x 2 days then pain returned to baseline.     Subjective   Ronni Asif is a 51 y.o. male  who presents for follow-up.  He has a history of mid back pain.  He completed a T8-T10 MBB on 6/29/2021 performed by Dr. Gonzalez.  He reports 80% relief from this procedure x 2 days and then his pain returned to baseline.  Patient has now completed 2 diagnostically positive bilateral T8-T10 medial branch blocks.  At this time I recommend that we proceed with bilateral T8-T10 radiofrequency ablation.    Today his pain is 2-3/10VAS in severity. He continues with Norco 5/325 0-1/day (does not use daily), Gabapentin 600 mg TID, and Methocarbamol PRN.  This medication decreases his pain by a moderate amount. He denies any side effects including somnolence or constipation.     Procedure list:  6/1/2021-bilateral T8-T10 MBB-80 to 90% relief the day of the procedure    Patient remained masked during entire encounter. No cough present. I donned a mask and eye protection throughout entire visit. Prior to donning mask and eye protection, hand hygiene was performed, as well as when it was doffed.  I was closer than 6 feet, but not for an extended period of time. No obvious exposure to any bodily fluids.    Back Pain  This is a chronic problem. The current episode started more than 1 year ago. The problem occurs daily. The problem has been gradually worsening since onset. The pain is present in the thoracic spine and lumbar spine. The quality of the pain is described as aching. The pain does not radiate. The pain is at a severity of 2/10. The pain is severe. The symptoms are aggravated by bending, standing and twisting. Stiffness is present in the morning. Pertinent negatives include no chest pain, dysuria, fever, headaches, numbness or weakness. Risk factors include obesity. He has tried analgesics, heat, ice, NSAIDs, muscle  "relaxant and home exercises (Norco 5/325) for the symptoms. The treatment provided significant (T8-T10 MBB-temporary relief) relief.      PEG Assessment   What number best describes your pain on average in the past week?2  What number best describes how, during the past week, pain has interfered with your enjoyment of life?2  What number best describes how, during the past week, pain has interfered with your general activity?  2    The following portions of the patient's history were reviewed and updated as appropriate: allergies, current medications, past family history, past medical history, past social history, past surgical history and problem list.    Review of Systems   Constitutional: Negative for activity change, fatigue and fever.   HENT: Negative for congestion.    Eyes: Negative for visual disturbance.   Respiratory: Negative for cough and shortness of breath.    Cardiovascular: Negative for chest pain.   Gastrointestinal: Negative for constipation and diarrhea.   Genitourinary: Negative for difficulty urinating and dysuria.   Musculoskeletal: Positive for back pain.   Neurological: Negative for dizziness, weakness, light-headedness, numbness and headaches.   Psychiatric/Behavioral: Negative for agitation, sleep disturbance and suicidal ideas. The patient is not nervous/anxious.      --  The aforementioned information the Chief Complaint section and above subjective data including any HPI data, and also the Review of Systems data, has been personally reviewed and affirmed.  --    Vitals:    07/13/21 1321   BP: 118/79   Pulse: 91   Resp: 20   Temp: 96.8 °F (36 °C)   SpO2: 96%   Weight: 124 kg (274 lb)   Height: 177.8 cm (70\")   PainSc:   2   PainLoc: Back     Objective   Physical Exam  Vitals and nursing note reviewed.   Constitutional:       Appearance: Normal appearance. He is well-developed.   Eyes:      General: Lids are normal.   Cardiovascular:      Rate and Rhythm: Normal rate.   Pulmonary:      " Effort: Pulmonary effort is normal.   Musculoskeletal:      Cervical back: Normal range of motion.      Thoracic back: Tenderness and bony tenderness present. Decreased range of motion.      Lumbar back: Tenderness present.   Neurological:      Mental Status: He is alert and oriented to person, place, and time.   Psychiatric:         Attention and Perception: Attention normal.         Mood and Affect: Mood normal.         Speech: Speech normal.         Behavior: Behavior normal.         Judgment: Judgment normal.       Assessment/Plan   Diagnoses and all orders for this visit:    1. Degeneration of thoracic intervertebral disc (Primary)    2. Chronic bilateral thoracic back pain    3. Arthropathy of thoracic facet joint    4. Spondylosis without myelopathy or radiculopathy, thoracic region    5. Encounter for long-term (current) use of high-risk medication    Other orders  -     methocarbamol (ROBAXIN) 750 MG tablet; Take 1 tablet by mouth 2 (Two) Times a Day As Needed for Muscle Spasms.  Dispense: 60 tablet; Refill: 3      --- Bilateral T8-T10 RFA  --------  Education about Radiofrequency Lesioning of Medial Branches:    The medial branch blockade was intended for diagnostic purposes, with the intent of offering the patient Radiofrequency thermal rhizotomy if the MBB is diagnostically effective.  The diagnostic blockade is necessary to determine the likelihood that RF therapy could be efficacious in providing long term relief to the patient.  As indicated above, diagnostic efficacy was established.      In the RF procedure, needles are placed to the joint lines in the same fashion, and after testing, the needle tips are heated to thermally treat the nerves, blocking the nerves by in essence damaging the nerves with the heat treatment.      Medically, a successful RF procedure should provide a patient with 50% pain relief or more for at least 6 months.  We estimate a likelihood of about an 80% chance that medical  success will be realized.  We discussed & educated once again that not all patients have a medically successful result, and the patient voices understanding.  However, our clinical experience suggests that successful patients receive relief more in the range of 12 months on average.  (We also discussed that a fortunate minority of patients receive therapeutic success from the MBB, and may not require RF ablation.  If a patient receives more than 8 weeks of relief from MBB, then occasional repeat MBB for therapeutic purposes is a very reasonable alternative therapy.  This course of therapy is consistent with our LCDs according to our CMS  in the area, and therefore other insurance providers should follow accordingly.  We will monitor our patients to screen for these therapeutic responders and will offer RF therapy only when necessary.  However, in this clinical scenario, this therapeutic result was not realized, and therefore Radiofrequency Lesioning is medically necessary.)      We discussed that MBB & RF are not without risks.  Guidelines regarding anticoagulant use & neuraxial procedures will be respected.  Patients that are ill or otherwise may be at risk for sepsis will not have their spines accessed by neuraxial injections of any type.  This patient will not be offered these therapies if there is an increased risk.   We discussed that there is a risk of postprocedural pain and also a risk of worsening of clinical picture with these procedures as with any neuraxial procedure.  All invasive procedures have risks including but not limited to bleeding, infection, injury, nerve injury, paralysis, coma, death, lack of pain relief, and worsening of clinical picture.      In this education session, all of these topics were covered and the patient voiced understanding.    ---------  --- The urine drug screen confirmation from 5/26/2021 has been reviewed and the result is appropriate based on patient history  and ROSI report  --- CSA updated 5/26/2021  --- Continue Norco 5/325. No refill needed. Patient appears stable with current regimen. No adverse effects. Regarding continuation of opioids, there is no evidence of aberrant behavior or any red flags.  The patient continues with appropriate response to opioid therapy. ADL's remain intact by self.   --- Refill Methocarbamol 750 mg PRN  --- Follow-up after procedure     ROSI REPORT  As part of the patient's treatment plan, I am prescribing controlled substances. The patient has been made aware of appropriate use of such medications, including potential risk of somnolence, limited ability to drive and/or work safely, and the potential for dependence or overdose. It has also bee made clear that these medications are for use by this patient only, without concomitant use of alcohol or other substances unless prescribed.     Patient has completed prescribing agreement detailing terms of continued prescribing of controlled substances, including monitoring ROSI reports, urine drug screening, and pill counts if necessary. The patient is aware that inappropriate use will results in cessation of prescribing such medications.    As the clinician, I personally reviewed the ROSI from 7/13/2021 while the patient was in the office today.    History and physical exam exhibit continued safe and appropriate use of controlled substances.    EMR Dragon/Transcription disclaimer:   Much of this encounter note is an electronic transcription/translation of spoken language to printed text. The electronic translation of spoken language may permit erroneous, or at times, nonsensical words or phrases to be inadvertently transcribed; Although I have reviewed the note for such errors, some may still exist.

## 2021-07-15 ENCOUNTER — TRANSCRIBE ORDERS (OUTPATIENT)
Dept: SURGERY | Facility: SURGERY CENTER | Age: 51
End: 2021-07-15

## 2021-07-15 DIAGNOSIS — Z41.9 SURGERY, ELECTIVE: Primary | ICD-10-CM

## 2021-07-27 ENCOUNTER — HOSPITAL ENCOUNTER (OUTPATIENT)
Dept: GENERAL RADIOLOGY | Facility: SURGERY CENTER | Age: 51
Setting detail: HOSPITAL OUTPATIENT SURGERY
End: 2021-07-27

## 2021-07-27 ENCOUNTER — HOSPITAL ENCOUNTER (OUTPATIENT)
Facility: SURGERY CENTER | Age: 51
Setting detail: HOSPITAL OUTPATIENT SURGERY
Discharge: HOME | End: 2021-07-27
Attending: ANESTHESIOLOGY | Admitting: ANESTHESIOLOGY

## 2021-07-27 VITALS
SYSTOLIC BLOOD PRESSURE: 138 MMHG | HEART RATE: 91 BPM | HEIGHT: 70 IN | OXYGEN SATURATION: 97 % | TEMPERATURE: 98.2 F | BODY MASS INDEX: 40.09 KG/M2 | RESPIRATION RATE: 16 BRPM | WEIGHT: 280 LBS | DIASTOLIC BLOOD PRESSURE: 84 MMHG

## 2021-07-27 DIAGNOSIS — Z41.9 SURGERY, ELECTIVE: ICD-10-CM

## 2021-07-27 DIAGNOSIS — M47.814 SPONDYLOSIS WITHOUT MYELOPATHY OR RADICULOPATHY, THORACIC REGION: ICD-10-CM

## 2021-07-27 DIAGNOSIS — M54.12 RIGHT CERVICAL RADICULOPATHY: Primary | ICD-10-CM

## 2021-07-27 PROCEDURE — 25010000002 DEXAMETHASONE SODIUM PHOSPHATE 20 MG/5ML SOLUTION: Performed by: ANESTHESIOLOGY

## 2021-07-27 PROCEDURE — 99152 MOD SED SAME PHYS/QHP 5/>YRS: CPT | Performed by: ANESTHESIOLOGY

## 2021-07-27 PROCEDURE — 25010000002 FENTANYL CITRATE (PF) 50 MCG/ML SOLUTION: Performed by: ANESTHESIOLOGY

## 2021-07-27 PROCEDURE — 64633 DESTROY CERV/THOR FACET JNT: CPT | Performed by: ANESTHESIOLOGY

## 2021-07-27 PROCEDURE — 64634 DESTROY C/TH FACET JNT ADDL: CPT | Performed by: ANESTHESIOLOGY

## 2021-07-27 PROCEDURE — BR15ZZZ FLUOROSCOPY OF THORACIC FACET JOINT(S): ICD-10-PCS | Performed by: ANESTHESIOLOGY

## 2021-07-27 PROCEDURE — 3E0T3TZ INTRODUCTION OF DESTRUCTIVE AGENT INTO PERIPHERAL NERVES AND PLEXI, PERCUTANEOUS APPROACH: ICD-10-PCS | Performed by: ANESTHESIOLOGY

## 2021-07-27 PROCEDURE — 25010000002 MIDAZOLAM PER 1 MG: Performed by: ANESTHESIOLOGY

## 2021-07-27 RX ORDER — SODIUM CHLORIDE 0.9 % (FLUSH) 0.9 %
10 SYRINGE (ML) INJECTION AS NEEDED
Status: DISCONTINUED | OUTPATIENT
Start: 2021-07-27 | End: 2021-07-27 | Stop reason: HOSPADM

## 2021-07-27 RX ORDER — SODIUM CHLORIDE 0.9 % (FLUSH) 0.9 %
10 SYRINGE (ML) INJECTION EVERY 12 HOURS SCHEDULED
Status: DISCONTINUED | OUTPATIENT
Start: 2021-07-27 | End: 2021-07-27 | Stop reason: HOSPADM

## 2021-07-27 RX ORDER — DEXAMETHASONE SODIUM PHOSPHATE 4 MG/ML
INJECTION, SOLUTION INTRA-ARTICULAR; INTRALESIONAL; INTRAMUSCULAR; INTRAVENOUS; SOFT TISSUE AS NEEDED
Status: DISCONTINUED | OUTPATIENT
Start: 2021-07-27 | End: 2021-07-27 | Stop reason: HOSPADM

## 2021-07-27 RX ORDER — FENTANYL CITRATE 50 UG/ML
INJECTION, SOLUTION INTRAMUSCULAR; INTRAVENOUS AS NEEDED
Status: DISCONTINUED | OUTPATIENT
Start: 2021-07-27 | End: 2021-07-27 | Stop reason: HOSPADM

## 2021-07-27 RX ORDER — MIDAZOLAM HYDROCHLORIDE 1 MG/ML
INJECTION INTRAMUSCULAR; INTRAVENOUS AS NEEDED
Status: DISCONTINUED | OUTPATIENT
Start: 2021-07-27 | End: 2021-07-27 | Stop reason: HOSPADM

## 2021-07-27 NOTE — PROGRESS NOTES
I am seeing him for lower thoracic RF today...    He has worsening right arm radicular symptoms, which are recurrent, and restarted 4 months ago , all the way shooting/burning through the c7 dermatome to the 4th & 5th fingers.    Ordering MRI & PT.

## 2021-08-04 ENCOUNTER — TELEPHONE (OUTPATIENT)
Dept: PAIN MEDICINE | Facility: CLINIC | Age: 51
End: 2021-08-04

## 2021-08-04 NOTE — TELEPHONE ENCOUNTER
"Provider: DR GALVAN    Caller: SHONDA ACKERMAN    Relationship to Patient: SELF    Phone Number: [phone]    Reason for Call: PATIENT CALLED TO [facility] CALL MRI DENIED AND WNTED TO KNOW NEXT STEP  AS HE IS ALSO TOHAVE \"PT\" AND THEY HAVE ALREADY ATTEMPTED TO REACH HIM TO SCHEDULE THE \"PT\" - BUT HASN'T HAD MRI YET. PT REQ C/B FOR NEXT STEP/.  "

## 2021-08-05 ENCOUNTER — APPOINTMENT (OUTPATIENT)
Dept: MRI IMAGING | Facility: HOSPITAL | Age: 51
End: 2021-08-05

## 2021-08-06 NOTE — TELEPHONE ENCOUNTER
Can we find out why his PT and MRI was denied. Patient states that is having numbness in his index and pink finger on his right hand and his pinky finger on his left hand.

## 2021-08-09 NOTE — TELEPHONE ENCOUNTER
This auth request was done thru the hospital we should be rec a copy of the denial soon I will call today to see it they will fax it.

## 2021-08-11 DIAGNOSIS — M54.16 LUMBAR RADICULOPATHY: ICD-10-CM

## 2021-08-12 RX ORDER — GABAPENTIN 600 MG/1
600 TABLET ORAL 3 TIMES DAILY
Qty: 90 TABLET | Refills: 0 | Status: SHIPPED | OUTPATIENT
Start: 2021-08-12 | End: 2021-09-03 | Stop reason: SDUPTHER

## 2021-08-12 RX ORDER — HYDROCODONE BITARTRATE AND ACETAMINOPHEN 5; 325 MG/1; MG/1
1 TABLET ORAL DAILY PRN
Qty: 30 TABLET | Refills: 0 | Status: ON HOLD | OUTPATIENT
Start: 2021-08-12 | End: 2021-10-15 | Stop reason: SDUPTHER

## 2021-08-12 RX ORDER — METHOCARBAMOL 750 MG/1
750 TABLET, FILM COATED ORAL 2 TIMES DAILY PRN
Qty: 60 TABLET | Refills: 0 | Status: ON HOLD | OUTPATIENT
Start: 2021-08-12 | End: 2021-10-15 | Stop reason: SDUPTHER

## 2021-08-12 NOTE — TELEPHONE ENCOUNTER
Reviewed TT last office visit on 7-13-21. CASANDRA and ROSI reviewed and are appropriate. Due to TT being out of office, will refill appropriately.

## 2021-09-03 ENCOUNTER — OFFICE VISIT (OUTPATIENT)
Dept: PAIN MEDICINE | Facility: CLINIC | Age: 51
End: 2021-09-03

## 2021-09-03 VITALS
TEMPERATURE: 96.9 F | RESPIRATION RATE: 16 BRPM | OXYGEN SATURATION: 98 % | BODY MASS INDEX: 37.71 KG/M2 | DIASTOLIC BLOOD PRESSURE: 75 MMHG | HEIGHT: 70 IN | WEIGHT: 263.4 LBS | SYSTOLIC BLOOD PRESSURE: 111 MMHG | HEART RATE: 87 BPM

## 2021-09-03 DIAGNOSIS — M54.50 CHRONIC MIDLINE LOW BACK PAIN WITHOUT SCIATICA: ICD-10-CM

## 2021-09-03 DIAGNOSIS — G89.29 CHRONIC BILATERAL THORACIC BACK PAIN: ICD-10-CM

## 2021-09-03 DIAGNOSIS — Z79.899 ENCOUNTER FOR LONG-TERM (CURRENT) USE OF HIGH-RISK MEDICATION: ICD-10-CM

## 2021-09-03 DIAGNOSIS — Z98.1 HISTORY OF LUMBAR SPINAL FUSION: ICD-10-CM

## 2021-09-03 DIAGNOSIS — M51.34 DEGENERATION OF THORACIC INTERVERTEBRAL DISC: Primary | ICD-10-CM

## 2021-09-03 DIAGNOSIS — M54.12 CERVICAL RADICULOPATHY: ICD-10-CM

## 2021-09-03 DIAGNOSIS — G89.29 CHRONIC MIDLINE LOW BACK PAIN WITHOUT SCIATICA: ICD-10-CM

## 2021-09-03 DIAGNOSIS — M54.16 LUMBAR RADICULOPATHY: ICD-10-CM

## 2021-09-03 DIAGNOSIS — M54.6 CHRONIC BILATERAL THORACIC BACK PAIN: ICD-10-CM

## 2021-09-03 PROCEDURE — 99214 OFFICE O/P EST MOD 30 MIN: CPT | Performed by: NURSE PRACTITIONER

## 2021-09-03 RX ORDER — GABAPENTIN 600 MG/1
600 TABLET ORAL 3 TIMES DAILY
Qty: 90 TABLET | Refills: 5 | Status: SHIPPED | OUTPATIENT
Start: 2021-09-03 | End: 2022-02-16

## 2021-09-03 NOTE — PROGRESS NOTES
"CHIEF COMPLAINT  F/U back pain-Bilateral T8-T10 RADIOFREQUENCY ABLATION- patient states that he had 50% improvement for the past 6 weeks.      Subjective   Ronni Asif is a 51 y.o. male  who presents to the office for follow-up of procedure.  He completed a Bilateral T8-T10 RFA   on  7/27/2021 performed by Dr. Gonzalez for management of mid-back pain. Patient reports 50% ONGOING  relief from the procedure.     He is complaining of neck pain at this time. MRI for neck pain with pain radiating into bilateral hands (R>L) ordered by Dr. Gonzalez.  This was unfortunately denied by insurance with requirement to complete PT.  Patient HAS completed PT for his neck in 2018 with NO improvement in his neck pain at that time. The bilateral upper extremity tingling is now worsening. He denies any upper extremity weakness.     He continues with Norco 5/325 0-1/day (does not use daily), Gabapentin 600 mg TID, and Methocarbamol PRN. He also utilizes ice which is helpful.  This medication regimen \"takes the edge off\". He denies any side effects including somnolence or constipation.     Patient remained masked during entire encounter. No cough present. I donned a mask and eye protection throughout entire visit. Prior to donning mask and eye protection, hand hygiene was performed, as well as when it was doffed.  I was closer than 6 feet, but not for an extended period of time. No obvious exposure to any bodily fluids.    Back Pain  This is a chronic problem. The current episode started more than 1 year ago. The problem occurs daily. The problem has been gradually worsening since onset. The pain is present in the thoracic spine and lumbar spine. The quality of the pain is described as aching. The pain does not radiate. The pain is at a severity of 5/10. The pain is severe. The symptoms are aggravated by bending, standing and twisting. Stiffness is present in the morning. Associated symptoms include headaches. Pertinent negatives " include no abdominal pain, chest pain, dysuria, fever, numbness or weakness. Risk factors include obesity. He has tried analgesics, heat, ice, NSAIDs, muscle relaxant and home exercises (Norco 5/325) for the symptoms. The treatment provided significant relief.   Neck Pain   This is a chronic problem. The current episode started more than 1 year ago. The problem occurs constantly. The problem has been gradually worsening. The pain is present in the occipital region and midline. The quality of the pain is described as burning (bilateral upper extremity tingling). The pain is at a severity of 5/10. The symptoms are aggravated by bending (lying in bed). Associated symptoms include headaches. Pertinent negatives include no chest pain, fever, numbness or weakness. He has tried ice, oral narcotics and NSAIDs (PT) for the symptoms.      PEG Assessment   What number best describes your pain on average in the past week?6  What number best describes how, during the past week, pain has interfered with your enjoyment of life?7  What number best describes how, during the past week, pain has interfered with your general activity?  7    The following portions of the patient's history were reviewed and updated as appropriate: allergies, current medications, past family history, past medical history, past social history, past surgical history and problem list.    Review of Systems   Constitutional: Negative for activity change (increased), chills, fatigue and fever.   HENT: Negative for congestion.    Eyes: Negative for visual disturbance.   Respiratory: Negative for chest tightness and shortness of breath.    Cardiovascular: Negative for chest pain.   Gastrointestinal: Negative for abdominal pain, constipation and diarrhea.   Genitourinary: Negative for difficulty urinating and dysuria.   Musculoskeletal: Positive for back pain and neck pain.   Neurological: Positive for headaches. Negative for dizziness, weakness, light-headedness  "and numbness.   Psychiatric/Behavioral: Negative for agitation, self-injury, sleep disturbance and suicidal ideas. The patient is not nervous/anxious.      --  The aforementioned information the Chief Complaint section and above subjective data including any HPI data, and also the Review of Systems data, has been personally reviewed and affirmed.  --     Vitals:    09/03/21 1103   BP: 111/75   Pulse: 87   Resp: 16   Temp: 96.9 °F (36.1 °C)   SpO2: 98%   Weight: 119 kg (263 lb 6.4 oz)   Height: 177.8 cm (70\")   PainSc:   5   PainLoc: Back     Objective   Physical Exam  Vitals and nursing note reviewed.   Constitutional:       Appearance: Normal appearance. He is well-developed.   Eyes:      General: Lids are normal.   Cardiovascular:      Rate and Rhythm: Normal rate.   Pulmonary:      Effort: Pulmonary effort is normal.   Musculoskeletal:      Cervical back: Normal range of motion. Tenderness and bony tenderness present. No pain with movement. Normal range of motion.      Thoracic back: Tenderness and bony tenderness present. Decreased range of motion.      Lumbar back: No tenderness or bony tenderness.      Comments: POS Right spurling   Neurological:      Mental Status: He is alert and oriented to person, place, and time.      Gait: Gait normal.   Psychiatric:         Attention and Perception: Attention normal.         Mood and Affect: Mood normal.         Speech: Speech normal.         Behavior: Behavior normal.         Judgment: Judgment normal.       Assessment/Plan   Diagnoses and all orders for this visit:    1. Degeneration of thoracic intervertebral disc (Primary)    2. Chronic bilateral thoracic back pain    3. Encounter for long-term (current) use of high-risk medication    4. Cervical radiculopathy  -     MRI Cervical Spine With & Without Contrast; Future    5. Chronic midline low back pain without sciatica    6. History of lumbar spinal fusion    7. Lumbar radiculopathy  -     gabapentin (NEURONTIN) 600 " MG tablet; Take 1 tablet by mouth 3 (Three) Times a Day. DNF 9/11/2021  Dispense: 90 tablet; Refill: 5      --- MRI of cervical spine for worsening cervical radiculopathy. Consider TAMEKA vs. Cervical MBB and RFA  --- The urine drug screen confirmation from 5/26/2021 has been reviewed and the result is appropriate based on patient history and ROSI report  --- CSA updated 5/26/2021  --- Continue Norco 5/325. No refill needed. Patient appears stable with current regimen. No adverse effects. Regarding continuation of opioids, there is no evidence of aberrant behavior or any red flags.  The patient continues with appropriate response to opioid therapy. ADL's remain intact by self.   --- Refill Gabapentin 600 mg TID.   --- Follow-up 2 months or sooner if needed.      ROSI REPORT  As part of the patient's treatment plan, I am prescribing controlled substances. The patient has been made aware of appropriate use of such medications, including potential risk of somnolence, limited ability to drive and/or work safely, and the potential for dependence or overdose. It has also bee made clear that these medications are for use by this patient only, without concomitant use of alcohol or other substances unless prescribed.     Patient has completed prescribing agreement detailing terms of continued prescribing of controlled substances, including monitoring ROSI reports, urine drug screening, and pill counts if necessary. The patient is aware that inappropriate use will results in cessation of prescribing such medications.    As the clinician, I personally reviewed the ROSI from 9/3/2021 while the patient was in the office today.    History and physical exam exhibit continued safe and appropriate use of controlled substances.     Dictated utilizing Dragon dictation.

## 2021-09-16 ENCOUNTER — TREATMENT (OUTPATIENT)
Dept: PHYSICAL THERAPY | Facility: CLINIC | Age: 51
End: 2021-09-16

## 2021-09-16 ENCOUNTER — TELEPHONE (OUTPATIENT)
Dept: PAIN MEDICINE | Facility: CLINIC | Age: 51
End: 2021-09-16

## 2021-09-16 DIAGNOSIS — G89.29 CHRONIC BILATERAL THORACIC BACK PAIN: ICD-10-CM

## 2021-09-16 DIAGNOSIS — M47.814 SPONDYLOSIS WITHOUT MYELOPATHY OR RADICULOPATHY, THORACIC REGION: ICD-10-CM

## 2021-09-16 DIAGNOSIS — G89.4 CHRONIC PAIN SYNDROME: Primary | ICD-10-CM

## 2021-09-16 DIAGNOSIS — R68.89 DECREASED ACTIVITY TOLERANCE: ICD-10-CM

## 2021-09-16 DIAGNOSIS — M54.6 CHRONIC BILATERAL THORACIC BACK PAIN: ICD-10-CM

## 2021-09-16 DIAGNOSIS — M54.12 CERVICAL RADICULOPATHY: ICD-10-CM

## 2021-09-16 DIAGNOSIS — M54.2 CERVICAL PAIN: Primary | ICD-10-CM

## 2021-09-16 DIAGNOSIS — M47.814 ARTHROPATHY OF THORACIC FACET JOINT: ICD-10-CM

## 2021-09-16 DIAGNOSIS — M51.34 DEGENERATION OF THORACIC INTERVERTEBRAL DISC: ICD-10-CM

## 2021-09-16 DIAGNOSIS — M54.16 LUMBAR RADICULOPATHY: ICD-10-CM

## 2021-09-16 DIAGNOSIS — M54.6 PAIN IN THORACIC SPINE: ICD-10-CM

## 2021-09-16 DIAGNOSIS — Z98.1 HISTORY OF LUMBAR SPINAL FUSION: ICD-10-CM

## 2021-09-16 PROCEDURE — 97162 PT EVAL MOD COMPLEX 30 MIN: CPT | Performed by: PHYSICAL THERAPIST

## 2021-09-16 NOTE — PROGRESS NOTES
Physical Therapy Initial Evaluation and Plan of Care    Patient: Ronni Asif   : 1970  Diagnosis/ICD-10 Code:  Cervical pain [M54.2]  Referring practitioner: Abdirashid Gonzalez MD    Subjective Evaluation    History of Present Illness  Onset date: .  Mechanism of injury: Pt is a 50 y/o WM who reports to the clinic with neck and B UE and mid thoracic pain for several years.  Pt states he started having treatment for the neck pain in 2018.  Pt had a thoracic spine ablation one month ago-reports the did 6 at one time (T8-9-10) (T4-5-6).  Pt states his MRI on the thoracic spine showed disc bulges at T4-5-6, L3-4.  Pt with a Hx of a lumbar fusion 2019.  Pt states the ablation has helped-pt is seeing Dr. Campoverde-for pain management.  Pt with a Hx of neck and shoulder pain, which he received PT back in 2018.  Pt has been monitoring his UE N/T R> L with the 4th and 5th digit on the right hand going numb.  Pt states he has tingling patches and describes them as all over the place.  When pt lies prone he gets the N/T the into both hands. No injuries in the neck recently, but yrs ago he fell on the street and passing out.    Subjective comment: Pt continues to c/o thoracic, back and cervical pain.    Patient Occupation: disabled  Quality of life: good    Pain  Current pain rating: 3  At worst pain ratin  Location: tingling in 4th and 5 th fingers  (right T7-T10 patches )  Quality: needle-like  Relieving factors: heat, ice and medications (wears a vest that holds ice, heated mattress pad, hydrocodone, gabapentin, muscle relaxer)  Aggravating factors: standing, ambulation, prolonged positioning, overhead activity, lifting and sleeping (bending, bending to pick things up, lying on stomach, sometimes wakes up at night, getting a few HA's )    Hand dominance: right    Diagnostic Tests  No diagnostic tests performed  X-ray: abnormal (2018-disc space narrow C5-6, C6-C7,  neural foraminal encroachment left  C6-7, right C5-6, C6-7 )    Treatments  Previous treatment: chiropractic and physical therapy (5-10 yrs ago went to a Chiropractor, Had previous PT for neck, back, dry needling and post lumbar surgery.  )  Current treatment: medication  Patient Goals  Patient goals for therapy: decreased pain and increased strength  Patient goal: getting an MRI            Objective          Postural Observations  Seated posture: fair    Additional Postural Observation Details  Forward head and rounded shoulder posture     Palpation   Left   Hypertonic in the scalenes, sternocleidomastoid and upper trapezius.   Tenderness of the cervical paraspinals, scalenes, sternocleidomastoid and upper trapezius.     Right   Hypertonic in the scalenes, sternocleidomastoid, suboccipitals and upper trapezius. Tenderness of the cervical paraspinals, scalenes, sternocleidomastoid, suboccipitals and upper trapezius.     Additional Palpation Details  Moderate B anterior cervical musculature tightness and tenderness     Neurological Testing     Sensation   Cervical/Thoracic   Left   Intact: light touch    Right   Intact: light touch    Reflexes   Left   Biceps (C5/C6): normal (2+)  Brachioradialis (C6): normal (2+)  Triceps (C7): trace (1+)    Right   Biceps (C5/C6): normal (2+)  Brachioradialis (C6): absent (0)  Triceps (C7): normal (2+)    Active Range of Motion   Cervical/Thoracic Spine   Cervical    Flexion: 40 degrees   Extension: 47 degrees   Left lateral flexion: 20 degrees   Right lateral flexion: 22 degrees   Left rotation: 48 degrees   Right rotation: 36 degrees     Strength/Myotome Testing     Left Shoulder     Planes of Motion   Flexion: 5   Abduction: 5   External rotation at 0°: 5   Internal rotation at 0°: 5     Isolated Muscles   Upper trapezius: 5     Right Shoulder     Planes of Motion   Flexion: 5   Abduction: 5   External rotation at 0°: 5   Internal rotation at 0°: 5     Isolated Muscles   Upper trapezius: 5     Left Elbow    Flexion: 5  Extension: 5    Right Elbow   Flexion: 5  Extension: 4    Left Wrist/Hand   Wrist extension: 5  Wrist flexion: 5     (2nd hand position)     Trial 1: 80 lbs    Trial 2: 83 lbs    Trial 3: 90 lbs    Average: 84.33 lbs    Right Wrist/Hand   Wrist extension: 4  Wrist flexion: 4     (2nd hand position)     Trial 1: 70 lbs    Trial 2: 74 lbs    Trial 3: 86 lbs    Average: 76.67 lbs    Additional Strength Details  Thumb abd right 4+/5, left 5/5   Finger add 5/5 B   Right brachialis 4/5, left 5/5   R Brachioradialis 4/5, left 5/5      Tests   Cervical     Left   Positive active compression ([place]) and Spurling's sign.   Negative alar ligament integrity and cervical distraction.     Right   Positive active compression ([place]) and Spurling's sign.   Negative alar ligament integrity and cervical distraction.     Additional Tests Details  Negative B vertebral artery test     Pt with positive Spurlings test right with opening the facet joints, positive on left for closing down the facet joints   Mild right OA joint tightness and tenderness   Positive supine lateral glides at C5-C6-C7 right > left           Assessment & Plan     Assessment  Impairments: abnormal or restricted ROM, activity intolerance, impaired physical strength, lacks appropriate home exercise program and pain with function  Assessment details: Pt is a 52 y/o WM with a chronic Hx of neck, thoracic and back pain.  Pt had to be redirected multiple times to focus on the neck pain/B UE N/T and not his thoracic pain and back pain.  Pt reports to the clinic today with c/o B UE hand N/T and neck pain.  Pt has decreased CROM, right hand, wrist, and elbow weakness, absent C6 right reflex, decreased right  strength, pain with Spurling's test, compression test, decreased joint mobility with lateral glides at the C5-C7 vertebras, decreased flexibility, tenderness, and decreased functional mobility with sleeping, sitting, standing, lifting,  pulling, and reaching due to pain and N/T in B UEs.    Barriers to therapy: chronic Hx of back pain, previous PT, dry needling, and sees pain management   Prognosis: fair  Functional Limitations: carrying objects, sleeping, walking, pulling, pushing, uncomfortable because of pain and standing  Goals  Plan Goals: STGs: 2-4 weeks  1.  Decrease cervical and shoulder pain to 5/10 with sitting, standing and sleeping  2.  Increase AROM B SB to > or = 25 degrees  3.  Increase B AROM Rot left 60 degrees, right 45 degrees  4.  Decrease B anterior cervical musculature tightness to minimal with palpation     LTGs: 6-8 weeks  1.  Decrease cervical and shoulder pain to a 3/10 with sitting, standing, ambulation, reaching, and sleeping   2.  Pt is independent with HEP  3.  Increase right AROM to 55 degrees  4.  Increase right  strength average to at least 85 lbs   5.  Increase right triceps, wrist flex/ext, and brachioradialis strength to 4+-5/5.      Plan  Therapy options: will be seen for skilled physical therapy services  Planned modality interventions: dry needling, cryotherapy, ultrasound, traction, thermotherapy (hydrocollator packs) and TENS  Other planned modality interventions: KT taping   Planned therapy interventions: flexibility, functional ROM exercises, home exercise program, therapeutic activities, stretching, strengthening, spinal/joint mobilization, soft tissue mobilization and manual therapy  Duration in visits: 16  Treatment plan discussed with: patient        Manual Therapy:         mins  75909;  Therapeutic Exercise:         mins  97513;     Neuromuscular Kamini:        mins  08609;    Therapeutic Activity:          mins  51801;     Gait Training:           mins  66066;     Ultrasound:          mins  01855;    Electrical Stimulation:         mins  20671 ( );  Dry Needling          mins self-pay    Timed Treatment:      mins   Total Treatment:     33   mins    PT SIGNATURE: Jigna Ahuja, PT   DATE  TREATMENT INITIATED: 9/17/2021    Medicare Initial Certification  Certification Period: 12/16/2021  I certify that the therapy services are furnished while this patient is under my care.  The services outlined above are required by this patient, and will be reviewed every 90 days.     PHYSICIAN: Abdirashid Gonzalez MD      DATE:     Please sign and return via fax to [phone].. Thank you, [facility].

## 2021-09-16 NOTE — TELEPHONE ENCOUNTER
Can you place another referral for PT with a different diagnosis. They stated that the insurance wont cover the pt for chronic pain syndrome.

## 2021-09-16 NOTE — TELEPHONE ENCOUNTER
Patient is scheduled for physical therapy this afternoon and they said he needs a new order. Can you please place a new order as soon as possible?

## 2021-09-24 ENCOUNTER — HOSPITAL ENCOUNTER (OUTPATIENT)
Dept: MRI IMAGING | Facility: HOSPITAL | Age: 51
Discharge: HOME | End: 2021-09-24
Admitting: NURSE PRACTITIONER

## 2021-09-24 DIAGNOSIS — M54.12 CERVICAL RADICULOPATHY: ICD-10-CM

## 2021-09-24 LAB — CREAT BLDA-MCNC: 0.8 MG/DL (ref 0.6–1.3)

## 2021-09-24 PROCEDURE — 72156 MRI NECK SPINE W/O & W/DYE: CPT

## 2021-09-24 PROCEDURE — 0 GADOBENATE DIMEGLUMINE 529 MG/ML SOLUTION: Performed by: NURSE PRACTITIONER

## 2021-09-24 PROCEDURE — 82565 ASSAY OF CREATININE: CPT

## 2021-09-24 PROCEDURE — A9577 INJ MULTIHANCE: HCPCS | Performed by: NURSE PRACTITIONER

## 2021-09-24 RX ADMIN — GADOBENATE DIMEGLUMINE 20 ML: 529 INJECTION, SOLUTION INTRAVENOUS at 13:35

## 2021-09-27 ENCOUNTER — TELEPHONE (OUTPATIENT)
Dept: NEUROSURGERY | Facility: CLINIC | Age: 51
End: 2021-09-27

## 2021-09-27 NOTE — PROGRESS NOTES
Please notify patient that his cervical MRI shows multilevel degenerative changes as well disc herniation at C2-3, and disc bulges at C3-4, C4-5, C5-6, and C6-7.  Please schedule an appt so we can go over his MRI in detail and discuss interventional options. Thanks

## 2021-09-27 NOTE — TELEPHONE ENCOUNTER
Caller: SHONDA    Relationship to patient: SELF    Patient is needing: PATIENT CALLED TO SET UP APPT WITH DR. CHAVES PER PREV [employer] MESSAGE. CALLED THE OFFICE, JOHN PAUL STANLEY CONFIRMED NEXT AVAILABLE WITH DR. CHAVES WAS FINE. PATIENT IS SCHEDULED FOR 12/29/21 AND HAS BEEN ADDED TO THE CANCELLATION LIST.

## 2021-09-27 NOTE — PROGRESS NOTES
Called and informed pt of results and to schedule f/u. Pt verbally understood. Call transferred to schedule an appt.

## 2021-09-28 ENCOUNTER — PREP FOR SURGERY (OUTPATIENT)
Dept: SURGERY | Facility: SURGERY CENTER | Age: 51
End: 2021-09-28

## 2021-09-28 ENCOUNTER — OFFICE VISIT (OUTPATIENT)
Dept: PAIN MEDICINE | Facility: CLINIC | Age: 51
End: 2021-09-28

## 2021-09-28 ENCOUNTER — TRANSCRIBE ORDERS (OUTPATIENT)
Dept: SURGERY | Facility: SURGERY CENTER | Age: 51
End: 2021-09-28

## 2021-09-28 VITALS
DIASTOLIC BLOOD PRESSURE: 74 MMHG | RESPIRATION RATE: 16 BRPM | HEART RATE: 84 BPM | HEIGHT: 70 IN | SYSTOLIC BLOOD PRESSURE: 112 MMHG | WEIGHT: 265 LBS | TEMPERATURE: 97.3 F | BODY MASS INDEX: 37.94 KG/M2 | OXYGEN SATURATION: 98 %

## 2021-09-28 DIAGNOSIS — M54.2 NECK PAIN: ICD-10-CM

## 2021-09-28 DIAGNOSIS — M54.12 CERVICAL RADICULOPATHY: Primary | ICD-10-CM

## 2021-09-28 DIAGNOSIS — M47.814 ARTHROPATHY OF THORACIC FACET JOINT: ICD-10-CM

## 2021-09-28 DIAGNOSIS — M50.30 BULGE OF CERVICAL DISC WITHOUT MYELOPATHY: ICD-10-CM

## 2021-09-28 DIAGNOSIS — M54.6 PAIN IN THORACIC SPINE: ICD-10-CM

## 2021-09-28 DIAGNOSIS — G89.4 CHRONIC PAIN SYNDROME: ICD-10-CM

## 2021-09-28 PROCEDURE — 99214 OFFICE O/P EST MOD 30 MIN: CPT | Performed by: NURSE PRACTITIONER

## 2021-09-28 RX ORDER — SODIUM CHLORIDE 0.9 % (FLUSH) 0.9 %
10 SYRINGE (ML) INJECTION AS NEEDED
Status: CANCELLED | OUTPATIENT
Start: 2021-09-28

## 2021-09-28 RX ORDER — SODIUM CHLORIDE 0.9 % (FLUSH) 0.9 %
10 SYRINGE (ML) INJECTION EVERY 12 HOURS SCHEDULED
Status: CANCELLED | OUTPATIENT
Start: 2021-09-28

## 2021-09-28 NOTE — PROGRESS NOTES
CHIEF COMPLAINT  F/U for back pain . Pt states his pain has remained the same .    Subjective   Ronni Asif is a 51 y.o. male  who presents for follow-up.  He has a history of back and neck pain. His primary pain complaint is neck pain. Today his pain is 3-4/10VAS in severity in his neck.  He report intermittent tingling in his bilateral hands (R>L) as well as in his posterior right arm.     He continues to report improvement in his mid-back pain from this thoracic RFA    He continues with Norco 5/325 0-1/day (does not use daily), Gabapentin 600 mg TID, and Methocarbamol PRN.  He states that he will utilize Advil prior to using his Norco 10/325.     Patient is currently working with PT.     Patient remained masked during entire encounter. No cough present. I donned a mask and eye protection throughout entire visit. Prior to donning mask and eye protection, hand hygiene was performed, as well as when it was doffed.  I was closer than 6 feet, but not for an extended period of time. No obvious exposure to any bodily fluids.    Back Pain  This is a chronic problem. The current episode started more than 1 year ago. The problem occurs daily. Progression since onset: improved since last office visit. The pain is present in the thoracic spine and lumbar spine. The quality of the pain is described as aching. The pain does not radiate. The pain is at a severity of 4/10. The pain is severe. The symptoms are aggravated by bending, standing and twisting. Stiffness is present in the morning. Associated symptoms include numbness (hands). Pertinent negatives include no abdominal pain, chest pain, dysuria, fever, headaches or weakness. Risk factors include obesity. He has tried analgesics, heat, ice, NSAIDs, muscle relaxant and home exercises (Norco 5/325) for the symptoms. The treatment provided significant relief.   Neck Pain   This is a chronic problem. The current episode started more than 1 year ago. The problem occurs  constantly. The problem has been gradually worsening. The pain is present in the occipital region and midline. The quality of the pain is described as burning (bilateral upper extremity tingling). The pain is at a severity of 4/10. The symptoms are aggravated by bending (lying in bed). Associated symptoms include numbness (hands). Pertinent negatives include no chest pain, fever, headaches or weakness. He has tried ice, oral narcotics and NSAIDs (PT) for the symptoms.      PEG Assessment   What number best describes your pain on average in the past week?7  What number best describes how, during the past week, pain has interfered with your enjoyment of life?7  What number best describes how, during the past week, pain has interfered with your general activity?  7    The following portions of the patient's history were reviewed and updated as appropriate: allergies, current medications, past family history, past medical history, past social history, past surgical history and problem list.    Review of Systems   Constitutional: Negative for activity change, fatigue and fever.   HENT: Negative for congestion.    Eyes: Negative for visual disturbance.   Respiratory: Negative for cough and chest tightness.    Cardiovascular: Negative for chest pain.   Gastrointestinal: Negative for abdominal pain, constipation and diarrhea.   Genitourinary: Negative for difficulty urinating and dysuria.   Musculoskeletal: Positive for back pain and neck pain.   Neurological: Positive for light-headedness and numbness (hands). Negative for dizziness, weakness and headaches.   Psychiatric/Behavioral: Negative for agitation, sleep disturbance and suicidal ideas. The patient is not nervous/anxious.      --  The aforementioned information the Chief Complaint section and above subjective data including any HPI data, and also the Review of Systems data, has been personally reviewed and affirmed.  --    Vitals:    09/28/21 1242   BP: 112/74  "  Pulse: 84   Resp: 16   Temp: 97.3 °F (36.3 °C)   TempSrc: Temporal   SpO2: 98%   Weight: 120 kg (265 lb)   Height: 177.8 cm (70\")   PainSc:   6   PainLoc: Back     9/24/2021: MRI Spine Cervical WO W     HISTORY:  Neck pain off-and-on for 5 years but worse the last 3 months with bilateral shoulder pain and hand numbness.     TECHNIQUE:  Multiplanar multisequence imaging of the cervical spine acquired prior to and following intravenous administration 20 cc MultiHance IV contrast utilizing a standard protocol.     COMPARISON: None     FINDINGS:     Study is significantly limited by patient motion artifact. Patient had difficulty holding still despite coaching.     Sagittal alignment is normal. Intervertebral discs are desiccated in general with mild loss of intervertebral disc height at C5-6 and C6-7 with some endplate spondylosis. Bone marrow signal intensity is normal allowing for minor marrow endplate  degenerative changes. The cervical cord is normal in size and grossly normal in signal intensity allowing for the motion artifact. Following contrast administration, there is no pathologic intracanalicular enhancement. (Again allowing for motion  artifact). No Chiari I malformation. There is a partially empty sella.      The cervical canal is mildly developmentally small secondary to short pedicles.     Level by level:     C2-3: There is mild left side facet degenerative change and right paramedian focal disc protrusion/extrusion extending caudad from the disc but remaining contiguous with it. The disc material measures about 7 x 3 x 7 mm and results in mild flattening of  the right anterior cord.     C3-4: There is mild concentric disc bulge with left-sided uncovertebral osteophyte formation. There is mild right and moderate left side facet degenerative change. There is mild effacement of the thecal sac but the cord is still surrounded by CSF. There  is moderate to severe left foraminal narrowing.     C4-5: There " is mild left and moderate right side facet arthritis with a mild concentric disc bulge and endplate spondylosis with uncovertebral osteophyte formation. There is mild flattening of the cord and very mild canal stenosis. There is approximately  moderate left and mild to moderate right foraminal narrowing.     C5-6: There is mild right facet degenerative change. There is a concentric desiccated disc bulge with endplate spondylosis and a superimposed broad posterior protrusion that is more prominent to the right than left at midline. There is right greater than  left-sided uncovertebral osteophyte formation. There is mild to moderate right and milder left-sided canal stenosis. There is severe right greater than left foraminal narrowing.     C6-7: There is mild facet degenerative change bilaterally with a concentric desiccated disc bulge and endplate spondylosis and a superimposed broad posterior protrusion that extends into the foramina. There is uncovertebral osteophyte formation  bilaterally. There is mild cord flattening and canal stenosis and fairly severe bilateral foraminal impingement.     C7-T1: There is a minimal posterior disc bulge. There is severe facet arthritis left greater than right. There is moderate to severe right and mild left foraminal narrowing. There is no canal stenosis.     OTHER: No prevertebral fluid collection     IMPRESSION:  1.  there is multiple level cervical degenerative changes superimposed upon a developmentally small canal. Allowing for motion degradation of the examination, there is mild to moderate cord flattening and canal stenosis at C5-6 worse the right of midline  and mild cord flattening at C6-7. There is multiple level foraminal impingement, some of which is severe. See level by level discussion above.  2. No pathologic intracanalicular enhancement. No convincing evidence for cord signal abnormality.     Signer Name: Tierney Garrett MD   Signed: 9/26/2021 1:34 PM   Workstation  [facility]: [facility]    [facility]    Objective   Physical Exam  Vitals and nursing note reviewed.   Constitutional:       Appearance: Normal appearance. He is well-developed.   Eyes:      General: Lids are normal.   Cardiovascular:      Rate and Rhythm: Normal rate.   Pulmonary:      Effort: Pulmonary effort is normal.   Musculoskeletal:      Cervical back: Tenderness present. Pain with movement present. Normal range of motion.      Comments: NEG Duke Spurling   Neurological:      Mental Status: He is alert and oriented to person, place, and time.      Gait: Gait normal.   Psychiatric:         Attention and Perception: Attention normal.         Mood and Affect: Mood normal.         Speech: Speech normal.         Behavior: Behavior normal.         Judgment: Judgment normal.       Assessment/Plan   Diagnoses and all orders for this visit:    1. Cervical radiculopathy (Primary)    2. Chronic pain syndrome    3. Neck pain    4. Arthropathy of thoracic facet joint    5. Pain in thoracic spine    6. Bulge of cervical disc without myelopathy      --- Results of cervical MRI performed on 9/24/2021 reviewed with patient.   --- The urine drug screen confirmation from 5/26/2021 has been reviewed and the result is appropriate based on patient history and ROSI report  --- Continue PT for neck pain  --- Proceed with TAMEKA  Reviewed the procedure at length with the patient.  Included in the review was expectations, complications, risk and benefits.The procedure was described in detail and the risks, benefits and alternatives were discussed with the patient (including but not limited to: bleeding, infection, nerve damage, worsening of pain, no pain relief, inability to perform injection, paralysis, seizures, and death) who agreed to proceed.  Discussed the potential for sedation if warranted/wanted.  The procedure will plan to be performed at [facility] with fluoroscopic guidance(unless  ultrasound is indicated) and could potentially have steroids and contrast dye used. Questions were answered and in a way the patient could understand.  Patient verbalized understanding and wishes to proceed.  This intervention will be ordered.  Discussed with patient that all procedures are part of a multimodal plan of care and include either formal PT or a home exercise program.  Patient has no evidence of coagulopathy or current infection.    Discussed with the patient that sedation is optional for this procedure.  The sedation offered is called conscious sedation which is different from general anesthesia that is utilized in surgical procedures. The dosing of the sedation is determined by the physician and they will be monitored throughout the procedure. With conscious sedation it is possible to remember parts or all of the procedure, this is normal. They will need to have a  with them as driving is prohibited following conscious sedation.     NPO instructions for conscious sedation:  --- Do not eat 6 hours prior to the procedure.   --- Do not drink any dairy or citrus 4 hours prior to the procedure.   --- Do not drink anything, including clear liquids, 2 hours prior to procedure.     If the NPO instructions are not followed then the procedure may be performed without sedation or the procedure will need to be rescheduled.     --- Continue sparing use of Norco 10/325. No refill needed. Patient appears stable with current regimen. No adverse effects. Regarding continuation of opioids, there is no evidence of aberrant behavior or any red flags.  The patient continues with appropriate response to opioid therapy. ADL's remain intact by self.   --- Follow-up after procedure     ROSI REPORT  As part of the patient's treatment plan, I am prescribing controlled substances. The patient has been made aware of appropriate use of such medications, including potential risk of somnolence, limited ability to drive and/or  work safely, and the potential for dependence or overdose. It has also bee made clear that these medications are for use by this patient only, without concomitant use of alcohol or other substances unless prescribed.     Patient has completed prescribing agreement detailing terms of continued prescribing of controlled substances, including monitoring ROSI reports, urine drug screening, and pill counts if necessary. The patient is aware that inappropriate use will results in cessation of prescribing such medications.    As the clinician, I personally reviewed the ROSI from 9/28/2021 while the patient was in the office today.    History and physical exam exhibit continued safe and appropriate use of controlled substances.     Dictated utilizing Dragon dictation.

## 2021-09-30 ENCOUNTER — TELEPHONE (OUTPATIENT)
Dept: NEUROSURGERY | Facility: CLINIC | Age: 51
End: 2021-09-30

## 2021-09-30 NOTE — TELEPHONE ENCOUNTER
Called Mr. Asif and informed him that Dr. Tripathi is fine with him getting the epidurals first.  He verbalized understanding.                        ----- Message from Jake Tripathi MD sent at 9/30/2021  1:31 PM EDT -----  Regarding: FW: Test Results Question  Contact: [phone]      ----- Message -----  From: Natalie Roper MA  Sent: 9/30/2021  12:28 PM EDT  To: Jake Tripathi MD  Subject: FW: Test Results Question                        Hello   So on October 7 I have scheduled some epidurals in my c spine with Dr Gonzalez.   Apparently I have some changes that might require surgery opening these narrow passages and I dont have an appointment with you until 12/29/21.  I was just wondering if you would be okay with the epidurals first.  I know your very busy and really dont want to bother you, but I high respect you and value your opinion.   Thank you.  Ronni asif   ----- Message -----  From: Ronni Asif  Sent: 9/30/2021  10:29 AM EDT  To: [facility]  Subject: Test Results Question                            Hello   So on October 7 I have scheduled some epidurals in my c spine with Dr Gonzalez.   Apparently I have some changes that might require surgery opening these narrow passages and I dont have an appointment with you until 12/29/21.  I was just wondering if you would be okay with the epidurals first.  I know your very busy and really dont want to bother you, but I high respect you and value your opinion.   Thank you.  Ronni asif

## 2021-10-04 NOTE — SIGNIFICANT NOTE
Patient educated on the following :    - If you are receiving Sedation for your procedure Nothing to Eat 6 hours and only clear liquids for 2 hours prior to your procedure.      -You will need to have someone drive you home after your PROCEDURE and remain with you for 24 hours after the PROCEDURE  - The date of your procedure, your are welcome to have one visitor at bedside or remain within 10-15 minutes of [facility]  -You will need to arrive at   1245        on   10/7        for your PROCEDURE  -Please contact [facility] at: [phone] with any questions and/or concerns

## 2021-10-07 ENCOUNTER — HOSPITAL ENCOUNTER (OUTPATIENT)
Facility: SURGERY CENTER | Age: 51
Setting detail: HOSPITAL OUTPATIENT SURGERY
Discharge: HOME | End: 2021-10-07
Attending: ANESTHESIOLOGY | Admitting: ANESTHESIOLOGY

## 2021-10-07 ENCOUNTER — HOSPITAL ENCOUNTER (OUTPATIENT)
Dept: GENERAL RADIOLOGY | Facility: SURGERY CENTER | Age: 51
Setting detail: HOSPITAL OUTPATIENT SURGERY
End: 2021-10-07

## 2021-10-07 VITALS
TEMPERATURE: 98.4 F | OXYGEN SATURATION: 94 % | DIASTOLIC BLOOD PRESSURE: 74 MMHG | HEIGHT: 70 IN | HEART RATE: 82 BPM | WEIGHT: 250 LBS | BODY MASS INDEX: 35.79 KG/M2 | RESPIRATION RATE: 16 BRPM | SYSTOLIC BLOOD PRESSURE: 111 MMHG

## 2021-10-07 DIAGNOSIS — M54.2 NECK PAIN: ICD-10-CM

## 2021-10-07 DIAGNOSIS — M54.12 CERVICAL RADICULOPATHY: ICD-10-CM

## 2021-10-07 PROCEDURE — 77002 NEEDLE LOCALIZATION BY XRAY: CPT

## 2021-10-07 PROCEDURE — 62321 NJX INTERLAMINAR CRV/THRC: CPT | Performed by: ANESTHESIOLOGY

## 2021-10-07 PROCEDURE — 25010000002 METHYLPREDNISOLONE PER 80 MG: Performed by: ANESTHESIOLOGY

## 2021-10-07 PROCEDURE — 3E0S3BZ INTRODUCTION OF ANESTHETIC AGENT INTO EPIDURAL SPACE, PERCUTANEOUS APPROACH: ICD-10-PCS | Performed by: ANESTHESIOLOGY

## 2021-10-07 PROCEDURE — 25010000002 FENTANYL CITRATE (PF) 50 MCG/ML SOLUTION: Performed by: ANESTHESIOLOGY

## 2021-10-07 PROCEDURE — 76000 FLUOROSCOPY <1 HR PHYS/QHP: CPT

## 2021-10-07 PROCEDURE — 0 IOHEXOL 300 MG/ML SOLUTION 10 ML VIAL: Performed by: ANESTHESIOLOGY

## 2021-10-07 PROCEDURE — 25010000002 MIDAZOLAM PER 1 MG: Performed by: ANESTHESIOLOGY

## 2021-10-07 RX ORDER — SODIUM CHLORIDE 0.9 % (FLUSH) 0.9 %
10 SYRINGE (ML) INJECTION EVERY 12 HOURS SCHEDULED
Status: DISCONTINUED | OUTPATIENT
Start: 2021-10-07 | End: 2021-10-07 | Stop reason: HOSPADM

## 2021-10-07 RX ORDER — FENTANYL CITRATE 50 UG/ML
INJECTION, SOLUTION INTRAMUSCULAR; INTRAVENOUS AS NEEDED
Status: DISCONTINUED | OUTPATIENT
Start: 2021-10-07 | End: 2021-10-07 | Stop reason: HOSPADM

## 2021-10-07 RX ORDER — SODIUM CHLORIDE 0.9 % (FLUSH) 0.9 %
10 SYRINGE (ML) INJECTION AS NEEDED
Status: DISCONTINUED | OUTPATIENT
Start: 2021-10-07 | End: 2021-10-07 | Stop reason: HOSPADM

## 2021-10-07 RX ORDER — MIDAZOLAM HYDROCHLORIDE 1 MG/ML
INJECTION INTRAMUSCULAR; INTRAVENOUS AS NEEDED
Status: DISCONTINUED | OUTPATIENT
Start: 2021-10-07 | End: 2021-10-07 | Stop reason: HOSPADM

## 2021-10-07 NOTE — OP NOTE
Cervical Epidural Steroid Injection @ C6-C7  [facility]    PREOPERATIVE DIAGNOSIS: Cervical Spinal Stenosis and Bilateral Cervical Radiculopathy  POSTOPERATIVE DIAGNOSIS:  Same as preop diagnosis    PROCEDURE:   Cervical Epidural Steroid Injection, Therapeutic Translaminar Injection, with epidurogram, at  C6-C7 level    PRE-PROCEDURE DISCUSSION WITH PATIENT:    Risks and complications were discussed with the patient prior to starting the procedure and informed consent was obtained.  We discussed various topics including but not limited to bleeding, infection, injury, paralysis, nerve injury, dural puncture, coma, death, worsening of clinical picture, lack of pain relief, and postprocedural soreness.    SURGEON:  Abdirashid Gonzalez MD    REASON FOR PROCEDURE:    Degenerative changes are noted in the area., Stenotic area is noted, and is likely contributing to this chronic &/or recurrent pain.  and Radiating pattern of pain is likely consistent with degenerative changes in the area.    SEDATION:  Versed 4mg & Fentanyl 50 mcg IV  ANESTHETIC:  Marcaine 0.25%  STEROID:   Methylprednisolone (DEPO MEDROL) 80mg/ml    DESCRIPTON OF PROCEDURE:    After obtaining informed consent, I.V. was started in the preop area.   The patient was taken to the operating room and placed in the prone position.  EKG, blood pressure, and pulse oximeter were monitored throughout, and sedation was provided as needed by the RN under my guidance. All pressure points were well padded.  The cervicothoracic spine area was prepped with Chloraprep and draped in a sterile fashion.  Under fluoroscopic guidance, the aforementioned interlaminar space was identified. Skin and subcutaneous tissues were anesthetized with 1% lidocaine in the middle of the space. A Tuohy needle was introduced through the skin and advanced to this interlaminar space and into the epidural space under fluoroscopic guidance and verified with loss-of-resistance  technique to air.  After confirming the position of the needle with the fluoroscope with all the views, and after aspiration was confirmed negative for blood and CSF, 1.5 mL of Omnipaque was injected.  After seeing appropriate epidurogram with lateral and PA views, a total of 2 cc solution was injected, consisting of 1cc of local anesthetic as above, with normal saline and injectable steroid as above.     ESTIMATED BLOOD LOSS:  <5 mL  SPECIMENS:  None    COMPLICATIONS:     No complications were noted., There was no indication of vascular uptake on live injection of contrast dye., There was no indication of intrathecal uptake on live injection of contrast dye., There was not any evidence of dural puncture.   and The patient did not have any signs of postprocedure numbness nor weakness.    TOLERANCE & DISCHARGE CONDITION:    The patient tolerated the procedure well.  The patient was transported to the recovery area without difficulties.  The patient was discharged to home under the care of family in stable and satisfactory condition.    PLAN OF CARE:  1. The patient was given our standard instruction sheet.  2. The patient will Return to clinic 4 wks and Repeat injection in 2 wks PRN.  3. The patient will resume all medications as per the medication reconciliation sheet.

## 2021-10-07 NOTE — DISCHARGE INSTRUCTIONS
[facility] - Post-procedure Instructions          --  While there are no absolute restrictions, it is recommended that you do not perform strenuous activity today. In the morning, you may resume your level of activity as before your block.    --  If you have a band-aid at your injection site, please remove it later today. Observe the area for any redness, swelling, pus-like drainage, or a temperature over 101°. If any of these symptoms occur, please call your doctor at [phone]. If after office hours, leave a message and the on-call provider will return your call.    --  Ice may be applied to your injection site. It is recommended you avoid direct heat (heating pad; hot tub) for 1-2 days.    --  Call [facility] at [phone] if you experience persistent headache, persistent bleeding from the injection site, or severe pain not relieved by heat or oral medication.    --  Do not make important decisions today.    --  Due to the effects of the block and/or the I.V. Sedation, DO NOT drive or operate hazardous machinery for 12 hours.  Local anesthetics may cause numbness after procedure and precautions must be taken with regards to operating equipment as well as with walking, even if ambulating with assistance of another person or with an assistive device.    --  Do not drink alcohol for 12 hours.    -- You may return to work tomorrow, or as directed by your referring doctor.    --  Occasionally you may notice a slight increase in your pain after the procedure. This should start to improve within the next 24-48 hours. Radiofrequency ablation procedure pain may last 3-4 weeks.    --  It may take as long as 3-4 days before you notice a gradual improvement in your pain and/or other symptoms.    -- You may continue to take your prescribed pain medication as needed.    --  Some normal possible side effects of steroid use could include fluid retention, increased blood sugar, dull headache,  increased sweating, increased appetite, mood swings and flushing.    --  Diabetics are recommended to watch their blood glucose level closely for 24-48 hours after the injection.    --  Must stay in PACU for 20 min upon arrival and prove no leg weakness before being discharged.    --  IN THE EVENT OF A LIFE THREATENING EMERGENCY, (CHEST PAIN, BREATHING DIFFICULTIES, PARALYSIS…) YOU SHOULD GO TO YOUR NEAREST EMERGENCY ROOM.    --  You should be contacted by our office within 2-3 days to schedule follow up or next appointment date.  If not contacted within 7 days, please call the office at [phone]

## 2021-10-12 ENCOUNTER — TREATMENT (OUTPATIENT)
Dept: PHYSICAL THERAPY | Facility: CLINIC | Age: 51
End: 2021-10-12

## 2021-10-12 DIAGNOSIS — M54.12 CERVICAL RADICULOPATHY: ICD-10-CM

## 2021-10-12 DIAGNOSIS — M54.2 CERVICAL PAIN: Primary | ICD-10-CM

## 2021-10-12 DIAGNOSIS — M51.16 LUMBAR DISC HERNIATION WITH RADICULOPATHY: ICD-10-CM

## 2021-10-12 DIAGNOSIS — R68.89 DECREASED ACTIVITY TOLERANCE: ICD-10-CM

## 2021-10-12 PROCEDURE — 97110 THERAPEUTIC EXERCISES: CPT | Performed by: PHYSICAL THERAPIST

## 2021-10-12 PROCEDURE — 97035 APP MDLTY 1+ULTRASOUND EA 15: CPT | Performed by: PHYSICAL THERAPIST

## 2021-10-12 PROCEDURE — 97012 MECHANICAL TRACTION THERAPY: CPT | Performed by: PHYSICAL THERAPIST

## 2021-10-12 NOTE — PROGRESS NOTES
Physical Therapy Daily Progress Note    Visit # : 2  Ronni Reyespoonam reports: not having as much tingling right little finger-pt states he needs to have a series of epidural-order for PT and the needling-pt states NOv 3 th.  N/T in both hands and and intermittent in the upper back.  Pt rates his neck and upper back pain is a 5-6/10 with pain pills since his epidurals.  Pt was really sore from the procedure.  Pt states he gets relief from the Biofreeze.      Subjective     Objective          Palpation   Left   Hypertonic in the levator scapulae, scalenes, sternocleidomastoid, suboccipitals and upper trapezius.   Tenderness of the levator scapulae, lower trapezius, middle trapezius, rhomboids, scalenes, sternocleidomastoid, suboccipitals and upper trapezius.     Right   Hypertonic in the levator scapulae, scalenes, sternocleidomastoid, suboccipitals and upper trapezius. Tenderness of the levator scapulae, lower trapezius, middle trapezius, rhomboids, scalenes, sternocleidomastoid, suboccipitals and upper trapezius.     Additional Palpation Details  B mild point tenderness over cervical musculature     Active Range of Motion   Cervical/Thoracic Spine   Cervical    Flexion: 40 degrees   Extension: 31 degrees   Left lateral flexion: 22 degrees   Right lateral flexion: 23 degrees   Left rotation: 56 degrees   Right rotation: 47 degrees       See Exercise, Manual, and Modality Logs for complete treatment.       Assessment & Plan     Assessment  Assessment details: Pt tolerated treatment well without c/o B hand N/T after treatment and reported less tightness in her neck and upper shoulders.  Pt was able to perform all stretches and exercises without difficulty or pain.  Did have to modify corner stretch to doorway due to c/o B hand N/T while doing stretch in corner.  Will continue to see pt 2x/week for the next 3 weeks until pt sees pain management on 11/3.  Reassess pt's symptoms next visit after initiating mechanical  ICT.            Progress per Plan of Care           Manual Therapy:    5     mins  82708;  Therapeutic Exercise:   20      mins  36901;     Neuromuscular Kamini:        mins  87338;    Therapeutic Activity:          mins  08819;     Gait Training:           mins  75239;     Ultrasound:    8      mins  37379;    Electrical Stimulation:         mins  12563 ( );  Dry Needling          mins self-pay  Cervical Traction           10 mins 07296     Timed Treatment:  33    mins   Total Treatment:    64    mins    Jigna Ahuja, PT  Physical Therapist

## 2021-10-12 NOTE — PROGRESS NOTES
Physical Therapy Daily Progress Note         Visit # : 3  Ronni Yefri reports: yesterday I did something to low back I was doing things around the house.  [place] like a pull or tear in the right side of my back when I woke up.  When I tried to get up it really hurt.  I'm here for my neck and it does feel better from treatment I had with Jigna. Denies bowel/bladder dysfunction, (+) valsalva some tingling into my right hip. Called Dr. Tripathi, first available appointment scheduled for Dec. 29, told to go to the ER if too severe.    Patient would like to proceed for needling on his neck for some relief.    See evaluation for cervical.  Had epidural last week, not much change yet.  Subjective   PMH:cervical epidural  10/7/21,  radiofrequency ablation 7/2021, medial branch block thoracic 6/21, lumbar disectomy with fusion with cage L5-S1 8/2019, lap band 6/2020, TIA, HTN, DM, skin CA  History:  pt denies local lymphedema in area to be treated, active infection, blood bourne illness, needle phobia, abnormal bleeding tendency, does use Aspirin, any compromised immune system, recent surgery.    Objective          Postural Observations    Additional Postural Observation Details  Patient ambulating into clinic with antalgic gait, difficulty stand to sit or sit to stand.    Palpation   Left   Hypertonic in the cervical paraspinals, levator scapulae, suboccipitals and upper trapezius.   Tenderness of the cervical paraspinals, levator scapulae and upper trapezius.     Right   Hypertonic in the cervical paraspinals, levator scapulae, suboccipitals and upper trapezius. Tenderness of the cervical paraspinals, levator scapulae and upper trapezius.     Neurological Testing     Sensation   Cervical/Thoracic   Left   Intact: light touch    Right   Intact: light touch    Comments   Left light touch: cervical and thoracic area to be treated.   Right light touch: cervical and thoracic area to be treated.       See Exercise,  Manual, and Modality Logs for complete treatment.       Assessment/Plan  Patient presents with guarding and tenderness cervical paraspinals, upper traps, levator, and thoracic paraspinals.  ROM improved functionally after treatment, decreased tenderness and improved pain.  Lumbar injury acute and advised patient to seek care with MD due to h/o surgery.  Other continue cervical treatment, add needling as needed in two weeks.  Patient to seek care for low back from MD or ER.           Manual Therapy:    -     mins  73555;  Therapeutic Exercise:    -     mins  95254;     Neuromuscular Kamini:    -    mins  88258;    Therapeutic Activity:     10     mins  34027;     Gait Training:      -     mins  12186;     Ultrasound:     -     mins  17415;    Electrical Stimulation:    -     mins  49290 ( );  Dry Needling     15     mins self-pay    Timed Treatment:   25   mins   Total Treatment:     35   mins    Shannan Reeves, PT OCS, CIDN    Physical Therapist

## 2021-10-13 ENCOUNTER — TREATMENT (OUTPATIENT)
Dept: PHYSICAL THERAPY | Facility: CLINIC | Age: 51
End: 2021-10-13

## 2021-10-13 DIAGNOSIS — M54.2 CERVICAL PAIN: Primary | ICD-10-CM

## 2021-10-13 DIAGNOSIS — M54.12 CERVICAL RADICULOPATHY: ICD-10-CM

## 2021-10-13 PROCEDURE — 20560 NDL INSJ W/O NJX 1 OR 2 MUSC: CPT | Performed by: PHYSICAL THERAPIST

## 2021-10-15 ENCOUNTER — TREATMENT (OUTPATIENT)
Dept: PHYSICAL THERAPY | Facility: CLINIC | Age: 51
End: 2021-10-15

## 2021-10-15 DIAGNOSIS — M54.12 CERVICAL RADICULOPATHY: ICD-10-CM

## 2021-10-15 DIAGNOSIS — M54.2 CERVICAL PAIN: Primary | ICD-10-CM

## 2021-10-15 PROCEDURE — 97035 APP MDLTY 1+ULTRASOUND EA 15: CPT | Performed by: PHYSICAL THERAPIST

## 2021-10-15 RX ORDER — METHOCARBAMOL 750 MG/1
750 TABLET, FILM COATED ORAL 2 TIMES DAILY PRN
Qty: 60 TABLET | Refills: 0 | Status: SHIPPED | OUTPATIENT
Start: 2021-10-15 | End: 2021-12-09 | Stop reason: SDUPTHER

## 2021-10-15 RX ORDER — HYDROCODONE BITARTRATE AND ACETAMINOPHEN 5; 325 MG/1; MG/1
1 TABLET ORAL DAILY PRN
Qty: 30 TABLET | Refills: 0 | Status: SHIPPED | OUTPATIENT
Start: 2021-10-15 | End: 2021-11-03 | Stop reason: SDUPTHER

## 2021-10-15 NOTE — TELEPHONE ENCOUNTER
Medication Refill Request    Date of phone call: 10/15/21    Medication being requested: norco 5/325mg si tab po daily prn   Qty: 30    Date of last visit: 21    Date of last refill:     ROSI up to date?:     Next Follow up?: 11/3/21    Any new pertinent information? (i.e, new medication allergies, new use of medications, change in patient's health or condition, non-compliance or inconsistency with prescribing agreement?): can you please fill for Na? Thank you.     Medication Refill Request    Date of phone call: 10/15/21    Medication being requested: robaxin 750mg si tab po bid prn   Qty: 60    Date of last visit: 21    Date of last refill:     ROSI up to date?:     Next Follow up?: 11/3/21    Any new pertinent information? (i.e, new medication allergies, new use of medications, change in patient's health or condition, non-compliance or inconsistency with prescribing agreement?): can you please fill for Na? Thank you.

## 2021-10-15 NOTE — TELEPHONE ENCOUNTER
ROSI reviewed and appropriate.  UDS 5/26/21 appropriate.  This patient is under the care of my colleague and I am covering patient care for him at this time.  I have reviewed pertinent information/documentation as necessary and will continue the plan of care as previously directed to the best of my ability.

## 2021-10-15 NOTE — PROGRESS NOTES
Physical Therapy Daily Progress Note        Patient: Ronni Asif   : 1970  Diagnosis/ICD-10 Code:  Cervical pain [M54.2]  Referring practitioner: Abdirashid Gonzalez MD  Date of Initial Visit: Type: THERAPY  Noted: 2021  Today's Date: 10/15/2021  Patient seen for 4 sessions         Ronni Asif reports: he is on hydrocodone, methoncarbodol, and ibuprofen so he is feeling pretty good right now.  He said his low back is .          Subjective     Objective          Palpation   Left   Hypertonic in the upper trapezius.   Tenderness of the upper trapezius.     Right   Hypertonic in the upper trapezius. Tenderness of the upper trapezius.       See Exercise, Manual, and Modality Logs for complete treatment.       Assessment/Plan  Pt continued to present with UT tightness this date thus addressed with US and stretching.  Pt continued to be limited with exercises d/t low back pain thus held exercises.  Instructed pt to continue with heat to neck.     Progress per Plan of Care           Manual Therapy:         mins  85437;  Therapeutic Exercise:    8     mins  19769;     Neuromuscular Kamini:        mins  20240;    Therapeutic Activity:          mins  37560;     Gait Training:           mins  48670;     Ultrasound:     10     mins  89141;    Electrical Stimulation:         mins  61885 ( );  Dry Needling          mins self-pay    Timed Treatment:   18   mins   Total Treatment:     25   mins    Tracey Ambrocio PTA  Physical Therapist Assistant

## 2021-10-18 ENCOUNTER — TREATMENT (OUTPATIENT)
Dept: PHYSICAL THERAPY | Facility: CLINIC | Age: 51
End: 2021-10-18

## 2021-10-18 DIAGNOSIS — M54.2 CERVICAL PAIN: Primary | ICD-10-CM

## 2021-10-18 DIAGNOSIS — R68.89 DECREASED ACTIVITY TOLERANCE: ICD-10-CM

## 2021-10-18 DIAGNOSIS — M54.12 CERVICAL RADICULOPATHY: ICD-10-CM

## 2021-10-18 PROCEDURE — 97110 THERAPEUTIC EXERCISES: CPT | Performed by: PHYSICAL THERAPIST

## 2021-10-18 PROCEDURE — 97035 APP MDLTY 1+ULTRASOUND EA 15: CPT | Performed by: PHYSICAL THERAPIST

## 2021-10-18 PROCEDURE — 97140 MANUAL THERAPY 1/> REGIONS: CPT | Performed by: PHYSICAL THERAPIST

## 2021-10-18 NOTE — PROGRESS NOTES
Re-Assessment / Re-Certification        Patient: Ronni Asif   : 1970  Diagnosis/ICD-10 Code:  Cervical pain [M54.2]  Referring practitioner: Abdirashid Gonzalez MD  Date of Initial Visit: Type: THERAPY  Noted: 2021  Today's Date: 10/18/2021  Patient seen for 5 sessions      Subjective:   Ronni Asif reports: Pt reports he is doing okay today. Took pain meds this morning after a bad night of sleep in which he was tossing/turning due to neck pain. Pt reports he has iced and relaxed today for neck pain relief. Pt states today is a bad day and that he doesn't think he has improved any since the first day. He says he has been doing HEP but not today due to pain. Pain rated 3/10 today after meds/ice. Pt with reports of some tingling in 4th and 5th digits with the Left hand > Right hand.    Subjective Questionnaire: NDI:50%  Clinical Progress: improved  Home Program Compliance: Yes  Treatment has included: therapeutic exercise, manual therapy, therapeutic activity, ultrasound, traction, dry needling, moist heat, cryotherapy and HEP    Subjective   Objective          Postural Observations  Seated posture: poor  Standing posture: fair    Additional Postural Observation Details  Forward head, rounded shoulders, kyphotic thoracic spine    Palpation   Left   Hypertonic in the cervical paraspinals, levator scapulae, scalenes and upper trapezius.   Tenderness of the cervical paraspinals, levator scapulae, scalenes and upper trapezius.     Right   Hypertonic in the cervical paraspinals, levator scapulae, scalenes and upper trapezius. Tenderness of the cervical paraspinals, levator scapulae, scalenes and upper trapezius.     Additional Palpation Details  Moderate tenderness L>R    Tenderness   Cervical Spine   Tenderness in the left transverse process, right scapula and right transverse process.     Additional Tenderness Details  C5, C6, C7 severe tenderness on Left, Mild on Right    Active Range of Motion    Cervical/Thoracic Spine   Cervical    Flexion: 32 degrees with pain  Extension: 12 degrees with pain  Left lateral flexion: 25 degrees   Right lateral flexion: 10 degrees   Left rotation: 56 degrees   Right rotation: 47 degrees     Strength/Myotome Testing     Right Elbow   Extension: 5    Right Wrist/Hand   Wrist extension: 4+  Wrist flexion: 5     (2nd hand position)     Trial 1: 75 lbs    Trial 2: 77 lbs    Trial 3: 75 lbs    Average: 75.67 lbs    Tests   Cervical   Deep neck flexor endurance: 4 seconds        EVAL MEASUREMENTS   Flexion: 40 degrees   Extension: 47 degrees   Left lateral flexion: 20 degrees   Right lateral flexion: 22 degrees   Left rotation: 48 degrees   Right rotation: 36 degrees     Assessment & Plan     Assessment  Assessment details: Pt presents today with increased pain. Pt measuring with less cervical AROM due to pain except noted improvements with cervical rotation B and Left lateral flexion. Pt with improved strength in the C7 myotomes of the RUE. Pt continues to have increased pain with sleeping, educated on use of cervical pillow and given recommendation for off the shelf pillows for purchase. Pt with decreased tolerance to manual cervical traction with noted aggravation of symptoms specifically on the Left side, mechanical traction held due to symptoms. Pt DNF and neck extensors muscle endurance tested. DNF tested with OA nod + head lift and hold, pt able to hold 4 sec before fatigue. Cervical extensor muscles tested and strengthening incorporated using BP cuff for biofeedback, pt able to push to 50 mmHg without straining. US provided to tender upper trapezius and cervical paraspinals for pain relief. Pt encouraged to perform stretches often along with heat and breathing for relaxation of muscle tension. Pt will benefit from continued PT to improve cervical muscle tension, continue progress towards AROM goals, and strengthen deep cervical neck flexors for improved pain  management.       Progress toward previous goals: Partially Met    Plan Goals: STGs: 2-4 weeks  1.  Decrease cervical and shoulder pain to 5/10 with sitting, standing and sleeping NOT MET  2.  Increase AROM B SB to > or = 25 degrees PARTIALLY MET  3.  Increase B AROM Rot left 60 degrees, right 45 degrees PARTIALLY MET  4.  Decrease B anterior cervical musculature tightness to minimal with palpation PROGRESSING    LTGs: 6-8 weeks  1.  Decrease cervical and shoulder pain to a 3/10 with sitting, standing, ambulation, reaching, and sleeping NOT MET  2.  Pt is independent with HEP MET  3.  Increase right AROM to 55 degrees NOT MET  4.  Increase right  strength average to at least 85 lbs PROGRESSING  5.  Increase right triceps, wrist flex/ext, and brachioradialis strength to 4+-5/5.  MET    New Goals  Long-term goals (LTG):   1. Improve DNF from 4 sec to 10 secs.  2. Pt will be independent in strengthening HEP  3. Pt feels pain can be managed with PT and pain management vs. surgery        Recommendations: Continue as planned  Timeframe: 4 WEEKS  Prognosis to achieve goals: good    PT Signature: Zorre Zeno Kimura, PT, Randi Carpio, Student PT      Based upon review of the patient's progress and continued therapy plan, it is my medical opinion that Ronni Asif should continue physical therapy treatment at [facility]  [address] [place] [id number] [phone].    Signature: __________________________________  Abdirashid Gonzalez MD    Manual Therapy:    8     mins  13195;  Therapeutic Exercise:         mins  67387;     Neuromuscular Kamini:        mins  67963;    Therapeutic Activity:     30     mins  65847;     Gait Training:           mins  25430;     Ultrasound:     8     mins  14831;    Electrical Stimulation:         mins  29034 ( );  Dry Needling          mins self-pay    Timed Treatment:  46    mins   Total Treatment:     53    mins

## 2021-10-21 ENCOUNTER — TREATMENT (OUTPATIENT)
Dept: PHYSICAL THERAPY | Facility: CLINIC | Age: 51
End: 2021-10-21

## 2021-10-21 DIAGNOSIS — M54.50 CHRONIC MIDLINE LOW BACK PAIN WITHOUT SCIATICA: ICD-10-CM

## 2021-10-21 DIAGNOSIS — M54.16 LUMBAR RADICULOPATHY: ICD-10-CM

## 2021-10-21 DIAGNOSIS — M47.814 ARTHROPATHY OF THORACIC FACET JOINT: ICD-10-CM

## 2021-10-21 DIAGNOSIS — M54.12 CERVICAL RADICULOPATHY: ICD-10-CM

## 2021-10-21 DIAGNOSIS — Q76.2 CONGENITAL SPONDYLOLYSIS, LUMBOSACRAL REGION: ICD-10-CM

## 2021-10-21 DIAGNOSIS — R68.89 DECREASED ACTIVITY TOLERANCE: ICD-10-CM

## 2021-10-21 DIAGNOSIS — M54.6 PAIN IN THORACIC SPINE: ICD-10-CM

## 2021-10-21 DIAGNOSIS — G89.29 CHRONIC MIDLINE LOW BACK PAIN WITHOUT SCIATICA: ICD-10-CM

## 2021-10-21 DIAGNOSIS — M54.2 CERVICAL PAIN: Primary | ICD-10-CM

## 2021-10-21 DIAGNOSIS — M54.12 CERVICAL RADICULOPATHY: Primary | ICD-10-CM

## 2021-10-21 PROCEDURE — 97110 THERAPEUTIC EXERCISES: CPT | Performed by: PHYSICAL THERAPIST

## 2021-10-21 PROCEDURE — 97140 MANUAL THERAPY 1/> REGIONS: CPT | Performed by: PHYSICAL THERAPIST

## 2021-10-21 NOTE — PROGRESS NOTES
Physical Therapy Daily Progress Note        Patient: Ronni Asif   : 1970  Diagnosis/ICD-10 Code:  Cervical pain [M54.2]  Referring practitioner: Abdirashid Gonzalez MD  Date of Initial Visit: Type: THERAPY  Noted: 2021  Today's Date: 10/21/2021  Patient seen for 6 sessions         Ronni Asif reports: he said he has some burning in his mid thoracic spine.  He also said his (R) low back.  He said his neck is better.  He rated it a 2/10 today.         Subjective     Objective   See Exercise, Manual, and Modality Logs for complete treatment.       Assessment/Plan  Decreased tightness and pain in cervical spine and musculature today.  Added mid thoracic stretch with cervical flexion to relieve scapular discomfort which may place stress on neck.  He reported relief from that.  Continued with US this date however plan to progress scapular strengthening next session if symptoms continue to be minimal to support cervical spine and decrease pain in this area with ADLs.     Progress per Plan of Care           Manual Therapy:    10     mins  32981;  Therapeutic Exercise:    28     mins  94769;     Neuromuscular Kamini:        mins  24984;    Therapeutic Activity:          mins  49289;     Gait Training:           mins  25467;     Ultrasound:     10     mins  22020;    Electrical Stimulation:         mins  55349 ( );  Dry Needling          mins self-pay    Timed Treatment:   48   mins   Total Treatment:     48   mins    Tracey Ambrocio PTA  Physical Therapist Assistant

## 2021-10-25 ENCOUNTER — TREATMENT (OUTPATIENT)
Dept: PHYSICAL THERAPY | Facility: CLINIC | Age: 51
End: 2021-10-25

## 2021-10-25 DIAGNOSIS — R68.89 DECREASED ACTIVITY TOLERANCE: ICD-10-CM

## 2021-10-25 DIAGNOSIS — M54.12 CERVICAL RADICULOPATHY: ICD-10-CM

## 2021-10-25 DIAGNOSIS — M54.2 CERVICAL PAIN: Primary | ICD-10-CM

## 2021-10-25 PROCEDURE — 97140 MANUAL THERAPY 1/> REGIONS: CPT | Performed by: PHYSICAL THERAPIST

## 2021-10-25 PROCEDURE — 97110 THERAPEUTIC EXERCISES: CPT | Performed by: PHYSICAL THERAPIST

## 2021-10-25 NOTE — PROGRESS NOTES
Physical Therapy Daily Progress Note        Patient: Ronni Asif   : 1970  Diagnosis/ICD-10 Code:  Cervical pain [M54.2]  Referring practitioner: Abdirashid Gonzalez MD  Date of Initial Visit: Type: THERAPY  Noted: 2021  Today's Date: 10/25/2021  Patient seen for 7 sessions         Ronni Asif reports: he has a new order for this thoracic and low back.  He said he did some housework on Friday fixing plumbing overhead and a couple hours after he had sharp pain in his neck.  It was sore and stiff the next day so he used heat and a massager that his wife has.  He said it is still a little stiff and sore.       Subjective     Objective   See Exercise, Manual, and Modality Logs for complete treatment.       Assessment/Plan  Progressed scapular strengthening to decrease stress on UT for decreased pain in cervical spine especially with overhead activities.  Initial difficulty with proper activation during scapular retraction however able to correct following several reps with verbal and tactile cues.  Updated HEP to reflect changes and increase carryover for greater progression towards goals and decreased pain with functional activities especially overhead.     Progress per Plan of Care           Manual Therapy:    15     mins  58267;  Therapeutic Exercise:    30     mins  67684;     Neuromuscular Kamini:        mins  82284;    Therapeutic Activity:          mins  00625;     Gait Training:           mins  78692;     Ultrasound:          mins  03232;    Electrical Stimulation:         mins  29550 ( );  Dry Needling          mins self-pay    Timed Treatment:   45   mins   Total Treatment:     55   mins    Tracey Ambrocio PTA  Physical Therapist Assistant

## 2021-10-25 NOTE — PATIENT INSTRUCTIONS
Access Code: [email or web address]  URL: [email or web address]  Date: 10/25/2021  Prepared by: Tracey Ambrocio    Exercises  Seated Scapular Retraction - 1 x daily - 7 x weekly - 1 sets - 10 reps - 5 hold  Shoulder External Rotation and Scapular Retraction - 1 x daily - 7 x weekly - 1 sets - 10 reps

## 2021-10-27 ENCOUNTER — TREATMENT (OUTPATIENT)
Dept: PHYSICAL THERAPY | Facility: CLINIC | Age: 51
End: 2021-10-27

## 2021-10-27 DIAGNOSIS — Z98.1 HISTORY OF LUMBAR SPINAL FUSION: ICD-10-CM

## 2021-10-27 DIAGNOSIS — M47.814 ARTHROPATHY OF THORACIC FACET JOINT: ICD-10-CM

## 2021-10-27 DIAGNOSIS — M54.6 PAIN IN THORACIC SPINE: ICD-10-CM

## 2021-10-27 DIAGNOSIS — R68.89 DECREASED ACTIVITY TOLERANCE: Primary | ICD-10-CM

## 2021-10-27 DIAGNOSIS — M54.16 LUMBAR RADICULOPATHY: ICD-10-CM

## 2021-10-27 PROCEDURE — 97162 PT EVAL MOD COMPLEX 30 MIN: CPT | Performed by: PHYSICAL THERAPIST

## 2021-10-27 PROCEDURE — 97012 MECHANICAL TRACTION THERAPY: CPT | Performed by: PHYSICAL THERAPIST

## 2021-10-27 PROCEDURE — 97035 APP MDLTY 1+ULTRASOUND EA 15: CPT | Performed by: PHYSICAL THERAPIST

## 2021-10-27 PROCEDURE — 97110 THERAPEUTIC EXERCISES: CPT | Performed by: PHYSICAL THERAPIST

## 2021-10-27 NOTE — PROGRESS NOTES
"     Physical Therapy Initial Evaluation and Plan of Care    Patient: Ronni Asif   : 1970  Diagnosis/ICD-10 Code:  Decreased activity tolerance [R68.89]  Referring practitioner: THEO Ontiveros    Subjective Evaluation    History of Present Illness  Onset date: back and right LE pain in the last 6 months, thoracic since July-Aug 2021.  Mechanism of injury: Pt is a 50 y/o WM who reports to the clinic with LBP and thoracic pain.  Pt states his mid back pain has been bothering him since July-Aug and has progressively gotten worse since then.  Pt received ablations in thoracic spine late summer-did not get relief from ablation-ordered MRI of neck due to c/o N/T in hands.  Pt with a Hx of a lumbar Fusion -has been good until in the last 6 months started getting \"sciatic nerve\" discomfort in the right leg.  Pt states he has always had a \"soreness\" on the right side and thinks of it as a aggravation.  When he is cutting the grass the pain/sorness is the worst.  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  Pt sees his Neuro/Dr. Tripathi every 6 months.  Pt was followed by Dr. Tripathi for a 1.5 yr after the fusion-referred to pain management to monitor his pain and f/u with his thoracic pain.  Pt is scheduled to see Dr. Tripathi 2021.           Patient Occupation: disabled  Quality of life: fair    Pain  Current pain ratin (thoracic burns-7-8/10,  back 3/10 )  At worst pain ratin  Location: right lower back to post thigh, between the shoulder blades   Quality: sharp and burning (stabbing)  Relieving factors: change in position and ice  Aggravating factors: lifting and sleeping (bending, picking stuff up off the floor, does not sleep well but has a adjustable bed with heating pad )    Hand dominance: right    Diagnostic Tests  No diagnostic tests performed  Abnormal x-ray: 2020-mild Degenerative changes throughout the lumbar spine     Treatments  Previous treatment: " physical therapy, injection treatment and chiropractic (Chiropractic 2012 prior to surgery, has had previous PT for lumbar and thoracic spine since his lumbar fusion 2019,  and epidurals lumbar spine, nerve ablations in thoracic spine )  Current treatment: injection treatment  Current treatment comments: ablation for the Thoracic .     Patient Goals  Patient goals for therapy: decreased pain  Patient goal: wants to prepare for surgery if needed or avoid it if he can?             Objective          Static Posture     Lumbar Spine   Flattened and decreased lordosis.     Postural Observations  Seated posture: fair    Additional Postural Observation Details  Flattened lumbar lordosis, equal iliac crest and greater trochanter     Palpation   Left   Tenderness of the gluteus lexus, gluteus medius, lumbar paraspinals, piriformis and quadratus lumborum.     Right Tenderness of the gluteus lexus, gluteus medius, lumbar paraspinals, piriformis and quadratus lumborum.     Additional Palpation Details  Moderate right L-S tenderness,  Mild B piriformis, gluteal tenderness with palpation   Mild thoracic T7-T10 intervertebral space, right MT/Rhomoboids     Tenderness     Lumbar Spine  Tenderness in the spinous process.     Left Hip   Tenderness in the PSIS.     Right Hip   Tenderness in the PSIS.     Additional Tenderness Details  Mild tenderness over the L2-L3 intervertebral space        Active Range of Motion   Cervical/Thoracic Spine     Thoracic   Flexion: WFL  Extension: WFL  Left lateral flexion: WFL  Right lateral flexion: WFL  Left rotation: WFL  Right rotation: WFL    Lumbar   Flexion: Active lumbar flexion: 50% WFL. with pain  Extension: Active lumbar extension: thoacic pain. WFL  Left lateral flexion: Active left lumbar lateral flexion: 75% WFL, Left low back pain. with pain  Right lateral flexion: Active right lumbar lateral flexion: Left thoracic pain. WFL and with pain  Left rotation: WFL  Right rotation:  WFL  Left Hip   External rotation (prone): Left hip active external rotation prone: minimal IR tightness.     Right Hip   Internal rotation (prone): Right hip active internal rotation prone: mild ER tightness.     Strength/Myotome Testing     Left Hip   Planes of Motion   Flexion: 5    Right Hip   Planes of Motion   Flexion: 5    Left Knee   Flexion: 4  Extension: 5    Right Knee   Flexion: 4  Extension: 5    Left Ankle/Foot   Dorsiflexion: 5  Plantar flexion: 5  Eversion: 4  Great toe extension: 5    Right Ankle/Foot   Dorsiflexion: 5  Plantar flexion: 5  Eversion: 5  Great toe extension: 5    Additional Strength Details  Prone trunk extension 2+/5   upper abdominal strength 2/5     Tests     Lumbar     Left   Positive crossed SLR (hip pain).   Negative femoral stretch and passive SLR.     Right   Negative crossed SLR, femoral stretch and passive SLR.     Left Hip   Positive EMILY (hip pain) and piriformis.   Negative Ely's and FADIR.   90/90 SLR: Positive (hamstring tightness).   SLR: Negative.     Right Hip   Positive EMILY (Left lower back pain) and piriformis.   Negative Ely's and FADIR.   90/90 SLR: Positive (hamstring tightness).   SLR: Negative.     Additional Tests Details  Positive Crossed over SLR for right LBP/right hip pain   Mild left ER tightness  Mild right IR tightness     Minimal B hamstring tightness at 90-90           Assessment & Plan     Assessment  Impairments: abnormal or restricted ROM, activity intolerance, impaired physical strength, lacks appropriate home exercise program and pain with function  Assessment details: Pt is a 50 y/o WM who reports to the clinic with LBP and thoracic pain, tenderness, decreased flexibility, decreased thoracic and lumbar AROM, decreased left ankle eversion strength, decreased B hamstring strength, decreased core strength, and decreased tolerance to lifting, reaching, pulling, standing, sitting and ambulation due to increased LBP and thoracic pain.     Prognosis: fair  Prognosis details: Chronic Hx of thoracic and lumbar pain.  Has been seeing pain management with Hx of lumbar fusion 2019 with ablations to thoracic spine.    Functional Limitations: carrying objects, lifting, sleeping, walking, pulling, pushing, uncomfortable because of pain, sitting, standing, stooping and unable to perform repetitive tasks  Goals  Plan Goals: STGs: 2-4 weeks  1.  Decrease LBP and thoracic pain to a < or = 5/10 with standing, sitting and ambulation.     2.  Decreased right MT/rhomboid tenderness to palpation to minimal to improve his QOL.    3.  Decrease B L-S and piriformis tenderness to minimal with palpation to improve his QOL.    4.  Increase B hamstring flexibility to WNL with 90-90 test   5. Increase pt's trunk ext strength to 3+/5 to improve pt's standing posture and provide support to his cervical and thoracic spine.      LTGs: 5-8 weeks  1.  Decrease LBP and thoracic pain to a < or =3/10 with standing, sitting and ambulation.    2.  Increase upper abdominal strength to 3+/5 for improved support to his lumbar spine and improve transitioning in/out of bed and chair.    3.  Increase B hip ER/IR flexibility to WNL to improve pt's ability to dress his LE's and cross his legs without right hip pain.    4.  Pt is independent with HEP  5.  Increase pt's trunk ext strength to 4-4+/5 for improved standing posture and support to his cervical and thoracic spine.        Plan  Therapy options: will be seen for skilled physical therapy services  Planned modality interventions: cryotherapy, electrical stimulation/Gabonese stimulation, ultrasound and thermotherapy (hydrocollator packs)  Other planned modality interventions: cervical traction for thoracic pain, pt can't not do lumbar Traction due to prior lumbar Fusion 2019.    Planned therapy interventions: abdominal trunk stabilization, flexibility, functional ROM exercises, home exercise program, stretching, strengthening, spinal/joint  mobilization, therapeutic activities, manual therapy and soft tissue mobilization  Other planned therapy interventions: KT taping   Frequency: 2x week  Duration in visits: 16  Duration in weeks: 8  Treatment plan discussed with: patient        Manual Therapy:         mins  12883;  Therapeutic Exercise:         mins  07302;     Neuromuscular Kamini:        mins  49236;    Therapeutic Activity:          mins  35226;     Gait Training:           mins  90224;     Ultrasound:          mins  36011;    Electrical Stimulation:         mins  61702 ( );  Dry Needling          mins self-pay    Timed Treatment:      mins   Total Treatment:     27   mins    PT SIGNATURE: Jigna Ahuja, PT   DATE TREATMENT INITIATED: 10/27/2021    Medicare Initial Certification  Certification Period: 1/25/2022  I certify that the therapy services are furnished while this patient is under my care.  The services outlined above are required by this patient, and will be reviewed every 90 days.     PHYSICIAN: Na Carreon APRN      DATE:     Please sign and return via fax to [phone].. Thank you, [facility].

## 2021-10-27 NOTE — PROGRESS NOTES
Physical Therapy Daily Progress Note    Visit # : 8  Ronni Asif reports: he is still having the mid back pain with the N/T into his 4th and 5th fingers.  Pt states he does not notice any changes in his symptoms since he started PT.  Pt states he does his HEP intermittently, but at times some of the exercises causes more pain so he does not do them.      Subjective     Objective   See Exercise, Manual, and Modality Logs for complete treatment.     Moderate B anterior scalene, SCM, and B OA tightness and tenderness with palpation      Assessment & Plan     Assessment  Assessment details: Pt continues to tolerate all stretches and strengthening exercises for the cervical and mid thoracic/scapular area for improved posture to unload the lower cervical and thoracic spine.  Pt needed verbal and tactile cueing to perform exercises correctly.  Added mechanical ICT back into treatment today since pt did report less N/T into hands after having the mechanical ICT on 10/12.  Mechanical ICT was held the last few visits due to his increased symptoms in his lower back and unsure if pt could tolerate positioning on the traction table.  Pt did well today after having the ICT and reported no N/T into his hands after treatment.  Will continue to see pt 2x/week for stretching, strengthening, and modalities PRN for pain control.          Progress per Plan of Care           Manual Therapy:         mins  18223;  Therapeutic Exercise:   15      mins  62305;     Neuromuscular Kamini:        mins  59028;    Therapeutic Activity:          mins  93867;     Gait Training:           mins  35076;     Ultrasound:    8      mins  09251;    Electrical Stimulation:         mins  53008 ( );  Dry Needling          mins self-pay  Cervical Traction          12 mins [id number]     Timed Treatment:  23    mins   Total Treatment:     46   mins    Jigna Ahuja, PT  Physical Therapist

## 2021-11-02 ENCOUNTER — TREATMENT (OUTPATIENT)
Dept: PHYSICAL THERAPY | Facility: CLINIC | Age: 51
End: 2021-11-02

## 2021-11-02 DIAGNOSIS — M51.16 LUMBAR DISC HERNIATION WITH RADICULOPATHY: ICD-10-CM

## 2021-11-02 DIAGNOSIS — R68.89 DECREASED ACTIVITY TOLERANCE: Primary | ICD-10-CM

## 2021-11-02 DIAGNOSIS — G89.29 CHRONIC MIDLINE LOW BACK PAIN WITHOUT SCIATICA: ICD-10-CM

## 2021-11-02 DIAGNOSIS — M47.814 ARTHROPATHY OF THORACIC FACET JOINT: ICD-10-CM

## 2021-11-02 DIAGNOSIS — M54.2 CERVICAL PAIN: ICD-10-CM

## 2021-11-02 DIAGNOSIS — Z98.1 HISTORY OF LUMBAR SPINAL FUSION: ICD-10-CM

## 2021-11-02 DIAGNOSIS — M54.6 PAIN IN THORACIC SPINE: ICD-10-CM

## 2021-11-02 DIAGNOSIS — M54.16 LUMBAR RADICULOPATHY: ICD-10-CM

## 2021-11-02 DIAGNOSIS — M54.50 CHRONIC MIDLINE LOW BACK PAIN WITHOUT SCIATICA: ICD-10-CM

## 2021-11-02 DIAGNOSIS — M54.12 CERVICAL RADICULOPATHY: ICD-10-CM

## 2021-11-02 PROCEDURE — 97110 THERAPEUTIC EXERCISES: CPT | Performed by: PHYSICAL THERAPIST

## 2021-11-02 PROCEDURE — 97014 ELECTRIC STIMULATION THERAPY: CPT | Performed by: PHYSICAL THERAPIST

## 2021-11-02 PROCEDURE — 97035 APP MDLTY 1+ULTRASOUND EA 15: CPT | Performed by: PHYSICAL THERAPIST

## 2021-11-02 NOTE — PROGRESS NOTES
Physical Therapy Daily Progress Note        Patient: Ronni Asif   : 1970  Diagnosis/ICD-10 Code:  No primary diagnosis found.  Referring practitioner: Abdirashid Gonzalez MD  Date of Initial Visit: Type: THERAPY  Noted: 10/27/2021  Today's Date: 2021  Patient seen for 2 sessions         Ronni Asif reports: he said since last time he has had a rough time.  He said he was on the traction.  He did some stuff after therapy but got a headache later that night and has had one since.  He said he rarely gets headaches.  Tingling throughout the back as well as consistent tingling in the ring fingers.  He has taken more hydrocodone and methocarbamol since then.  He hasn't been getting relief from cold or heat.   He said last session was a long day and isn't sure if it is from the traction because it felt good when he was on it or the length of time with the eval prior.         Subjective     Objective   See Exercise, Manual, and Modality Logs for complete treatment.       Assessment/Plan  Held cervical strengthening, stretching, manual, and traction this date d/t to symptoms reported since last session.  Continued with US to relieve symptoms.  Initiated lumbar stretching and stabilization exercises however they increased neck and back pain thus limited exercises and held thoracic strengthening.  Added estim and ice to assist with pain control of lumbar spine.  Instructed pt to discuss symptom onset with pain management as pt with recent flare up of symptoms.  Will monitor tolerance to exercises and progress as able.     Progress per Plan of Care           Manual Therapy:         mins  46852;  Therapeutic Exercise:    25     mins  25527;     Neuromuscular Kamini:        mins  59708;    Therapeutic Activity:          mins  57721;     Gait Training:           mins  36081;     Ultrasound:     8     mins  73262;    Electrical Stimulation:    15     mins  67539 ( );  Dry Needling          mins  self-pay    Timed Treatment:   33   mins   Total Treatment:     48   mins    Tracey Ambrocio PTA  Physical Therapist Assistant

## 2021-11-03 ENCOUNTER — OFFICE VISIT (OUTPATIENT)
Dept: PAIN MEDICINE | Facility: CLINIC | Age: 51
End: 2021-11-03

## 2021-11-03 ENCOUNTER — PREP FOR SURGERY (OUTPATIENT)
Dept: SURGERY | Facility: SURGERY CENTER | Age: 51
End: 2021-11-03

## 2021-11-03 VITALS
TEMPERATURE: 96.7 F | OXYGEN SATURATION: 98 % | BODY MASS INDEX: 37.05 KG/M2 | HEART RATE: 85 BPM | RESPIRATION RATE: 16 BRPM | SYSTOLIC BLOOD PRESSURE: 132 MMHG | HEIGHT: 70 IN | WEIGHT: 258.8 LBS | DIASTOLIC BLOOD PRESSURE: 91 MMHG

## 2021-11-03 DIAGNOSIS — M54.50 CHRONIC MIDLINE LOW BACK PAIN WITHOUT SCIATICA: ICD-10-CM

## 2021-11-03 DIAGNOSIS — R51.9 ACUTE INTRACTABLE HEADACHE, UNSPECIFIED HEADACHE TYPE: ICD-10-CM

## 2021-11-03 DIAGNOSIS — R20.0 BILATERAL HAND NUMBNESS: ICD-10-CM

## 2021-11-03 DIAGNOSIS — G89.4 CHRONIC PAIN SYNDROME: ICD-10-CM

## 2021-11-03 DIAGNOSIS — M54.12 CERVICAL RADICULOPATHY: Primary | ICD-10-CM

## 2021-11-03 DIAGNOSIS — M54.6 PAIN IN THORACIC SPINE: ICD-10-CM

## 2021-11-03 DIAGNOSIS — M47.812 ARTHROPATHY OF CERVICAL FACET JOINT: ICD-10-CM

## 2021-11-03 DIAGNOSIS — M47.812 ARTHROPATHY OF CERVICAL FACET JOINT: Primary | ICD-10-CM

## 2021-11-03 DIAGNOSIS — G89.29 CHRONIC MIDLINE LOW BACK PAIN WITHOUT SCIATICA: ICD-10-CM

## 2021-11-03 PROCEDURE — 96372 THER/PROPH/DIAG INJ SC/IM: CPT | Performed by: NURSE PRACTITIONER

## 2021-11-03 PROCEDURE — 99214 OFFICE O/P EST MOD 30 MIN: CPT | Performed by: NURSE PRACTITIONER

## 2021-11-03 RX ORDER — SODIUM CHLORIDE 0.9 % (FLUSH) 0.9 %
10 SYRINGE (ML) INJECTION EVERY 12 HOURS SCHEDULED
Status: CANCELLED | OUTPATIENT
Start: 2021-11-03

## 2021-11-03 RX ORDER — HYDROCODONE BITARTRATE AND ACETAMINOPHEN 5; 325 MG/1; MG/1
1 TABLET ORAL DAILY PRN
Qty: 30 TABLET | Refills: 0 | Status: SHIPPED | OUTPATIENT
Start: 2021-11-03 | End: 2021-12-09 | Stop reason: SDUPTHER

## 2021-11-03 RX ORDER — SODIUM CHLORIDE 0.9 % (FLUSH) 0.9 %
10 SYRINGE (ML) INJECTION AS NEEDED
Status: CANCELLED | OUTPATIENT
Start: 2021-11-03

## 2021-11-03 RX ORDER — KETOROLAC TROMETHAMINE 30 MG/ML
60 INJECTION, SOLUTION INTRAMUSCULAR; INTRAVENOUS ONCE
Status: COMPLETED | OUTPATIENT
Start: 2021-11-03 | End: 2021-11-03

## 2021-11-03 RX ADMIN — KETOROLAC TROMETHAMINE 60 MG: 30 INJECTION, SOLUTION INTRAMUSCULAR; INTRAVENOUS at 14:14

## 2021-11-03 NOTE — PROGRESS NOTES
CHIEF COMPLAINT  F/U for back pain.  Pt states his pain has gotten worse.     Subjective   Ronni Asif is a 51 y.o. male  who presents for follow-up.  He has a history of back pain.  He completed a TAMEKA @ C6-C7 on 10/7/2021 performed by Dr. Gonzalez. He reports minimal relief from this procedure.     He continues to report improvement in his mid-back pain from this thoracic RFA (7/27/21). He states with his neck pain flared he doesn't notice his mid back pain currently.     Today his pain is 7/10VAS in severity. He continues to complain of neck pain, he has done traction with he thinks has potentially irritated it.  He states that he has pain in his forearms and hands. He continues with Norco 5/325 1/day (does not use daily), Gabapentin 600 mg TID, and Methocarbamol PRN. This medication regimen decreases his pain by moderate amount.     He continues in PT and states this has been helpful to his low back, he has seen no improvement in his neck pain with it.     Patient remained masked during entire encounter. No cough present. I donned a mask and eye protection throughout entire visit. Prior to donning mask and eye protection, hand hygiene was performed, as well as when it was doffed.  I was closer than 6 feet, but not for an extended period of time. No obvious exposure to any bodily fluids.    Back Pain  This is a chronic problem. The current episode started more than 1 year ago. The problem occurs daily. Progression since onset: improved since last office visit. The pain is present in the thoracic spine and lumbar spine. The quality of the pain is described as aching. The pain does not radiate. The pain is at a severity of 4/10. The pain is severe. The symptoms are aggravated by bending, standing and twisting. Stiffness is present in the morning. Associated symptoms include headaches, numbness (fingers) and weakness (hands). Pertinent negatives include no abdominal pain, chest pain, dysuria or fever. Risk  factors include obesity. He has tried analgesics, heat, ice, NSAIDs, muscle relaxant and home exercises (Norco 5/325) for the symptoms. The treatment provided significant relief.   Neck Pain   This is a chronic problem. The current episode started more than 1 year ago. The problem occurs constantly. The problem has been gradually worsening. The pain is present in the occipital region and midline. The quality of the pain is described as burning (bilateral upper extremity tingling). The pain is at a severity of 7/10. The symptoms are aggravated by bending (lying in bed). Associated symptoms include headaches, numbness (fingers) and weakness (hands). Pertinent negatives include no chest pain or fever. He has tried ice, oral narcotics and NSAIDs (PT) for the symptoms.      PEG Assessment   What number best describes your pain on average in the past week?7  What number best describes how, during the past week, pain has interfered with your enjoyment of life?7  What number best describes how, during the past week, pain has interfered with your general activity?  7    The following portions of the patient's history were reviewed and updated as appropriate: allergies, current medications, past family history, past medical history, past social history, past surgical history and problem list.    Review of Systems   Constitutional: Negative for activity change, fatigue and fever.   HENT: Negative for congestion.    Eyes: Positive for visual disturbance (os upper eyelid twitching).   Respiratory: Negative for cough and chest tightness.    Cardiovascular: Negative for chest pain.   Gastrointestinal: Negative for abdominal pain, constipation and diarrhea.   Genitourinary: Negative for difficulty urinating and dysuria.   Musculoskeletal: Positive for back pain and neck pain.   Neurological: Positive for dizziness, weakness (hands), numbness (fingers) and headaches. Negative for light-headedness.   Psychiatric/Behavioral: Positive  "for sleep disturbance. Negative for agitation and suicidal ideas. The patient is not nervous/anxious.      --  The aforementioned information the Chief Complaint section and above subjective data including any HPI data, and also the Review of Systems data, has been personally reviewed and affirmed.  --    Vitals:    11/03/21 1316   BP: 132/91   Pulse: 85   Resp: 16   Temp: 96.7 °F (35.9 °C)   SpO2: 98%   Weight: 117 kg (258 lb 12.8 oz)   Height: 177.8 cm (70\")   PainSc:   7   PainLoc: Back     Objective   Physical Exam  Vitals and nursing note reviewed.   Constitutional:       Appearance: Normal appearance. He is well-developed.   Eyes:      General: Lids are normal.   Cardiovascular:      Rate and Rhythm: Normal rate.   Pulmonary:      Effort: Pulmonary effort is normal.   Musculoskeletal:      Cervical back: Tenderness and bony tenderness present. Decreased range of motion.      Thoracic back: Tenderness present. Decreased range of motion.      Lumbar back: Tenderness present. Decreased range of motion.   Neurological:      Mental Status: He is alert and oriented to person, place, and time.   Psychiatric:         Attention and Perception: Attention normal.         Mood and Affect: Mood normal.         Speech: Speech normal.         Behavior: Behavior normal.         Judgment: Judgment normal.        Assessment/Plan   Diagnoses and all orders for this visit:    1. Cervical radiculopathy (Primary)    2. Pain in thoracic spine    3. Chronic midline low back pain without sciatica    4. Chronic pain syndrome    5. Acute intractable headache, unspecified headache type  -     ketorolac (TORADOL) injection 60 mg    6. Bilateral hand numbness  -     EMG & Nerve Conduction Test; Future    7. Arthropathy of cervical facet joint    Other orders  -     HYDROcodone-acetaminophen (NORCO) 5-325 MG per tablet; Take 1 tablet by mouth Daily As Needed for Severe Pain . DNF 11/14/2021  Dispense: 30 tablet; Refill: 0      --- Cervical " MBB at approximately C4-C6 (verify painful levels under fluoro) x 1   Reviewed the procedure at length with the patient.  Included in the review was expectations, complications, risk and benefits.The procedure was described in detail and the risks, benefits and alternatives were discussed with the patient (including but not limited to: bleeding, infection, nerve damage, worsening of pain, inability to perform injection, paralysis, seizures, coma, no pain relief and death) who agreed to proceed.  Discussed the potential for sedation if warranted/wanted.  The procedure will plan to be performed at [facility] with fluoroscopic guidance(unless ultrasound is indicated) and could potentially have steroids and contrast dye used. Questions were answered and in a way the patient could understand.  Patient verbalized understanding and wishes to proceed.  This intervention will be ordered.  Discussed with patient that all procedures are part of a multimodal plan of care and include either formal PT or a home exercise program.  Patient has no evidence of coagulopathy or current infection.    --- Toradol 60 mg IM in office today. Patient instructed to avoid use of advil or aleve for 24 hours.   --- Refill norco 5/325. DNF 11/14/2021 applied. Patient appears stable with current regimen. No adverse effects. Regarding continuation of opioids, there is no evidence of aberrant behavior or any red flags.  The patient continues with appropriate response to opioid therapy. ADL's remain intact by self.   --- The urine drug screen confirmation from 5/26/2021 has been reviewed and the result is appropriate based on patient history and ROSI report  --- CSA updated 5/26/2021  --- Follow-up after procedure.      ROSI REPORT  As part of the patient's treatment plan, I am prescribing controlled substances. The patient has been made aware of appropriate use of such medications, including potential risk of somnolence,  limited ability to drive and/or work safely, and the potential for dependence or overdose. It has also bee made clear that these medications are for use by this patient only, without concomitant use of alcohol or other substances unless prescribed.     Patient has completed prescribing agreement detailing terms of continued prescribing of controlled substances, including monitoring ROSI reports, urine drug screening, and pill counts if necessary. The patient is aware that inappropriate use will results in cessation of prescribing such medications.    As the clinician, I personally reviewed the ROSI from 11/3/2021 while the patient was in the office today.    History and physical exam exhibit continued safe and appropriate use of controlled substances.    Dictated utilizing Dragon dictation.

## 2021-11-04 ENCOUNTER — TREATMENT (OUTPATIENT)
Dept: PHYSICAL THERAPY | Facility: CLINIC | Age: 51
End: 2021-11-04

## 2021-11-04 ENCOUNTER — TRANSCRIBE ORDERS (OUTPATIENT)
Dept: SURGERY | Facility: SURGERY CENTER | Age: 51
End: 2021-11-04

## 2021-11-04 DIAGNOSIS — Z98.1 HISTORY OF LUMBAR SPINAL FUSION: ICD-10-CM

## 2021-11-04 DIAGNOSIS — M54.16 LUMBAR RADICULOPATHY: ICD-10-CM

## 2021-11-04 DIAGNOSIS — M47.812 ARTHROPATHY OF CERVICAL FACET JOINT: Primary | ICD-10-CM

## 2021-11-04 DIAGNOSIS — M54.12 CERVICAL RADICULOPATHY: ICD-10-CM

## 2021-11-04 DIAGNOSIS — M47.814 ARTHROPATHY OF THORACIC FACET JOINT: ICD-10-CM

## 2021-11-04 DIAGNOSIS — R68.89 DECREASED ACTIVITY TOLERANCE: Primary | ICD-10-CM

## 2021-11-04 DIAGNOSIS — M54.2 CERVICAL PAIN: ICD-10-CM

## 2021-11-04 DIAGNOSIS — M54.6 PAIN IN THORACIC SPINE: ICD-10-CM

## 2021-11-04 PROCEDURE — 97014 ELECTRIC STIMULATION THERAPY: CPT | Performed by: PHYSICAL THERAPIST

## 2021-11-04 PROCEDURE — 97035 APP MDLTY 1+ULTRASOUND EA 15: CPT | Performed by: PHYSICAL THERAPIST

## 2021-11-04 PROCEDURE — 97110 THERAPEUTIC EXERCISES: CPT | Performed by: PHYSICAL THERAPIST

## 2021-11-04 NOTE — PROGRESS NOTES
Physical Therapy Daily Progress Note        Patient: Ronni Asif   : 1970  Diagnosis/ICD-10 Code:  Decreased activity tolerance [R68.89]  Referring practitioner: Abdirashid Gonzalez MD  Date of Initial Visit: Type: THERAPY  Noted: 2021    Type: THERAPY  Noted: 10/27/2021  Today's Date: 2021  Patient seen for 10 sessions         Ronni Asif reports: he saw pain management yesterday.  She gave him a shot for his headache.  She refilled medication.  She scheduled him for an EMG/nerve conduction study and possibly a nerve block or ablation.  He didn't feel much change from the last epidural.  He said he has been up since 5 because of a headache and back pain.     He said the US and estim helped.  He reported no pain after the exercises.  He said the back of his head is still hurting.        Subjective     Objective   See Exercise, Manual, and Modality Logs for complete treatment.       Assessment/Plan  Reintrodcuced gentle stretching to cervical spine and continued with lumbar stabilization.  Updated HEP to reflect lumbar exercises as pt reported good tolerance to those.  Attempted bridges however these increased pain thus modified to glute sets.  Continued with US, estim, and ice for pain control.  Will monitor tolerance however if cervical symptoms remain consistent may hold treatment until further testing completed.     Progress per Plan of Care           Manual Therapy:         mins  38931;  Therapeutic Exercise:    30     mins  31208;     Neuromuscular Kamini:        mins  20473;    Therapeutic Activity:          mins  79844;     Gait Training:           mins  80211;     Ultrasound:     8     mins  57642;    Electrical Stimulation:    15     mins  03580 ( );  Dry Needling          mins self-pay    Timed Treatment:   38   mins   Total Treatment:     65   mins    Tracey Ambrocio PTA  Physical Therapist Assistant

## 2021-11-10 ENCOUNTER — TELEPHONE (OUTPATIENT)
Dept: PAIN MEDICINE | Facility: CLINIC | Age: 51
End: 2021-11-10

## 2021-11-10 ENCOUNTER — HOSPITAL ENCOUNTER (EMERGENCY)
Facility: HOSPITAL | Age: 51
Discharge: HOME | End: 2021-11-10
Attending: EMERGENCY MEDICINE | Admitting: EMERGENCY MEDICINE

## 2021-11-10 ENCOUNTER — APPOINTMENT (OUTPATIENT)
Dept: CT IMAGING | Facility: HOSPITAL | Age: 51
End: 2021-11-10

## 2021-11-10 VITALS
SYSTOLIC BLOOD PRESSURE: 132 MMHG | TEMPERATURE: 98.6 F | DIASTOLIC BLOOD PRESSURE: 79 MMHG | RESPIRATION RATE: 16 BRPM | OXYGEN SATURATION: 98 % | HEART RATE: 82 BPM

## 2021-11-10 DIAGNOSIS — M54.9 CHRONIC BACK PAIN, UNSPECIFIED BACK LOCATION, UNSPECIFIED BACK PAIN LATERALITY: ICD-10-CM

## 2021-11-10 DIAGNOSIS — G89.29 CHRONIC BACK PAIN, UNSPECIFIED BACK LOCATION, UNSPECIFIED BACK PAIN LATERALITY: ICD-10-CM

## 2021-11-10 DIAGNOSIS — G44.209 TENSION HEADACHE: Primary | ICD-10-CM

## 2021-11-10 LAB
ALBUMIN SERPL-MCNC: 4.2 G/DL (ref 3.5–5.2)
ALBUMIN/GLOB SERPL: 2 G/DL
ALP SERPL-CCNC: 82 U/L (ref 39–117)
ALT SERPL W P-5'-P-CCNC: 28 U/L (ref 1–41)
ANION GAP SERPL CALCULATED.3IONS-SCNC: 7.5 MMOL/L (ref 5–15)
AST SERPL-CCNC: 24 U/L (ref 1–40)
BASOPHILS # BLD AUTO: 0.03 10*3/MM3 (ref 0–0.2)
BASOPHILS NFR BLD AUTO: 0.4 % (ref 0–1.5)
BILIRUB SERPL-MCNC: 0.6 MG/DL (ref 0–1.2)
BUN SERPL-MCNC: 10 MG/DL (ref 6–20)
BUN/CREAT SERPL: 13.9 (ref 7–25)
CALCIUM SPEC-SCNC: 9.2 MG/DL (ref 8.6–10.5)
CHLORIDE SERPL-SCNC: 103 MMOL/L (ref 98–107)
CO2 SERPL-SCNC: 28.5 MMOL/L (ref 22–29)
CREAT SERPL-MCNC: 0.72 MG/DL (ref 0.76–1.27)
DEPRECATED RDW RBC AUTO: 44.6 FL (ref 37–54)
EOSINOPHIL # BLD AUTO: 0.22 10*3/MM3 (ref 0–0.4)
EOSINOPHIL NFR BLD AUTO: 3.2 % (ref 0.3–6.2)
ERYTHROCYTE [DISTWIDTH] IN BLOOD BY AUTOMATED COUNT: 14.1 % (ref 12.3–15.4)
GFR SERPL CREATININE-BSD FRML MDRD: 115 ML/MIN/1.73
GLOBULIN UR ELPH-MCNC: 2.1 GM/DL
GLUCOSE SERPL-MCNC: 80 MG/DL (ref 65–99)
HCT VFR BLD AUTO: 46.6 % (ref 37.5–51)
HGB BLD-MCNC: 15.3 G/DL (ref 13–17.7)
HOLD SPECIMEN: NORMAL
HOLD SPECIMEN: NORMAL
IMM GRANULOCYTES # BLD AUTO: 0.02 10*3/MM3 (ref 0–0.05)
IMM GRANULOCYTES NFR BLD AUTO: 0.3 % (ref 0–0.5)
LYMPHOCYTES # BLD AUTO: 2.06 10*3/MM3 (ref 0.7–3.1)
LYMPHOCYTES NFR BLD AUTO: 30.2 % (ref 19.6–45.3)
MCH RBC QN AUTO: 28.5 PG (ref 26.6–33)
MCHC RBC AUTO-ENTMCNC: 32.8 G/DL (ref 31.5–35.7)
MCV RBC AUTO: 86.8 FL (ref 79–97)
MONOCYTES # BLD AUTO: 0.47 10*3/MM3 (ref 0.1–0.9)
MONOCYTES NFR BLD AUTO: 6.9 % (ref 5–12)
NEUTROPHILS NFR BLD AUTO: 4.02 10*3/MM3 (ref 1.7–7)
NEUTROPHILS NFR BLD AUTO: 59 % (ref 42.7–76)
NRBC BLD AUTO-RTO: 0 /100 WBC (ref 0–0.2)
PLATELET # BLD AUTO: 225 10*3/MM3 (ref 140–450)
PMV BLD AUTO: 9.2 FL (ref 6–12)
POTASSIUM SERPL-SCNC: 4.3 MMOL/L (ref 3.5–5.2)
PROT SERPL-MCNC: 6.3 G/DL (ref 6–8.5)
RBC # BLD AUTO: 5.37 10*6/MM3 (ref 4.14–5.8)
SODIUM SERPL-SCNC: 139 MMOL/L (ref 136–145)
WBC # BLD AUTO: 6.82 10*3/MM3 (ref 3.4–10.8)
WHOLE BLOOD HOLD SPECIMEN: NORMAL
WHOLE BLOOD HOLD SPECIMEN: NORMAL

## 2021-11-10 PROCEDURE — 25010000002 DIPHENHYDRAMINE PER 50 MG: Performed by: NURSE PRACTITIONER

## 2021-11-10 PROCEDURE — 25010000002 PROCHLORPERAZINE 10 MG/2ML SOLUTION: Performed by: NURSE PRACTITIONER

## 2021-11-10 PROCEDURE — 96375 TX/PRO/DX INJ NEW DRUG ADDON: CPT

## 2021-11-10 PROCEDURE — 96374 THER/PROPH/DIAG INJ IV PUSH: CPT

## 2021-11-10 PROCEDURE — 25010000002 KETOROLAC TROMETHAMINE PER 15 MG: Performed by: NURSE PRACTITIONER

## 2021-11-10 PROCEDURE — 80053 COMPREHEN METABOLIC PANEL: CPT | Performed by: NURSE PRACTITIONER

## 2021-11-10 PROCEDURE — 36415 COLL VENOUS BLD VENIPUNCTURE: CPT

## 2021-11-10 PROCEDURE — 99283 EMERGENCY DEPT VISIT LOW MDM: CPT

## 2021-11-10 PROCEDURE — 70450 CT HEAD/BRAIN W/O DYE: CPT

## 2021-11-10 PROCEDURE — 85025 COMPLETE CBC W/AUTO DIFF WBC: CPT | Performed by: NURSE PRACTITIONER

## 2021-11-10 RX ORDER — PROCHLORPERAZINE EDISYLATE 5 MG/ML
10 INJECTION INTRAMUSCULAR; INTRAVENOUS ONCE
Status: COMPLETED | OUTPATIENT
Start: 2021-11-10 | End: 2021-11-10

## 2021-11-10 RX ORDER — DIPHENHYDRAMINE HYDROCHLORIDE 50 MG/ML
6 INJECTION INTRAMUSCULAR; INTRAVENOUS ONCE
Status: COMPLETED | OUTPATIENT
Start: 2021-11-10 | End: 2021-11-10

## 2021-11-10 RX ORDER — KETOROLAC TROMETHAMINE 15 MG/ML
15 INJECTION, SOLUTION INTRAMUSCULAR; INTRAVENOUS ONCE
Status: COMPLETED | OUTPATIENT
Start: 2021-11-10 | End: 2021-11-10

## 2021-11-10 RX ADMIN — PROCHLORPERAZINE EDISYLATE 10 MG: 5 INJECTION INTRAMUSCULAR; INTRAVENOUS at 13:53

## 2021-11-10 RX ADMIN — DIPHENHYDRAMINE HYDROCHLORIDE 6 MG: 50 INJECTION, SOLUTION INTRAMUSCULAR; INTRAVENOUS at 13:55

## 2021-11-10 RX ADMIN — SODIUM CHLORIDE 500 ML: 9 INJECTION, SOLUTION INTRAVENOUS at 13:48

## 2021-11-10 RX ADMIN — KETOROLAC TROMETHAMINE 15 MG: 15 INJECTION, SOLUTION INTRAMUSCULAR; INTRAVENOUS at 13:50

## 2021-11-10 NOTE — DISCHARGE INSTRUCTIONS
Follow up with your pain management doctor and your family doctor  Home to rest  Drink plenty of fluids  Return if worse or new concerns   Continue care with your primary care physician and have your blood pressure regularly checked and managed. Normal blood pressure is 120/80.

## 2021-11-10 NOTE — ED TRIAGE NOTES
HA and blurred vision X 4 days.     Mask placed on patient in triage. Triage staff wore appropriate PPE during interaction with patient.

## 2021-11-10 NOTE — TELEPHONE ENCOUNTER
Please call and check on patient this morning. If he is continuing to have severe headaches with vision changes and if he has never had these before then I recommend a ED eval.

## 2021-11-10 NOTE — TELEPHONE ENCOUNTER
Mr. Reyespoonam called back and states that he's currently in the ER at [facility]. He states that he called his PCP at your recommendation and was told to go to the ER because the believe he may have had a stroke. none known

## 2021-11-10 NOTE — ED PROVIDER NOTES
I have supervised the care provided by the midlevel provider.    We have discussed this patient's history, physical exam, and treatment plan.   I have reviewed the note and have personally examined the patient and agree with the plan of care.  See attached attending note.  My personal findings are below:    Patient complains of constant headache for the past 4 days.  Fell approximately 1 week ago but did not have a headache at that time.  He is not on anticoagulants.  He has taken multiple medications without relief.  Reports mild photophobia and intermittent blurred vision but denies nausea, vomiting, or fever.  Has chronic back and neck pain.  Reports tingling in both hands for several weeks.  Denies any new numbness/tingling/weakness or slurred speech.    On exam: Awake and alert.  Oriented x3.  Head is atraumatic.  Extraocular muscles intact.  Heart is regular rate and rhythm.  Lungs are clear bilaterally.  Abdomen is soft and nontender.  Extremities are nontender with full range of motion.  Speech is clear and fluent.  No facial droop.  No aphasia.  Normal strength and light touch sensation in all extremities.    Head CT is negative acute.  Labs are pending.  Plan is for symptomatic treatment of his headache.     Freeman Alcantar MD  11/10/21 3879

## 2021-11-10 NOTE — ED PROVIDER NOTES
EMERGENCY DEPARTMENT ENCOUNTER    Room Number:  01/01  Date of encounter:  11/12/2021  PCP: Gil Chambers MD  Historian: PATIENT   Full history not obtainable due to: NONE     HPI:  Chief Complaint: Headache     Context: Ronni Asif is a 51 y.o. male with a hx of chronic back pain followed by pain management who presents to the ED c/o headache onset 4 days prior. Headache is around the vertex. Constant. Moderate severity. Associated sinus pressure and photophobia. Lights worsen the pain. Taking his pain medications including norco, gabapentin, robaxin, and advil. They have not significantly helped the headache. Endorses blurrd vision and notes right sided weakness but states it has been ongoing for months and is unchanged; he is going through PT for this problem. No slurred speech. States he did lose his balance and fell last week on Wednesday, about 5 days prior, and he hit his head. States he did not have a hard fall and did not pass out.  He called his pain management doctor and pcp who wanted him to go to the ER for evaluation of a stroke.     IDDM until last week when his endocrinologist took him off of his insulin due to good control of his BG.     MEDICAL RECORD REVIEW:Reviewed medical record. There are multiple encounters from staff at pain management and [facility]. Pt complained of headache and was referred to ER.      PAST MEDICAL HISTORY    Active Ambulatory Problems     Diagnosis Date Noted   • Lumbar radiculopathy 05/03/2016   • Congenital spondylolysis, lumbosacral region 06/10/2016   • Transient cerebral ischemia 08/17/2016   • Pain in thoracic spine 03/06/2018   • Neck pain 03/06/2018   • Degeneration of thoracic intervertebral disc 05/07/2018   • Degeneration of intervertebral disc at C5-C6 level 05/07/2018   • Type 2 diabetes mellitus with circulatory disorder, without long-term current use of insulin (HCC) 07/24/2018   • Obstructive sleep apnea, adult 07/24/2018   •  Essential hypertension 07/24/2018   • Moderate asthma without complication 03/05/2019   • Gastroesophageal reflux disease 05/14/2019   • Diarrhea 05/14/2019   • History of colon polyps 05/14/2019   • Hyperglycemia 08/25/2019   • Surgery follow-up examination 09/13/2019   • Morbidly obese (HCC) 11/21/2019   • Chest pain 10/11/2018   • Chronic pain 08/21/2017   • Coronary artery disease 01/14/2019   • Gout 01/30/2019   • Labile blood pressure 09/27/2018   • Mixed anxiety depressive disorder 08/21/2017   • Secondary male hypogonadism 08/21/2017   • Shoulder impingement syndrome, left 01/17/2019   • Syncope and collapse 08/21/2017   • Vitamin D deficiency 01/30/2019   • DEVAUGHN on CPAP 08/21/2017   • Thoracic radiculopathy 01/08/2020   • History of lumbar spinal fusion 11/23/2020   • Chronic midline low back pain without sciatica 11/23/2020   • Chronic bilateral thoracic back pain 11/23/2020   • Arthropathy of thoracic facet joint 03/15/2021   • Spondylosis without myelopathy or radiculopathy, thoracic region 04/30/2021   • Encounter for long-term (current) use of high-risk medication 05/26/2021   • Cervical radiculopathy 09/03/2021   • Bulge of cervical disc without myelopathy 09/28/2021   • Arthropathy of cervical facet joint 11/04/2021     Resolved Ambulatory Problems     Diagnosis Date Noted   • Foot drop, left 08/25/2019   • Lumbar disc herniation with radiculopathy 08/25/2019     Past Medical History:   Diagnosis Date   • Anxiety    • Asthma    • Cancer ([facility])    • DDD (degenerative disc disease), lumbar    • Diabetes mellitus ([facility])    • Dizziness    • ED (erectile dysfunction)    • GERD (gastroesophageal reflux disease)    • Headache    • Hyperlipidemia    • Hypertension    • Injury of back 03/2016   • Kidney stone    • Neuromuscular disorder ([facility])    • Pneumonia    • Sleep apnea    • Stroke ([facility])    • TIA (transient ischemic attack)          PAST SURGICAL HISTORY  Past Surgical History:   Procedure Laterality Date    • CERVICAL EPIDURAL N/A 10/7/2021    Procedure: CERVICAL EPIDURAL;  Surgeon: Abdirashid Gonzalez MD;  Location: [facility];  Service: Pain Management;  Laterality: N/A;   • COLONOSCOPY  2014   • COLONOSCOPY N/A 5/23/2019    Procedure: COLONOSCOPY to Cecum with Hot and Cold Polypectomy x 2;  Surgeon: Navid Zafar Jr., MD;  Location: [facility] ENDOSCOPY;  Service: General   • ENDOSCOPY N/A 5/23/2019    Procedure: ESOPHAGOGASTRODUODENOSCOPY with Bx's;  Surgeon: Navid Zafar Jr., MD;  Location: [facility] ENDOSCOPY;  Service: General   • HERNIA REPAIR  10/2015   • LAPAROSCOPIC GASTRIC BANDING      June 2020   • LUMBAR DISCECTOMY FUSION INSTRUMENTATION Bilateral 8/30/2019    Procedure: LUMBAR 5 TO SACRAL 1 OPEN DECOMPRESSION WITH  POSTERIOR LUMBAR INTERBODY FUSION, CAGE AND SCREW;  Surgeon: Jake Tripathi MD;  Location: [facility] MAIN OR;  Service: Neurosurgery   • MEDIAL BRANCH BLOCK Bilateral 6/1/2021    Procedure: thoracic medial branch block (bilateral T9-11 vs T8-10) x2, 1-2 wks apart - diagnostic;  Surgeon: bAdirashid Gonzalez MD;  Location: [facility];  Service: Pain Management;  Laterality: Bilateral;   • MEDIAL BRANCH BLOCK Bilateral 6/29/2021    Procedure: Bilateral T8-T10 MEDIAL BRANCH BLOCK;  Surgeon: Abdirashid Gonzalez MD;  Location: [facility];  Service: Pain Management;  Laterality: Bilateral;   • RADIOFREQUENCY ABLATION Bilateral 7/27/2021    Procedure: Bilateral T8-T10 RADIOFREQUENCY ABLATION;  Surgeon: Abdirashid Gonzalez MD;  Location: [facility];  Service: Pain Management;  Laterality: Bilateral;         FAMILY HISTORY  Family History   Problem Relation Age of Onset   • Brain cancer Father    • Stroke Father    • Hypertension Father    • Atrial fibrillation Father    • Heart disease Father    • Hypertension Mother    • Diabetes Mother    • Heart disease Mother    • Hypertension Brother    • Colon cancer Maternal Grandmother    • Lung cancer Maternal Grandfather    • Bone cancer Maternal  Grandfather          SOCIAL HISTORY  Social History     Socioeconomic History   • Marital status:    Tobacco Use   • Smoking status: Never Smoker   • Smokeless tobacco: Never Used   • Tobacco comment: no caffeine    Substance and Sexual Activity   • Alcohol use: No     Comment: caffeine weekly   • Drug use: No   • Sexual activity: Defer         ALLERGIES  Patient has no known allergies.        REVIEW OF SYSTEMS  Review of Systems   All systems reviewed and marked as negative except as listed in HPI       PHYSICAL EXAM    I have reviewed the triage vital signs and nursing notes.    ED Triage Vitals   Temp Heart Rate Resp BP SpO2   11/10/21 0951 11/10/21 0951 11/10/21 0951 11/10/21 0954 11/10/21 0951   98.6 °F (37 °C) 87 17 136/80 97 %      Temp src Heart Rate Source Patient Position BP Location FiO2 (%)   11/10/21 0951 -- -- -- --   Tympanic           GENERAL: alert well developed, well nourished in no distress  HENT: NCAT, neck supple, trachea midline. Negative kernig sign. Negative brudenski.   EYES: no scleral icterus, PERRL, normal conjunctivae. No photophobia   CV: regular rhythm, regular rate, no murmur  RESPIRATORY: unlabored effort, CTAB  ABDOMEN: soft, nontender, nondistended, bowel sounds present  MUSCULOSKELETAL: no gross deformity  NEURO: alert,  sensory and motor function of extremities grossly intact, speech clear, mental status normal/baseline  SKIN: warm, dry, no rash  PSYCH:  Appropriate mood and affect    Vital signs and nursing notes reviewed.          LAB RESULTS  Labs Reviewed   COMPREHENSIVE METABOLIC PANEL - Abnormal; Notable for the following components:       Result Value    Creatinine 0.72 (*)     All other components within normal limits    Narrative:     GFR Normal >60  Chronic Kidney Disease <60  Kidney Failure <15     CBC WITH AUTO DIFFERENTIAL - Normal   RAINBOW DRAW    Narrative:     The following orders were created for panel order [facility] Draw.  Procedure                                Abnormality         Status                     ---------                               -----------         ------                     Green Top (Gel)[[id number]]                                  Final result               Lavender Top[[id number]]                                     Final result               Gold Top - SST[[id number]]                                   Final result               Light Blue Top[[id number]]                                   Final result                 Please view results for these tests on the individual orders.   CBC AND DIFFERENTIAL    Narrative:     The following orders were created for panel order CBC & Differential.  Procedure                               Abnormality         Status                     ---------                               -----------         ------                     CBC Auto Differential[[id number]]        Normal              Final result                 Please view results for these tests on the individual orders.   GREEN TOP   LAVENDER TOP   GOLD TOP - SST   LIGHT BLUE TOP       Ordered the above labs and independently reviewed the results.        RADIOLOGY  CT Head Without Contrast   Final Result   No acute process identified. Further evaluation could be   performed with a MRI examination of the brain as indicated.               Radiation dose reduction techniques were utilized, including automated   exposure control and exposure modulation based on body size.       This report was finalized on 11/10/2021 2:47 PM by Dr. Anish Simon M.D.              I ordered the above noted radiological studies. Independently reviewed by me and discussed with radiologist.  See dictation above for official radiology interpretation.      PROCEDURES    Procedures        MEDICATIONS GIVEN IN ER    Medications   prochlorperazine (COMPAZINE) injection 10 mg (10 mg Intravenous Given 11/10/21 1353)   diphenhydrAMINE (BENADRYL) injection 6 mg (6 mg Intravenous Given  11/10/21 1355)   sodium chloride 0.9 % bolus 500 mL (0 mL Intravenous Stopped 11/10/21 1445)   ketorolac (TORADOL) injection 15 mg (15 mg Intravenous Given 11/10/21 1350)         PROGRESS, DATA ANALYSIS, CONSULTS, AND MEDICAL DECISION MAKING    All labs have been independently reviewed by me.  All radiology studies have been reviewed by me.   EKG's independently reviewed by me.  Discussion below represents my analysis of pertinent findings related to patient's condition, differential diagnosis, treatment plan and final disposition.    DIFFERENTIAL DIAGNOSIS INCLUDE BUT NOT LIMITED TO: Tension headache, migraine headache, cluster headache, posttraumatic headache,  rebound headache, meningitis, temporal arteritis, sinus venous thrombosis, subarachnoid hemorrhage, subdural hematoma, brain tumor, withdrawal, dehydration      ED Course    Wed Nov 10, 2021   1501 Hemoglobin: 15.3 [JS]   1501 BUN: 10 [JS]   1501 Creatinine(!): 0.72 [JS]   1545 Pt reports headache is much improved. He fell asleep and was resting with eyes closed on my repeat exam. We discussed closed follow up with his pcp and pain management. CT head normal. Given his complex back and neck history, I suspect this is a tension headache. We discussed plan for discharge and follow up with his pcp and pain management md. He is in agreement with the plan.  [JS]      ED Course User Index  [JS] Cristóbal Ana Laurabeau Brown, THEO         BP - 132/79  HR - 82  TEMP - 98.6 °F (37 °C) (Tympanic)  O2 SATS - 98%      DIAGNOSIS  Final diagnoses:   Tension headache   Chronic back pain, unspecified back location, unspecified back pain laterality         DISPOSITION  Discharge     Pt masked in first look. I wore appropriate PPE throughout my encounters with the pt. I performed hand hygiene on entry into the pt room and upon exit.     Dictated utilizing Dragon dictation:  Much of this encounter note is an electronic transcription/translation of spoken language to printed text. The  electronic translation of spoken language may permit erroneous, or at times, nonsensical words or phrases to be inadvertently transcribed; Although I have reviewed the note for such errors, some may still exist.     Ana Laura Ambrocio, APRN  11/12/21 1013

## 2021-11-18 ENCOUNTER — APPOINTMENT (OUTPATIENT)
Dept: GENERAL RADIOLOGY | Facility: SURGERY CENTER | Age: 51
End: 2021-11-18

## 2021-11-30 ENCOUNTER — HOSPITAL ENCOUNTER (OUTPATIENT)
Facility: SURGERY CENTER | Age: 51
Setting detail: HOSPITAL OUTPATIENT SURGERY
Discharge: HOME | End: 2021-11-30
Attending: ANESTHESIOLOGY | Admitting: ANESTHESIOLOGY

## 2021-11-30 ENCOUNTER — HOSPITAL ENCOUNTER (OUTPATIENT)
Dept: GENERAL RADIOLOGY | Facility: SURGERY CENTER | Age: 51
Setting detail: HOSPITAL OUTPATIENT SURGERY
End: 2021-11-30

## 2021-11-30 VITALS
OXYGEN SATURATION: 96 % | HEART RATE: 86 BPM | HEIGHT: 70 IN | BODY MASS INDEX: 36.94 KG/M2 | DIASTOLIC BLOOD PRESSURE: 84 MMHG | SYSTOLIC BLOOD PRESSURE: 131 MMHG | RESPIRATION RATE: 16 BRPM | TEMPERATURE: 98 F | WEIGHT: 258 LBS

## 2021-11-30 DIAGNOSIS — M47.812 ARTHROPATHY OF CERVICAL FACET JOINT: ICD-10-CM

## 2021-11-30 PROCEDURE — 64491 INJ PARAVERT F JNT C/T 2 LEV: CPT | Performed by: ANESTHESIOLOGY

## 2021-11-30 PROCEDURE — 0 IOHEXOL 300 MG/ML SOLUTION 10 ML VIAL: Performed by: ANESTHESIOLOGY

## 2021-11-30 PROCEDURE — 3E0T3BZ INTRODUCTION OF ANESTHETIC AGENT INTO PERIPHERAL NERVES AND PLEXI, PERCUTANEOUS APPROACH: ICD-10-PCS | Performed by: ANESTHESIOLOGY

## 2021-11-30 PROCEDURE — 64490 INJ PARAVERT F JNT C/T 1 LEV: CPT | Performed by: ANESTHESIOLOGY

## 2021-11-30 PROCEDURE — 25010000002 MIDAZOLAM PER 1 MG: Performed by: ANESTHESIOLOGY

## 2021-11-30 PROCEDURE — 77002 NEEDLE LOCALIZATION BY XRAY: CPT

## 2021-11-30 PROCEDURE — 76000 FLUOROSCOPY <1 HR PHYS/QHP: CPT

## 2021-11-30 PROCEDURE — 25010000002 FENTANYL CITRATE (PF) 50 MCG/ML SOLUTION: Performed by: ANESTHESIOLOGY

## 2021-11-30 RX ORDER — SODIUM CHLORIDE 0.9 % (FLUSH) 0.9 %
10 SYRINGE (ML) INJECTION EVERY 12 HOURS SCHEDULED
Status: DISCONTINUED | OUTPATIENT
Start: 2021-11-30 | End: 2021-11-30 | Stop reason: HOSPADM

## 2021-11-30 RX ORDER — SODIUM CHLORIDE, SODIUM LACTATE, POTASSIUM CHLORIDE, CALCIUM CHLORIDE 600; 310; 30; 20 MG/100ML; MG/100ML; MG/100ML; MG/100ML
20 INJECTION, SOLUTION INTRAVENOUS CONTINUOUS
Status: DISCONTINUED | OUTPATIENT
Start: 2021-11-30 | End: 2021-11-30 | Stop reason: HOSPADM

## 2021-11-30 RX ORDER — FENTANYL CITRATE 50 UG/ML
INJECTION, SOLUTION INTRAMUSCULAR; INTRAVENOUS AS NEEDED
Status: DISCONTINUED | OUTPATIENT
Start: 2021-11-30 | End: 2021-11-30 | Stop reason: HOSPADM

## 2021-11-30 RX ORDER — MIDAZOLAM HYDROCHLORIDE 1 MG/ML
INJECTION INTRAMUSCULAR; INTRAVENOUS AS NEEDED
Status: DISCONTINUED | OUTPATIENT
Start: 2021-11-30 | End: 2021-11-30 | Stop reason: HOSPADM

## 2021-11-30 RX ORDER — SODIUM CHLORIDE 0.9 % (FLUSH) 0.9 %
10 SYRINGE (ML) INJECTION AS NEEDED
Status: DISCONTINUED | OUTPATIENT
Start: 2021-11-30 | End: 2021-11-30 | Stop reason: HOSPADM

## 2021-11-30 RX ADMIN — SODIUM CHLORIDE, POTASSIUM CHLORIDE, SODIUM LACTATE AND CALCIUM CHLORIDE 20 ML/HR: 600; 310; 30; 20 INJECTION, SOLUTION INTRAVENOUS at 10:43

## 2021-12-10 RX ORDER — HYDROCODONE BITARTRATE AND ACETAMINOPHEN 5; 325 MG/1; MG/1
1 TABLET ORAL DAILY PRN
Qty: 30 TABLET | Refills: 0 | Status: SHIPPED | OUTPATIENT
Start: 2021-12-10 | End: 2022-04-08 | Stop reason: SDUPTHER

## 2021-12-10 RX ORDER — METHOCARBAMOL 750 MG/1
750 TABLET, FILM COATED ORAL 2 TIMES DAILY PRN
Qty: 60 TABLET | Refills: 0 | Status: SHIPPED | OUTPATIENT
Start: 2021-12-10 | End: 2022-04-08 | Stop reason: SDUPTHER

## 2021-12-17 ENCOUNTER — OFFICE VISIT (OUTPATIENT)
Dept: PAIN MEDICINE | Facility: CLINIC | Age: 51
End: 2021-12-17

## 2021-12-17 VITALS
TEMPERATURE: 97.3 F | OXYGEN SATURATION: 97 % | BODY MASS INDEX: 37.08 KG/M2 | DIASTOLIC BLOOD PRESSURE: 79 MMHG | WEIGHT: 259 LBS | HEART RATE: 83 BPM | HEIGHT: 70 IN | RESPIRATION RATE: 20 BRPM | SYSTOLIC BLOOD PRESSURE: 117 MMHG

## 2021-12-17 DIAGNOSIS — M47.814 ARTHROPATHY OF THORACIC FACET JOINT: ICD-10-CM

## 2021-12-17 DIAGNOSIS — M54.50 CHRONIC MIDLINE LOW BACK PAIN WITHOUT SCIATICA: ICD-10-CM

## 2021-12-17 DIAGNOSIS — R20.0 BILATERAL HAND NUMBNESS: ICD-10-CM

## 2021-12-17 DIAGNOSIS — M54.6 PAIN IN THORACIC SPINE: ICD-10-CM

## 2021-12-17 DIAGNOSIS — G89.29 CHRONIC MIDLINE LOW BACK PAIN WITHOUT SCIATICA: ICD-10-CM

## 2021-12-17 DIAGNOSIS — M54.12 CERVICAL RADICULOPATHY: Primary | ICD-10-CM

## 2021-12-17 DIAGNOSIS — M47.812 ARTHROPATHY OF CERVICAL FACET JOINT: ICD-10-CM

## 2021-12-17 DIAGNOSIS — G89.4 CHRONIC PAIN SYNDROME: ICD-10-CM

## 2021-12-17 PROCEDURE — 99214 OFFICE O/P EST MOD 30 MIN: CPT | Performed by: NURSE PRACTITIONER

## 2021-12-17 PROCEDURE — 80305 DRUG TEST PRSMV DIR OPT OBS: CPT | Performed by: NURSE PRACTITIONER

## 2021-12-17 RX ORDER — BLOOD-GLUCOSE METER
1 KIT MISCELLANEOUS
COMMUNITY
Start: 2021-10-21

## 2021-12-17 RX ORDER — HYDROCODONE BITARTRATE AND ACETAMINOPHEN 5; 325 MG/1; MG/1
1 TABLET ORAL EVERY 6 HOURS PRN
COMMUNITY
Start: 2021-06-21 | End: 2021-12-17

## 2021-12-17 RX ORDER — TESTOSTERONE CYPIONATE 200 MG/ML
200 INJECTION, SOLUTION INTRAMUSCULAR
COMMUNITY
Start: 2021-08-03 | End: 2021-12-17

## 2021-12-17 RX ORDER — GABAPENTIN 600 MG/1
600 TABLET ORAL 3 TIMES DAILY
COMMUNITY
Start: 2021-07-04 | End: 2021-12-17

## 2021-12-17 RX ORDER — ATORVASTATIN CALCIUM 40 MG/1
40 TABLET, FILM COATED ORAL DAILY
Status: ON HOLD | COMMUNITY
Start: 2021-08-03 | End: 2022-06-07

## 2021-12-17 RX ORDER — METOPROLOL SUCCINATE 50 MG/1
1 TABLET, EXTENDED RELEASE ORAL DAILY
COMMUNITY
Start: 2021-08-23

## 2021-12-17 RX ORDER — FLUTICASONE PROPIONATE 50 MCG
SPRAY, SUSPENSION (ML) NASAL
COMMUNITY
Start: 2021-10-21

## 2021-12-17 RX ORDER — DULAGLUTIDE 3 MG/.5ML
INJECTION, SOLUTION SUBCUTANEOUS
COMMUNITY
Start: 2021-11-01

## 2021-12-17 RX ORDER — FINASTERIDE 1 MG/1
1 TABLET, FILM COATED ORAL DAILY
Status: ON HOLD | COMMUNITY
Start: 2021-04-21 | End: 2022-06-07

## 2021-12-17 RX ORDER — GABAPENTIN 800 MG/1
800 TABLET ORAL 3 TIMES DAILY
Qty: 90 TABLET | Refills: 1 | Status: SHIPPED | OUTPATIENT
Start: 2021-12-17 | End: 2022-01-18 | Stop reason: SDUPTHER

## 2021-12-17 RX ORDER — INSULIN DEGLUDEC INJECTION 100 U/ML
INJECTION, SOLUTION SUBCUTANEOUS
COMMUNITY
Start: 2021-12-06

## 2021-12-17 RX ORDER — PROCHLORPERAZINE 25 MG/1
SUPPOSITORY RECTAL
COMMUNITY
Start: 2021-12-04

## 2021-12-17 RX ORDER — METHYLPREDNISOLONE 4 MG/1
TABLET ORAL
COMMUNITY
Start: 2021-11-15 | End: 2021-12-17

## 2021-12-17 RX ORDER — TADALAFIL 10 MG/1
1 TABLET ORAL DAILY PRN
COMMUNITY
Start: 2021-11-22

## 2021-12-17 RX ORDER — ASPIRIN 81 MG/1
1 TABLET ORAL DAILY
COMMUNITY
Start: 2021-07-06 | End: 2022-06-03

## 2021-12-17 RX ORDER — ALBUTEROL SULFATE 90 UG/1
2 AEROSOL, METERED RESPIRATORY (INHALATION)
COMMUNITY
Start: 2021-07-06 | End: 2022-06-03 | Stop reason: SDUPTHER

## 2021-12-17 RX ORDER — METHOCARBAMOL 750 MG/1
750 TABLET, FILM COATED ORAL 3 TIMES DAILY
COMMUNITY
Start: 2021-07-13 | End: 2022-05-17 | Stop reason: SDUPTHER

## 2021-12-17 RX ORDER — PROCHLORPERAZINE 25 MG/1
SUPPOSITORY RECTAL
COMMUNITY
Start: 2021-09-23

## 2021-12-17 RX ORDER — PROCHLORPERAZINE 25 MG/1
SUPPOSITORY RECTAL
COMMUNITY
Start: 2021-12-05

## 2021-12-17 RX ORDER — LANSOPRAZOLE 30 MG/1
30 CAPSULE, DELAYED RELEASE ORAL
COMMUNITY
Start: 2021-07-02 | End: 2022-06-03 | Stop reason: SDUPTHER

## 2021-12-17 RX ORDER — IPRATROPIUM BROMIDE AND ALBUTEROL SULFATE 2.5; .5 MG/3ML; MG/3ML
SOLUTION RESPIRATORY (INHALATION)
COMMUNITY
Start: 2021-10-21

## 2021-12-17 RX ORDER — ERGOCALCIFEROL 1.25 MG/1
1 CAPSULE ORAL
COMMUNITY
Start: 2021-11-03

## 2021-12-28 NOTE — PROGRESS NOTES
Subjective    Patient ID: Ronni Asif is a 51 y.o. male is here today for a 1Y follow-up of Chronic bilateral thoracic back pain.  Pt last seen on 11/23/20 and reported constant back pain with weakness.  Pt instructed to see Dr Gonzalez and consider a RFA with medial branch blocks prior.  .  Patient reports today neck pain.  He has been going to PT and he has had RFA.    I have not seen him in over a year.  The last time I saw him he had mainly thoracic back pain which I felt was a nonsurgical problem.  He went on to have an ablation in the thoracic spine and it helped modestly.  More recently he has been having more neck pain with some numbness and tingling down his arms and in his hands.  There is no pain in his arms and no weakness per se.  It is mainly pain particularly with extension.  I reviewed his cervical MRI which did show some modest cervical stenosis and some facet disease which I think provides an explanation for the pain as well as some radiating occipital pain which she describes.  I explained to him that there is nothing surgical for that.  He has been undergoing some therapy and will continue that.  I mentioned that he might want to speak with Dr. Gonzalez about doing an ablation of his neck.  The low back and his leg are doing well.  Its been about 2-1/2 years since I did an L5-S1 decompression and fusion on him and he is doing reasonably well with that.  We will renew his therapy.  He will work with Dr. Gonzalez and I will see him in 6 months.        Neck Pain   The pain is present in the midline. The quality of the pain is described as stabbing, shooting, cramping, burning and aching. The pain is at a severity of 7/10. The pain is same all the time. Associated symptoms include headaches, numbness, syncope, tingling and trouble swallowing. Pertinent negatives include no fever. He has tried bed rest, heat, NSAIDs, oral narcotics and muscle relaxants for the symptoms. The treatment provided  mild relief.       The following portions of the patient's history were reviewed and updated as appropriate: allergies, current medications, past family history, past medical history, past social history, past surgical history and problem list.    Review of Systems   Constitutional: Negative for chills and fever.   HENT: Positive for trouble swallowing. Negative for congestion.    Cardiovascular: Positive for syncope.   Musculoskeletal: Positive for neck pain.   Neurological: Positive for tingling, numbness and headaches.   All other systems reviewed and are negative.      Objective   Physical Exam  Constitutional:       Appearance: He is well-developed.   HENT:      Head: Normocephalic and atraumatic.   Eyes:      Extraocular Movements: EOM normal.      Conjunctiva/sclera: Conjunctivae normal.      Pupils: Pupils are equal, round, and reactive to light.   Neck:      Vascular: No carotid bruit.   Neurological:      Mental Status: He is oriented to person, place, and time.      Coordination: Finger-Nose-Finger Test and Heel to Shin Test normal.      Gait: Gait is intact.      Deep Tendon Reflexes:      Reflex Scores:       Tricep reflexes are 2+ on the right side and 2+ on the left side.       Bicep reflexes are 2+ on the right side and 2+ on the left side.       Brachioradialis reflexes are 2+ on the right side and 2+ on the left side.       Patellar reflexes are 2+ on the right side and 2+ on the left side.       Achilles reflexes are 2+ on the right side and 2+ on the left side.  Psychiatric:         Speech: Speech normal.       Neurologic Exam     Mental Status   Oriented to person, place, and time.   Registration of memory: Good recent and remote memory.   Attention: normal. Concentration: normal.   Speech: speech is normal   Level of consciousness: alert  Knowledge: consistent with education.     Cranial Nerves     CN II   Visual fields full to confrontation.   Visual acuity: normal    CN III, IV, VI   Pupils  are equal, round, and reactive to light.  Extraocular motions are normal.     CN V   Facial sensation intact.   Right corneal reflex: normal  Left corneal reflex: normal    CN VII   Facial expression full, symmetric.   Right facial weakness: none  Left facial weakness: none    CN VIII   Hearing: intact    CN IX, X   Palate: symmetric    CN XI   Right sternocleidomastoid strength: normal  Left sternocleidomastoid strength: normal    CN XII   Tongue: not atrophic  Tongue deviation: none    Motor Exam   Muscle bulk: normal  Right arm tone: normal  Left arm tone: normal  Right leg tone: normal  Left leg tone: normal    Strength   Strength 5/5 except as noted.     Sensory Exam   Light touch normal.     Gait, Coordination, and Reflexes     Gait  Gait: normal    Coordination   Finger to nose coordination: normal  Heel to shin coordination: normal    Reflexes   Right brachioradialis: 2+  Left brachioradialis: 2+  Right biceps: 2+  Left biceps: 2+  Right triceps: 2+  Left triceps: 2+  Right patellar: 2+  Left patellar: 2+  Right achilles: 2+  Left achilles: 2+  Right : 2+  Left : 2+      Assessment/Plan   Independent Review of Radiographic Studies:      I reviewed the cervical MRI done on 9/24/2021 which shows some modest cervical stenosis at C5-C6 with some foraminal entrapment.  Agree with the report.        Medical Decision Making:      Nothing surgical for the neck at this point.  We will do some physical therapy.  I told him that it is reasonable from my standpoint for Dr. Gonzalez to do a radiofrequency ablation of the neck in addition to repeating it for the thoracic spine.  We will keep an eye on him as far as the cervical stenosis goes and see him in 6 months.      Diagnoses and all orders for this visit:    1. Chronic midline low back pain without sciatica (Primary)    2. Chronic bilateral thoracic back pain    3. Cervical spinal stenosis  -     Ambulatory Referral to Physical Therapy Evaluate and  treat      Return in about 6 months (around 6/29/2022) for Face-to-face.

## 2021-12-29 ENCOUNTER — OFFICE VISIT (OUTPATIENT)
Dept: NEUROSURGERY | Facility: CLINIC | Age: 51
End: 2021-12-29

## 2021-12-29 VITALS
HEART RATE: 92 BPM | TEMPERATURE: 97.7 F | WEIGHT: 260.4 LBS | OXYGEN SATURATION: 96 % | SYSTOLIC BLOOD PRESSURE: 138 MMHG | HEIGHT: 70 IN | DIASTOLIC BLOOD PRESSURE: 76 MMHG | BODY MASS INDEX: 37.28 KG/M2

## 2021-12-29 DIAGNOSIS — M54.6 CHRONIC BILATERAL THORACIC BACK PAIN: ICD-10-CM

## 2021-12-29 DIAGNOSIS — G89.29 CHRONIC MIDLINE LOW BACK PAIN WITHOUT SCIATICA: Primary | ICD-10-CM

## 2021-12-29 DIAGNOSIS — M48.02 CERVICAL SPINAL STENOSIS: ICD-10-CM

## 2021-12-29 DIAGNOSIS — M54.50 CHRONIC MIDLINE LOW BACK PAIN WITHOUT SCIATICA: Primary | ICD-10-CM

## 2021-12-29 DIAGNOSIS — G89.29 CHRONIC BILATERAL THORACIC BACK PAIN: ICD-10-CM

## 2021-12-29 PROCEDURE — 99214 OFFICE O/P EST MOD 30 MIN: CPT | Performed by: NEUROLOGICAL SURGERY

## 2022-01-04 ENCOUNTER — CLINICAL SUPPORT (OUTPATIENT)
Dept: PAIN MEDICINE | Facility: CLINIC | Age: 52
End: 2022-01-04

## 2022-01-04 DIAGNOSIS — G89.4 CHRONIC PAIN SYNDROME: Primary | ICD-10-CM

## 2022-01-04 PROCEDURE — 80305 DRUG TEST PRSMV DIR OPT OBS: CPT | Performed by: NURSE PRACTITIONER

## 2022-01-04 NOTE — PROGRESS NOTES
Date of pill count: 22  Medication(dosage):Hydrocodone-Acetaminophen 5-325  Si tab po daily prn  Date and Quantity filled:21 and 30  Quantity at pill count: 18  Preliminary UDS results: neg    Date of pill count: 22  Medication(dosage):gabapentin 800 mg  Si tab po TID  Date and Quantity filled:21 and 90  Quantity at pill count: 41  Preliminary UDS results: neg

## 2022-01-18 RX ORDER — GABAPENTIN 800 MG/1
800 TABLET ORAL 3 TIMES DAILY
Qty: 90 TABLET | Refills: 1 | Status: SHIPPED | OUTPATIENT
Start: 2022-01-18 | End: 2022-02-16

## 2022-01-20 ENCOUNTER — APPOINTMENT (OUTPATIENT)
Dept: CT IMAGING | Facility: HOSPITAL | Age: 52
End: 2022-01-20

## 2022-01-20 ENCOUNTER — HOSPITAL ENCOUNTER (EMERGENCY)
Facility: HOSPITAL | Age: 52
Discharge: HOME | End: 2022-01-20
Attending: EMERGENCY MEDICINE | Admitting: EMERGENCY MEDICINE

## 2022-01-20 VITALS
HEIGHT: 70 IN | OXYGEN SATURATION: 100 % | HEART RATE: 79 BPM | DIASTOLIC BLOOD PRESSURE: 80 MMHG | BODY MASS INDEX: 35.79 KG/M2 | RESPIRATION RATE: 16 BRPM | WEIGHT: 250 LBS | SYSTOLIC BLOOD PRESSURE: 137 MMHG | TEMPERATURE: 97.8 F

## 2022-01-20 DIAGNOSIS — R11.10 ABDOMINAL PAIN, VOMITING, AND DIARRHEA: Primary | ICD-10-CM

## 2022-01-20 DIAGNOSIS — R19.7 ABDOMINAL PAIN, VOMITING, AND DIARRHEA: Primary | ICD-10-CM

## 2022-01-20 DIAGNOSIS — R10.9 ABDOMINAL PAIN, VOMITING, AND DIARRHEA: Primary | ICD-10-CM

## 2022-01-20 DIAGNOSIS — U07.1 COVID-19: ICD-10-CM

## 2022-01-20 LAB
ALBUMIN SERPL-MCNC: 4.2 G/DL (ref 3.5–5.2)
ALBUMIN/GLOB SERPL: 1.5 G/DL
ALP SERPL-CCNC: 84 U/L (ref 39–117)
ALT SERPL W P-5'-P-CCNC: 38 U/L (ref 1–41)
AMYLASE SERPL-CCNC: 75 U/L (ref 28–100)
ANION GAP SERPL CALCULATED.3IONS-SCNC: 12 MMOL/L (ref 5–15)
AST SERPL-CCNC: 24 U/L (ref 1–40)
BACTERIA UR QL AUTO: ABNORMAL /HPF
BASOPHILS # BLD AUTO: 0.04 10*3/MM3 (ref 0–0.2)
BASOPHILS NFR BLD AUTO: 0.4 % (ref 0–1.5)
BILIRUB SERPL-MCNC: 0.6 MG/DL (ref 0–1.2)
BILIRUB UR QL STRIP: NEGATIVE
BUN SERPL-MCNC: 10 MG/DL (ref 6–20)
BUN/CREAT SERPL: 15.2 (ref 7–25)
CALCIUM SPEC-SCNC: 9.7 MG/DL (ref 8.6–10.5)
CHLORIDE SERPL-SCNC: 98 MMOL/L (ref 98–107)
CLARITY UR: CLEAR
CO2 SERPL-SCNC: 24 MMOL/L (ref 22–29)
COLOR UR: YELLOW
CREAT SERPL-MCNC: 0.66 MG/DL (ref 0.76–1.27)
DEPRECATED RDW RBC AUTO: 42.8 FL (ref 37–54)
EOSINOPHIL # BLD AUTO: 0.15 10*3/MM3 (ref 0–0.4)
EOSINOPHIL NFR BLD AUTO: 1.7 % (ref 0.3–6.2)
ERYTHROCYTE [DISTWIDTH] IN BLOOD BY AUTOMATED COUNT: 13.3 % (ref 12.3–15.4)
FLUAV RNA RESP QL NAA+PROBE: NOT DETECTED
FLUBV RNA RESP QL NAA+PROBE: NOT DETECTED
GFR SERPL CREATININE-BSD FRML MDRD: 127 ML/MIN/1.73
GLOBULIN UR ELPH-MCNC: 2.8 GM/DL
GLUCOSE SERPL-MCNC: 90 MG/DL (ref 65–99)
GLUCOSE UR STRIP-MCNC: NEGATIVE MG/DL
HCT VFR BLD AUTO: 47.8 % (ref 37.5–51)
HGB BLD-MCNC: 15.8 G/DL (ref 13–17.7)
HGB UR QL STRIP.AUTO: ABNORMAL
HYALINE CASTS UR QL AUTO: ABNORMAL /LPF
IMM GRANULOCYTES # BLD AUTO: 0.15 10*3/MM3 (ref 0–0.05)
IMM GRANULOCYTES NFR BLD AUTO: 1.7 % (ref 0–0.5)
KETONES UR QL STRIP: NEGATIVE
LEUKOCYTE ESTERASE UR QL STRIP.AUTO: NEGATIVE
LIPASE SERPL-CCNC: 38 U/L (ref 13–60)
LYMPHOCYTES # BLD AUTO: 2.74 10*3/MM3 (ref 0.7–3.1)
LYMPHOCYTES NFR BLD AUTO: 30.7 % (ref 19.6–45.3)
MCH RBC QN AUTO: 28.7 PG (ref 26.6–33)
MCHC RBC AUTO-ENTMCNC: 33.1 G/DL (ref 31.5–35.7)
MCV RBC AUTO: 86.8 FL (ref 79–97)
MONOCYTES # BLD AUTO: 0.63 10*3/MM3 (ref 0.1–0.9)
MONOCYTES NFR BLD AUTO: 7.1 % (ref 5–12)
NEUTROPHILS NFR BLD AUTO: 5.21 10*3/MM3 (ref 1.7–7)
NEUTROPHILS NFR BLD AUTO: 58.4 % (ref 42.7–76)
NITRITE UR QL STRIP: NEGATIVE
PH UR STRIP.AUTO: 7.5 [PH] (ref 4.5–8)
PLATELET # BLD AUTO: 297 10*3/MM3 (ref 140–450)
PMV BLD AUTO: 8.8 FL (ref 6–12)
POTASSIUM SERPL-SCNC: 3.9 MMOL/L (ref 3.5–5.2)
PROT SERPL-MCNC: 7 G/DL (ref 6–8.5)
PROT UR QL STRIP: NEGATIVE
RBC # BLD AUTO: 5.51 10*6/MM3 (ref 4.14–5.8)
RBC # UR STRIP: ABNORMAL /HPF
REF LAB TEST METHOD: ABNORMAL
SARS-COV-2 RNA RESP QL NAA+PROBE: DETECTED
SODIUM SERPL-SCNC: 134 MMOL/L (ref 136–145)
SP GR UR STRIP: 1.02 (ref 1–1.03)
SQUAMOUS #/AREA URNS HPF: ABNORMAL /HPF
UROBILINOGEN UR QL STRIP: ABNORMAL
WBC # UR STRIP: ABNORMAL /HPF
WBC NRBC COR # BLD: 8.92 10*3/MM3 (ref 3.4–10.8)

## 2022-01-20 PROCEDURE — 85025 COMPLETE CBC W/AUTO DIFF WBC: CPT | Performed by: EMERGENCY MEDICINE

## 2022-01-20 PROCEDURE — 82150 ASSAY OF AMYLASE: CPT | Performed by: EMERGENCY MEDICINE

## 2022-01-20 PROCEDURE — 0 IOPAMIDOL PER 1 ML: Performed by: EMERGENCY MEDICINE

## 2022-01-20 PROCEDURE — 99283 EMERGENCY DEPT VISIT LOW MDM: CPT

## 2022-01-20 PROCEDURE — 36415 COLL VENOUS BLD VENIPUNCTURE: CPT

## 2022-01-20 PROCEDURE — 83690 ASSAY OF LIPASE: CPT | Performed by: EMERGENCY MEDICINE

## 2022-01-20 PROCEDURE — 80053 COMPREHEN METABOLIC PANEL: CPT | Performed by: EMERGENCY MEDICINE

## 2022-01-20 PROCEDURE — 0 DIATRIZOATE MEGLUMINE & SODIUM PER 1 ML: Performed by: EMERGENCY MEDICINE

## 2022-01-20 PROCEDURE — 81001 URINALYSIS AUTO W/SCOPE: CPT | Performed by: EMERGENCY MEDICINE

## 2022-01-20 PROCEDURE — 99283 EMERGENCY DEPT VISIT LOW MDM: CPT | Performed by: EMERGENCY MEDICINE

## 2022-01-20 PROCEDURE — 87636 SARSCOV2 & INF A&B AMP PRB: CPT | Performed by: EMERGENCY MEDICINE

## 2022-01-20 PROCEDURE — 74177 CT ABD & PELVIS W/CONTRAST: CPT

## 2022-01-20 RX ORDER — SODIUM CHLORIDE 0.9 % (FLUSH) 0.9 %
10 SYRINGE (ML) INJECTION AS NEEDED
Status: DISCONTINUED | OUTPATIENT
Start: 2022-01-20 | End: 2022-01-20 | Stop reason: HOSPADM

## 2022-01-20 RX ORDER — ONDANSETRON 4 MG/1
4 TABLET, ORALLY DISINTEGRATING ORAL EVERY 6 HOURS PRN
Qty: 20 TABLET | Refills: 0 | Status: ON HOLD | OUTPATIENT
Start: 2022-01-20 | End: 2022-06-07

## 2022-01-20 RX ADMIN — IOPAMIDOL 100 ML: 755 INJECTION, SOLUTION INTRAVENOUS at 18:12

## 2022-01-20 RX ADMIN — DIATRIZOATE MEGLUMINE AND DIATRIZOATE SODIUM 30 ML: 600; 100 SOLUTION ORAL; RECTAL at 16:52

## 2022-01-20 NOTE — DISCHARGE INSTRUCTIONS
Medication as directed for nausea and vomiting.  Follow-up with gastroenterology as scheduled.  Recommend plenty of fluids and rest.  Tylenol and/or ibuprofen as needed for fever, body aches, headache.  You are still positive for COVID-19.  Isolate from other family members as above.  Anyone exposed to you should quarantine per CDC recommendations.  Follow-up with your PCP as above.  Return to ED for medical emergencies.

## 2022-01-20 NOTE — ED PROVIDER NOTES
Subjective   Ronni Asif is a 51-year-old white male who presents secondary to abdominal pain, nausea, vomiting and diarrhea. Onset of symptoms 1 week ago. Patient was seen at the onset of symptoms by his gastric band surgeon Dr. Curtis Yu. All of the fluid was withdrawn and the Lap-Band deflated. Dr. Yu recommended patient see a gastroenterologist. Patient has an appointment scheduled but has not yet seen GI. Patient was diagnosed with COVID 1 week ago. He had mild URI symptoms which resolved within a couple of days. However the nausea, vomiting, diarrhea and abdominal pain have persisted. Patient is also under the care of of Dr. Tripathi. He also recommended patient see a gastroenterologist after patient had a single episode of encopresis. Patient's has not experienced encopresis since that time. Patient has vomited three times today. One episode of diarrhea. Patient states his wife is a pediatrician. She encouraged patient to come to the ED today for evaluation.    Patient is vaccinated for COVID-19.      History provided by:  Patient      Review of Systems   Constitutional: Negative for fever.   HENT: Negative for rhinorrhea.    Eyes: Negative for redness.   Respiratory: Negative for cough.    Cardiovascular: Negative for chest pain.   Gastrointestinal: Positive for abdominal pain, diarrhea, nausea and vomiting.   Genitourinary: Negative for dysuria.   Musculoskeletal: Negative for back pain.   Skin: Negative for rash.   Neurological: Positive for dizziness and headaches. Negative for syncope.   All other systems reviewed and are negative.      Past Medical History:   Diagnosis Date   • Anxiety    • Asthma    • Cancer (HCC)     skin   • DDD (degenerative disc disease), lumbar    • Diabetes mellitus (HCC)    • Dizziness    • ED (erectile dysfunction)    • GERD (gastroesophageal reflux disease)    • Headache    • Hyperlipidemia    • Hypertension    • Injury of back 03/2016    Pt fell 20 feet    •  Kidney stone    • Neuromuscular disorder (HCC)    • Pneumonia    • Sleep apnea     cpap   • Stroke (HCC)     tia   • Syncope and collapse    • TIA (transient ischemic attack)        No Known Allergies    Past Surgical History:   Procedure Laterality Date   • CERVICAL EPIDURAL N/A 10/7/2021    Procedure: CERVICAL EPIDURAL;  Surgeon: Abdirashid Gonzalez MD;  Location: [facility] MAIN OR;  Service: Pain Management;  Laterality: N/A;   • COLONOSCOPY  2014   • COLONOSCOPY N/A 5/23/2019    Procedure: COLONOSCOPY to Cecum with Hot and Cold Polypectomy x 2;  Surgeon: Navid Zafar Jr., MD;  Location: [facility] ENDOSCOPY;  Service: General   • ENDOSCOPY N/A 5/23/2019    Procedure: ESOPHAGOGASTRODUODENOSCOPY with Bx's;  Surgeon: Navid Zafar Jr., MD;  Location: [facility] ENDOSCOPY;  Service: General   • HERNIA REPAIR  10/2015   • LAPAROSCOPIC GASTRIC BANDING      June 2020   • LUMBAR DISCECTOMY FUSION INSTRUMENTATION Bilateral 8/30/2019    Procedure: LUMBAR 5 TO SACRAL 1 OPEN DECOMPRESSION WITH  POSTERIOR LUMBAR INTERBODY FUSION, CAGE AND SCREW;  Surgeon: Jake Tripathi MD;  Location: [facility] MAIN OR;  Service: Neurosurgery   • MEDIAL BRANCH BLOCK Bilateral 6/1/2021    Procedure: thoracic medial branch block (bilateral T9-11 vs T8-10) x2, 1-2 wks apart - diagnostic;  Surgeon: Abdirashid Gonzalez MD;  Location: [facility] MAIN OR;  Service: Pain Management;  Laterality: Bilateral;   • MEDIAL BRANCH BLOCK Bilateral 6/29/2021    Procedure: Bilateral T8-T10 MEDIAL BRANCH BLOCK;  Surgeon: Abdirashid Gonzalez MD;  Location: [facility] MAIN OR;  Service: Pain Management;  Laterality: Bilateral;   • MEDIAL BRANCH BLOCK Bilateral 11/30/2021    Procedure: Bilateral cervical MEDIAL BRANCH BLOCK at approximately C4-C6;  Surgeon: Abdirashid Gonzalez MD;  Location: [facility] MAIN OR;  Service: Pain Management;  Laterality: Bilateral;   • RADIOFREQUENCY ABLATION Bilateral 7/27/2021    Procedure: Bilateral T8-T10 RADIOFREQUENCY ABLATION;  Surgeon: Abdirashid Gonzalez  MD CJ;  Location: [facility] MAIN OR;  Service: Pain Management;  Laterality: Bilateral;       Family History   Problem Relation Age of Onset   • Brain cancer Father    • Stroke Father    • Hypertension Father    • Atrial fibrillation Father    • Heart disease Father    • Hypertension Mother    • Diabetes Mother    • Heart disease Mother    • Hypertension Brother    • Colon cancer Maternal Grandmother    • Lung cancer Maternal Grandfather    • Bone cancer Maternal Grandfather        Social History     Socioeconomic History   • Marital status:    Tobacco Use   • Smoking status: Never Smoker   • Smokeless tobacco: Never Used   • Tobacco comment: no caffeine    Substance and Sexual Activity   • Alcohol use: No     Comment: caffeine weekly   • Drug use: No   • Sexual activity: Defer           Objective   Physical Exam  Vitals and nursing note reviewed.   Constitutional:       General: He is not in acute distress.     Appearance: He is well-developed. He is not ill-appearing, toxic-appearing or diaphoretic.      Comments: 51-year-old white male lying in bed. Patient is a bit overweight. He otherwise appears in good health. Vital signs unremarkable. Patient friendly and cooperative.   HENT:      Head: Normocephalic and atraumatic.      Right Ear: External ear normal.      Left Ear: External ear normal.      Nose: Nose normal.      Mouth/Throat:      Mouth: Mucous membranes are moist.      Pharynx: Oropharynx is clear.   Eyes:      Extraocular Movements: Extraocular movements intact.      Conjunctiva/sclera: Conjunctivae normal.      Pupils: Pupils are equal, round, and reactive to light.   Cardiovascular:      Rate and Rhythm: Normal rate and regular rhythm.      Heart sounds: Normal heart sounds. No murmur heard.  No friction rub. No gallop.    Pulmonary:      Effort: Pulmonary effort is normal. No respiratory distress.      Breath sounds: Normal breath sounds.   Abdominal:      General: Abdomen is protuberant. Bowel  sounds are normal.      Palpations: Abdomen is soft.      Tenderness: There is abdominal tenderness (Mild) in the right lower quadrant. There is no guarding or rebound. Negative signs include Escobar's sign, Rovsing's sign and McBurney's sign.   Musculoskeletal:         General: Normal range of motion.      Cervical back: Normal range of motion and neck supple.   Skin:     General: Skin is warm and dry.   Neurological:      General: No focal deficit present.      Mental Status: He is alert and oriented to person, place, and time.   Psychiatric:         Attention and Perception: Attention normal.         Mood and Affect: Mood and affect normal.         Behavior: Behavior normal.         Procedures           ED Course  ED Course as of 01/20/22 1837   Thu Jan 20, 2022   1613 Obtain full set of labs as well as CAT scan with IV and oral contrast. Patient declined offer for pain and nausea medicines at this time. [SS]   1716 CBC is unremarkable.  CMP does not show any significant abnormalities.  Amylase and lipase unremarkable.  Urine shows trace blood.  Otherwise unremarkable.  Patient still positive for COVID-19.  Influenza negative.  Awaiting CT. [SS]   1720 Patient resting comfortably in bed.  Discussed lab results. [SS]   1833 CT is unremarkable.  I feel patient's vomiting and diarrhea COVID-related.  I do not find any evidence of intra-abdominal disease process.  Recommend patient follow-up with gastroenterology as scheduled.I have discussed at length with patient (including family if appropriate) all results, diagnoses, treatment, indications to return to emergency room and follow-up.  Will d/c home.    Prescription1-Zofran [SS]      ED Course User Index  [SS] Venu Mcknight MD      Labs Reviewed   COVID-19 AND FLU A/B, NP SWAB IN TRANSPORT MEDIA 8-12 HR TAT - Abnormal; Notable for the following components:       Result Value    COVID19 Detected (*)     All other components within normal limits    Narrative:      Fact sheet for providers: https://www.fda.gov/media/566889/download    Fact sheet for patients: https://www.fda.gov/media/633629/download    Test performed by PCR.  Influenza A and Influenza B negative results should be considered presumptive in samples that have a positive SARS-CoV-2 result.    Competitive inhibition studies showed that SARS-CoV-2 virus, when present at concentrations above 3.6E+04 copies/mL, can inhibit the detection and amplification of influenza A and influenza B virus RNA if present at or below 1.8E+02 copies/mL or 4.9E+02 copies/mL, respectively, and may lead to false negative influenza virus results. If co-infection with influenza A or influenza B virus is suspected in samples with a positive SARS-CoV-2 result, the sample should be re-tested with another FDA cleared, approved, or authorized influenza test, if influenza virus detection would change clinical management.   COMPREHENSIVE METABOLIC PANEL - Abnormal; Notable for the following components:    Creatinine 0.66 (*)     Sodium 134 (*)     All other components within normal limits    Narrative:     GFR Normal >60  Chronic Kidney Disease <60  Kidney Failure <15     URINALYSIS W/ MICROSCOPIC IF INDICATED (NO CULTURE) - Abnormal; Notable for the following components:    Blood, UA Trace (*)     All other components within normal limits   CBC WITH AUTO DIFFERENTIAL - Abnormal; Notable for the following components:    Immature Grans % 1.7 (*)     Immature Grans, Absolute 0.15 (*)     All other components within normal limits   URINALYSIS, MICROSCOPIC ONLY - Abnormal; Notable for the following components:    RBC, UA 0-2 (*)     All other components within normal limits   AMYLASE - Normal   LIPASE - Normal   CBC AND DIFFERENTIAL    Narrative:     The following orders were created for panel order CBC & Differential.  Procedure                               Abnormality         Status                     ---------                                -----------         ------                     CBC Auto Differential[[id number]]        Abnormal            Final result                 Please view results for these tests on the individual orders.     CT Abdomen Pelvis With Contrast    Result Date: 1/20/2022  Narrative: CT Abdomen Pelvis W INDICATION: Nausea vomiting and diarrhea for one week. Covid positive patient. Previous hernia repair left and surgery TECHNIQUE: CT of the abdomen and pelvis with IV contrast. Coronal and sagittal reconstructions were obtained.  Radiation dose reduction techniques included automated exposure control or exposure modulation based on body size. Count of known CT and cardiac nuc med studies performed in previous 12 months: 0. COMPARISON: None available. FINDINGS: Abdomen: Lung bases are clear. Liver, gallbladder, spleen, pancreas, adrenal glands and kidneys are normal in appearance. Aorta is normal in size. There is no adenopathy. Oral contrast was administered. The bowel including the appendix appears normal. Pelvis: Bladder is normal. Prostate gland is normal. There are postoperative changes in lumbar spine     Impression: No evidence of bowel inflammation or obstruction Postoperative changes lumbar spine and stomach Signer Name: Brandon Pacheco MD  Signed: 1/20/2022 6:25 PM  Workstation Name: [email or web address]  [facility]    My differential diagnosis for abdominal pain includes but is not limited to:  Gastritis, gastroenteritis, peptic ulcer disease, GERD, esophageal perforation, acute appendicitis, mesenteric adenitis, Meckel’s diverticulum, epiploic appendagitis, diverticulitis, colon cancer, ulcerative colitis, Crohn’s disease, intussusception, small bowel obstruction, adhesions, ischemic bowel, perforated viscus, ileus, obstipation, biliary colic, cholecystitis, cholelithiasis, Ravin-Cameron Grady, hepatitis, pancreatitis, common bile duct obstruction, cholangitis, bile leak, splenic trauma, splenic rupture,  splenic infarction, splenic abscess, abdominal wall hematoma, abdominal wall abscess, intra-abdominal abscess, ascites, spontaneous bacterial peritonitis, hernia, UTI, cystitis, prostatitis, ureterolithiasis, urinary obstruction, AAA, myocardial infarction, pneumonia, cancer, porphyria, DKA, medications, sickle cell, viral syndrome, zoster                                             MDM    Final diagnoses:   Abdominal pain, vomiting, and diarrhea   COVID-19       ED Disposition  ED Disposition     ED Disposition Condition Comment    Discharge Stable           Gil Chambers MD  [facility] [place] [id number] [phone]    Schedule an appointment as soon as possible for a visit in 1 week  for continued or worsened symptoms, Sooner if needed    Your gastroenterologist      As scheduled, Sooner if needed         Medication List      New Prescriptions    ondansetron ODT 4 MG disintegrating tablet  Commonly known as: Zofran ODT  Place 1 tablet on the tongue Every 6 (Six) Hours As Needed for Nausea or Vomiting for up to 20 doses.           Where to Get Your Medications      These medications were sent to [address] [facility] - [phone] [facility] [phone]   [address] [address]    Phone: [phone]   · ondansetron ODT 4 MG disintegrating tablet          Venu Mcknight MD  01/20/22 3929

## 2022-02-08 DIAGNOSIS — M54.50 CHRONIC MIDLINE LOW BACK PAIN WITHOUT SCIATICA: Primary | ICD-10-CM

## 2022-02-08 DIAGNOSIS — G89.29 CHRONIC MIDLINE LOW BACK PAIN WITHOUT SCIATICA: Primary | ICD-10-CM

## 2022-02-16 ENCOUNTER — PREP FOR SURGERY (OUTPATIENT)
Dept: SURGERY | Facility: SURGERY CENTER | Age: 52
End: 2022-02-16

## 2022-02-16 ENCOUNTER — OFFICE VISIT (OUTPATIENT)
Dept: PAIN MEDICINE | Facility: CLINIC | Age: 52
End: 2022-02-16

## 2022-02-16 VITALS
OXYGEN SATURATION: 97 % | HEART RATE: 93 BPM | HEIGHT: 70 IN | BODY MASS INDEX: 38.63 KG/M2 | WEIGHT: 269.8 LBS | DIASTOLIC BLOOD PRESSURE: 83 MMHG | SYSTOLIC BLOOD PRESSURE: 123 MMHG | RESPIRATION RATE: 20 BRPM | TEMPERATURE: 97.8 F

## 2022-02-16 DIAGNOSIS — M47.814 ARTHROPATHY OF THORACIC FACET JOINT: Primary | ICD-10-CM

## 2022-02-16 DIAGNOSIS — G89.4 CHRONIC PAIN SYNDROME: Primary | ICD-10-CM

## 2022-02-16 DIAGNOSIS — M54.50 CHRONIC MIDLINE LOW BACK PAIN WITHOUT SCIATICA: ICD-10-CM

## 2022-02-16 DIAGNOSIS — G89.29 CHRONIC MIDLINE LOW BACK PAIN WITHOUT SCIATICA: ICD-10-CM

## 2022-02-16 DIAGNOSIS — M54.6 PAIN IN THORACIC SPINE: ICD-10-CM

## 2022-02-16 DIAGNOSIS — M47.812 ARTHROPATHY OF CERVICAL FACET JOINT: ICD-10-CM

## 2022-02-16 DIAGNOSIS — R20.0 BILATERAL HAND NUMBNESS: ICD-10-CM

## 2022-02-16 DIAGNOSIS — M47.814 ARTHROPATHY OF THORACIC FACET JOINT: ICD-10-CM

## 2022-02-16 DIAGNOSIS — M54.12 CERVICAL RADICULOPATHY: ICD-10-CM

## 2022-02-16 PROCEDURE — 99214 OFFICE O/P EST MOD 30 MIN: CPT | Performed by: NURSE PRACTITIONER

## 2022-02-16 RX ORDER — GABAPENTIN 800 MG/1
TABLET ORAL
Qty: 90 TABLET | Refills: 0 | Status: SHIPPED | OUTPATIENT
Start: 2022-02-16 | End: 2022-05-17 | Stop reason: SDUPTHER

## 2022-02-16 RX ORDER — SODIUM CHLORIDE 0.9 % (FLUSH) 0.9 %
10 SYRINGE (ML) INJECTION EVERY 12 HOURS SCHEDULED
Status: CANCELLED | OUTPATIENT
Start: 2022-02-16

## 2022-02-16 RX ORDER — SODIUM CHLORIDE 0.9 % (FLUSH) 0.9 %
10 SYRINGE (ML) INJECTION AS NEEDED
Status: CANCELLED | OUTPATIENT
Start: 2022-02-16

## 2022-02-16 NOTE — PROGRESS NOTES
CHIEF COMPLAINT  FOLLOW-UP BACK AND NECK PAIN.   Pt states his pain level has stayed the same since last office visit .     Subjective   Ronni Asif is a 51 y.o. male  who presents for follow-up.  He has a history of back and neck pain.    Today his pain is 6/10VAS in severity.  He continues with Norco 5/325 0-1/day (last utilize ~1 week ago), Gabapentin 800 mg 3/day, and Methocarbamol 750mg BID.  This medication regimen is helpful to his pain.  He denies any side effects including somnolence and constipation.     He plans to start PT soon per Dr. Tripathi's recommendation.     He is having some GI issues and will be having a colonoscopy in March.     Patient has recently recovered from COVID.     Procedure list:  11/30/2021-bilateral C3-C5 MBB-minimal relief  10/7/2021-TAMEKA at C6-C7-minimal relief  7/27/2021-bilateral T8-T10 RFA-50% improvement, his pain has now started to return to baseline.   6/29/2021-bilateral T8-T10 MBB-80% relief x2 days  6/1/2021-bilateral T8-T10 MBB-80 to 90% relief x1 day    Patient remained masked during entire encounter. No cough present. I donned a mask and eye protection throughout entire visit. Prior to donning mask and eye protection, hand hygiene was performed, as well as when it was doffed.  I was closer than 6 feet, but not for an extended period of time. No obvious exposure to any bodily fluids.    Back Pain  This is a chronic problem. The current episode started more than 1 year ago. The problem occurs daily. The problem is unchanged. The pain is present in the thoracic spine and lumbar spine. The quality of the pain is described as aching. The pain does not radiate. The pain is at a severity of 6/10. The pain is severe. The symptoms are aggravated by bending, standing and twisting. Stiffness is present in the morning. Associated symptoms include abdominal pain, headaches, numbness (hands) and weakness (right hand). Pertinent negatives include no chest pain, dysuria or  fever. Risk factors include obesity. He has tried analgesics, heat, ice, NSAIDs, muscle relaxant and home exercises (Norco 5/325) for the symptoms. The treatment provided significant relief.   Neck Pain   This is a chronic problem. The current episode started more than 1 year ago. The problem occurs constantly. The problem has been gradually worsening. The pain is present in the occipital region and midline. The quality of the pain is described as burning (bilateral upper extremity tingling). The pain is at a severity of 6/10. The symptoms are aggravated by bending (lying in bed). Associated symptoms include headaches, numbness (hands) and weakness (right hand). Pertinent negatives include no chest pain or fever. He has tried ice, oral narcotics and NSAIDs (PT) for the symptoms.      PEG Assessment   What number best describes your pain on average in the past week?4  What number best describes how, during the past week, pain has interfered with your enjoyment of life?4  What number best describes how, during the past week, pain has interfered with your general activity?  4    Review of Pertinent Medical Data ---    This visit from 12/29/2020 with Dr. Tripathi reviewed.  No surgical recommendations for neck at this point.  Recommend physical therapy.  Reasonable for Dr. Gonzalez to RFA of the neck in addition to repeat PT note for the thoracic spine.  Follow-up in 6 months.    The following portions of the patient's history were reviewed and updated as appropriate: allergies, current medications, past family history, past medical history, past social history, past surgical history and problem list.    Review of Systems   Constitutional: Negative for activity change, fatigue and fever.   HENT: Negative for congestion.    Eyes: Negative for visual disturbance.   Respiratory: Negative for cough and chest tightness.    Cardiovascular: Negative for chest pain.   Gastrointestinal: Positive for abdominal pain and diarrhea.  "Negative for constipation.   Genitourinary: Negative for difficulty urinating and dysuria.   Musculoskeletal: Positive for back pain and neck pain.   Neurological: Positive for dizziness, weakness (right hand), light-headedness, numbness (hands) and headaches.   Psychiatric/Behavioral: Negative for agitation, sleep disturbance and suicidal ideas. The patient is not nervous/anxious.      --  The aforementioned information the Chief Complaint section and above subjective data including any HPI data, and also the Review of Systems data, has been personally reviewed and affirmed.  --    Vitals:    02/16/22 1050   BP: 123/83   Pulse: 93   Resp: 20   Temp: 97.8 °F (36.6 °C)   SpO2: 97%   Weight: 122 kg (269 lb 12.8 oz)   Height: 177.8 cm (70\")   PainSc:   6   PainLoc: Back  Comment: NECK     Objective   Physical Exam  Vitals and nursing note reviewed.   Constitutional:       Appearance: Normal appearance. He is well-developed.   Eyes:      General: Lids are normal.   Cardiovascular:      Rate and Rhythm: Normal rate.   Pulmonary:      Effort: Pulmonary effort is normal.   Musculoskeletal:      Cervical back: Tenderness and bony tenderness present. Decreased range of motion.      Thoracic back: Tenderness and bony tenderness present. Decreased range of motion.   Neurological:      Mental Status: He is alert and oriented to person, place, and time.   Psychiatric:         Attention and Perception: Attention normal.         Mood and Affect: Mood normal.         Speech: Speech normal.         Behavior: Behavior normal.         Judgment: Judgment normal.       Assessment/Plan   Diagnoses and all orders for this visit:    1. Chronic pain syndrome (Primary)    2. Cervical radiculopathy    3. Pain in thoracic spine    4. Chronic midline low back pain without sciatica    5. Bilateral hand numbness    6. Arthropathy of cervical facet joint    7. Arthropathy of thoracic facet joint      --- Bilateral T8-T10 RFA   Reviewed the " procedure at length with the patient.  Included in the review was expectations, complications, risk and benefits.The procedure was described in detail and the risks, benefits and alternatives were discussed with the patient (including but not limited to: bleeding, infection, nerve damage, worsening of pain, inability to perform injection, paralysis, seizures, coma, no pain relief and death) who agreed to proceed.  Discussed the potential for sedation if warranted/wanted.  The procedure will plan to be performed at [facility] with fluoroscopic guidance(unless ultrasound is indicated) and could potentially have steroids and contrast dye used. Questions were answered and in a way the patient could understand.  Patient verbalized understanding and wishes to proceed.  This intervention will be ordered.  Discussed with patient that all procedures are part of a multimodal plan of care and include either formal PT or a home exercise program.  Patient has no evidence of coagulopathy or current infection.    Discussed with the patient that sedation is optional for this procedure.  The sedation offered is called conscious sedation which is different from general anesthesia that is utilized in surgical procedures. The dosing of the sedation is determined by the physician and they will be monitored throughout the procedure. With conscious sedation it is possible to remember parts or all of the procedure, this is normal. They will need to have a  with them as driving is prohibited following conscious sedation.     NPO instructions for conscious sedation:  --- Do not eat 6 hours prior to the procedure.   --- Do not drink any dairy or citrus 4 hours prior to the procedure.   --- Do not drink anything, including clear liquids, 2 hours prior to procedure.     If the NPO instructions are not followed then the procedure may be performed without sedation or the procedure will need to be rescheduled.     ---  Consider Bilateral C5-C7 MBB in the future.   --- The urine drug screen confirmation from 1/4/2022 has been reviewed and the result is appropriate based on patient history and ROSI report  --- CSA updated 5/26/2021  --- Continue Norco 5/325. No refill needed. Patient appears stable with current regimen. No adverse effects. Regarding continuation of opioids, there is no evidence of aberrant behavior or any red flags.  The patient continues with appropriate response to opioid therapy. ADL's remain intact by self.   --- Proceed with previously ordered EMG of upper extremities.   --- Follow-up after procedure     ROSI REPORT  As part of the patient's treatment plan, I am prescribing controlled substances. The patient has been made aware of appropriate use of such medications, including potential risk of somnolence, limited ability to drive and/or work safely, and the potential for dependence or overdose. It has also bee made clear that these medications are for use by this patient only, without concomitant use of alcohol or other substances unless prescribed.     Patient has completed prescribing agreement detailing terms of continued prescribing of controlled substances, including monitoring ROSI reports, urine drug screening, and pill counts if necessary. The patient is aware that inappropriate use will results in cessation of prescribing such medications.    As the clinician, I personally reviewed the ROSI from 2/16/2022 while the patient was in the office today.    History and physical exam exhibit continued safe and appropriate use of controlled substances.    Dictated utilizing Dragon dictation.     This document is intended for medical expert use only. Reading of this document by patients and/or patient's family without participating medical staff guidance may result in misinterpretation and unintended morbidity.   Any interpretation of such data is the responsibility of the patient and/or family member  responsible for the patient in concert with their primary or specialist providers, not to be left for sources of online searches such as PANOSOL, Google or similar queries. Relying on these approaches to knowledge may result in misinterpretation, misguided goals of care and even death should patients or family members try recommendations outside of the realm of professional medical care in a supervised way.

## 2022-02-16 NOTE — PATIENT INSTRUCTIONS
--- Do not eat 6 hours prior to the procedure.   --- Do not drink any dairy or citrus 4 hours prior to the procedure.   --- Do not drink anything, including clear liquids, 2 hours prior to procedure.

## 2022-02-23 ENCOUNTER — TREATMENT (OUTPATIENT)
Dept: PHYSICAL THERAPY | Facility: CLINIC | Age: 52
End: 2022-02-23

## 2022-02-23 DIAGNOSIS — R68.89 DECREASED ACTIVITY TOLERANCE: ICD-10-CM

## 2022-02-23 DIAGNOSIS — M50.322 DEGENERATION OF INTERVERTEBRAL DISC AT C5-C6 LEVEL: ICD-10-CM

## 2022-02-23 DIAGNOSIS — M54.12 CERVICAL RADICULOPATHY: Primary | ICD-10-CM

## 2022-02-23 DIAGNOSIS — M54.2 PAIN, NECK: ICD-10-CM

## 2022-02-23 DIAGNOSIS — M54.2 NECK PAIN: Primary | ICD-10-CM

## 2022-02-23 PROCEDURE — 97162 PT EVAL MOD COMPLEX 30 MIN: CPT | Performed by: PHYSICAL THERAPIST

## 2022-02-23 NOTE — PROGRESS NOTES
Physical Therapy Initial Evaluation and Plan of Care    Patient: Ronni Asif   : 1970  Diagnosis/ICD-10 Code:  Cervical radiculopathy [M54.12]  Referring practitioner: Jake Tripathi MD    Subjective Evaluation    History of Present Illness  Onset date: per chart review neck pain started -but had PT 2021.  Mechanism of injury: Pt is a 50 y/o WM who reports to the clinic with c/o neck pain for several years, but it has progressively gotten worse.  Per chart review pt was receiving PT in 2021, held PT due to symptoms not improving.  Pt had MRI showing bulging disc and narrowing at multiple levels-pt followed up with MD on  and he recommended pain management and PT prior to doing surgery.  Pt had to delay doing PT from getting Covid in 2022.  Pt is currently still seeing pain management for neck and back pain.  Pt had an ablation done in 2021 at [facility], but reports not getting much relief.  MD increased his Gabapentin to help with the pain.  Pt also has had several ablations done for his thoracic pain.  Pt was having a lot of HAs and his PCP and pain management thought the HAs could be caused from the neck pain.  Pt states he is taking 2 tylenol and 3 ibuprofen for the HAs.  Pt is seeing a gastric MD for the stomach pain and he had a CT performed of his abdomin-it was negative.  Pt has a EMG scheduled for March for his neck and mid thoracic pain.  Pt's c/o is having muscle soreness and pain with turning his head to the right especially when backing out his car.  Pt has tingling patches in back intermittently-numbness back and both hands into his 4th and 5th digit, but it has not increased since his last visit.  Pt reports falling on the ice and landed on his left shoulder, but no increase in symptoms since the fall.          Subjective comment: Pt does report the two times he did the C-Tx it increased his pain and HAs the next day after doing it.  Pt reports having increased  N/T into his hands.    Patient Occupation: retired    Precautions and Work Restrictions: Dexcom-monitors blood sugar Quality of life: good    Pain  Current pain ratin  At worst pain ratin  Location: right mid lower cervical spine   Quality: sharp and dull ache (popping, tingling for 2-3 minutes then repositions at night the the tingling improves )  Relieving factors: heat and medications (hot showers on neck, )  Aggravating factors: sleeping, movement and lifting (cooking, typing and having to hold the arms up )    Hand dominance: right    Diagnostic Tests  MRI studies: abnormal (Fall -multiple bugles and narrowing )  CT scan: normal (head CT for HAs and blurred vision 2022)    Treatments  Previous treatment: physical therapy, injection treatment, medication and massage (dry needling )  Current treatment: injection treatment and medication  Patient Goals  Patient goals for therapy: decreased pain, increased motion and increased strength  Patient goal: go to the gym and lifting with arms and legs, started back last week, flows up in 6 months            Objective          Postural Observations  Seated posture: fair    Additional Postural Observation Details  Forward head, rounded shoulder posture     Palpation   Left   Hypertonic in the levator scapulae, scalenes, sternocleidomastoid and upper trapezius.   Tenderness of the cervical paraspinals, levator scapulae, middle trapezius, rhomboids, scalenes, sternocleidomastoid and upper trapezius.     Right   Hypertonic in the scalenes, sternocleidomastoid and suboccipitals. Tenderness of the cervical paraspinals, scalenes, sternocleidomastoid, suboccipitals and upper trapezius.     Additional Palpation Details  Mild B cervical tightness-scalenes, left UT, and B SCM   Moderate tenderness B cervical PS and right suboccipitals     Tenderness   Cervical Spine   Tenderness in the spinous process.     Additional Tenderness Details  C4-C5-C6-C7 spinous process      Neurological Testing     Sensation   Cervical/Thoracic   Left   Intact: light touch    Right   Intact: light touch    Reflexes   Left   Biceps (C5/C6): trace (1+)  Brachioradialis (C6): trace (1+)  Triceps (C7): trace (1+)    Right   Biceps (C5/C6): absent (0)  Brachioradialis (C6): trace (1+)  Triceps (C7): trace (1+)    Active Range of Motion   Cervical/Thoracic Spine   Cervical    Flexion: 25 degrees   Extension: 30 degrees   Left lateral flexion: 34 degrees   Right lateral flexion: 22 degrees with pain  Left rotation: 62 degrees with pain  Right rotation: 57 degrees with pain    Strength/Myotome Testing     Left Shoulder     Planes of Motion   External rotation at 0°: 4+   Internal rotation at 0°: 4     Isolated Muscles   Upper trapezius: 5     Right Shoulder     Planes of Motion   External rotation at 0°: 4+   Internal rotation at 0°: 4     Isolated Muscles   Upper trapezius: 5     Left Elbow   Flexion: 5  Extension: 4    Right Elbow   Flexion: 5  Extension: 4    Left Wrist/Hand   Wrist extension: 5    Right Wrist/Hand   Wrist extension: 4    Tests   Cervical     Left   Positive active compression (Sully) and Spurling's sign.   Negative alar ligament integrity and cervical distraction.     Right   Positive active compression (Sully) and Spurling's sign.   Negative alar ligament integrity and cervical distraction.     Additional Tests Details  Negative vertebral artery test B  Positive Spurlings- position to touch spine on both sides           Assessment & Plan     Assessment  Impairments: abnormal or restricted ROM, activity intolerance, impaired physical strength, lacks appropriate home exercise program and pain with function  Functional Limitations: carrying objects, lifting, sleeping, pulling, uncomfortable because of pain, moving in bed, reaching overhead and unable to perform repetitive tasks  Assessment details: Ronni JANEEN Asif is a 51 y.o. year-old male referred to physical therapy for neck  pain. He presents with limited cervical ROM, arm weakness, muscle tightness, decreased Upper limb reflexes, B UE and neck tenderness, and decreased functional use of his arms due to increased neck pain and UE numbness/tingling.  Patient can benefit from 4 visit of PT for instruction in a HEP for self management of his symptoms since pt had PT from Sept 2021-11/2021 with limited improvement.  Pt continues to do some of the exercises, but pt reports he may have to have surgery, so he does not want to use all of his PT visits.   During evaluation, pt educated on anatomy and course of treatment.      Prognosis: fair    Goals  Plan Goals: Plan Goals: STG 2-4 weeks:  1.  Decrease tightness and tenderness of left UT and levator, B SCM and scalenes to min by palpation.  2.  Increase cervical AROM to 30 degrees on R.  3.  Independent and compliant with HEP.  4.  Increase B shoulder IR and triceps strength to 5/5.  5. Pt will improve neck index score to 10.        Plan  Therapy options: will be seen for skilled therapy services  Planned modality interventions: cryotherapy, thermotherapy (hydrocollator packs), TENS, ultrasound and dry needling  Other planned modality interventions: KT taping   Planned therapy interventions: body mechanics training, flexibility, functional ROM exercises, home exercise program, joint mobilization, manual therapy, postural training, soft tissue mobilization, spinal/joint mobilization, strengthening, stretching, therapeutic activities and neuromuscular re-education  Frequency: 1x week  Duration in visits: 4  Duration in weeks: 4  Treatment plan discussed with: patient        Manual Therapy:         mins  50385;  Therapeutic Exercise:         mins  22122;     Neuromuscular Kamini:        mins  26448;    Therapeutic Activity:          mins  35660;     Gait Training:           mins  91095;     Ultrasound:          mins  85925;    Electrical Stimulation:         mins  10036 ( );   Traction                      ___  mins 53976     Timed Treatment:      mins   Total Treatment:     32   mins    PT SIGNATURE: Jigna Ahuja, PT [place] # [id number]  DATE TREATMENT INITIATED: 2/23/2022  Electronically signed 2/23/2022    Medicare Initial Certification  Certification Period: 2/23/2022 thru 5/23/2022  I certify that the therapy services are furnished while this patient is under my care.  The services outlined above are required by this patient, and will be reviewed every 90 days.     _______________________________________________________________________  PHYSICIAN: Jake Tripathi MD      DATE:     Please sign and return via fax to [phone].. Thank you, [facility].

## 2022-03-03 ENCOUNTER — HOSPITAL ENCOUNTER (OUTPATIENT)
Dept: INFUSION THERAPY | Facility: HOSPITAL | Age: 52
Discharge: HOME | End: 2022-03-03
Admitting: PSYCHIATRY & NEUROLOGY

## 2022-03-03 DIAGNOSIS — R20.0 BILATERAL HAND NUMBNESS: ICD-10-CM

## 2022-03-03 PROCEDURE — 95886 MUSC TEST DONE W/N TEST COMP: CPT | Performed by: PSYCHIATRY & NEUROLOGY

## 2022-03-03 PROCEDURE — 95911 NRV CNDJ TEST 9-10 STUDIES: CPT | Performed by: PSYCHIATRY & NEUROLOGY

## 2022-03-03 PROCEDURE — 95911 NRV CNDJ TEST 9-10 STUDIES: CPT

## 2022-03-03 PROCEDURE — 95886 MUSC TEST DONE W/N TEST COMP: CPT

## 2022-03-03 NOTE — PROCEDURES
EMG and Nerve Conduction Studies    I.      Instrument used: Neuromax 1002  II.     Please see data sheets for tabular summary of NCS and details on methods, temperatures and lab standards.   III.    EMG muscles tested for upper extremity studies include the deltoid, biceps, triceps, pronator teres, extensor digitorum communis, first dorsal interosseous and abductor pollicis brevis.    IV.   EMG muscles tested for lower extremity studies include the vastus lateralis, tibialis anterior, peroneus longus, medial gastrocnemius and extensor digitorum brevis.    V.    Additional muscles tested as needed.  Paraspinal muscles tested as needed.   VI.   Please see data sheets for tabular summary of EMG findings.   VII. The complete report includes the data sheets.      Indication: Numbness and tingling in the hands and mostly last 2 digits.  History: 51-year-old male with diabetes who has numbness and tingling in the hands mostly the last 2 digits.  He also has chronic neck pain and degenerative disc disease on MRI of the cervical spine.      Ht: 177.8 cm  Wt: 122 kg; BMI 38.71  HbA1C:   Lab Results   Component Value Date    HGBA1C 5.6 11/18/2021     TSH:   Lab Results   Component Value Date    TSH 1.200 10/28/2021       Technical summary:  Nerve conduction studies were obtained in both arms.  Skin temperatures were at least 32 °C measured on the palms.  Needle examination was obtained on selected muscles in both arms.    Results:  1.  Normal median antidromic sensory distal latencies and amplitudes bilaterally.  2.  Normal median orthodromic palmar sensory distal latencies and amplitudes bilaterally.  (The orthodromic palmar sensory studies were done since carpal tunnel syndrome was on the list of differential and the antidromic sensory studies were normal.)  3.  Normal ulnar antidromic sensory distal latencies and amplitudes bilaterally.  4.  Mildly prolonged left median motor distal latency at 4.4 ms with normal conduction  velocity and amplitudes.  Normal right median motor distal latency at 4.3 ms with normal conduction velocity and amplitudes.  5.  Normal ulnar motor conduction velocities, distal latencies and amplitudes bilaterally.  6.  Needle examination of selected muscles in both arms showed normal insertional activities throughout.  There were normal motor units and recruitment patterns throughout.    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  There was no evidence of an ulnar neuropathy or cervical radiculopathy on either side by this study.  Study results were discussed with the patient.    Mckinley Ambrocio M.D.              Dictated utilizing Dragon dictation.

## 2022-03-14 ENCOUNTER — TELEPHONE (OUTPATIENT)
Dept: PAIN MEDICINE | Facility: CLINIC | Age: 52
End: 2022-03-14

## 2022-03-14 RX ORDER — MELOXICAM 7.5 MG/1
TABLET ORAL
Qty: 30 TABLET | Refills: 2 | Status: ON HOLD | OUTPATIENT
Start: 2022-03-14 | End: 2022-06-07

## 2022-03-14 NOTE — TELEPHONE ENCOUNTER
Pt wants to know if he needs to continue taking  Meloxicam ? States its been a while since he has taken med . Was not sure if he needs to continue this regimen. Please advice , Thanks .

## 2022-03-14 NOTE — PROGRESS NOTES
EMG shows mild left median neuropathy at the wrist.  No evidence of his numbness and tingling in his hands coming from his cervical spine.

## 2022-03-16 ENCOUNTER — TREATMENT (OUTPATIENT)
Dept: PHYSICAL THERAPY | Facility: CLINIC | Age: 52
End: 2022-03-16

## 2022-03-16 DIAGNOSIS — M54.12 CERVICAL RADICULOPATHY: Primary | ICD-10-CM

## 2022-03-16 DIAGNOSIS — M47.814 ARTHROPATHY OF THORACIC FACET JOINT: ICD-10-CM

## 2022-03-16 DIAGNOSIS — R68.89 DECREASED ACTIVITY TOLERANCE: ICD-10-CM

## 2022-03-16 DIAGNOSIS — M50.322 DEGENERATION OF INTERVERTEBRAL DISC AT C5-C6 LEVEL: ICD-10-CM

## 2022-03-16 DIAGNOSIS — M54.6 PAIN IN THORACIC SPINE: ICD-10-CM

## 2022-03-16 DIAGNOSIS — M54.2 PAIN, NECK: ICD-10-CM

## 2022-03-16 PROCEDURE — 97035 APP MDLTY 1+ULTRASOUND EA 15: CPT | Performed by: PHYSICAL THERAPIST

## 2022-03-16 PROCEDURE — 97110 THERAPEUTIC EXERCISES: CPT | Performed by: PHYSICAL THERAPIST

## 2022-03-16 NOTE — PROGRESS NOTES
Physical Therapy Daily Progress Note    Visit # : 4  Ronni Meinaseemzoe reports: pt states he is having more tingling, neck pain and headaches.      Subjective     Objective   See Exercise, Manual, and Modality Logs for complete treatment.     Mild palpable B cervical PS, UT and levator tightness     Assessment & Plan     Assessment    Assessment details: Pt continues to c/o B UE N/T and neck pain.  Pt with palpable tightness in B cervical musculature, so added US treatment post exercises to improve pt's flexibility.  Pt reported decreased pain and tightness after US treatment.  Pt tolerated all stretches and scapular strengthening exercises.  Will continue to see pt once per week for stretching, stabilization exercises and modalities PRN for pain.           Progress per Plan of Care           Manual Therapy:         mins  99970;  Therapeutic Exercise:   22      mins  58838;     Neuromuscular Kamini:        mins  07807;    Therapeutic Activity:          mins  27232;     Gait Training:           mins  85972;     Ultrasound:     8     mins  79424;    Electrical Stimulation:         mins  42010 ( );  Dry Needling          mins self-pay    Timed Treatment:  30    mins   Total Treatment:     50   mins    Jigna Ahuja, PT  Physical Therapist

## 2022-03-24 ENCOUNTER — TREATMENT (OUTPATIENT)
Dept: PHYSICAL THERAPY | Facility: CLINIC | Age: 52
End: 2022-03-24

## 2022-03-24 DIAGNOSIS — M54.2 PAIN, NECK: ICD-10-CM

## 2022-03-24 DIAGNOSIS — R68.89 DECREASED ACTIVITY TOLERANCE: ICD-10-CM

## 2022-03-24 DIAGNOSIS — M54.12 CERVICAL RADICULOPATHY: Primary | ICD-10-CM

## 2022-03-24 DIAGNOSIS — M50.322 DEGENERATION OF INTERVERTEBRAL DISC AT C5-C6 LEVEL: ICD-10-CM

## 2022-03-24 PROCEDURE — 97035 APP MDLTY 1+ULTRASOUND EA 15: CPT | Performed by: PHYSICAL THERAPIST

## 2022-03-24 PROCEDURE — 97110 THERAPEUTIC EXERCISES: CPT | Performed by: PHYSICAL THERAPIST

## 2022-03-24 PROCEDURE — 97530 THERAPEUTIC ACTIVITIES: CPT | Performed by: PHYSICAL THERAPIST

## 2022-03-24 NOTE — PROGRESS NOTES
Physical Therapy Daily Progress Note/Reassessment     Visit # : 5  Ronni Asif reports: his neck is not much better.  He still has sleepiness, HAs, and tingling into his shoulders, middle of his back, and into his hands.  Sees Dr. Tripathi on 6/27 and Dr. Mclean for CTS 4/18.  Trying to get an ablation performed.    Subjective     Objective          Palpation   Left   Hypertonic in the scalenes and sternocleidomastoid.   Tenderness of the scalenes and sternocleidomastoid.     Right   Hypertonic in the scalenes and sternocleidomastoid. Tenderness of the scalenes and sternocleidomastoid.     Additional Palpation Details  Pt with moderate B palpable tightness in B scalenes and SCM     Active Range of Motion   Cervical/Thoracic Spine   Cervical    Flexion: 27 degrees   Extension: 30 degrees   Left lateral flexion: 24 degrees   Right lateral flexion: 22 degrees   Left rotation: 49 degrees   Right rotation: 34 degrees       See Exercise, Manual, and Modality Logs for complete treatment.       Assessment & Plan     Assessment    Assessment details: Pt is not making progress with PT, but has only seen pt for treatment for two visits this episode of care due to pt wanting to be conservative with his limited visits.  Attempted C-Tx during his previous PT treatments in the fall 2021 and pt did not respond well, so pt did not want to attempt traction this episode of care.  Pt has performed the same stretches and strengthening exercises as before, so reviewed exercises and issued pt a copy of his HEP.  Pt tolerates and reports less tightness after US treatments, so advised pt to use his heating pad then perform exercises for improved flexibility.  Feel pt can continue to manage his symptoms with his home stretches at this time since there is limited PT treatment options at this time.  Placing pt on hold at this time until pt goes back to MD.        Goals  Plan Goals: Plan Goals: STG 2-4 weeks:  1.  Decrease tightness  and tenderness of left UT and levator, B SCM and scalenes to min by palpation.  NOT MET   2.  Increase cervical AROM to 30 degrees on R. NOT MET   3.  Independent and compliant with HEP.  MET   4.  Increase B shoulder IR and triceps strength to 5/5.  NA   5. Pt will improve neck index score to 10.  NOT MET     Other         Manual Therapy:         mins  51921;  Therapeutic Exercise:   20      mins  62727;     Neuromuscular Kamini:        mins  01748;    Therapeutic Activity:   10      mins  76241;     Gait Training:           mins  89380;     Ultrasound:    8      mins  50317;    Electrical Stimulation:         mins  82864 ( );  Dry Needling          mins self-pay    Timed Treatment:   38   mins   Total Treatment:     49   mins    Jigna Ahuja, PT  Physical Therapist

## 2022-04-11 RX ORDER — METHOCARBAMOL 750 MG/1
750 TABLET, FILM COATED ORAL 2 TIMES DAILY PRN
Qty: 60 TABLET | Refills: 0 | Status: SHIPPED | OUTPATIENT
Start: 2022-04-11 | End: 2022-05-17

## 2022-04-11 RX ORDER — HYDROCODONE BITARTRATE AND ACETAMINOPHEN 5; 325 MG/1; MG/1
1 TABLET ORAL DAILY PRN
Qty: 30 TABLET | Refills: 0 | Status: SHIPPED | OUTPATIENT
Start: 2022-04-11 | End: 2022-05-17 | Stop reason: SDUPTHER

## 2022-05-17 ENCOUNTER — PREP FOR SURGERY (OUTPATIENT)
Dept: SURGERY | Facility: SURGERY CENTER | Age: 52
End: 2022-05-17

## 2022-05-17 ENCOUNTER — OFFICE VISIT (OUTPATIENT)
Dept: PAIN MEDICINE | Facility: CLINIC | Age: 52
End: 2022-05-17

## 2022-05-17 VITALS
RESPIRATION RATE: 12 BRPM | SYSTOLIC BLOOD PRESSURE: 117 MMHG | HEIGHT: 70 IN | BODY MASS INDEX: 39.14 KG/M2 | HEART RATE: 100 BPM | WEIGHT: 273.4 LBS | DIASTOLIC BLOOD PRESSURE: 77 MMHG | OXYGEN SATURATION: 98 % | TEMPERATURE: 97.5 F

## 2022-05-17 DIAGNOSIS — M54.12 CERVICAL RADICULOPATHY: ICD-10-CM

## 2022-05-17 DIAGNOSIS — M47.812 ARTHROPATHY OF CERVICAL FACET JOINT: ICD-10-CM

## 2022-05-17 DIAGNOSIS — M54.6 PAIN IN THORACIC SPINE: ICD-10-CM

## 2022-05-17 DIAGNOSIS — R20.0 BILATERAL HAND NUMBNESS: ICD-10-CM

## 2022-05-17 DIAGNOSIS — G89.4 CHRONIC PAIN SYNDROME: Primary | ICD-10-CM

## 2022-05-17 DIAGNOSIS — G89.29 CHRONIC MIDLINE LOW BACK PAIN WITHOUT SCIATICA: ICD-10-CM

## 2022-05-17 DIAGNOSIS — M54.12 CERVICAL RADICULOPATHY: Primary | ICD-10-CM

## 2022-05-17 DIAGNOSIS — M54.50 CHRONIC MIDLINE LOW BACK PAIN WITHOUT SCIATICA: ICD-10-CM

## 2022-05-17 PROCEDURE — 99214 OFFICE O/P EST MOD 30 MIN: CPT | Performed by: NURSE PRACTITIONER

## 2022-05-17 RX ORDER — SODIUM CHLORIDE 0.9 % (FLUSH) 0.9 %
10 SYRINGE (ML) INJECTION AS NEEDED
Status: CANCELLED | OUTPATIENT
Start: 2022-05-17

## 2022-05-17 RX ORDER — METHOCARBAMOL 750 MG/1
750 TABLET, FILM COATED ORAL 2 TIMES DAILY PRN
Qty: 60 TABLET | Refills: 0 | Status: SHIPPED | OUTPATIENT
Start: 2022-05-17 | End: 2022-06-28 | Stop reason: SDUPTHER

## 2022-05-17 RX ORDER — GABAPENTIN 800 MG/1
800 TABLET ORAL 3 TIMES DAILY
Qty: 90 TABLET | Refills: 0 | Status: SHIPPED | OUTPATIENT
Start: 2022-05-17 | End: 2022-06-20

## 2022-05-17 RX ORDER — SODIUM CHLORIDE 0.9 % (FLUSH) 0.9 %
10 SYRINGE (ML) INJECTION EVERY 12 HOURS SCHEDULED
Status: CANCELLED | OUTPATIENT
Start: 2022-05-17

## 2022-05-17 RX ORDER — DICYCLOMINE HYDROCHLORIDE 10 MG/1
10 CAPSULE ORAL 4 TIMES DAILY
COMMUNITY
Start: 2022-05-12 | End: 2022-06-03 | Stop reason: SDUPTHER

## 2022-05-17 RX ORDER — HYDROCODONE BITARTRATE AND ACETAMINOPHEN 5; 325 MG/1; MG/1
1 TABLET ORAL DAILY PRN
Qty: 30 TABLET | Refills: 0 | Status: SHIPPED | OUTPATIENT
Start: 2022-05-17 | End: 2022-08-17 | Stop reason: SDUPTHER

## 2022-05-17 NOTE — PROGRESS NOTES
"CHIEF COMPLAINT  FOLLOW UP LUMBAR/CERVICAL PAIN  3 Month F/U VISIT- Patient in office reports his pain has gotten worse over the last 2 months and has also stopped PT due to increased pain . Wants to discuss on getting a cervical MRI and move forward with another cervical RFA .    Subjective   Ronni Asif is a 52 y.o. male  who presents for follow-up.  He has a history of back and neck pain.  Today his pain is 4/10VAS in severity. He continues with Norco 5/325 0-1/day, Gabapentin 800 mg 3/day, and Methocarbamol 750mg 0-2/day. This medication regimen decreases his pain by 30-40%. \"It takes the edge off but it doesn't take it away\". He states that his medication can make him feel \"loopy\" at times, he denies any somnolence or constipation and does not drive when he has taken his muscle relaxer or the norco.     He stopped PT because this worsened his pain.     Discussed that with regards to his neck pain he completed a TAMEKA which only provided minimal relief. He also completed a Cervical MBB from C3-C5 with only minimal relief. At his last office visit the plan was to repeat his Thoracic RFA but this was unfortunately denied by insurance. Discussed that we can move forward with another Cervical MBB with adjusted levels with the goal of diagnostic benefit.     Procedure list:  11/30/2021-bilateral C3-C5 MBB-minimal relief  10/7/2021-TAMEKA at C6-C7-minimal relief  7/27/2021-bilateral T8-T10 RFA-50% improvement, his pain has now started to return to baseline.   6/29/2021-bilateral T8-T10 MBB-80% relief x2 days  6/1/2021-bilateral T8-T10 MBB-80 to 90% relief x1 day    Patient remained masked during entire encounter. No cough present. I donned a mask and eye protection throughout entire visit. Prior to donning mask and eye protection, hand hygiene was performed, as well as when it was doffed.  I was closer than 6 feet, but not for an extended period of time. No obvious exposure to any bodily fluids.    Back " Pain  This is a chronic problem. The current episode started more than 1 year ago. The problem occurs daily. The problem is unchanged. The pain is present in the thoracic spine and lumbar spine. The quality of the pain is described as aching. The pain does not radiate. The pain is at a severity of 4/10. The pain is severe. The symptoms are aggravated by bending, standing and twisting. Stiffness is present in the morning. Associated symptoms include abdominal pain, headaches, numbness (HANDS) and weakness (HANDS). Pertinent negatives include no chest pain, dysuria or fever. Risk factors include obesity. He has tried analgesics, heat, ice, NSAIDs, muscle relaxant and home exercises (Norco 5/325) for the symptoms. The treatment provided significant relief.   Neck Pain   This is a chronic problem. The current episode started more than 1 year ago. The problem occurs constantly. The problem has been gradually worsening. The pain is present in the occipital region and midline. The quality of the pain is described as burning (bilateral upper extremity tingling). The pain is at a severity of 4/10. The symptoms are aggravated by bending (lying in bed). Associated symptoms include headaches, numbness (HANDS) and weakness (HANDS). Pertinent negatives include no chest pain or fever. He has tried ice, oral narcotics and NSAIDs (PT) for the symptoms.      PEG Assessment   What number best describes your pain on average in the past week?8  What number best describes how, during the past week, pain has interfered with your enjoyment of life?8  What number best describes how, during the past week, pain has interfered with your general activity?  8    The following portions of the patient's history were reviewed and updated as appropriate: allergies, current medications, past family history, past medical history, past social history, past surgical history and problem list.    Review of Systems   Constitutional: Positive for activity  "change (INCREASED). Negative for fatigue and fever.   HENT: Negative for congestion.    Eyes: Negative for visual disturbance.   Respiratory: Negative for cough and chest tightness.    Cardiovascular: Negative for chest pain.   Gastrointestinal: Positive for abdominal pain. Negative for constipation and diarrhea.   Genitourinary: Negative for difficulty urinating and dysuria.   Musculoskeletal: Positive for back pain and neck pain.   Neurological: Positive for weakness (HANDS), numbness (HANDS) and headaches. Negative for dizziness and light-headedness.   Psychiatric/Behavioral: Positive for sleep disturbance. Negative for agitation and suicidal ideas. The patient is not nervous/anxious.      --  The aforementioned information the Chief Complaint section and above subjective data including any HPI data, and also the Review of Systems data, has been personally reviewed and affirmed.  --    Vitals:    05/17/22 1401   BP: 117/77   BP Location: Left arm   Patient Position: Sitting   Pulse: 100   Resp: 12   Temp: 97.5 °F (36.4 °C)   SpO2: 98%   Weight: 124 kg (273 lb 6.4 oz)   Height: 177.8 cm (70\")   PainSc:   4   PainLoc: Back  Comment: NECK     Objective   Physical Exam  Vitals and nursing note reviewed.   Constitutional:       Appearance: Normal appearance. He is well-developed.   Eyes:      General: Lids are normal.   Cardiovascular:      Rate and Rhythm: Normal rate.   Pulmonary:      Effort: Pulmonary effort is normal.   Musculoskeletal:      Cervical back: Tenderness and bony tenderness present. Decreased range of motion.      Thoracic back: Tenderness present. Decreased range of motion.      Comments: +Cervical loading maneuver   Neurological:      Mental Status: He is alert and oriented to person, place, and time.   Psychiatric:         Attention and Perception: Attention normal.         Mood and Affect: Mood normal.         Speech: Speech normal.         Behavior: Behavior normal.         Judgment: Judgment " normal.       Assessment & Plan   Diagnoses and all orders for this visit:    1. Chronic pain syndrome (Primary)    2. Cervical radiculopathy    3. Pain in thoracic spine    4. Chronic midline low back pain without sciatica    5. Bilateral hand numbness    6. Arthropathy of cervical facet joint    Other orders  -     HYDROcodone-acetaminophen (NORCO) 5-325 MG per tablet; Take 1 tablet by mouth Daily As Needed for Severe Pain .  Dispense: 30 tablet; Refill: 0  -     methocarbamol (ROBAXIN) 750 MG tablet; Take 1 tablet by mouth 2 (Two) Times a Day As Needed for Muscle Spasms.  Dispense: 60 tablet; Refill: 0  -     gabapentin (NEURONTIN) 800 MG tablet; Take 1 tablet by mouth 3 (Three) Times a Day.  Dispense: 90 tablet; Refill: 0      --- Unfortunately his Thoracic RFA was denied and we are still waiting on insurance appeal from this.   ---Proceed with Cervical MBB at adjusted levels of C5-C7. If diagnostically positive will repeat x 1 with goal of proceeding with RFA  Reviewed the procedure at length with the patient.  Included in the review was expectations, complications, risk and benefits.The procedure was described in detail and the risks, benefits and alternatives were discussed with the patient (including but not limited to: bleeding, infection, nerve damage, worsening of pain, inability to perform injection, paralysis, seizures, coma, no pain relief and death) who agreed to proceed.  Discussed the potential for sedation if warranted/wanted.  The procedure will plan to be performed at [facility] with fluoroscopic guidance(unless ultrasound is indicated) and could potentially have steroids and contrast dye used. Questions were answered and in a way the patient could understand.  Patient verbalized understanding and wishes to proceed.  This intervention will be ordered.  Discussed with patient that all procedures are part of a multimodal plan of care and include either formal PT or a home  exercise program.  Patient has no evidence of coagulopathy or current infection.    --- The urine drug screen confirmation from 1/4/2022 has been reviewed and the result is appropriate based on patient history and ROSI report  --- CSA updated 5/26/2021  --- Refill [place] 5/325. Patient appears stable with current regimen. No adverse effects. Regarding continuation of opioids, there is no evidence of aberrant behavior or any red flags.  The patient continues with appropriate response to opioid therapy. ADL's remain intact by self.   --- Refill Robaxin and Gabapentin.  --- Follow-up after procedure.      ROSI REPORT  As part of the patient's treatment plan, I am prescribing controlled substances. The patient has been made aware of appropriate use of such medications, including potential risk of somnolence, limited ability to drive and/or work safely, and the potential for dependence or overdose. It has also been made clear that these medications are for use by this patient only, without concomitant use of alcohol or other substances unless prescribed.     Patient has completed prescribing agreement detailing terms of continued prescribing of controlled substances, including monitoring ROSI reports, urine drug screening, and pill counts if necessary. The patient is aware that inappropriate use will results in cessation of prescribing such medications.    As the clinician, I personally reviewed the ROSI from 5/17/2022 while the patient was in the office today.    History and physical exam exhibit continued safe and appropriate use of controlled substances.    Dictated utilizing Dragon dictation.     This document is intended for medical expert use only. Reading of this document by patients and/or patient's family without participating medical staff guidance may result in misinterpretation and unintended morbidity.   Any interpretation of such data is the responsibility of the patient and/or family member  responsible for the patient in concert with their primary or specialist providers, not to be left for sources of online searches such as [employer], [employer] or similar queries. Relying on these approaches to knowledge may result in misinterpretation, misguided goals of care and even death should patients or family members try recommendations outside of the realm of professional medical care in a supervised way.

## 2022-05-18 ENCOUNTER — TRANSCRIBE ORDERS (OUTPATIENT)
Dept: SURGERY | Facility: SURGERY CENTER | Age: 52
End: 2022-05-18

## 2022-05-18 DIAGNOSIS — M54.12 CERVICAL RADICULOPATHY: Primary | ICD-10-CM

## 2022-05-19 ENCOUNTER — PRIOR AUTHORIZATION (OUTPATIENT)
Dept: PAIN MEDICINE | Facility: CLINIC | Age: 52
End: 2022-05-19

## 2022-06-03 RX ORDER — COVID-19 MOLECULAR TEST ASSAY
KIT MISCELLANEOUS
COMMUNITY
Start: 2022-03-23

## 2022-06-03 RX ORDER — DICYCLOMINE HYDROCHLORIDE 10 MG/1
10 CAPSULE ORAL 4 TIMES DAILY
COMMUNITY
Start: 2022-05-12 | End: 2022-07-11

## 2022-06-03 RX ORDER — TESTOSTERONE CYPIONATE 200 MG/ML
0.5 INJECTION, SOLUTION INTRAMUSCULAR
COMMUNITY
Start: 2022-01-26

## 2022-06-03 RX ORDER — LANSOPRAZOLE 30 MG/1
30 CAPSULE, DELAYED RELEASE ORAL DAILY
COMMUNITY
Start: 2022-05-16

## 2022-06-07 ENCOUNTER — HOSPITAL ENCOUNTER (OUTPATIENT)
Dept: GENERAL RADIOLOGY | Facility: SURGERY CENTER | Age: 52
Setting detail: HOSPITAL OUTPATIENT SURGERY
End: 2022-06-07

## 2022-06-07 ENCOUNTER — HOSPITAL ENCOUNTER (OUTPATIENT)
Facility: SURGERY CENTER | Age: 52
Setting detail: HOSPITAL OUTPATIENT SURGERY
Discharge: HOME | End: 2022-06-07
Attending: ANESTHESIOLOGY | Admitting: ANESTHESIOLOGY

## 2022-06-07 VITALS
WEIGHT: 270 LBS | BODY MASS INDEX: 38.65 KG/M2 | RESPIRATION RATE: 16 BRPM | OXYGEN SATURATION: 96 % | HEIGHT: 70 IN | HEART RATE: 90 BPM | DIASTOLIC BLOOD PRESSURE: 80 MMHG | SYSTOLIC BLOOD PRESSURE: 126 MMHG | TEMPERATURE: 98.4 F

## 2022-06-07 DIAGNOSIS — M54.12 CERVICAL RADICULOPATHY: ICD-10-CM

## 2022-06-07 DIAGNOSIS — M47.812 ARTHROPATHY OF CERVICAL FACET JOINT: ICD-10-CM

## 2022-06-07 PROCEDURE — 64491 INJ PARAVERT F JNT C/T 2 LEV: CPT | Performed by: ANESTHESIOLOGY

## 2022-06-07 PROCEDURE — 76000 FLUOROSCOPY <1 HR PHYS/QHP: CPT

## 2022-06-07 PROCEDURE — 25010000002 IOPAMIDOL 61 % SOLUTION 30 ML VIAL: Performed by: ANESTHESIOLOGY

## 2022-06-07 PROCEDURE — 25010000002 MIDAZOLAM PER 1 MG: Performed by: ANESTHESIOLOGY

## 2022-06-07 PROCEDURE — 25010000002 FENTANYL CITRATE (PF) 50 MCG/ML SOLUTION: Performed by: ANESTHESIOLOGY

## 2022-06-07 PROCEDURE — 64490 INJ PARAVERT F JNT C/T 1 LEV: CPT | Performed by: ANESTHESIOLOGY

## 2022-06-07 RX ORDER — FENTANYL CITRATE 50 UG/ML
INJECTION, SOLUTION INTRAMUSCULAR; INTRAVENOUS AS NEEDED
Status: DISCONTINUED | OUTPATIENT
Start: 2022-06-07 | End: 2022-06-07 | Stop reason: HOSPADM

## 2022-06-07 RX ORDER — SODIUM CHLORIDE, SODIUM LACTATE, POTASSIUM CHLORIDE, CALCIUM CHLORIDE 600; 310; 30; 20 MG/100ML; MG/100ML; MG/100ML; MG/100ML
20 INJECTION, SOLUTION INTRAVENOUS CONTINUOUS
Status: DISCONTINUED | OUTPATIENT
Start: 2022-06-07 | End: 2022-06-07 | Stop reason: HOSPADM

## 2022-06-07 RX ORDER — MIDAZOLAM HYDROCHLORIDE 1 MG/ML
INJECTION INTRAMUSCULAR; INTRAVENOUS AS NEEDED
Status: DISCONTINUED | OUTPATIENT
Start: 2022-06-07 | End: 2022-06-07 | Stop reason: HOSPADM

## 2022-06-07 RX ORDER — SODIUM CHLORIDE 0.9 % (FLUSH) 0.9 %
10 SYRINGE (ML) INJECTION EVERY 12 HOURS SCHEDULED
Status: DISCONTINUED | OUTPATIENT
Start: 2022-06-07 | End: 2022-06-07 | Stop reason: HOSPADM

## 2022-06-07 RX ORDER — SODIUM CHLORIDE 0.9 % (FLUSH) 0.9 %
10 SYRINGE (ML) INJECTION AS NEEDED
Status: DISCONTINUED | OUTPATIENT
Start: 2022-06-07 | End: 2022-06-07 | Stop reason: HOSPADM

## 2022-06-07 RX ADMIN — SODIUM CHLORIDE, SODIUM LACTATE, POTASSIUM CHLORIDE, AND CALCIUM CHLORIDE 20 ML/HR: .6; .31; .03; .02 INJECTION, SOLUTION INTRAVENOUS at 14:39

## 2022-06-07 NOTE — DISCHARGE INSTRUCTIONS
[facility] - Post-procedure Instructions          --  While there are no absolute restrictions, it is recommended that you do not perform strenuous activity today. In the morning, you may resume your level of activity as before your block.    --  If you have a band-aid at your injection site, please remove it later today. Observe the area for any redness, swelling, pus-like drainage, or a temperature over 101°. If any of these symptoms occur, please call your doctor at [phone]. If after office hours, leave a message and the on-call provider will return your call.    --  Ice may be applied to your injection site. It is recommended you avoid direct heat (heating pad; hot tub) for 1-2 days.    --  Call [facility] at [phone] if you experience persistent headache, persistent bleeding from the injection site, or severe pain not relieved by heat or oral medication.    --  Do not make important decisions today.    --  Due to the effects of the block and/or the I.V. Sedation, DO NOT drive or operate hazardous machinery for 12 hours.  Local anesthetics may cause numbness after procedure and precautions must be taken with regards to operating equipment as well as with walking, even if ambulating with assistance of another person or with an assistive device.    --  Do not drink alcohol for 12 hours.    -- You may return to work tomorrow, or as directed by your referring doctor.    --  Occasionally you may notice a slight increase in your pain after the procedure. This should start to improve within the next 24-48 hours. Radiofrequency ablation procedure pain may last 3-4 weeks.    --  It may take as long as 3-4 days before you notice a gradual improvement in your pain and/or other symptoms.    -- You may continue to take your prescribed pain medication as needed.    --  Some normal possible side effects of steroid use could include fluid retention, increased blood sugar, dull headache,  increased sweating, increased appetite, mood swings and flushing.    --  Diabetics are recommended to watch their blood glucose level closely for 24-48 hours after the injection.    --  Must stay in PACU for 20 min upon arrival and prove no leg weakness before being discharged.    --  IN THE EVENT OF A LIFE THREATENING EMERGENCY, (CHEST PAIN, BREATHING DIFFICULTIES, PARALYSIS…) YOU SHOULD GO TO YOUR NEAREST EMERGENCY ROOM.    --  You should be contacted by our office within 2-3 days to schedule follow up or next appointment date.  If not contacted within 7 days, please call the office at [phone]

## 2022-06-07 NOTE — OP NOTE
Bilateral C5-7 Cervical Medial Branch Blockade  [facility]      PREOPERATIVE DIAGNOSIS:  Cervical spondylosis without myelopathy   POSTOPERATIVE DIAGNOSIS:  Same as preoperative diagnosis    PROCEDURE:    Cervical Facet Nerve (medial branch) Blocks, with Fluoroscopy:  LEVELS C5, C6, C7 to block facet joints C5-6, C6-7 bilaterally  • [id number]  - Bilateral Cervical Facet blocks, 1st level  • [id number]  - Bilateral Cervical Facet blocks, 2nd level      PRE-PROCEDURE DISCUSSION WITH PATIENT:    Risks and complications were discussed with the patient prior to starting the procedure and informed consent was obtained.    SURGEON:  Abdirashid Gonzalez MD    REASON FOR PROCEDURE:    The patient complains of pain that seems to have a significant axial component Because of previous diagnostic negative blockades, different levels are being selected today for diagnostic injection.     SEDATION:  Versed 6mg & Fentanyl 50 mcg IV and The use of increased procedural sedation was carefully considered, and for this particular patient the need for additional procedural sedation seemed necessary in this instance to safely perform the procedure.  ANESTHETIC:   Marcaine 0.5%  STEROID:  NONE  TOTAL VOLUME OF SOLUTION:  6 mL    DESCRIPTON OF PROCEDURE:  After obtaining informed consent, the patient was placed in the prone position. IV access was obtained.  EKG, blood pressure, and pulse oximeter were monitored and all sedation was administered by an RN under my direct guidance.  The cervical area was prepped with Chloraprep and draped with sterile barrier. Under fluoroscopic guidance the waists of the C5 through C7 vertebra on each side were identified. Skin and subcutaneous tissue was then anesthetized with 1% lidocaine 1mL at each point. Then spinal needles were introduced under fluoroscopic guidance at the waist of these vertebra on one side. After confirming the position of the needle under fluoroscopic PA and lateral  views, and confirming negative aspiration of blood and CSF, 0.25 mL of Omnipaque was injected.  Proper spread and lack of vascular uptake was demonstrated.  A solution was prepared as above, and 1 mL of that solution was injected very slowly each level.   In similar fashion, this procedure was repeated at the same levels on the contralateral side.  Needles were removed intact from all levels.  Vitals were stable throughout.     ESTIMATED BLOOD LOSS:  minimal  SPECIMENS:  None    COMPLICATIONS:    No complications were noted., There was no indication of vascular uptake on live injection of contrast dye., There was no indication of intrathecal uptake on live injection of contrast dye., There was not any evidence of dural puncture.   and The patient did not have any signs of postprocedure numbness nor weakness.    TOLERANCE & DISCHARGE CONDITION:    The patient tolerated the procedure well.  The patient was transported to the recovery area without difficulties.  The patient was discharged to home under the care of family in stable and satisfactory condition.      PLAN OF CARE:  1. The patient was given our standard instruction sheet.  2. We discussed that Cervical Medial Branch Blockade is a diagnostic procedure in consideration for radiofrequency ablation if two diagnostic procedures proved to be positive for significant benefit.  If sustained relief of six to eight weeks is obtained, then an alternative plan could be therapeutic cervical medial branch blocks on an as-needed basis.  3. The patient is asked to keep a pain log hourly for 8 hours postoperatively today.  4. The patient will Return to clinic 1-2 wks.  5. The patient will resume all medications as per the medication reconciliation sheet.

## 2022-06-20 RX ORDER — GABAPENTIN 800 MG/1
TABLET ORAL
Qty: 90 TABLET | Refills: 0 | Status: SHIPPED | OUTPATIENT
Start: 2022-06-20 | End: 2022-07-26

## 2022-06-27 ENCOUNTER — OFFICE VISIT (OUTPATIENT)
Dept: NEUROSURGERY | Facility: CLINIC | Age: 52
End: 2022-06-27

## 2022-06-27 VITALS
HEART RATE: 90 BPM | DIASTOLIC BLOOD PRESSURE: 74 MMHG | SYSTOLIC BLOOD PRESSURE: 130 MMHG | TEMPERATURE: 97.7 F | BODY MASS INDEX: 38.65 KG/M2 | HEIGHT: 70 IN | WEIGHT: 270 LBS | OXYGEN SATURATION: 98 %

## 2022-06-27 DIAGNOSIS — Z98.1 HISTORY OF LUMBAR SPINAL FUSION: ICD-10-CM

## 2022-06-27 DIAGNOSIS — M48.02 CERVICAL SPINAL STENOSIS: ICD-10-CM

## 2022-06-27 DIAGNOSIS — M54.2 NECK PAIN: Primary | ICD-10-CM

## 2022-06-27 PROCEDURE — 99213 OFFICE O/P EST LOW 20 MIN: CPT | Performed by: NEUROLOGICAL SURGERY

## 2022-06-27 RX ORDER — RIFAXIMIN 550 MG/1
TABLET ORAL
COMMUNITY
Start: 2022-06-07

## 2022-06-27 RX ORDER — AZELASTINE 1 MG/ML
1-2 SPRAY, METERED NASAL
COMMUNITY
Start: 2021-12-27 | End: 2022-12-27

## 2022-06-27 RX ORDER — MONTELUKAST SODIUM 4 MG/1
TABLET, CHEWABLE ORAL
COMMUNITY
Start: 2022-05-24

## 2022-06-27 RX ORDER — FLUTICASONE PROPIONATE 50 MCG
1 SPRAY, SUSPENSION (ML) NASAL
COMMUNITY
Start: 2021-10-21 | End: 2022-06-27

## 2022-06-27 RX ORDER — LISINOPRIL 20 MG/1
20 TABLET ORAL
COMMUNITY
Start: 2022-05-24

## 2022-06-27 NOTE — PROGRESS NOTES
Subjective   Patient ID: Ronni Asif is a 52 y.o. male is here today for follow-up for neck pain after pain management. He did have a bilateral medial branch block at C5-C7 on 6/7/22 by Dr. Gonzalez.     Mr. Asif reports he did get some relief from the MBB that was done. He states that he did have an episode of passing out on 6/12/22 and was seen at [facility]. He did have cervical MRI completed at that time.     Its been 6 months since have seen him.  He was in the emergency room recently for dizziness.  They got a new cervical MRI which did not show any progression of his known cervical stenosis at C5-C6 and C6-C7.  He has mainly neck pain.  He does have some numbness and tingling in his fourth and fifth digits bilaterally.  He got an EMG and nerve conduction study ordered by Dr. Gonzalez which showed some evidence of left carpal tunnel syndrome.  This was done by Dr. Ambrocio.  He went to see Dr. Mclean who thought he might have some ulnar neuropathy although that was not supported by the EMG and nerve conduction study.  He does not seem to have radiating pain from his neck suggestive of a cervical radiculopathy.  He does have chronic neck pain.  The medial branch blocks in the neck did not really do that much.  Dr. Gonzalez is planning on doing some cervical epidural blocks which I think is not unreasonable.  He is overall doing well from the low back although he does have some occasional low back pain but he is maintained to keep the weight off.  We will keep an eye on him and I will see him in 8 months.  If he develops radiating arm pain he will let me know.        Neck Pain   This is a chronic problem. The current episode started more than 1 year ago. The problem occurs constantly. The problem has been unchanged. The pain is associated with nothing. The pain is present in the right side, left side and midline. The quality of the pain is described as aching and stabbing. The pain is at a  "severity of 4/10. The pain is mild. The symptoms are aggravated by position. Associated symptoms include numbness and tingling. Pertinent negatives include no fever, leg pain or weakness. He has tried oral narcotics, muscle relaxants, acetaminophen and NSAIDs for the symptoms. The treatment provided mild relief.       The following portions of the patient's history were reviewed and updated as appropriate: allergies, current medications, past family history, past medical history, past social history, past surgical history and problem list.    Review of Systems   Constitutional: Negative for activity change and fever.   Musculoskeletal: Positive for neck pain.   Neurological: Positive for tingling and numbness. Negative for weakness.   Psychiatric/Behavioral: Positive for sleep disturbance.   All other systems reviewed and are negative.          Objective     Vitals:    06/27/22 1038   BP: 130/74   Pulse: 90   Temp: 97.7 °F (36.5 °C)   SpO2: 98%   Weight: 122 kg (270 lb)   Height: 177.8 cm (70\")     Body mass index is 38.74 kg/m².      Physical Exam  Constitutional:       Appearance: He is well-developed.   HENT:      Head: Normocephalic and atraumatic.   Eyes:      Extraocular Movements: EOM normal.      Conjunctiva/sclera: Conjunctivae normal.      Pupils: Pupils are equal, round, and reactive to light.   Neck:      Vascular: No carotid bruit.   Neurological:      Mental Status: He is oriented to person, place, and time.      Coordination: Finger-Nose-Finger Test and Heel to Shin Test normal.      Gait: Gait is intact.      Deep Tendon Reflexes:      Reflex Scores:       Tricep reflexes are 2+ on the right side and 2+ on the left side.       Bicep reflexes are 2+ on the right side and 2+ on the left side.       Brachioradialis reflexes are 2+ on the right side and 2+ on the left side.       Patellar reflexes are 2+ on the right side and 2+ on the left side.       Achilles reflexes are 2+ on the right side and 2+ on " the left side.  Psychiatric:         Speech: Speech normal.       Neurologic Exam     Mental Status   Oriented to person, place, and time.   Registration of memory: Good recent and remote memory.   Attention: normal. Concentration: normal.   Speech: speech is normal   Level of consciousness: alert  Knowledge: consistent with education.     Cranial Nerves     CN II   Visual fields full to confrontation.   Visual acuity: normal    CN III, IV, VI   Pupils are equal, round, and reactive to light.  Extraocular motions are normal.     CN V   Facial sensation intact.   Right corneal reflex: normal  Left corneal reflex: normal    CN VII   Facial expression full, symmetric.   Right facial weakness: none  Left facial weakness: none    CN VIII   Hearing: intact    CN IX, X   Palate: symmetric    CN XI   Right sternocleidomastoid strength: normal  Left sternocleidomastoid strength: normal    CN XII   Tongue: not atrophic  Tongue deviation: none    Motor Exam   Muscle bulk: normal  Right arm tone: normal  Left arm tone: normal  Right leg tone: normal  Left leg tone: normal    Strength   Strength 5/5 except as noted.     Sensory Exam   Light touch normal.     Gait, Coordination, and Reflexes     Gait  Gait: normal    Coordination   Finger to nose coordination: normal  Heel to shin coordination: normal    Reflexes   Right brachioradialis: 2+  Left brachioradialis: 2+  Right biceps: 2+  Left biceps: 2+  Right triceps: 2+  Left triceps: 2+  Right patellar: 2+  Left patellar: 2+  Right achilles: 2+  Left achilles: 2+  Right : 2+  Left : 2+          Assessment & Plan   Independent Review of Radiographic Studies:      I personally reviewed the images from the following studies.    The cervical MRI done on 6/11/2022 shows no change in the modest amount of cervical stenosis at C5-C6 and C6-C7.  There are some endplate changes at C6-C7.  Agree with the report.        Medical Decision Making:      He will continue working with   Carlos and possibly get some cervical epidural blocks.  We will see him in 8 months.  If he develops severe radiating arm pain, he will let us know.      Diagnoses and all orders for this visit:    1. Neck pain (Primary)    2. Cervical spinal stenosis    3. History of lumbar spinal fusion      Return in about 8 months (around 2/27/2023) for Face-to-face.

## 2022-06-28 ENCOUNTER — OFFICE VISIT (OUTPATIENT)
Dept: PAIN MEDICINE | Facility: CLINIC | Age: 52
End: 2022-06-28

## 2022-06-28 ENCOUNTER — PREP FOR SURGERY (OUTPATIENT)
Dept: SURGERY | Facility: SURGERY CENTER | Age: 52
End: 2022-06-28

## 2022-06-28 VITALS
OXYGEN SATURATION: 95 % | SYSTOLIC BLOOD PRESSURE: 132 MMHG | TEMPERATURE: 97.7 F | WEIGHT: 269.2 LBS | HEART RATE: 77 BPM | RESPIRATION RATE: 16 BRPM | HEIGHT: 70 IN | DIASTOLIC BLOOD PRESSURE: 80 MMHG | BODY MASS INDEX: 38.54 KG/M2

## 2022-06-28 DIAGNOSIS — M47.814 ARTHROPATHY OF THORACIC FACET JOINT: ICD-10-CM

## 2022-06-28 DIAGNOSIS — M47.814 ARTHROPATHY OF THORACIC FACET JOINT: Primary | ICD-10-CM

## 2022-06-28 DIAGNOSIS — M47.812 ARTHROPATHY OF CERVICAL FACET JOINT: ICD-10-CM

## 2022-06-28 DIAGNOSIS — G89.4 CHRONIC PAIN SYNDROME: Primary | ICD-10-CM

## 2022-06-28 DIAGNOSIS — G89.29 CHRONIC MIDLINE LOW BACK PAIN WITHOUT SCIATICA: ICD-10-CM

## 2022-06-28 DIAGNOSIS — M54.12 CERVICAL RADICULOPATHY: ICD-10-CM

## 2022-06-28 DIAGNOSIS — M54.50 CHRONIC MIDLINE LOW BACK PAIN WITHOUT SCIATICA: ICD-10-CM

## 2022-06-28 PROCEDURE — 99214 OFFICE O/P EST MOD 30 MIN: CPT | Performed by: NURSE PRACTITIONER

## 2022-06-28 PROCEDURE — 80305 DRUG TEST PRSMV DIR OPT OBS: CPT | Performed by: NURSE PRACTITIONER

## 2022-06-28 RX ORDER — METHOCARBAMOL 750 MG/1
750 TABLET, FILM COATED ORAL 2 TIMES DAILY PRN
Qty: 60 TABLET | Refills: 2 | Status: SHIPPED | OUTPATIENT
Start: 2022-06-28 | End: 2022-08-17 | Stop reason: SDUPTHER

## 2022-06-28 RX ORDER — SODIUM CHLORIDE 0.9 % (FLUSH) 0.9 %
10 SYRINGE (ML) INJECTION EVERY 12 HOURS SCHEDULED
Status: CANCELLED | OUTPATIENT
Start: 2022-06-28

## 2022-06-28 RX ORDER — SODIUM CHLORIDE 0.9 % (FLUSH) 0.9 %
10 SYRINGE (ML) INJECTION AS NEEDED
Status: CANCELLED | OUTPATIENT
Start: 2022-06-28

## 2022-06-28 NOTE — PROGRESS NOTES
CHIEF COMPLAINT  PROCEDURE FOLLOW UP Bilateral C5-C7 MEDIAL BRANCH BLOCK 6/07/2022  Patient in office reports 20% relief  for about 3-4 hours.    Subjective   Ronni Asif is a 52 y.o. male  who presents to the office for follow-up of procedure.  He completed a Bilateral C5-C7 MBB   on  6/7/2022 performed by Dr. Gonzalez for management of neck pain. Patient reports 20% relief from the procedure. Discussed that this is diagnostically negative and we will not move forward with cervical RFA.     Today his pain is 6/10VAS in severity.  He complains of mid-back pain that has been progressively worsening. Our goal was to repeat his Thoracic RFA which provided therapeutic benefit previously, this has unfortunately been denied by insurance.     He continues with Norco 5/325 0-1/day (using very sparingly, last utilized over a week ago), Gabapentin 800 mg 3/day, and Methocarbamol 750mg PRN (every 2-3 days, typically when he will be more active). He denies any side effects including somnolence or constipation.     He was in PT for many months focusing on his lumbar, thoracic, and cervical spine.  He most recently completed PT in March 2022 which was discontinued due to no longer progressing with this treatment modality.     Procedure list:  11/30/2021-bilateral C3-C5 MBB-minimal relief  10/7/2021-TAMEKA at C6-C7-minimal relief  7/27/2021-bilateral T8-T10 RFA-50% improvement lasting many months.   6/29/2021-bilateral T8-T10 MBB-80% relief x2 days  6/1/2021-bilateral T8-T10 MBB-80 to 90% relief x1 day    Patient remained masked during entire encounter. No cough present. I donned a mask and eye protection throughout entire visit. Prior to donning mask and eye protection, hand hygiene was performed, as well as when it was doffed.  I was closer than 6 feet, but not for an extended period of time. No obvious exposure to any bodily fluids.    Back Pain  This is a chronic problem. The current episode started more than 1 year ago.  The problem occurs daily. The problem has been gradually worsening (mid-back pain) since onset. The pain is present in the thoracic spine and lumbar spine. The quality of the pain is described as aching. The pain does not radiate. The pain is at a severity of 6/10. The pain is severe. The symptoms are aggravated by bending, standing and twisting. Stiffness is present in the morning. Associated symptoms include abdominal pain and headaches. Pertinent negatives include no chest pain, dysuria, fever, numbness or weakness. Risk factors include obesity. He has tried analgesics, heat, ice, NSAIDs, muscle relaxant and home exercises (Norco 5/325) for the symptoms. The treatment provided significant relief.   Neck Pain   This is a chronic problem. The current episode started more than 1 year ago. The problem occurs constantly. The problem has been gradually worsening. The pain is present in the occipital region and midline. The quality of the pain is described as burning (bilateral upper extremity tingling). The pain is at a severity of 6/10. The symptoms are aggravated by bending (lying in bed). Associated symptoms include headaches. Pertinent negatives include no chest pain, fever, numbness or weakness. He has tried ice, oral narcotics and NSAIDs (PT) for the symptoms.      PEG Assessment   What number best describes your pain on average in the past week?8  What number best describes how, during the past week, pain has interfered with your enjoyment of life?8  What number best describes how, during the past week, pain has interfered with your general activity?  8    The following portions of the patient's history were reviewed and updated as appropriate: allergies, current medications, past family history, past medical history, past social history, past surgical history and problem list.    Review of Systems   Constitutional: Positive for activity change. Negative for fatigue and fever.   HENT: Negative for congestion.   "  Eyes: Negative for visual disturbance.   Respiratory: Negative for cough and chest tightness.    Cardiovascular: Negative for chest pain.   Gastrointestinal: Positive for abdominal pain. Negative for constipation and diarrhea.   Genitourinary: Negative for difficulty urinating and dysuria.   Musculoskeletal: Positive for back pain and neck pain.   Neurological: Positive for dizziness and headaches. Negative for weakness, light-headedness and numbness.   Psychiatric/Behavioral: Positive for sleep disturbance (pain). Negative for agitation and suicidal ideas. The patient is not nervous/anxious.      --  The aforementioned information the Chief Complaint section and above subjective data including any HPI data, and also the Review of Systems data, has been personally reviewed and affirmed.  --     Vitals:    06/28/22 1028   BP: 132/80   BP Location: Left arm   Patient Position: Sitting   Pulse: 77   Resp: 16   Temp: 97.7 °F (36.5 °C)   SpO2: 95%   Weight: 122 kg (269 lb 3.2 oz)   Height: 177.8 cm (70\")   PainSc:   6   PainLoc: Back  Comment: NECK     Objective   Physical Exam  Vitals and nursing note reviewed.   Constitutional:       Appearance: Normal appearance. He is well-developed.   Eyes:      General: Lids are normal.   Cardiovascular:      Rate and Rhythm: Normal rate.   Pulmonary:      Effort: Pulmonary effort is normal.   Musculoskeletal:      Cervical back: Decreased range of motion.      Thoracic back: Tenderness present. Decreased range of motion.      Comments: Pain with palpation of thoracic spine   Neurological:      Mental Status: He is alert and oriented to person, place, and time.   Psychiatric:         Attention and Perception: Attention normal.         Mood and Affect: Mood normal.         Speech: Speech normal.         Behavior: Behavior normal.         Judgment: Judgment normal.       Assessment & Plan   Diagnoses and all orders for this visit:    1. Chronic pain syndrome (Primary)  -     Urine " Drug Screen Confirmation - Urine, Clean Catch; Future  -     POC Urine Drug Screen, Triage    2. Cervical radiculopathy  -     Urine Drug Screen Confirmation - Urine, Clean Catch; Future  -     POC Urine Drug Screen, Triage    3. Chronic midline low back pain without sciatica  -     Urine Drug Screen Confirmation - Urine, Clean Catch; Future  -     POC Urine Drug Screen, Triage    4. Arthropathy of thoracic facet joint  -     Urine Drug Screen Confirmation - Urine, Clean Catch; Future  -     POC Urine Drug Screen, Triage    5. Arthropathy of cervical facet joint  -     Urine Drug Screen Confirmation - Urine, Clean Catch; Future  -     POC Urine Drug Screen, Triage    Other orders  -     methocarbamol (ROBAXIN) 750 MG tablet; Take 1 tablet by mouth 2 (Two) Times a Day As Needed for Muscle Spasms.  Dispense: 60 tablet; Refill: 2      With regards to his neck pain he completed a TAMEKA with minimal relief and has had 2 cervical MBBs covering from C3-C7 with no diagnostic relief. Discussed that I do not recommend any further interventions for his neck pain.     --- Attempt to proceed with Bilateral T8-T10 RFA as this provided therapeutic pain relief of 50% reduction in pain lasting many months.   Reviewed the procedure at length with the patient.  Included in the review was expectations, complications, risk and benefits.The procedure was described in detail and the risks, benefits and alternatives were discussed with the patient (including but not limited to: bleeding, infection, nerve damage, worsening of pain, inability to perform injection, paralysis, seizures, coma, no pain relief and death) who agreed to proceed.  Discussed the potential for sedation if warranted/wanted.  The procedure will plan to be performed at [facility] with fluoroscopic guidance(unless ultrasound is indicated) and could potentially have steroids and contrast dye used. Questions were answered and in a way the patient could  understand.  Patient verbalized understanding and wishes to proceed.  This intervention will be ordered.  Discussed with patient that all procedures are part of a multimodal plan of care and include either formal PT or a home exercise program.  Patient has no evidence of coagulopathy or current infection.    --- Routine UDS in office today as part of monitoring requirements for controlled substances.  The specimen was viewed and the immunoassay result reviewed and is NEG (appropriately negative based on refill dates).  This specimen will be sent to [facility] for confirmation.     --- CSA updated 5/26/2021  --- Continue sparing use of Norco. Patient appears stable with current regimen. No adverse effects. Regarding continuation of opioids, there is no evidence of aberrant behavior or any red flags.  The patient continues with appropriate response to opioid therapy. ADL's remain intact by self.   --- Follow-up 3 months or sooner if needed.      ROSI REPORT  As part of the patient's treatment plan, I am prescribing controlled substances. The patient has been made aware of appropriate use of such medications, including potential risk of somnolence, limited ability to drive and/or work safely, and the potential for dependence or overdose. It has also been made clear that these medications are for use by this patient only, without concomitant use of alcohol or other substances unless prescribed.     Patient has completed prescribing agreement detailing terms of continued prescribing of controlled substances, including monitoring ROSI reports, urine drug screening, and pill counts if necessary. The patient is aware that inappropriate use will results in cessation of prescribing such medications.    As the clinician, I personally reviewed the ROSI from 6/28/2022 while the patient was in the office today.    History and physical exam exhibit continued safe and appropriate use of controlled  substances.    Dictated utilizing Dragon dictation.      This document is intended for medical expert use only. Reading of this document by patients and/or patient's family without participating medical staff guidance may result in misinterpretation and unintended morbidity.   Any interpretation of such data is the responsibility of the patient and/or family member responsible for the patient in concert with their primary or specialist providers, not to be left for sources of online searches such as [employer], [employer] or similar queries. Relying on these approaches to knowledge may result in misinterpretation, misguided goals of care and even death should patients or family members try recommendations outside of the realm of professional medical care in a supervised way.

## 2022-07-06 ENCOUNTER — TRANSCRIBE ORDERS (OUTPATIENT)
Dept: SURGERY | Facility: SURGERY CENTER | Age: 52
End: 2022-07-06

## 2022-07-06 DIAGNOSIS — M47.814 ARTHROPATHY OF THORACIC FACET JOINT: Primary | ICD-10-CM

## 2022-07-26 ENCOUNTER — APPOINTMENT (OUTPATIENT)
Dept: GENERAL RADIOLOGY | Facility: SURGERY CENTER | Age: 52
End: 2022-07-26

## 2022-07-26 RX ORDER — GABAPENTIN 800 MG/1
TABLET ORAL
Qty: 90 TABLET | Refills: 0 | Status: SHIPPED | OUTPATIENT
Start: 2022-07-26 | End: 2022-08-24

## 2022-08-01 ENCOUNTER — TREATMENT (OUTPATIENT)
Dept: PHYSICAL THERAPY | Facility: CLINIC | Age: 52
End: 2022-08-01

## 2022-08-01 DIAGNOSIS — M50.322 DEGENERATION OF INTERVERTEBRAL DISC AT C5-C6 LEVEL: ICD-10-CM

## 2022-08-01 DIAGNOSIS — M54.2 PAIN, NECK: ICD-10-CM

## 2022-08-01 DIAGNOSIS — M54.12 CERVICAL RADICULOPATHY: Primary | ICD-10-CM

## 2022-08-01 DIAGNOSIS — R68.89 DECREASED ACTIVITY TOLERANCE: ICD-10-CM

## 2022-08-01 PROCEDURE — 20561 NDL INSJ W/O NJX 3+ MUSC: CPT | Performed by: PHYSICAL THERAPIST

## 2022-08-01 NOTE — PROGRESS NOTES
Physical Therapy Reevaluation         Patient: Ronni VERNON Yefri   : 1970  Diagnosis/ICD-10 Code:  Cervical radiculopathy [M54.12]  Referring practitioner: No ref. provider found  Date of Initial Visit: Type: THERAPY  Noted: 2022  Today's Date: 2022  Patient seen for 1 sessions         Ronni Yefri reports:  No surgery per Dr. Belcher, only stenosis.  Had blocks in the past with some success.  At the point now has pain constantly.  4,5th fingers intermittent tingling but usually at the same time.  Saw hand MD and they assessed ulnar nerve. Used ulnar nerve pads from MD and no change in symptoms. Only numbness and tingling in fingers, none in arms and no radiating pain.  Neck pain central and shoulder blades, headaches 3 times a week and are in the back or top of head.  (-) dizziness, (+) tinnitis, (-) diplopia. Jodi passed out sitting at the table.  He has had h/o TIA's, went to hospital and CT scan negative for acute findings. Patient has had needling in the past and did well.  He would like to try to manage his pain more with conservative treatments like needling vs medications.    PMH: lumbar radiculopathy, h/o TIAs,  DDD thoracic and cervical, DM II, HTN, GERD, hyperglycemia, CAD.    History:  pt denies active infection, blood bourne illness, needle phobia, use of blood thinners, any compromised immune system, recent surgery.    Diagnostics: CT scan - no acute abnormality  Cervical MRI: Diffuse congenital and multilevel superimposed acquired spinal stenosis, most prominent at C5-C6 and C6-C7        Objective          Palpation   Left   Hypertonic in the cervical paraspinals, levator scapulae, suboccipitals and upper trapezius.   Tenderness of the cervical paraspinals, levator scapulae, suboccipitals and upper trapezius.     Right   Hypertonic in the cervical paraspinals, levator scapulae, suboccipitals and upper trapezius. Tenderness of the cervical paraspinals, levator scapulae,  suboccipitals and upper trapezius.     Tenderness     Additional Tenderness Details  No tenderness along C6,7 dermatome    Neurological Testing     Sensation   Cervical/Thoracic   Left   Intact: light touch    Right   Intact: light touch    Active Range of Motion   Cervical/Thoracic Spine   Cervical    Flexion: Neck active flexion: 75% spine. with pain  Extension: WFL  Left lateral flexion: Neck active lateral bend left: 50% trao. with pain  Right lateral flexion: Neck active lateral bend right: 50% with pain  Left rotation: WFL  Right rotation: Neck active rotation right: 50% with pain    Tests     Additional Tests Details  (-) vertebral artery      See Exercise, Manual, and Modality Logs for complete treatment.     Patient was instructed in indications for dry needling treatment,  conditions treated, procedure to be performed, possible side effects, contraindications, and risks of the procedure.  All questions were answered.  Patient agreed to have dry needling treatment performed and signed the consent.      Assessment/Plan  Ronni returns to PT for dry needling for relief of neck pain and improved mobility.  He would like to try more conservative treatment to help control pain in between his ablations,   Used threading and direct techniques, obtained consent to treat after discussing benefits and risks of dry needling. Clean needle technique and gloves used at all times. Precautions utilized for lung fields and neurovascular structures. Pt in prone position for needling of cervical.   No adverse response noted.  Manual palpation and assessment performed before, during and after needling. Monitored for syncope in the clinic after needling and he has no issued.  Plan to continue needling 1-2X per month. and instructed to apply heat to sore areas tonight.       STG X 2 weeks  1.  Increase cervical  ROM in right rotation with 25% less pain.  2.  ADL's with tolerable symptoms.  LTG X 6 weeks  1.  Decrease pain response  with cervical ROM.  2.  Minimal to no pain with palpation after treatment.  3.  Resolve radiation.            Manual Therapy:    -     mins  04381;  Therapeutic Exercise:    -     mins  18612;     Neuromuscular Kamini:    -    mins  99743;    Therapeutic Activity:     -     mins  56645;     Gait Training:      -     mins  52796;     Ultrasound:     -     mins  36624;    Electrical Stimulation:    -     mins  11378 ( );  Dry Needling     20     mins [id number]  Re evaluation    30 min  Timed Treatment:   20   mins   Total Treatment:     50   mins    Shannan Reeves PT OCS, CID[facility]  Physical Therapist    License #: [id number]

## 2022-08-17 RX ORDER — METHOCARBAMOL 750 MG/1
750 TABLET, FILM COATED ORAL 2 TIMES DAILY PRN
Qty: 60 TABLET | Refills: 2 | Status: SHIPPED | OUTPATIENT
Start: 2022-08-17 | End: 2022-11-14 | Stop reason: SDUPTHER

## 2022-08-17 RX ORDER — HYDROCODONE BITARTRATE AND ACETAMINOPHEN 5; 325 MG/1; MG/1
1 TABLET ORAL DAILY PRN
Qty: 30 TABLET | Refills: 0 | Status: SHIPPED | OUTPATIENT
Start: 2022-08-17 | End: 2022-09-19 | Stop reason: SDUPTHER

## 2022-08-23 ENCOUNTER — HOSPITAL ENCOUNTER (OUTPATIENT)
Facility: SURGERY CENTER | Age: 52
Setting detail: HOSPITAL OUTPATIENT SURGERY
Discharge: HOME | End: 2022-08-23
Attending: ANESTHESIOLOGY | Admitting: ANESTHESIOLOGY

## 2022-08-23 ENCOUNTER — HOSPITAL ENCOUNTER (OUTPATIENT)
Dept: GENERAL RADIOLOGY | Facility: SURGERY CENTER | Age: 52
Setting detail: HOSPITAL OUTPATIENT SURGERY
End: 2022-08-23

## 2022-08-23 VITALS
TEMPERATURE: 98.4 F | DIASTOLIC BLOOD PRESSURE: 80 MMHG | OXYGEN SATURATION: 96 % | WEIGHT: 275 LBS | RESPIRATION RATE: 18 BRPM | HEIGHT: 70 IN | BODY MASS INDEX: 39.37 KG/M2 | HEART RATE: 74 BPM | SYSTOLIC BLOOD PRESSURE: 128 MMHG

## 2022-08-23 DIAGNOSIS — M47.814 ARTHROPATHY OF THORACIC FACET JOINT: ICD-10-CM

## 2022-08-23 PROCEDURE — 64634 DESTROY C/TH FACET JNT ADDL: CPT | Performed by: ANESTHESIOLOGY

## 2022-08-23 PROCEDURE — 64633 DESTROY CERV/THOR FACET JNT: CPT | Performed by: ANESTHESIOLOGY

## 2022-08-23 PROCEDURE — 76000 FLUOROSCOPY <1 HR PHYS/QHP: CPT

## 2022-08-23 PROCEDURE — 25010000002 MIDAZOLAM PER 1 MG: Performed by: ANESTHESIOLOGY

## 2022-08-23 PROCEDURE — S0260 H&P FOR SURGERY: HCPCS | Performed by: ANESTHESIOLOGY

## 2022-08-23 PROCEDURE — 99152 MOD SED SAME PHYS/QHP 5/>YRS: CPT | Performed by: ANESTHESIOLOGY

## 2022-08-23 PROCEDURE — 25010000002 FENTANYL CITRATE (PF) 50 MCG/ML SOLUTION: Performed by: ANESTHESIOLOGY

## 2022-08-23 RX ORDER — SODIUM CHLORIDE 0.9 % (FLUSH) 0.9 %
10 SYRINGE (ML) INJECTION AS NEEDED
Status: DISCONTINUED | OUTPATIENT
Start: 2022-08-23 | End: 2022-08-23 | Stop reason: HOSPADM

## 2022-08-23 RX ORDER — FENTANYL CITRATE 50 UG/ML
INJECTION, SOLUTION INTRAMUSCULAR; INTRAVENOUS AS NEEDED
Status: DISCONTINUED | OUTPATIENT
Start: 2022-08-23 | End: 2022-08-23 | Stop reason: HOSPADM

## 2022-08-23 RX ORDER — MIDAZOLAM HYDROCHLORIDE 1 MG/ML
INJECTION INTRAMUSCULAR; INTRAVENOUS AS NEEDED
Status: DISCONTINUED | OUTPATIENT
Start: 2022-08-23 | End: 2022-08-23 | Stop reason: HOSPADM

## 2022-08-23 RX ORDER — SODIUM CHLORIDE 0.9 % (FLUSH) 0.9 %
10 SYRINGE (ML) INJECTION EVERY 12 HOURS SCHEDULED
Status: DISCONTINUED | OUTPATIENT
Start: 2022-08-23 | End: 2022-08-23 | Stop reason: HOSPADM

## 2022-08-23 NOTE — H&P
Brief Pre-procedural / Pre-operative H&P        -----    Pertinent Diagnosis:   Thoracic facet arthropathy    Proposed Procedure: Thoracic medial branch radiofrequency ablation      Subjective   Ronni Asif is a 52 y.o. male  who presents for intervention.  He has a history of mid back pain.      History of Present Illness     He is mid back pain among other painful problems.  He is known to spinal facet arthropathies.  He had radiofrequency ablation of the T8 and T9 and T10 thoracic medial branches last year and he had greater than 50% pain relief for many months approaching a year.  Recurrent pain.  Bilateral aching and some burning sensations radiating off of the spine bilaterally in this area.  Not progressing with physical therapy.  Need for some other options.  Utilizing polypharmacy including acetaminophen and hydrocodone and gabapentin and methocarbamol.    -------    The following portions of the patient's history were reviewed and updated as appropriate: allergies, current medications, past family history, past medical history, past social history, past surgical history and problem list.    No Known Allergies      Current Facility-Administered Medications:   •  sodium chloride 0.9 % flush 10 mL, 10 mL, Intravenous, Q12H, Abdirashid Gonzalez MD  •  sodium chloride 0.9 % flush 10 mL, 10 mL, Intravenous, PRN, Abdirashid Gonzalez MD    No current facility-administered medications on file prior to encounter.     Current Outpatient Medications on File Prior to Encounter   Medication Sig Dispense Refill   • acetaminophen (TYLENOL) 325 MG tablet Take 2 tablets by mouth Every 6 (Six) Hours As Needed for Mild Pain .     • allopurinol (ZYLOPRIM) 100 MG tablet Take 100 mg by mouth Daily.     • aspirin 81 MG EC tablet Take 81 mg by mouth Daily.     • atorvastatin (LIPITOR) 40 MG tablet      • azelastine (ASTELIN) 0.1 % nasal spray 1-2 sprays into the nostril(s) as directed by provider.     • DANIEL YATES 200-25  "MCG/INH inhaler Inhale 1 puff Daily As Needed.  5   • colestipol (COLESTID) 1 g tablet      • ergocalciferol (ERGOCALCIFEROL) 1.25 MG (98494 UT) capsule Take 1 capsule by mouth Every 7 (Seven) Days.     • fluticasone-salmeterol (ADVAIR) 250-50 MCG/DOSE DISKUS Inhale 1 puff.     • lansoprazole (PREVACID) 30 MG capsule Take 30 mg by mouth Daily.     • metFORMIN ER (GLUCOPHAGE-XR) 500 MG 24 hr tablet Take 2 tablets by mouth 2 (Two) Times a Day.  1   • multivitamin (DAILY CANDY) tablet tablet Take 1 tablet by mouth Daily.     • tadalafil (CIALIS) 10 MG tablet Take 1 tablet by mouth Daily As Needed.     • Testosterone Cypionate (DEPOTESTOTERONE CYPIONATE) 200 MG/ML injection Inject 0.5 mL into the appropriate muscle as directed by prescriber Every 7 (Seven) Days.     • Trulicity 3 MG/0.5ML solution pen-injector      • albuterol (2.5 MG/3ML) 0.083% nebulizer solution 3 mL, albuterol (5 MG/ML) 0.5% nebulizer solution 0.5 mL Inhale 4 (four) times a day as needed.     • albuterol (PROVENTIL HFA;VENTOLIN HFA) 108 (90 BASE) MCG/ACT inhaler Inhale 2 puffs every 4 (four) hours as needed for wheezing.     • B-D 3CC LUER-ALECIA SYR 92ZC5-5/2 23G X 1-1/2\" 3 ML misc INJECT 1 EACH IM Q 14 DAYS UTD  1   • B-D UF III MINI PEN NEEDLES 31G X 5 MM misc USE 1 QID  3   • BinaxNOW COVID-19 Ag Home Test kit      • Continuous Blood Gluc  (Dexcom G6 ) device      • Continuous Blood Gluc Sensor (Dexcom G6 Sensor)      • Continuous Blood Gluc Transmit (Dexcom G6 Transmitter) misc      • COVID-19 At Home Antigen Test (BinaxNOW COVID-19 Ag Home Test) kit      • Dulaglutide (Trulicity) 1.5 MG/0.5ML solution pen-injector Inject 1.5 mg under the skin into the appropriate area as directed 1 (One) Time Per Week.     • fluticasone (FLONASE) 50 MCG/ACT nasal spray      • glucose blood (FREESTYLE LITE) test strip 1 strip by Other route.     • glucose blood (FREESTYLE LITE) test strip 1 strip by Other route.     • Insulin Pen Needle (B-D UF III " "MINI PEN NEEDLES) 31G X 5 MM misc USE TO INJECT FOUR TIMES A DAY     • ipratropium-albuterol (DUO-NEB) 0.5-2.5 mg/3 ml nebulizer      • Lancets (FREESTYLE) lancets TEST QD UTD  3   • lisinopril (PRINIVIL,ZESTRIL) 20 MG tablet Take 20 mg by mouth.     • metoprolol succinate XL (TOPROL-XL) 50 MG 24 hr tablet Take 1 tablet by mouth Daily.     • Nerve Stimulator (TENS THERAPY REPLACE BACK PADS) misc 1 each.     • Syringe/Needle, Disp, 23G X 1-1/2\" 3 ML misc Inject 1 each into the appropriate muscle as directed by prescriber.     • Tresiba FlexTouch 100 UNIT/ML solution pen-injector injection      • triamcinolone (KENALOG) 0.1 % cream Apply  topically to the appropriate area as directed 3 (Three) Times a Day.     • Xifaxan 550 MG tablet          Patient Active Problem List   Diagnosis   • Lumbar radiculopathy   • Congenital spondylolysis, lumbosacral region   • Transient cerebral ischemia   • Pain in thoracic spine   • Neck pain   • Degeneration of thoracic intervertebral disc   • Degeneration of intervertebral disc at C5-C6 level   • Type 2 diabetes mellitus with circulatory disorder, without long-term current use of insulin (HCC)   • Obstructive sleep apnea, adult   • Essential hypertension   • Moderate asthma without complication   • Gastroesophageal reflux disease   • Diarrhea   • History of colon polyps   • Hyperglycemia   • Surgery follow-up examination   • Morbidly obese (HCC)   • Chest pain   • Chronic pain   • Coronary artery disease   • Gout   • Labile blood pressure   • Mixed anxiety depressive disorder   • Secondary male hypogonadism   • Shoulder impingement syndrome, left   • Syncope and collapse   • Vitamin D deficiency   • DEVAUGHN on CPAP   • Thoracic radiculopathy   • History of lumbar spinal fusion   • Chronic midline low back pain without sciatica   • Chronic bilateral thoracic back pain   • Arthropathy of thoracic facet joint   • Spondylosis without myelopathy or radiculopathy, thoracic region   • " Encounter for long-term (current) use of high-risk medication   • Cervical radiculopathy   • Bulge of cervical disc without myelopathy   • Arthropathy of cervical facet joint   • Cervical spinal stenosis       Past Medical History:   Diagnosis Date   • Anxiety    • Asthma    • Cancer (HCC)     skin   • DDD (degenerative disc disease), lumbar    • Diabetes mellitus (HCC)    • Dizziness    • ED (erectile dysfunction)    • GERD (gastroesophageal reflux disease)    • Headache    • Hyperlipidemia    • Hypertension    • Injury of back 03/2016    Pt fell 20 feet    • Kidney stone    • Neuromuscular disorder (HCC)    • Pneumonia    • Sleep apnea     cpap   • Stroke (HCC)     tia   • Syncope and collapse    • TIA (transient ischemic attack)        Past Surgical History:   Procedure Laterality Date   • CERVICAL EPIDURAL N/A 10/7/2021    Procedure: CERVICAL EPIDURAL;  Surgeon: Abdirashid Gonzalez MD;  [place] [facility] [place]  Service: Pain Management;  Laterality: N/A;   • COLONOSCOPY  2014   • COLONOSCOPY N/A 5/23/2019    Procedure: COLONOSCOPY to Cecum with Hot and Cold Polypectomy x 2;  Surgeon: Navid Zafar Jr., MD;  Location: [facility];  Service: General   • ENDOSCOPY N/A 5/23/2019    Procedure: ESOPHAGOGASTRODUODENOSCOPY with Bx's;  Surgeon: Navid Zafar Jr., MD;  Location: [facility];  Service: General   • HERNIA REPAIR  10/2015   • LAPAROSCOPIC GASTRIC BANDING      June 2020   • LUMBAR DISCECTOMY FUSION INSTRUMENTATION Bilateral 8/30/2019    Procedure: LUMBAR 5 TO SACRAL 1 OPEN DECOMPRESSION WITH  POSTERIOR LUMBAR INTERBODY FUSION, CAGE AND SCREW;  Surgeon: Jake Tripathi MD;  Location: [facility];  Service: Neurosurgery   • MEDIAL BRANCH BLOCK Bilateral 6/1/2021    Procedure: thoracic medial branch block (bilateral T9-11 vs T8-10) x2, 1-2 wks apart - diagnostic;  Surgeon: Abdirashid Gonzalez MD;  [place] [facility] [place] [facility];  Service: Pain Management;  Laterality: Bilateral;   • MEDIAL BRANCH  BLOCK Bilateral 6/29/2021    Procedure: Bilateral T8-T10 MEDIAL BRANCH BLOCK;  Surgeon: Abdirashid Gonzalez MD;  Location: [facility];  Service: Pain Management;  Laterality: Bilateral;   • MEDIAL BRANCH BLOCK Bilateral 11/30/2021    Procedure: Bilateral cervical MEDIAL BRANCH BLOCK at approximately C4-C6;  Surgeon: Abdirashid Gonzalez MD;  Location: [facility];  Service: Pain Management;  Laterality: Bilateral;   • MEDIAL BRANCH BLOCK Bilateral 6/7/2022    Procedure: Bilateral C5-C7 MEDIAL BRANCH BLOCK;  Surgeon: Abdirashid Gonzalez MD;  Location: [facility];  Service: Pain Management;  Laterality: Bilateral;   • RADIOFREQUENCY ABLATION Bilateral 7/27/2021    Procedure: Bilateral T8-T10 RADIOFREQUENCY ABLATION;  Surgeon: Abdirashid Gonzalez MD;  Location: [facility];  Service: Pain Management;  Laterality: Bilateral;       Family History   Problem Relation Age of Onset   • Brain cancer Father    • Stroke Father    • Hypertension Father    • Atrial fibrillation Father    • Heart disease Father    • Hypertension Mother    • Diabetes Mother    • Heart disease Mother    • Hypertension Brother    • Colon cancer Maternal Grandmother    • Lung cancer Maternal Grandfather    • Bone cancer Maternal Grandfather        Social History     Socioeconomic History   • Marital status:    Tobacco Use   • Smoking status: Never Smoker   • Smokeless tobacco: Never Used   • Tobacco comment: no caffeine    Substance and Sexual Activity   • Alcohol use: No     Comment: caffeine weekly   • Drug use: No   • Sexual activity: Defer       -------       Review of Systems  No Fever, No Chills, No ear pain, No sinus pressure or drainage, No eye pain or drainage, No cough, No SOB, No chest tightness nor chest pain, no palpitations.          Vitals:    08/23/22 0833   BP: 120/80   BP Location: Left arm   Patient Position: Lying   Pulse: 77   Resp: 17   Temp: 98.3 °F (36.8 °C)   TempSrc: Temporal   SpO2: 96%   Weight: 125 kg (275 lb)  "  Height: 177.8 cm (70\")         Objective   Physical Exam  VSS, NNR, NCAT, NMNA, NRD, AAOx3.      -------    Assessment & Plan:  - as noted above, the stated intervention is indicated  - Follow-up plan will be noted in the operative report        Ov 4-6 weks      EMR Dragon/Transcription disclaimer:   Typed items in this encounter note may have been created by electronic transcription/translation software which converts spoken language to printed text. The electronic translation of spoken language may permit erroneous, or at times, nonsensical words or phrases to be inadvertently transcribed; Although I have reviewed the note for such errors, some may still exist.      "

## 2022-08-23 NOTE — DISCHARGE INSTRUCTIONS
[facility] - Post-procedure Instructions          --  While there are no absolute restrictions, it is recommended that you do not perform strenuous activity today. In the morning, you may resume your level of activity as before your block.    --  If you have a band-aid at your injection site, please remove it later today. Observe the area for any redness, swelling, pus-like drainage, or a temperature over 101°. If any of these symptoms occur, please call your doctor at [phone]. If after office hours, leave a message and the on-call provider will return your call.    --  Ice may be applied to your injection site. It is recommended you avoid direct heat (heating pad; hot tub) for 1-2 days.    --  Call [facility] at [phone] if you experience persistent headache, persistent bleeding from the injection site, or severe pain not relieved by heat or oral medication.    --  Do not make important decisions today.    --  Due to the effects of the block and/or the I.V. Sedation, DO NOT drive or operate hazardous machinery for 12 hours.  Local anesthetics may cause numbness after procedure and precautions must be taken with regards to operating equipment as well as with walking, even if ambulating with assistance of another person or with an assistive device.    --  Do not drink alcohol for 12 hours.    -- You may return to work tomorrow, or as directed by your referring doctor.    --  Occasionally you may notice a slight increase in your pain after the procedure. This should start to improve within the next 24-48 hours. Radiofrequency ablation procedure pain may last 3-4 weeks.    --  It may take as long as 3-4 days before you notice a gradual improvement in your pain and/or other symptoms.    -- You may continue to take your prescribed pain medication as needed.    --  Some normal possible side effects of steroid use could include fluid retention, increased blood sugar, dull headache,  increased sweating, increased appetite, mood swings and flushing.    --  Diabetics are recommended to watch their blood glucose level closely for 24-48 hours after the injection.    --  Must stay in PACU for 20 min upon arrival and prove no leg weakness before being discharged.    --  IN THE EVENT OF A LIFE THREATENING EMERGENCY, (CHEST PAIN, BREATHING DIFFICULTIES, PARALYSIS…) YOU SHOULD GO TO YOUR NEAREST EMERGENCY ROOM.    --  You should be contacted by our office within 2-3 days to schedule follow up or next appointment date.  If not contacted within 7 days, please call the office at [phone]

## 2022-08-23 NOTE — OP NOTE
Thoracic Medial Branch Radiofrequency Lesioning:  [facility]        PREOPERATIVE DIAGNOSIS:  Thoracic spondylosis without myelopathy    POSTOPERATIVE DIAGNOSIS:  Same as above.  .     PROCEDURE:   Diagnostic Thoracic Medial Branch RF (thermal), with fluoroscopy:     - LEVELS:  bilateral  T8, T9 and T10    PRE-PROCEDURE DISCUSSION WITH PATIENT:    Risks and complications were discussed with the patient prior to starting the procedure and informed consent was obtained.   We discussed various topics including but not limited to bleeding, infection, injury, nerve injury, paralysis, coma, death, pneumothorax, worsening of clinical picture, postprocedural soreness, and lack of pain relief.  We discussed the diagnostic nature of this procedure as well as the potential for possible long term therapeutic relief, and the odds of such.    SURGEON:  Abdirashid Gonzalez MD    REASON FOR PROCEDURE:    He had significant relief noted for many months.  Recurrent pain.  Radiofrequency was over a year ago but less than 2 years ago.    SEDATION:  Versed 6mg & Fentanyl 50 mcg IV  TIME OF PROCEDURE:  Intraoperative procedure time, after administration of the IV sedation, was 18 minutes.    ANESTHETIC:  Lidocaine 2%  STEROID:  NONE  TOTAL VOLUME OF SOLUTION:  6 ml    DESCRIPTON OF PROCEDURE:  After obtaining informed consent, IV access was  obtained in the preoperative area.   The patient was taken to the operating room.  The patient was placed in the prone position with a pillow under the abdomen. All pressure points were well padded.  EKG, blood pressure, and pulse oximeter were monitored.  The patient was monitored and sedated by the RN under my direction. The thoracic area was prepped with Chloraprep and draped in a sterile fashion.     Under fluoroscopic guidance at each of the levels above the approach to address the transverse processes of those vertebrae at the junctions of the superior articular processes was  identified.  Skin and subcutaneous tissues were anesthetized with 1% lidocaine above each of these points.     Then, radiofrequency probe needles were advanced in this fluoro view to the above junctions, advancing under serial fluoroscopic images to ensure that the needle tip was overlying the transverse process throughout the course of each needle.  Then a lateral image was taken for each needle position to ensure proper depth and that the needles were near the joint.  Aspiration was negative for blood and CSF.  After confirming the position of the needle with fluoroscope in all views,testing was initiated.  First, sensory testing was started on each needle a 1V and 50Hz and slowly decreased until painful pressure stimulation diminished at 0.5V.  Next, motor testing was confirmed to be negative at 3V and 2Hz for any radicular stimulation.  Then 1mL of the local anesthetic was instilled in each needle.  Two minutes elapsed, and during this time a lateral fluoroscopic view was confirmed again to ensure the needles had not advanced nor retracted.  Then, Radiofrequency Lesioning was initiated for 1.5 minutes at 85 degrees Celsius.  Needles were removed intact from each of the areas.      Onset of analgesia was noted.  Vital signs remained stable throughout.        ESTIMATED BLOOD LOSS:  <5 mL  SPECIMENS:  none    COMPLICATIONS:   No complications were noted., There was not any evidence of dural puncture.   and The patient did not have any signs of postprocedure numbness nor weakness.    TOLERANCE & DISCHARGE CONDITION:    The patient tolerated the procedure well.  The patient was transported to the recovery area without difficulties.  The patient was discharged to home under the care of family in stable and satisfactory condition.    PLAN OF CARE:  1. The patient was given our standard instruction sheet.  2. We discussed that there may be some soreness and painful flare after RF procedure, and that optimum pain relief  may not be immediately realized.    3. The patient will  Return to clinic 4-6 wks  4. The patient will resume all medications as per the medication reconciliation sheet.

## 2022-08-24 RX ORDER — GABAPENTIN 800 MG/1
TABLET ORAL
Qty: 90 TABLET | Refills: 1 | Status: SHIPPED | OUTPATIENT
Start: 2022-08-24 | End: 2022-09-19 | Stop reason: SDUPTHER

## 2022-09-19 RX ORDER — GABAPENTIN 800 MG/1
800 TABLET ORAL 3 TIMES DAILY
Qty: 90 TABLET | Refills: 1 | Status: SHIPPED | OUTPATIENT
Start: 2022-09-19 | End: 2022-11-14 | Stop reason: SDUPTHER

## 2022-09-19 RX ORDER — HYDROCODONE BITARTRATE AND ACETAMINOPHEN 5; 325 MG/1; MG/1
1 TABLET ORAL DAILY PRN
Qty: 30 TABLET | Refills: 0 | Status: SHIPPED | OUTPATIENT
Start: 2022-09-19 | End: 2022-11-14 | Stop reason: SDUPTHER

## 2022-11-14 ENCOUNTER — OFFICE VISIT (OUTPATIENT)
Dept: PAIN MEDICINE | Facility: CLINIC | Age: 52
End: 2022-11-14

## 2022-11-14 VITALS
DIASTOLIC BLOOD PRESSURE: 63 MMHG | SYSTOLIC BLOOD PRESSURE: 110 MMHG | OXYGEN SATURATION: 96 % | HEART RATE: 82 BPM | WEIGHT: 286.6 LBS | TEMPERATURE: 96.2 F | BODY MASS INDEX: 41.03 KG/M2 | HEIGHT: 70 IN | RESPIRATION RATE: 18 BRPM

## 2022-11-14 DIAGNOSIS — M47.812 ARTHROPATHY OF CERVICAL FACET JOINT: ICD-10-CM

## 2022-11-14 DIAGNOSIS — M54.50 CHRONIC MIDLINE LOW BACK PAIN WITHOUT SCIATICA: ICD-10-CM

## 2022-11-14 DIAGNOSIS — G89.4 CHRONIC PAIN SYNDROME: Primary | ICD-10-CM

## 2022-11-14 DIAGNOSIS — M54.12 CERVICAL RADICULOPATHY: ICD-10-CM

## 2022-11-14 DIAGNOSIS — G89.29 CHRONIC MIDLINE LOW BACK PAIN WITHOUT SCIATICA: ICD-10-CM

## 2022-11-14 DIAGNOSIS — M47.814 ARTHROPATHY OF THORACIC FACET JOINT: ICD-10-CM

## 2022-11-14 DIAGNOSIS — Q76.2 CONGENITAL SPONDYLOLYSIS, LUMBOSACRAL REGION: ICD-10-CM

## 2022-11-14 PROCEDURE — 99214 OFFICE O/P EST MOD 30 MIN: CPT | Performed by: NURSE PRACTITIONER

## 2022-11-14 RX ORDER — HYDROCODONE BITARTRATE AND ACETAMINOPHEN 5; 325 MG/1; MG/1
1 TABLET ORAL DAILY PRN
Qty: 30 TABLET | Refills: 0 | Status: SHIPPED | OUTPATIENT
Start: 2022-11-14 | End: 2023-01-06 | Stop reason: SDUPTHER

## 2022-11-14 RX ORDER — GABAPENTIN 800 MG/1
800 TABLET ORAL 3 TIMES DAILY
Qty: 90 TABLET | Refills: 1 | Status: SHIPPED | OUTPATIENT
Start: 2022-11-14 | End: 2023-02-08 | Stop reason: SDUPTHER

## 2022-11-14 RX ORDER — DICYCLOMINE HYDROCHLORIDE 10 MG/1
10 CAPSULE ORAL 4 TIMES DAILY
COMMUNITY
Start: 2022-07-29

## 2022-11-14 RX ORDER — METHOCARBAMOL 750 MG/1
750 TABLET, FILM COATED ORAL 2 TIMES DAILY PRN
Qty: 60 TABLET | Refills: 2 | Status: SHIPPED | OUTPATIENT
Start: 2022-11-14 | End: 2023-01-06 | Stop reason: SDUPTHER

## 2022-11-14 NOTE — PROGRESS NOTES
CHIEF COMPLAINT  PROCEDURE FOLLOW UP RADIOFREQUENCY ABLATION Bilateral T8-T10 08/23/2022  Patient in office reports 80% pain relief from RFA for 2 months. Patient in office states he had a cardiac loop placement week ago @ [facility].    Subjective   Ronni Asif is a 52 y.o. male  who presents to the office for follow-up of procedure.  He completed a Bilateral T8-T10 RFA   on  8/23/2022 performed by Dr. Gonzalez for management of mid-back pain. Patient reports 80% relief from the procedure x 2 months with ONGOING 60-70% relief.     Today his pain is 5/10VAS in severity. He continues with Norco 5/325 0-1/day (using very sparingly), Gabapentin 800 mg 3/day, and Methocarbamol 750mg PRN. This medication regimen decreases his pain and allows him to remain active.  He denies any side effects.      Recently had a loop recorder placed by cardiology.     Hx of Lap-band. Patient will reach out to his surgeon to see if he is a candidate for NSAIDs.     Procedure list:  6/7/2022-bilateral C5-C7 MBB-20% relief (diagnostically negative)  11/30/2021-bilateral C3-C5 MBB-minimal relief (diagnostically negative)  10/7/2021-TAMEKA at C6-C7-minimal relief  7/27/2021-bilateral T8-T10 RFA-50% improvement lasting many months.   6/29/2021-bilateral T8-T10 MBB-80% relief x2 days  6/1/2021-bilateral T8-T10 MBB-80 to 90% relief x1 day    Back Pain  This is a chronic problem. The current episode started more than 1 year ago. The problem occurs daily. The problem has been waxing and waning (mid-back pain improved) since onset. The pain is present in the thoracic spine and lumbar spine. The quality of the pain is described as aching. The pain does not radiate. The pain is at a severity of 5/10. The pain is severe. The symptoms are aggravated by bending, standing and twisting. Stiffness is present in the morning. Associated symptoms include headaches, numbness (HANDS) and weakness (LEFT HAND). Pertinent negatives include no abdominal pain, chest  pain, dysuria or fever. Risk factors include obesity. He has tried analgesics, heat, ice, NSAIDs, muscle relaxant and home exercises (Norco 5/325) for the symptoms. The treatment provided significant relief.   Neck Pain   This is a chronic problem. The current episode started more than 1 year ago. The problem occurs constantly. The problem has been waxing and waning. The pain is present in the occipital region and midline. The quality of the pain is described as burning (bilateral upper extremity tingling). The pain is at a severity of 5/10. The symptoms are aggravated by bending (lying in bed). Associated symptoms include headaches, numbness (HANDS) and weakness (LEFT HAND). Pertinent negatives include no chest pain or fever. He has tried ice, oral narcotics and NSAIDs (PT) for the symptoms.      PEG Assessment   What number best describes your pain on average in the past week?7  What number best describes how, during the past week, pain has interfered with your enjoyment of life?9  What number best describes how, during the past week, pain has interfered with your general activity?  8    The following portions of the patient's history were reviewed and updated as appropriate: allergies, current medications, past family history, past medical history, past social history, past surgical history and problem list.    Review of Systems   Constitutional: Negative for activity change (LESS), fatigue and fever.   HENT: Negative for congestion.    Eyes: Negative for visual disturbance.   Respiratory: Negative for cough and chest tightness.    Cardiovascular: Negative for chest pain.   Gastrointestinal: Negative for abdominal pain, constipation and diarrhea.   Genitourinary: Negative for difficulty urinating and dysuria.   Musculoskeletal: Positive for back pain and neck pain.   Neurological: Positive for dizziness, weakness (LEFT HAND), numbness (HANDS) and headaches. Negative for light-headedness.   Psychiatric/Behavioral:  "Positive for sleep disturbance. Negative for agitation and suicidal ideas. The patient is not nervous/anxious.      --  The aforementioned information the Chief Complaint section and above subjective data including any HPI data, and also the Review of Systems data, has been personally reviewed and affirmed.  --     Vitals:    11/14/22 1023   BP: 110/63   BP Location: Left arm   Patient Position: Sitting   Cuff Size: Large Adult   Pulse: 82   Resp: 18   Temp: 96.2 °F (35.7 °C)   TempSrc: Temporal   SpO2: 96%   Weight: 130 kg (286 lb 9.6 oz)   Height: 177.8 cm (70\")   PainSc:   5   PainLoc: Comment: BACK,NECK     Objective   Physical Exam  Vitals and nursing note reviewed.   Constitutional:       Appearance: Normal appearance. He is well-developed.   Eyes:      General: Lids are normal.   Cardiovascular:      Rate and Rhythm: Normal rate.   Pulmonary:      Effort: Pulmonary effort is normal.   Musculoskeletal:      Cervical back: Tenderness present. Decreased range of motion.      Thoracic back: Tenderness present. Decreased range of motion.   Neurological:      Mental Status: He is alert and oriented to person, place, and time.   Psychiatric:         Attention and Perception: Attention normal.         Mood and Affect: Mood normal.         Speech: Speech normal.         Behavior: Behavior normal.         Judgment: Judgment normal.       Assessment & Plan   Diagnoses and all orders for this visit:    1. Chronic pain syndrome (Primary)    2. Cervical radiculopathy    3. Chronic midline low back pain without sciatica    4. Arthropathy of thoracic facet joint    5. Arthropathy of cervical facet joint    6. Congenital spondylolysis, lumbosacral region      --- The urine drug screen confirmation from 6/28/2022 has been reviewed and the result is appropriate based on patient history and ROSI report  --- CSA updated 5/26/2021  --- Refill [place] 5/325. Patient appears stable with current regimen. No adverse effects. Regarding " continuation of opioids, there is no evidence of aberrant behavior or any red flags.  The patient continues with appropriate response to opioid therapy. ADL's remain intact by self.   --- Refill Gabapentin 800mg.   --- Refill Methocarbamol.   --- Follow-up 3 months or sooner if needed.      ROSI REPORT  As part of the patient's treatment plan, I am prescribing controlled substances. The patient has been made aware of appropriate use of such medications, including potential risk of somnolence, limited ability to drive and/or work safely, and the potential for dependence or overdose. It has also been made clear that these medications are for use by this patient only, without concomitant use of alcohol or other substances unless prescribed.     Patient has completed prescribing agreement detailing terms of continued prescribing of controlled substances, including monitoring ROSI reports, urine drug screening, and pill counts if necessary. The patient is aware that inappropriate use will results in cessation of prescribing such medications.    As the clinician, I personally reviewed the ROSI from 11/14/2022 while the patient was in the office today.    History and physical exam exhibit continued safe and appropriate use of controlled substances.    Dictated utilizing Dragon dictation.      Patient remained masked during entire encounter. No cough present. I donned a mask and eye protection throughout entire visit. Prior to donning mask and eye protection, hand hygiene was performed, as well as when it was doffed.  I was closer than 6 feet, but not for an extended period of time. No obvious exposure to any bodily fluids.

## 2023-01-06 ENCOUNTER — PRIOR AUTHORIZATION (OUTPATIENT)
Dept: PAIN MEDICINE | Facility: CLINIC | Age: 53
End: 2023-01-06
Payer: COMMERCIAL

## 2023-01-06 RX ORDER — METHOCARBAMOL 750 MG/1
750 TABLET, FILM COATED ORAL 2 TIMES DAILY PRN
Qty: 60 TABLET | Refills: 2 | Status: SHIPPED | OUTPATIENT
Start: 2023-01-06

## 2023-01-06 RX ORDER — HYDROCODONE BITARTRATE AND ACETAMINOPHEN 5; 325 MG/1; MG/1
1 TABLET ORAL DAILY PRN
Qty: 30 TABLET | Refills: 0 | Status: SHIPPED | OUTPATIENT
Start: 2023-01-06

## 2023-01-11 NOTE — TELEPHONE ENCOUNTER
I received a fax back from [facility], in regards to patients insurance. The financial department stated that patient needs to see a provider in his own state. With patient having passport insurance, it will not cover or patient to be seen by a neurologist at the [facility].   clear

## 2023-02-08 ENCOUNTER — OFFICE VISIT (OUTPATIENT)
Dept: PAIN MEDICINE | Facility: CLINIC | Age: 53
End: 2023-02-08
Payer: COMMERCIAL

## 2023-02-08 VITALS
RESPIRATION RATE: 18 BRPM | HEART RATE: 78 BPM | HEIGHT: 70 IN | BODY MASS INDEX: 39.91 KG/M2 | TEMPERATURE: 96.2 F | SYSTOLIC BLOOD PRESSURE: 122 MMHG | DIASTOLIC BLOOD PRESSURE: 77 MMHG | WEIGHT: 278.8 LBS | OXYGEN SATURATION: 95 %

## 2023-02-08 DIAGNOSIS — M47.812 ARTHROPATHY OF CERVICAL FACET JOINT: ICD-10-CM

## 2023-02-08 DIAGNOSIS — G89.29 CHRONIC MIDLINE LOW BACK PAIN WITHOUT SCIATICA: ICD-10-CM

## 2023-02-08 DIAGNOSIS — G89.4 CHRONIC PAIN SYNDROME: ICD-10-CM

## 2023-02-08 DIAGNOSIS — M54.16 LUMBAR RADICULOPATHY: ICD-10-CM

## 2023-02-08 DIAGNOSIS — M54.12 CERVICAL RADICULOPATHY: Primary | ICD-10-CM

## 2023-02-08 DIAGNOSIS — M47.814 ARTHROPATHY OF THORACIC FACET JOINT: ICD-10-CM

## 2023-02-08 DIAGNOSIS — M54.50 CHRONIC MIDLINE LOW BACK PAIN WITHOUT SCIATICA: ICD-10-CM

## 2023-02-08 PROCEDURE — 99214 OFFICE O/P EST MOD 30 MIN: CPT | Performed by: NURSE PRACTITIONER

## 2023-02-08 PROCEDURE — 80305 DRUG TEST PRSMV DIR OPT OBS: CPT | Performed by: NURSE PRACTITIONER

## 2023-02-08 RX ORDER — FLUDROCORTISONE ACETATE 0.1 MG/1
0.1 TABLET ORAL DAILY
COMMUNITY
Start: 2022-12-24

## 2023-02-08 RX ORDER — GABAPENTIN 800 MG/1
800 TABLET ORAL 3 TIMES DAILY
Qty: 90 TABLET | Refills: 2 | Status: SHIPPED | OUTPATIENT
Start: 2023-02-08

## 2023-02-08 NOTE — PROGRESS NOTES
CHIEF COMPLAINT  Back and neck pain  Patient reports his pain level has continued to remain consistent since last OV. Complains of numbness left foot about a month ago. States he has a loop monitor recorder installed.     Subjective   Ronni Asif is a 52 y.o. male  who presents for follow-up.  He has a history of back and neck pain.  He reports new numbness in the toes of his left foot, he denies any other radicular pain or weakness in his left leg.     Today his pain is 7/10VAS in severity. His pain remains in his neck, mid back, and low back.  He continues with Norco 5/325 0-1/day. He is using very sparingly and trying to avoid taking this if possible. He states that instead of the Norco he will utilize ice and Tylenol which is helpful to his pain.     He continues with Gabapentin 800 mg 3/day, and Methocarbamol 750mg PRN (does not utilize daily). The Gabapentin decreases his pain by significant amount. He denies any side effects including somnolence.     He is planning to return to work, he states that he is trying to get off of disability.     Procedure list:  8/23/2022-bilateral T8-T10 RFA-80% relief x2 months with ongoing 60 to 70% relief  6/7/2022-bilateral C5-C7 MBB-20% relief (diagnostically negative)  11/30/2021-bilateral C3-C5 MBB-minimal relief (diagnostically negative)  10/7/2021-TAMEKA at C6-C7-minimal relief  7/27/2021-bilateral T8-T10 RFA-50% improvement lasting many months.   6/29/2021-bilateral T8-T10 MBB-80% relief x2 days  6/1/2021-bilateral T8-T10 MBB-80 to 90% relief x1 day     Back Pain  This is a chronic problem. The current episode started more than 1 year ago. The problem occurs daily. The problem is unchanged. The pain is present in the thoracic spine and lumbar spine. The quality of the pain is described as aching. The pain does not radiate. The pain is at a severity of 7/10. The pain is severe. The symptoms are aggravated by bending, standing and twisting. Stiffness is present in  the morning. Associated symptoms include numbness (left foot). Pertinent negatives include no abdominal pain, chest pain, dysuria, fever, headaches or weakness. Risk factors include obesity. He has tried analgesics, heat, ice, NSAIDs, muscle relaxant and home exercises (Norco 5/325) for the symptoms. The treatment provided significant relief.   Neck Pain   This is a chronic problem. The current episode started more than 1 year ago. The problem occurs constantly. The problem has been unchanged. The pain is present in the occipital region and midline. The quality of the pain is described as burning (bilateral upper extremity tingling). The pain is at a severity of 7/10. The symptoms are aggravated by bending (lying in bed). Associated symptoms include numbness (left foot). Pertinent negatives include no chest pain, fever, headaches or weakness. He has tried ice, oral narcotics and NSAIDs (PT) for the symptoms.      PEG Assessment   What number best describes your pain on average in the past week?6  What number best describes how, during the past week, pain has interfered with your enjoyment of life?7  What number best describes how, during the past week, pain has interfered with your general activity?  7    The following portions of the patient's history were reviewed and updated as appropriate: allergies, current medications, past family history, past medical history, past social history, past surgical history and problem list.    Review of Systems   Constitutional: Negative for activity change, fatigue and fever.   HENT: Negative for congestion.    Eyes: Negative for visual disturbance.   Respiratory: Negative for cough and chest tightness.    Cardiovascular: Negative for chest pain.   Gastrointestinal: Negative for abdominal pain, constipation and diarrhea.   Genitourinary: Negative for difficulty urinating and dysuria.   Musculoskeletal: Positive for back pain and neck pain.   Neurological: Positive for numbness  "(left foot). Negative for dizziness, weakness and headaches.   Psychiatric/Behavioral: Negative for agitation, sleep disturbance and suicidal ideas. The patient is not nervous/anxious.      --  The aforementioned information the Chief Complaint section and above subjective data including any HPI data, and also the Review of Systems data, has been personally reviewed and affirmed.  --    Vitals:    02/08/23 0751   BP: 122/77   BP Location: Right arm   Patient Position: Sitting   Cuff Size: Large Adult   Pulse: 78   Resp: 18   Temp: 96.2 °F (35.7 °C)   TempSrc: Temporal   SpO2: 95%   Weight: 126 kg (278 lb 12.8 oz)   Height: 177.8 cm (70\")   PainSc:   7     Objective   Physical Exam  Vitals and nursing note reviewed.   Constitutional:       Appearance: Normal appearance. He is well-developed.   Eyes:      General: Lids are normal.   Cardiovascular:      Rate and Rhythm: Normal rate.   Pulmonary:      Effort: Pulmonary effort is normal.   Musculoskeletal:      Cervical back: Decreased range of motion.      Thoracic back: Decreased range of motion.      Lumbar back: Decreased range of motion.   Neurological:      Mental Status: He is alert and oriented to person, place, and time.   Psychiatric:         Attention and Perception: Attention normal.         Mood and Affect: Mood normal.         Speech: Speech normal.         Behavior: Behavior normal.         Judgment: Judgment normal.       Assessment & Plan   Diagnoses and all orders for this visit:    1. Cervical radiculopathy (Primary)  -     gabapentin (NEURONTIN) 800 MG tablet; Take 1 tablet by mouth 3 (Three) Times a Day.  Dispense: 90 tablet; Refill: 2    2. Lumbar radiculopathy  -     gabapentin (NEURONTIN) 800 MG tablet; Take 1 tablet by mouth 3 (Three) Times a Day.  Dispense: 90 tablet; Refill: 2    3. Chronic pain syndrome    4. Arthropathy of thoracic facet joint    5. Arthropathy of cervical facet joint    6. Chronic midline low back pain without " sciatica      --- Routine UDS in office today as part of monitoring requirements for controlled substances.  The specimen was viewed and the immunoassay result reviewed and is NEG.  This specimen will be sent to [facility] laboratory for confirmation.     --- CSA updated in office today.   --- Continue sparing use of Norco, no refill needed. Patient appears stable with current regimen. No adverse effects. Regarding continuation of opioids, there is no evidence of aberrant behavior or any red flags.  The patient continues with appropriate response to opioid therapy. ADL's remain intact by self.   --- Refill Gabapentin  --- Follow-up 3 months or sooner if needed.      ROSI REPORT  As part of the patient's treatment plan, I am prescribing controlled substances. The patient has been made aware of appropriate use of such medications, including potential risk of somnolence, limited ability to drive and/or work safely, and the potential for dependence or overdose. It has also been made clear that these medications are for use by this patient only, without concomitant use of alcohol or other substances unless prescribed.     Patient has completed prescribing agreement detailing terms of continued prescribing of controlled substances, including monitoring ROSI reports, urine drug screening, and pill counts if necessary. The patient is aware that inappropriate use will results in cessation of prescribing such medications.    As the clinician, I personally reviewed the ROSI from 2/8/2023 while the patient was in the office today.    History and physical exam exhibit continued safe and appropriate use of controlled substances.    Dictated utilizing Dragon dictation.     Patient remained masked during entire encounter. No cough present. I donned a mask and eye protection throughout entire visit. Prior to donning mask and eye protection, hand hygiene was performed, as well as when it was doffed.  I was closer than 6 feet,  but not for an extended period of time. No obvious exposure to any bodily fluids.

## 2023-02-27 ENCOUNTER — OFFICE VISIT (OUTPATIENT)
Dept: NEUROSURGERY | Facility: CLINIC | Age: 53
End: 2023-02-27
Payer: COMMERCIAL

## 2023-02-27 VITALS — OXYGEN SATURATION: 96 % | SYSTOLIC BLOOD PRESSURE: 150 MMHG | DIASTOLIC BLOOD PRESSURE: 82 MMHG | HEART RATE: 87 BPM

## 2023-02-27 DIAGNOSIS — M48.02 CERVICAL SPINAL STENOSIS: Primary | ICD-10-CM

## 2023-02-27 DIAGNOSIS — M51.36 DDD (DEGENERATIVE DISC DISEASE), LUMBAR: ICD-10-CM

## 2023-02-27 DIAGNOSIS — Z98.1 HISTORY OF LUMBAR SPINAL FUSION: ICD-10-CM

## 2023-02-27 PROBLEM — M51.369 DDD (DEGENERATIVE DISC DISEASE), LUMBAR: Status: ACTIVE | Noted: 2023-02-27

## 2023-02-27 PROCEDURE — 99213 OFFICE O/P EST LOW 20 MIN: CPT | Performed by: NEUROLOGICAL SURGERY

## 2023-03-01 ENCOUNTER — APPOINTMENT (OUTPATIENT)
Dept: GENERAL RADIOLOGY | Facility: HOSPITAL | Age: 53
End: 2023-03-01
Payer: COMMERCIAL

## 2023-03-01 ENCOUNTER — APPOINTMENT (OUTPATIENT)
Dept: CT IMAGING | Facility: HOSPITAL | Age: 53
End: 2023-03-01
Payer: COMMERCIAL

## 2023-03-01 ENCOUNTER — HOSPITAL ENCOUNTER (EMERGENCY)
Facility: HOSPITAL | Age: 53
Discharge: HOME | End: 2023-03-01
Attending: EMERGENCY MEDICINE | Admitting: EMERGENCY MEDICINE
Payer: COMMERCIAL

## 2023-03-01 VITALS
SYSTOLIC BLOOD PRESSURE: 125 MMHG | TEMPERATURE: 98.4 F | DIASTOLIC BLOOD PRESSURE: 74 MMHG | BODY MASS INDEX: 38.65 KG/M2 | HEART RATE: 73 BPM | RESPIRATION RATE: 16 BRPM | WEIGHT: 270 LBS | OXYGEN SATURATION: 98 % | HEIGHT: 70 IN

## 2023-03-01 DIAGNOSIS — S50.12XA CONTUSION OF LEFT FOREARM, INITIAL ENCOUNTER: Primary | ICD-10-CM

## 2023-03-01 DIAGNOSIS — M54.42 ACUTE LEFT-SIDED LOW BACK PAIN WITH LEFT-SIDED SCIATICA: ICD-10-CM

## 2023-03-01 DIAGNOSIS — S43.402A SPRAIN OF LEFT SHOULDER, UNSPECIFIED SHOULDER SPRAIN TYPE, INITIAL ENCOUNTER: ICD-10-CM

## 2023-03-01 DIAGNOSIS — S20.222A BACK CONTUSION, LEFT, INITIAL ENCOUNTER: ICD-10-CM

## 2023-03-01 PROCEDURE — 99283 EMERGENCY DEPT VISIT LOW MDM: CPT

## 2023-03-01 PROCEDURE — 70450 CT HEAD/BRAIN W/O DYE: CPT

## 2023-03-01 PROCEDURE — 73090 X-RAY EXAM OF FOREARM: CPT

## 2023-03-01 PROCEDURE — 73030 X-RAY EXAM OF SHOULDER: CPT

## 2023-03-01 PROCEDURE — 72131 CT LUMBAR SPINE W/O DYE: CPT

## 2023-03-01 RX ORDER — OXYCODONE HYDROCHLORIDE AND ACETAMINOPHEN 5; 325 MG/1; MG/1
1 TABLET ORAL ONCE
Status: COMPLETED | OUTPATIENT
Start: 2023-03-01 | End: 2023-03-01

## 2023-03-01 RX ADMIN — OXYCODONE AND ACETAMINOPHEN 1 TABLET: 5; 325 TABLET ORAL at 14:16

## 2023-03-01 NOTE — DISCHARGE INSTRUCTIONS
Rest as needed.  May apply heating pad to the affected area of the lower back as needed.  Please return to the emergency room for any worsening symptoms or concerns.

## 2023-03-01 NOTE — ED TRIAGE NOTES
Patient to ER via car from home for slip and fall down 20 steps    Patient has previous back surgery  States he has leg numbness back pain and shoulder pain  Did hit his head no loc is on Asprin    Patient wearing mask this RN in PPE

## 2023-03-01 NOTE — ED PROVIDER NOTES
EMERGENCY DEPARTMENT ENCOUNTER    Room Number:  10/10  Date of encounter:  3/3/2023  PCP: Gil Chambers MD  Patient Care Team:  Gil Chambers MD as PCP - General (Family Medicine)   Independent Historians: Patient, family    HPI:  Chief Complaint: Fall, back pain    A complete HPI/ROS/PMH/PSH/SH/FH are unobtainable due to: Nothing    Chronic or social conditions impacting patient care (Social Determinants of Health): None  (Financial Resource Strain / Food Insecurity / Transportation Needs / Physical Activity / Stress / Social Connections / Intimate Partner Violence / Housing Stability)    Context: Ronni Asif is a 52 y.o. male who presents to the ED c/o having a fall just prior to arrival.  He reports that he was going down some steps at home and slipped on some water from a roof leak.  He reports that he injured his left low back.  He reports the pain radiates down his left posterior leg.  He denies any weakness or numbness.  He denies any bowel or bladder incontinence.  He reports some left shoulder pain as well as left forearm pain.  He denies loss of consciousness.  He denies any headache.  He reports that he saw neurosurgery yesterday in the office for follow-up.    Review of prior external notes (non-ED): Neurosurgery note dated yesterday shows cervical spinal stenosis and prior spinal fusion.  X-rays of the lumbar spine with flexion and extension were ordered.    Review of prior external test results outside of this encounter: Urine toxicology screen dated 2/8/2023 was negative.  Toxicology screen on 6/28/2022 was also negative.    PAST MEDICAL HISTORY  Active Ambulatory Problems     Diagnosis Date Noted   • Lumbar radiculopathy 05/03/2016   • Congenital spondylolysis, lumbosacral region 06/10/2016   • Transient cerebral ischemia 08/17/2016   • Pain in thoracic spine 03/06/2018   • Neck pain 03/06/2018   • Degeneration of thoracic intervertebral disc 05/07/2018   • Degeneration  of intervertebral disc at C5-C6 level 05/07/2018   • Type 2 diabetes mellitus with circulatory disorder, without long-term current use of insulin ([facility]) 07/24/2018   • Obstructive sleep apnea, adult 07/24/2018   • Essential hypertension 07/24/2018   • Moderate asthma without complication 03/05/2019   • Gastroesophageal reflux disease 05/14/2019   • Diarrhea 05/14/2019   • History of colon polyps 05/14/2019   • Hyperglycemia 08/25/2019   • Surgery follow-up examination 09/13/2019   • Morbidly obese ([facility]) 11/21/2019   • Chest pain 10/11/2018   • Chronic pain 08/21/2017   • Coronary artery disease 01/14/2019   • Gout 01/30/2019   • Labile blood pressure 09/27/2018   • Mixed anxiety depressive disorder 08/21/2017   • Secondary male hypogonadism 08/21/2017   • Shoulder impingement syndrome, left 01/17/2019   • Syncope and collapse 08/21/2017   • Vitamin D deficiency 01/30/2019   • DEVAUGHN on CPAP 08/21/2017   • Thoracic radiculopathy 01/08/2020   • History of lumbar spinal fusion 11/23/2020   • Chronic midline low back pain without sciatica 11/23/2020   • Chronic bilateral thoracic back pain 11/23/2020   • Arthropathy of thoracic facet joint 03/15/2021   • Spondylosis without myelopathy or radiculopathy, thoracic region 04/30/2021   • Encounter for long-term (current) use of high-risk medication 05/26/2021   • Cervical radiculopathy 09/03/2021   • Bulge of cervical disc without myelopathy 09/28/2021   • Arthropathy of cervical facet joint 11/04/2021   • Cervical spinal stenosis 12/29/2021   • DDD (degenerative disc disease), lumbar 02/27/2023     Resolved Ambulatory Problems     Diagnosis Date Noted   • Foot drop, left 08/25/2019   • Lumbar disc herniation with radiculopathy 08/25/2019     Past Medical History:   Diagnosis Date   • Anxiety    • Asthma    • Cancer ([facility])    • Diabetes mellitus ([facility])    • Dizziness    • ED (erectile dysfunction)    • GERD (gastroesophageal reflux disease)    • Headache    • Hyperlipidemia    •  Hypertension    • Injury of back 03/2016   • Kidney stone    • Neuromuscular disorder ([facility])    • Pneumonia    • Sleep apnea    • Stroke ([facility])    • TIA (transient ischemic attack)        The patient has a COVID HM Topic on their chart, and they are fully vaccinated.    PAST SURGICAL HISTORY  Past Surgical History:   Procedure Laterality Date   • CARDIAC DEFIBRILLATOR PLACEMENT     • CERVICAL EPIDURAL N/A 10/07/2021    Procedure: CERVICAL EPIDURAL;  Surgeon: Abdirashid Gonzalez MD;  [place] [facility] [place];  Service: Pain Management;  Laterality: N/A;   • COLONOSCOPY  2014   • COLONOSCOPY N/A 05/23/2019    Procedure: COLONOSCOPY to Cecum with Hot and Cold Polypectomy x 2;  Surgeon: Navid Zafar Jr., MD;  Location: [facility] ENDOSCOPY;  Service: General   • ENDOSCOPY N/A 05/23/2019    Procedure: ESOPHAGOGASTRODUODENOSCOPY with Bx's;  Surgeon: Navid Zafar Jr., MD;  Location: [facility] ENDOSCOPY;  Service: General   • HERNIA REPAIR  10/2015   • LAPAROSCOPIC GASTRIC BANDING      June 2020   • LUMBAR DISCECTOMY FUSION INSTRUMENTATION Bilateral 08/30/2019    Procedure: LUMBAR 5 TO SACRAL 1 OPEN DECOMPRESSION WITH  POSTERIOR LUMBAR INTERBODY FUSION, CAGE AND SCREW;  Surgeon: Jake Tripathi MD;  Location: [facility] MAIN OR;  Service: Neurosurgery   • MEDIAL BRANCH BLOCK Bilateral 06/01/2021    Procedure: thoracic medial branch block (bilateral T9-11 vs T8-10) x2, 1-2 wks apart - diagnostic;  Surgeon: Abdirashid Gonzalez MD;  [place] [facility] [place];  Service: Pain Management;  Laterality: Bilateral;   • MEDIAL BRANCH BLOCK Bilateral 06/29/2021    Procedure: Bilateral T8-T10 MEDIAL BRANCH BLOCK;  Surgeon: Abdirashid Gonzalez MD;  [place] [facility] [place];  Service: Pain Management;  Laterality: Bilateral;   • MEDIAL BRANCH BLOCK Bilateral 11/30/2021    Procedure: Bilateral cervical MEDIAL BRANCH BLOCK at approximately C4-C6;  Surgeon: Abdirashid Gonzalez MD;  [place] [facility] [place];  Service: Pain Management;  Laterality:  Bilateral;   • MEDIAL BRANCH BLOCK Bilateral 06/07/2022    Procedure: Bilateral C5-C7 MEDIAL BRANCH BLOCK;  Surgeon: Abdirashid Gonzalez MD;  Location[facility];  Service: Pain Management;  Laterality: Bilateral;   • RADIOFREQUENCY ABLATION Bilateral 07/27/2021    Procedure: Bilateral T8-T10 RADIOFREQUENCY ABLATION;  Surgeon: Abdirashid Gonzalez MD;  Location: [facility];  Service: Pain Management;  Laterality: Bilateral;   • RADIOFREQUENCY ABLATION Bilateral 08/23/2022    Procedure: RADIOFREQUENCY ABLATION Bilateral T8-T10;  Surgeon: Abdirashid Gonzalez MD;  Location: [facility];  Service: Pain Management;  Laterality: Bilateral;         FAMILY HISTORY  Family History   Problem Relation Age of Onset   • Brain cancer Father    • Stroke Father    • Hypertension Father    • Atrial fibrillation Father    • Heart disease Father    • Hypertension Mother    • Diabetes Mother    • Heart disease Mother    • Hypertension Brother    • Colon cancer Maternal Grandmother    • Lung cancer Maternal Grandfather    • Bone cancer Maternal Grandfather          SOCIAL HISTORY  Social History     Socioeconomic History   • Marital status:    Tobacco Use   • Smoking status: Never   • Smokeless tobacco: Never   • Tobacco comments:     no caffeine    Substance and Sexual Activity   • Alcohol use: No     Comment: caffeine weekly   • Drug use: No   • Sexual activity: Defer         ALLERGIES  Patient has no known allergies.        REVIEW OF SYSTEMS  Review of Systems   No chest pain, no shortness of breath, no syncope, no nausea, no vomiting, no fever, no chills, no headache, no focal weakness, no focal numbness, no bowel incontinence, no bladder incontinence  All systems reviewed and negative except for those discussed in HPI.       PHYSICAL EXAM    I have reviewed the triage vital signs and nursing notes.    ED Triage Vitals [03/01/23 1317]   Temp Heart Rate Resp BP SpO2   -- 105 16 -- 97 %      Temp src Heart Rate Source  Patient Position BP Location FiO2 (%)   -- -- -- -- --       Physical Exam  GENERAL: Awake, alert, no acute distress  SKIN: Warm, dry  HENT: Normocephalic, atraumatic  EYES: no scleral icterus  CV: regular rhythm, regular rate  RESPIRATORY: normal effort, lungs clear  ABDOMEN: soft, nontender, nondistended  MUSCULOSKELETAL: no deformity, abrasion to the left forearm along the ulnar aspect with a hematoma at the site.  The patient has tenderness with range of motion of his left shoulder.  No open wounds.  No deformity.  He has no cervical or thoracic tenderness.  He does have an abrasion and left-sided paraspinal lumbar tenderness.  No significant midline lumbar tenderness.  Distal pulses, motor, sensation are intact.  NEURO: alert, moves all extremities, follows commands          LAB RESULTS  No results found for this or any previous visit (from the past 24 hour(s)).    Ordered the above labs and independently reviewed the results.        RADIOLOGY  No Radiology Exams Resulted Within Past 24 Hours    I ordered the above noted radiological studies. Reviewed by me and discussed with radiologist.  See dictation for official radiology interpretation.      PROCEDURES    Procedures      MEDICATIONS GIVEN IN ER    Medications   oxyCODONE-acetaminophen (PERCOCET) 5-325 MG per tablet 1 tablet (1 tablet Oral Given 3/1/23 1416)         ORDERS PLACED DURING THIS VISIT:  Orders Placed This Encounter   Procedures   • CT Lumbar Spine Without Contrast   • XR Forearm 2 View Left   • XR Shoulder 2+ View Left   • CT Head Without Contrast   • Apply ice to affected area         PROGRESS, DATA ANALYSIS, CONSULTS, AND MEDICAL DECISION MAKING    All labs have been independently interpreted by me.  All radiology studies have been reviewed by me and discussed with radiologist dictating the report.   EKG's independently viewed and interpreted by me.  Discussion below represents my analysis of pertinent findings related to patient's condition,  differential diagnosis, treatment plan and final disposition.    Differential diagnosis includes but is not limited to lumbar fracture, lumbar strain, cauda equina syndrome, forearm fracture, shoulder dislocation, shoulder fracture, intracranial hemorrhage.    ED Course as of 03/03/23 2146   Wed Mar 01, 2023   1355 I considered performing a CT of the patient's head given his trauma and striking his head however without a headache and not being anticoagulated with no neurologic symptoms I do not think it is currently indicated. [TR]   1356 XR Forearm 2 View Left  My independent interpretation of the left forearm x-ray is no fracture.  No foreign body. [TR]   1356 XR Shoulder 2+ View Left  My independent interpretation of the left shoulder x-ray is no fracture.  No dislocation. [TR]   1453 The patient is now reporting a headache.  CT of the head ordered given his trauma. [TR]   1501 I reviewed the work-up and findings with the patient and family at the bedside.  Answered all questions.  We are pending CT head and CT of the lumbar spine.  He reports his pain is improved.  If his imaging of his head and his cervical spine are negative for acute process today [TR]   1515 Care to Dr. Fay pending CT of the head and CT of the lumbar spine.  If imaging is negative and the patient is able to ambulate, he should be able to go home.  He is on chronic pain medicine at home.  Steroids and muscle relaxants could be added to help with the symptoms.  He has an outpatient neurosurgeon he can follow-up with. [TR]   1553 I discussed with radiologist about the head CT study and he tells me that it is negative for any acute intracranial injury [HUY]   1620 I discussed with the patient about the CT findings and x-ray findings today which are all reassuring.  Patient seems to be resting comfortably at the time of my conversation with him.  I think he is safe to discharge home at this point time.  He reassures me that he has prescriptions  for hydrocodone and methocarbamol and gabapentin at home that he can take for his pain symptoms.  I advised him to follow-up with PCP or return here if he has any worsening symptoms or concerns [HUY]      ED Course User Index  [HUY] Rl Davis MD  [TR] Ralph Olmedo MD         PPE: The patient wore a mask and I wore an N95 mask throughout the entire patient encounter.       AS OF 21:46 EST VITALS:    BP - 125/74  HR - 73  TEMP - 98.4 °F (36.9 °C) (Oral)  O2 SATS - 98%        DIAGNOSIS  Final diagnoses:   Contusion of left forearm, initial encounter   Sprain of left shoulder, unspecified shoulder sprain type, initial encounter   Acute left-sided low back pain with left-sided sciatica   Back contusion, left, initial encounter         DISPOSITION  ED Disposition     ED Disposition   Discharge    Condition   Stable    Comment   --                Note Disclaimer: At [facility], we believe that sharing information builds trust and better relationships. You are receiving this note because you recently visited [facility]. It is possible you will see health information before a provider has talked with you about it. This kind of information can be easy to misunderstand. To help you fully understand what it means for your health, we urge you to discuss this note with your provider.       Ralph Olmedo MD  03/01/23 [id number]       Ralph Olmedo MD  03/03/23 1421

## 2023-03-02 RX ORDER — HYDROCODONE BITARTRATE AND ACETAMINOPHEN 7.5; 325 MG/1; MG/1
1 TABLET ORAL EVERY 8 HOURS PRN
Qty: 50 TABLET | Refills: 0 | Status: SHIPPED | OUTPATIENT
Start: 2023-03-02

## 2023-03-07 ENCOUNTER — TELEPHONE (OUTPATIENT)
Dept: NEUROSURGERY | Facility: CLINIC | Age: 53
End: 2023-03-07
Payer: COMMERCIAL

## 2023-03-07 NOTE — TELEPHONE ENCOUNTER
Called patient informed per Dr. Tripathi looked at the CT scan and it looks fine.  Reinforce that he should be careful when climbing steps. Patient understood

## 2023-03-07 NOTE — TELEPHONE ENCOUNTER
----- Message from Jake Tripathi MD sent at 3/6/2023  5:24 PM EST -----  Regarding: FW: Emergency room visit  Contact: [phone]  Tell him I looked at the CT scan and it looks fine.  Reinforce that he should be careful when climbing steps.  ----- Message -----  From: Marcia Ambrocio MA  Sent: 3/6/2023  10:03 AM EST  To: Jake Tripathi MD  Subject: FW: Emergency room visit                           ----- Message -----  From: Ronni Asif  Sent: 3/6/2023   9:51 AM EST  To: [facility]  Subject: Emergency room visit                             Hello  So recently I fell down a staircase and landed  On my lumbar area.  I had so.e CT SCANS down and show nothing  Change.  I just thought you may want to look at ct scans  Or just be notified of this accident.   I'm doing well now and watching my steps.    Thank you  Have a great day   Ronni asif

## 2023-04-17 RX ORDER — HYDROCODONE BITARTRATE AND ACETAMINOPHEN 5; 325 MG/1; MG/1
1 TABLET ORAL DAILY PRN
Qty: 30 TABLET | Refills: 0 | Status: SHIPPED | OUTPATIENT
Start: 2023-04-17

## 2023-05-08 ENCOUNTER — PREP FOR SURGERY (OUTPATIENT)
Dept: SURGERY | Facility: SURGERY CENTER | Age: 53
End: 2023-05-08
Payer: COMMERCIAL

## 2023-05-08 ENCOUNTER — OFFICE VISIT (OUTPATIENT)
Dept: PAIN MEDICINE | Facility: CLINIC | Age: 53
End: 2023-05-08
Payer: COMMERCIAL

## 2023-05-08 VITALS
HEIGHT: 70 IN | HEART RATE: 91 BPM | DIASTOLIC BLOOD PRESSURE: 78 MMHG | WEIGHT: 277 LBS | BODY MASS INDEX: 39.65 KG/M2 | SYSTOLIC BLOOD PRESSURE: 131 MMHG | TEMPERATURE: 98.4 F | OXYGEN SATURATION: 97 %

## 2023-05-08 DIAGNOSIS — M54.50 CHRONIC MIDLINE LOW BACK PAIN WITHOUT SCIATICA: ICD-10-CM

## 2023-05-08 DIAGNOSIS — G89.29 CHRONIC MIDLINE LOW BACK PAIN WITHOUT SCIATICA: ICD-10-CM

## 2023-05-08 DIAGNOSIS — M47.812 ARTHROPATHY OF CERVICAL FACET JOINT: ICD-10-CM

## 2023-05-08 DIAGNOSIS — M62.838 MUSCLE SPASM: ICD-10-CM

## 2023-05-08 DIAGNOSIS — M47.814 ARTHROPATHY OF THORACIC FACET JOINT: ICD-10-CM

## 2023-05-08 DIAGNOSIS — M47.814 ARTHROPATHY OF THORACIC FACET JOINT: Primary | ICD-10-CM

## 2023-05-08 DIAGNOSIS — M54.16 LUMBAR RADICULOPATHY: ICD-10-CM

## 2023-05-08 DIAGNOSIS — G89.4 CHRONIC PAIN SYNDROME: ICD-10-CM

## 2023-05-08 DIAGNOSIS — M54.12 CERVICAL RADICULOPATHY: Primary | ICD-10-CM

## 2023-05-08 RX ORDER — GABAPENTIN 800 MG/1
800 TABLET ORAL 3 TIMES DAILY
Qty: 90 TABLET | Refills: 2 | Status: SHIPPED | OUTPATIENT
Start: 2023-05-08

## 2023-05-08 RX ORDER — FENOFIBRATE 145 MG/1
145 TABLET, COATED ORAL DAILY
COMMUNITY
Start: 2023-03-31

## 2023-05-08 RX ORDER — METHOCARBAMOL 750 MG/1
750 TABLET, FILM COATED ORAL 2 TIMES DAILY PRN
Qty: 60 TABLET | Refills: 2 | Status: SHIPPED | OUTPATIENT
Start: 2023-05-08

## 2023-05-08 RX ORDER — OMEGA-3-ACID ETHYL ESTERS 1 G/1
1 CAPSULE, LIQUID FILLED ORAL DAILY
COMMUNITY
Start: 2023-04-03

## 2023-05-08 RX ORDER — SODIUM CHLORIDE 0.9 % (FLUSH) 0.9 %
10 SYRINGE (ML) INJECTION EVERY 12 HOURS SCHEDULED
OUTPATIENT
Start: 2023-05-08

## 2023-05-08 RX ORDER — SODIUM CHLORIDE 0.9 % (FLUSH) 0.9 %
10 SYRINGE (ML) INJECTION AS NEEDED
OUTPATIENT
Start: 2023-05-08

## 2023-05-08 NOTE — PROGRESS NOTES
CHIEF COMPLAINT  F/U back and neck pain- patient states that his pain has worsened since his last visit.     Subjective   Ronni Asif is a 53 y.o. male  who presents for follow-up.  He has a history of back and neck pain.  Today his pain is 5/10VAS in severity. His mid-back pain has increased since his last office visit. This has previously responded well to thoracic RFA with 60-70% relief lasting longer then 6 months. He has previously failed physical therapy with regards to this pain.     He continues with Gabapentin 800 mg 3/day, and Methocarbamol 750mg PRN (has increased to daily since his fall). He also continues with Norco 5/325 very sparingly 0-1/day. He is also utilize OTC advil and ice. This medication regimen decreases his pain by a significant amount. ADLs by self. He denies any side effects including somnolence or constipation.     Cervical medial branch blocks were diagnostically negative at multiple levels, TAMEKA provided no relief.     He fell on March 1st and Dr. Gonzalez temporarily increased his pain medication to [place] 7.5/325. He has now returned to Hayes 5/325.     Procedure list:  8/23/2022-bilateral T8-T10 RFA-80% relief x2 months with ongoing 60 to 70% relief x ~ 6 months. His pain is now starting to return  6/7/2022-bilateral C5-C7 MBB-20% relief (diagnostically negative)  11/30/2021-bilateral C3-C5 MBB-minimal relief (diagnostically negative)  10/7/2021-TAMEKA at C6-C7-minimal relief  7/27/2021-bilateral T8-T10 RFA-50% improvement lasting many months.   6/29/2021-bilateral T8-T10 MBB-80% relief x2 days  6/1/2021-bilateral T8-T10 MBB-80 to 90% relief x1 day    Back Pain  This is a chronic problem. The current episode started more than 1 year ago. The problem occurs daily. The problem is unchanged. The pain is present in the thoracic spine and lumbar spine. The quality of the pain is described as aching. The pain does not radiate. The pain is at a severity of 5/10. The pain is severe. The  symptoms are aggravated by bending, standing and twisting. Stiffness is present in the morning. Associated symptoms include abdominal pain and headaches. Pertinent negatives include no chest pain, dysuria, fever, numbness or weakness. Risk factors include obesity. He has tried analgesics, heat, ice, NSAIDs, muscle relaxant and home exercises (Norco 5/325) for the symptoms. The treatment provided significant relief.   Neck Pain   This is a chronic problem. The current episode started more than 1 year ago. The problem occurs constantly. The problem has been unchanged. The pain is present in the occipital region and midline. The quality of the pain is described as burning (bilateral upper extremity tingling). The pain is at a severity of 5/10. The symptoms are aggravated by bending (lying in bed). Associated symptoms include headaches. Pertinent negatives include no chest pain, fever, numbness or weakness. He has tried ice, oral narcotics and NSAIDs (PT) for the symptoms.      PEG Assessment   What number best describes your pain on average in the past week?5  What number best describes how, during the past week, pain has interfered with your enjoyment of life?6  What number best describes how, during the past week, pain has interfered with your general activity?  7    Review of Pertinent Medical Data ---  Office visit from 2/27/2023 with Dr. Tripathi.  Patient reports neck, mid, and low back pain with radiating numbness bilaterally into the pinky and index fingers.  He also has numbness in the left foot.  Approximately 4 years ago he underwent an L5-S1 fusion.  He has some cervical stenosis at C5-C6 and C6-C7.  He does not have significant arm pain but he does have numbness and tingling.  His back and legs are generally doing well.  We will follow-up in 1 year with x-rays.    The following portions of the patient's history were reviewed and updated as appropriate: allergies, current medications, past family history,  "past medical history, past social history, past surgical history and problem list.    Review of Systems   Constitutional: Positive for activity change (decreased). Negative for chills, fatigue and fever.   HENT: Negative for congestion.    Eyes: Negative for visual disturbance.   Respiratory: Negative for chest tightness and shortness of breath.    Cardiovascular: Negative for chest pain.   Gastrointestinal: Positive for abdominal pain and diarrhea. Negative for constipation.   Genitourinary: Negative for difficulty urinating and dysuria.   Musculoskeletal: Positive for back pain and neck pain. Negative for arthralgias.   Neurological: Positive for light-headedness and headaches. Negative for dizziness, weakness and numbness.   Psychiatric/Behavioral: Positive for sleep disturbance. Negative for agitation. The patient is not nervous/anxious.      --  The aforementioned information the Chief Complaint section and above subjective data including any HPI data, and also the Review of Systems data, has been personally reviewed and affirmed.  --    Vitals:    05/08/23 1059   BP: 131/78   Pulse: 91   Temp: 98.4 °F (36.9 °C)   SpO2: 97%   Weight: 126 kg (277 lb)   Height: 177.8 cm (70\")   PainSc:   5   PainLoc: Back     Objective   Physical Exam  Vitals and nursing note reviewed.   Constitutional:       Appearance: Normal appearance. He is well-developed.   Eyes:      General: Lids are normal.   Cardiovascular:      Rate and Rhythm: Normal rate.   Pulmonary:      Effort: Pulmonary effort is normal.   Musculoskeletal:      Cervical back: Tenderness present. Decreased range of motion.      Thoracic back: Tenderness and bony tenderness present. Decreased range of motion.      Lumbar back: Decreased range of motion.   Neurological:      Mental Status: He is alert and oriented to person, place, and time.   Psychiatric:         Attention and Perception: Attention normal.         Mood and Affect: Mood normal.         Speech: " Speech normal.         Behavior: Behavior normal.         Judgment: Judgment normal.       Assessment & Plan   Diagnoses and all orders for this visit:    1. Cervical radiculopathy (Primary)  -     gabapentin (NEURONTIN) 800 MG tablet; Take 1 tablet by mouth 3 (Three) Times a Day.  Dispense: 90 tablet; Refill: 2    2. Lumbar radiculopathy  -     gabapentin (NEURONTIN) 800 MG tablet; Take 1 tablet by mouth 3 (Three) Times a Day.  Dispense: 90 tablet; Refill: 2    3. Chronic pain syndrome    4. Arthropathy of thoracic facet joint    5. Arthropathy of cervical facet joint    6. Chronic midline low back pain without sciatica    7. Muscle spasm  -     methocarbamol (ROBAXIN) 750 MG tablet; Take 1 tablet by mouth 2 (Two) Times a Day As Needed for Muscle Spasms.  Dispense: 60 tablet; Refill: 2      Thermal Radiofrequency Destruction    This repeat thermal radiofrequency destruction (RFA) of cervical, thoracic, or lumbar paravertebral facet joint (medial branch) nerves at the same anatomical site is considered medically reasonable and necessary as this patient has met the criteria of having a minimum of consistent 50% improvement in pain for at least six (6) months or at least 50% consistent improvement in the ability to perform previously painful movements and ADLs as compared to baseline measurement using the same scale.      --- Pain / Disability Scale    The scale used for measurement of pain and/or disability for this patient was the [place] back pain disability scale.  The score was 26 on 05/08/2023    --- Repeat Bilateral T8-T10 RFA  Reviewed the procedure at length with the patient.  Included in the review was expectations, complications, risk and benefits.The procedure was described in detail and the risks, benefits and alternatives were discussed with the patient (including but not limited to: bleeding, infection, nerve damage, worsening of pain, inability to perform injection, paralysis, seizures, coma, no pain  relief and death) who agreed to proceed.  Discussed the potential for sedation if warranted/wanted.  The procedure will plan to be performed at [facility] with fluoroscopic guidance(unless ultrasound is indicated) and could potentially have steroids and contrast dye used. Questions were answered and in a way the patient could understand.  Patient verbalized understanding and wishes to proceed.  This intervention will be ordered.  Discussed with patient that all procedures are part of a multimodal plan of care and include either formal PT or a home exercise program.  Patient has no evidence of coagulopathy or current infection.    --- The urine drug screen confirmation from 2/8/2023 has been reviewed and the result is appropriate based on patient history and ROSI report  --- CSA updated 2/8/2023  --- Continue sparing use of Norco 5/325. No refill needed. Patient appears stable with current regimen. No adverse effects. Regarding continuation of opioids, there is no evidence of aberrant behavior or any red flags.  The patient continues with appropriate response to opioid therapy. ADL's remain intact by self.   --- Refill Gabapentin.   --- Refill Methocarbamol.   --- Follow-up 3 months or sooner if needed.      ROSI REPORT  As part of the patient's treatment plan, I am prescribing controlled substances. The patient has been made aware of appropriate use of such medications, including potential risk of somnolence, limited ability to drive and/or work safely, and the potential for dependence or overdose. It has also been made clear that these medications are for use by this patient only, without concomitant use of alcohol or other substances unless prescribed.     Patient has completed prescribing agreement detailing terms of continued prescribing of controlled substances, including monitoring ROSI reports, urine drug screening, and pill counts if necessary. The patient is aware that  inappropriate use will results in cessation of prescribing such medications.    As the clinician, I personally reviewed the ROSI from 5/8/2023 while the patient was in the office today.    History and physical exam exhibit continued safe and appropriate use of controlled substances.    Dictated utilizing Dragon dictation.

## 2023-05-23 ENCOUNTER — TRANSCRIBE ORDERS (OUTPATIENT)
Dept: SURGERY | Facility: SURGERY CENTER | Age: 53
End: 2023-05-23
Payer: COMMERCIAL

## 2023-05-23 DIAGNOSIS — Z41.9 SURGERY, ELECTIVE: Primary | ICD-10-CM

## 2023-05-31 ENCOUNTER — HOSPITAL ENCOUNTER (EMERGENCY)
Facility: HOSPITAL | Age: 53
Discharge: HOME | End: 2023-05-31
Attending: EMERGENCY MEDICINE | Admitting: EMERGENCY MEDICINE
Payer: COMMERCIAL

## 2023-05-31 ENCOUNTER — APPOINTMENT (OUTPATIENT)
Dept: CT IMAGING | Facility: HOSPITAL | Age: 53
End: 2023-05-31
Payer: COMMERCIAL

## 2023-05-31 VITALS
RESPIRATION RATE: 16 BRPM | DIASTOLIC BLOOD PRESSURE: 78 MMHG | WEIGHT: 271.3 LBS | HEART RATE: 63 BPM | SYSTOLIC BLOOD PRESSURE: 134 MMHG | OXYGEN SATURATION: 96 % | HEIGHT: 71 IN | BODY MASS INDEX: 37.98 KG/M2 | TEMPERATURE: 98.2 F

## 2023-05-31 DIAGNOSIS — R53.1 GENERALIZED WEAKNESS: Primary | ICD-10-CM

## 2023-05-31 DIAGNOSIS — R42 DIZZINESS: ICD-10-CM

## 2023-05-31 LAB
ALBUMIN SERPL-MCNC: 4.5 G/DL (ref 3.5–5.2)
ALBUMIN/GLOB SERPL: 1.7 G/DL
ALP SERPL-CCNC: 86 U/L (ref 39–117)
ALT SERPL W P-5'-P-CCNC: 39 U/L (ref 1–41)
ANION GAP SERPL CALCULATED.3IONS-SCNC: 11.5 MMOL/L (ref 5–15)
AST SERPL-CCNC: 36 U/L (ref 1–40)
BASOPHILS # BLD AUTO: 0.03 10*3/MM3 (ref 0–0.2)
BASOPHILS NFR BLD AUTO: 0.4 % (ref 0–1.5)
BILIRUB SERPL-MCNC: 0.5 MG/DL (ref 0–1.2)
BILIRUB UR QL STRIP: NEGATIVE
BUN SERPL-MCNC: 10 MG/DL (ref 6–20)
BUN/CREAT SERPL: 13.2 (ref 7–25)
CALCIUM SPEC-SCNC: 9.5 MG/DL (ref 8.6–10.5)
CHLORIDE SERPL-SCNC: 96 MMOL/L (ref 98–107)
CLARITY UR: CLEAR
CO2 SERPL-SCNC: 26.5 MMOL/L (ref 22–29)
COLOR UR: YELLOW
CREAT SERPL-MCNC: 0.76 MG/DL (ref 0.76–1.27)
DEPRECATED RDW RBC AUTO: 41.7 FL (ref 37–54)
EGFRCR SERPLBLD CKD-EPI 2021: 107.5 ML/MIN/1.73
EOSINOPHIL # BLD AUTO: 0.25 10*3/MM3 (ref 0–0.4)
EOSINOPHIL NFR BLD AUTO: 3.2 % (ref 0.3–6.2)
ERYTHROCYTE [DISTWIDTH] IN BLOOD BY AUTOMATED COUNT: 13 % (ref 12.3–15.4)
GEN 5 2HR TROPONIN T REFLEX: 7 NG/L
GLOBULIN UR ELPH-MCNC: 2.7 GM/DL
GLUCOSE SERPL-MCNC: 131 MG/DL (ref 65–99)
GLUCOSE UR STRIP-MCNC: NEGATIVE MG/DL
HCT VFR BLD AUTO: 45.1 % (ref 37.5–51)
HGB BLD-MCNC: 14.6 G/DL (ref 13–17.7)
HGB UR QL STRIP.AUTO: NEGATIVE
IMM GRANULOCYTES # BLD AUTO: 0.02 10*3/MM3 (ref 0–0.05)
IMM GRANULOCYTES NFR BLD AUTO: 0.3 % (ref 0–0.5)
KETONES UR QL STRIP: NEGATIVE
LEUKOCYTE ESTERASE UR QL STRIP.AUTO: NEGATIVE
LYMPHOCYTES # BLD AUTO: 3.25 10*3/MM3 (ref 0.7–3.1)
LYMPHOCYTES NFR BLD AUTO: 41.6 % (ref 19.6–45.3)
MCH RBC QN AUTO: 27.9 PG (ref 26.6–33)
MCHC RBC AUTO-ENTMCNC: 32.4 G/DL (ref 31.5–35.7)
MCV RBC AUTO: 86.2 FL (ref 79–97)
MONOCYTES # BLD AUTO: 0.65 10*3/MM3 (ref 0.1–0.9)
MONOCYTES NFR BLD AUTO: 8.3 % (ref 5–12)
NEUTROPHILS NFR BLD AUTO: 3.62 10*3/MM3 (ref 1.7–7)
NEUTROPHILS NFR BLD AUTO: 46.2 % (ref 42.7–76)
NITRITE UR QL STRIP: NEGATIVE
PH UR STRIP.AUTO: 7 [PH] (ref 4.5–8)
PLATELET # BLD AUTO: 245 10*3/MM3 (ref 140–450)
PMV BLD AUTO: 9.3 FL (ref 6–12)
POTASSIUM SERPL-SCNC: 3.6 MMOL/L (ref 3.5–5.2)
PROT SERPL-MCNC: 7.2 G/DL (ref 6–8.5)
PROT UR QL STRIP: NEGATIVE
QT INTERVAL: 410 MS
RBC # BLD AUTO: 5.23 10*6/MM3 (ref 4.14–5.8)
SODIUM SERPL-SCNC: 134 MMOL/L (ref 136–145)
SP GR UR STRIP: 1.02 (ref 1–1.03)
TROPONIN T DELTA: 0 NG/L
TROPONIN T SERPL HS-MCNC: 7 NG/L
UROBILINOGEN UR QL STRIP: NORMAL
WBC NRBC COR # BLD: 7.82 10*3/MM3 (ref 3.4–10.8)

## 2023-05-31 PROCEDURE — 93010 ELECTROCARDIOGRAM REPORT: CPT | Performed by: INTERNAL MEDICINE

## 2023-05-31 PROCEDURE — 36415 COLL VENOUS BLD VENIPUNCTURE: CPT

## 2023-05-31 PROCEDURE — 81003 URINALYSIS AUTO W/O SCOPE: CPT | Performed by: EMERGENCY MEDICINE

## 2023-05-31 PROCEDURE — 84484 ASSAY OF TROPONIN QUANT: CPT | Performed by: EMERGENCY MEDICINE

## 2023-05-31 PROCEDURE — 93005 ELECTROCARDIOGRAM TRACING: CPT | Performed by: EMERGENCY MEDICINE

## 2023-05-31 PROCEDURE — 85025 COMPLETE CBC W/AUTO DIFF WBC: CPT | Performed by: EMERGENCY MEDICINE

## 2023-05-31 PROCEDURE — 99283 EMERGENCY DEPT VISIT LOW MDM: CPT

## 2023-05-31 PROCEDURE — 80053 COMPREHEN METABOLIC PANEL: CPT | Performed by: EMERGENCY MEDICINE

## 2023-05-31 PROCEDURE — 70450 CT HEAD/BRAIN W/O DYE: CPT

## 2023-05-31 RX ORDER — SODIUM CHLORIDE 0.9 % (FLUSH) 0.9 %
10 SYRINGE (ML) INJECTION AS NEEDED
Status: DISCONTINUED | OUTPATIENT
Start: 2023-05-31 | End: 2023-05-31 | Stop reason: HOSPADM

## 2023-05-31 RX ORDER — ACETAMINOPHEN 500 MG
1000 TABLET ORAL ONCE
Status: COMPLETED | OUTPATIENT
Start: 2023-05-31 | End: 2023-05-31

## 2023-05-31 RX ADMIN — ACETAMINOPHEN 1000 MG: 500 TABLET ORAL at 09:38

## 2023-05-31 RX ADMIN — SODIUM CHLORIDE 1000 ML: 9 INJECTION, SOLUTION INTRAVENOUS at 08:14

## 2023-05-31 NOTE — ED NOTES
"Pt arrived via POV for lightheadedness and near syncope x3-4 days. Pt stated that he has a loop recorder in and it has \"been going off\" every time he has these episodes. Pt stated that this has happened in the past but stated that it typically goes away. Pt woke up this morning and reports HTN and extreme fatigue. Pt needed assistance to get out of his car related to the weakness. Pt stated that he feels tingly all over and some worsening weakness on his L side. Pt is A/O x4.   "

## 2023-05-31 NOTE — ED PROVIDER NOTES
"Subjective     History provided by:  Patient, medical records and spouse    History of Present Illness    · Chief complaint: Dizziness and weakness    · Location: Generalized weakness.    · Quality/Severity: The patient states he has been dizzy described as lightheaded and the room spinning.  He reports generalized weakness.    · Timing/Onset: Started about 3 days ago but is particularly worse today.    · Modifying Factors: The patient is unaware of any causative factors.  He has a loop recorder that he states has been going off recently.    · Associated symptoms: No focality to his symptoms.  No change in speech or vision.  No headache.  He states his blood pressure at the onset of symptoms at home was 180/100.    · Narrative: The patient is a 53-year-old white male complaining of dizziness and generalized weakness.  He states he has a history of the same dizziness and syncope and has been worked up for it in the past.  He currently has a loop recorder in place that he states has been going off recently.  He is in pain management for chronic cervical and back pain.  He has a history of hypertension, hyperlipidemia, type 2 diabetes, coronary disease and obstructive sleep apnea is on CPAP.  He has a history of right ventricular enlargement.    Review of Systems  Past Medical History:   Diagnosis Date   • Anxiety    • Asthma    • Cancer     skin   • DDD (degenerative disc disease), lumbar    • Diabetes mellitus    • Dizziness    • ED (erectile dysfunction)    • GERD (gastroesophageal reflux disease)    • Headache    • Hyperlipidemia    • Hypertension    • Injury of back 03/2016    Pt fell 20 feet    • Kidney stone    • Neuromuscular disorder    • Pneumonia    • Sleep apnea     cpap   • Stroke     tia   • Syncope and collapse    • TIA (transient ischemic attack)      /78   Pulse 63   Temp 98.2 °F (36.8 °C) (Oral)   Resp 16   Ht 180.3 cm (71\")   Wt 123 kg (271 lb 4.8 oz)   SpO2 96%   BMI 37.84 kg/m² "     Past Medical History:   Diagnosis Date   • Anxiety    • Asthma    • Cancer     skin   • DDD (degenerative disc disease), lumbar    • Diabetes mellitus    • Dizziness    • ED (erectile dysfunction)    • GERD (gastroesophageal reflux disease)    • Headache    • Hyperlipidemia    • Hypertension    • Injury of back 03/2016    Pt fell 20 feet    • Kidney stone    • Neuromuscular disorder    • Pneumonia    • Sleep apnea     cpap   • Stroke     tia   • Syncope and collapse    • TIA (transient ischemic attack)        No Known Allergies    Past Surgical History:   Procedure Laterality Date   • CARDIAC DEFIBRILLATOR PLACEMENT     • CERVICAL EPIDURAL N/A 10/07/2021    Procedure: CERVICAL EPIDURAL;  Surgeon: Abdirashid Gonzalez MD;  Location: [facility] MAIN OR;  Service: Pain Management;  Laterality: N/A;   • COLONOSCOPY  2014   • COLONOSCOPY N/A 05/23/2019    Procedure: COLONOSCOPY to Cecum with Hot and Cold Polypectomy x 2;  Surgeon: Navid Zafar Jr., MD;  Location: [facility] ENDOSCOPY;  Service: General   • ENDOSCOPY N/A 05/23/2019    Procedure: ESOPHAGOGASTRODUODENOSCOPY with Bx's;  Surgeon: Navid Zafar Jr., MD;  Location: [facility] ENDOSCOPY;  Service: General   • HERNIA REPAIR  10/2015   • LAPAROSCOPIC GASTRIC BANDING      June 2020   • LUMBAR DISCECTOMY FUSION INSTRUMENTATION Bilateral 08/30/2019    Procedure: LUMBAR 5 TO SACRAL 1 OPEN DECOMPRESSION WITH  POSTERIOR LUMBAR INTERBODY FUSION, CAGE AND SCREW;  Surgeon: Jake Tripathi MD;  Location: [facility] MAIN OR;  Service: Neurosurgery   • MEDIAL BRANCH BLOCK Bilateral 06/01/2021    Procedure: thoracic medial branch block (bilateral T9-11 vs T8-10) x2, 1-2 wks apart - diagnostic;  Surgeon: Abdirashid Gonzalez MD;  Location: [facility] MAIN OR;  Service: Pain Management;  Laterality: Bilateral;   • MEDIAL BRANCH BLOCK Bilateral 06/29/2021    Procedure: Bilateral T8-T10 MEDIAL BRANCH BLOCK;  Surgeon: Abdirashid Gonzalez MD;  Location: [facility] MAIN OR;  Service: Pain Management;   Laterality: Bilateral;   • MEDIAL BRANCH BLOCK Bilateral 11/30/2021    Procedure: Bilateral cervical MEDIAL BRANCH BLOCK at approximately C4-C6;  Surgeon: Abdirashid Gonzalez MD;  Location: [facility] MAIN OR;  Service: Pain Management;  Laterality: Bilateral;   • MEDIAL BRANCH BLOCK Bilateral 06/07/2022    Procedure: Bilateral C5-C7 MEDIAL BRANCH BLOCK;  Surgeon: Abdirashid Gonzalez MD;  Location: [facility] MAIN OR;  Service: Pain Management;  Laterality: Bilateral;   • RADIOFREQUENCY ABLATION Bilateral 07/27/2021    Procedure: Bilateral T8-T10 RADIOFREQUENCY ABLATION;  Surgeon: Abdirashid Gonzalez MD;  Location: [facility] MAIN OR;  Service: Pain Management;  Laterality: Bilateral;   • RADIOFREQUENCY ABLATION Bilateral 08/23/2022    Procedure: RADIOFREQUENCY ABLATION Bilateral T8-T10;  Surgeon: Abdirashid Gonzalez MD;  Location: [facility] MAIN [facility];  Service: Pain Management;  Laterality: Bilateral;       Family History   Problem Relation Age of Onset   • Brain cancer Father    • Stroke Father    • Hypertension Father    • Atrial fibrillation Father    • Heart disease Father    • Hypertension Mother    • Diabetes Mother    • Heart disease Mother    • Hypertension Brother    • Colon cancer Maternal Grandmother    • Lung cancer Maternal Grandfather    • Bone cancer Maternal Grandfather        Social History     Socioeconomic History   • Marital status:    Tobacco Use   • Smoking status: Never   • Smokeless tobacco: Never   • Tobacco comments:     no caffeine    Substance and Sexual Activity   • Alcohol use: No     Comment: caffeine weekly   • Drug use: No   • Sexual activity: Defer           Objective   Physical Exam  Vitals and nursing note reviewed.   Constitutional:       General: He is not in acute distress.     Appearance: He is well-developed. He is obese. He is not ill-appearing, toxic-appearing or diaphoretic.      Comments: The patient appears very weak, but otherwise does not appear ill.  Review of his vital signs: His  blood pressure slightly elevated 149/55, heart rate 73, respirations normal 16 with a normal room air oxygen saturation of 97%.   HENT:      Head: Normocephalic and atraumatic.      Nose: Nose normal.      Mouth/Throat:      Mouth: Mucous membranes are moist.      Pharynx: Oropharynx is clear.   Eyes:      General: No scleral icterus.        Right eye: No discharge.         Left eye: No discharge.      Pupils: Pupils are equal, round, and reactive to light.   Neck:      Thyroid: No thyromegaly.      Vascular: No JVD.   Cardiovascular:      Rate and Rhythm: Normal rate and regular rhythm.      Heart sounds: Normal heart sounds. No murmur heard.  Pulmonary:      Effort: Pulmonary effort is normal.      Breath sounds: Normal breath sounds. No wheezing, rhonchi or rales.   Chest:      Chest wall: No tenderness.   Abdominal:      General: Bowel sounds are normal. There is no distension.      Palpations: Abdomen is soft.      Tenderness: There is no abdominal tenderness.   Musculoskeletal:         General: No tenderness or deformity. Normal range of motion.      Cervical back: Normal range of motion and neck supple.   Lymphadenopathy:      Cervical: No cervical adenopathy.   Skin:     General: Skin is warm and dry.      Capillary Refill: Capillary refill takes less than 2 seconds.      Findings: No rash.   Neurological:      Mental Status: He is alert and oriented to person, place, and time.      Cranial Nerves: No cranial nerve deficit.      Coordination: Coordination normal.      Comments: The patient has symmetrical weak strength in all 4 extremities.  Sensations intact in all 4 extremities.  No facial asymmetry and no cranial nerve deficits.     Psychiatric:         Behavior: Behavior normal.         Thought Content: Thought content normal.         Judgment: Judgment normal.      Comments: Depressed mood.         Procedures           ED Course  ED Course as of 05/31/23 1326   Wed May 31, 2023   0820 My interpretation  of the patient's EKG tracing performed 08: 08 is normal sinus rhythm with a rate of 63, normal axis, normal conduction, no acute ST segment elevation or depression consistent with ischemia, normal R wave transition, normal [place] and QT intervals, no ectopy.  Normal EKG. [TP]   1035 Review the patient's laboratory studies: The patient's urinalysis is negative for blood or infection and has a normal specific gravity.  CMP has a slightly elevated glucose of 131, and a slightly low sodium of 134, otherwise has normal electrolytes, normal renal function test and normal liver enzymes.  There is no laboratory evidence of dehydration.  High-sensitivity troponin is negative.  CBC is normal. [TP]   1036 CT of the head without contrast was interpreted by the radiologist as negative. [TP]   1036 Repeat examination of the patient at 10: 30 AM the patient states he is feeling much better much stronger.  I can find no abnormality to explain the patient's generalized weakness and lightheadedness.  He states this happened a couple times before and has been evaluated by his cardiologist and other physicians without finding a source.  He states he has an appointment with his cardiologist Dr. Chapman tomorrow which I encouraged him to keep. [TP]      ED Course User Index  [TP] Porfirio Yusuf MD                                           Medical Decision Making  My differential diagnosis includes but is not limited to generalized weakness, electrolyte abnormality, CVA, TIA, Bell's palsy, acute MI, GI bleed, urinary tract infection, systemic infections including sepsis, alcohol abuse, drug abuse including prescription and street drug.    Dizziness: acute illness or injury  Generalized weakness: acute illness or injury  Amount and/or Complexity of Data Reviewed  Labs: ordered. Decision-making details documented in ED Course.  Radiology: ordered. Decision-making details documented in ED Course.  ECG/medicine tests: ordered and independent  interpretation performed. Decision-making details documented in ED Course.      Risk  OTC drugs.          Final diagnoses:   Generalized weakness   Dizziness       ED Disposition  ED Disposition     ED Disposition   Discharge    Condition   Stable    Comment   --             Zen Posey MD  [address] [facility]  [address] [place] [address]  [phone]    Go in 1 day  As scheduled         Medication List      No changes were made to your prescriptions during this visit.         Labs Reviewed   COMPREHENSIVE METABOLIC PANEL - Abnormal; Notable for the following components:       Result Value    Glucose 131 (*)     Sodium 134 (*)     Chloride 96 (*)     All other components within normal limits    Narrative:     GFR Normal >60  Chronic Kidney Disease <60  Kidney Failure <15     CBC WITH AUTO DIFFERENTIAL - Abnormal; Notable for the following components:    Lymphocytes, Absolute 3.25 (*)     All other components within normal limits   URINALYSIS W/ MICROSCOPIC IF INDICATED (NO CULTURE) - Normal    Narrative:     Urine microscopic not indicated.   TROPONIN - Normal    Narrative:     High Sensitive Troponin T Reference Range:  <10.0 ng/L- Negative Female for AMI  <15.0 ng/L- Negative Male for AMI  >=10 - Abnormal Female indicating possible myocardial injury.  >=15 - Abnormal Male indicating possible myocardial injury.   Clinicians would have to utilize clinical acumen, EKG, Troponin, and serial changes to determine if it is an Acute Myocardial Infarction or myocardial injury due to an underlying chronic condition.        HIGH SENSITIVITIY TROPONIN T 2HR - Normal    Narrative:     High Sensitive Troponin T Reference Range:  <10.0 ng/L- Negative Female for AMI  <15.0 ng/L- Negative Male for AMI  >=10 - Abnormal Female indicating possible myocardial injury.  >=15 - Abnormal Male indicating possible myocardial injury.   Clinicians would have to utilize clinical acumen, EKG, Troponin, and serial changes to  determine if it is an Acute Myocardial Infarction or myocardial injury due to an underlying chronic condition.        CBC AND DIFFERENTIAL    Narrative:     The following orders were created for panel order CBC & Differential.  Procedure                               Abnormality         Status                     ---------                               -----------         ------                     CBC Auto Differential[[id number]]        Abnormal            Final result                 Please view results for these tests on the individual orders.     CT Head Without Contrast   Final Result   Negative head CT examination. No change since 03/01/2023.       This report was finalized on 5/31/2023 10:21 AM by Dr. Eleazar Parnell MD.                 Medication List      No changes were made to your prescriptions during this visit.              Porfirio Yusuf MD  05/31/23 1582

## 2023-06-19 DIAGNOSIS — M54.16 LUMBAR RADICULOPATHY: ICD-10-CM

## 2023-06-19 DIAGNOSIS — M62.838 MUSCLE SPASM: ICD-10-CM

## 2023-06-19 DIAGNOSIS — M54.12 CERVICAL RADICULOPATHY: ICD-10-CM

## 2023-06-19 RX ORDER — HYDROCODONE BITARTRATE AND ACETAMINOPHEN 5; 325 MG/1; MG/1
1 TABLET ORAL DAILY PRN
Qty: 30 TABLET | Refills: 0 | Status: SHIPPED | OUTPATIENT
Start: 2023-06-19

## 2023-06-19 RX ORDER — GABAPENTIN 800 MG/1
800 TABLET ORAL 3 TIMES DAILY
Qty: 90 TABLET | Refills: 2 | Status: SHIPPED | OUTPATIENT
Start: 2023-06-19

## 2023-06-19 RX ORDER — METHOCARBAMOL 750 MG/1
750 TABLET, FILM COATED ORAL 2 TIMES DAILY PRN
Qty: 60 TABLET | Refills: 2 | Status: SHIPPED | OUTPATIENT
Start: 2023-06-19

## 2023-07-24 DIAGNOSIS — M54.12 CERVICAL RADICULOPATHY: ICD-10-CM

## 2023-07-24 DIAGNOSIS — M62.838 MUSCLE SPASM: ICD-10-CM

## 2023-07-24 DIAGNOSIS — M54.16 LUMBAR RADICULOPATHY: ICD-10-CM

## 2023-07-24 RX ORDER — HYDROCODONE BITARTRATE AND ACETAMINOPHEN 5; 325 MG/1; MG/1
1 TABLET ORAL DAILY PRN
Qty: 30 TABLET | Refills: 0 | Status: SHIPPED | OUTPATIENT
Start: 2023-07-24

## 2023-07-24 RX ORDER — METHOCARBAMOL 750 MG/1
750 TABLET, FILM COATED ORAL 2 TIMES DAILY PRN
Qty: 60 TABLET | Refills: 2 | Status: SHIPPED | OUTPATIENT
Start: 2023-07-24

## 2023-08-08 ENCOUNTER — OFFICE VISIT (OUTPATIENT)
Dept: PAIN MEDICINE | Facility: CLINIC | Age: 53
End: 2023-08-08
Payer: COMMERCIAL

## 2023-08-08 VITALS
TEMPERATURE: 97.5 F | OXYGEN SATURATION: 98 % | DIASTOLIC BLOOD PRESSURE: 60 MMHG | HEART RATE: 98 BPM | RESPIRATION RATE: 20 BRPM | WEIGHT: 294.2 LBS | SYSTOLIC BLOOD PRESSURE: 108 MMHG | BODY MASS INDEX: 41.19 KG/M2 | HEIGHT: 71 IN

## 2023-08-08 DIAGNOSIS — G89.29 CHRONIC MIDLINE LOW BACK PAIN WITHOUT SCIATICA: ICD-10-CM

## 2023-08-08 DIAGNOSIS — M54.12 CERVICAL RADICULOPATHY: ICD-10-CM

## 2023-08-08 DIAGNOSIS — M47.814 ARTHROPATHY OF THORACIC FACET JOINT: ICD-10-CM

## 2023-08-08 DIAGNOSIS — M62.838 MUSCLE SPASM: Primary | ICD-10-CM

## 2023-08-08 DIAGNOSIS — Z79.899 ENCOUNTER FOR LONG-TERM (CURRENT) USE OF HIGH-RISK MEDICATION: ICD-10-CM

## 2023-08-08 DIAGNOSIS — G89.4 CHRONIC PAIN SYNDROME: ICD-10-CM

## 2023-08-08 DIAGNOSIS — M54.50 CHRONIC MIDLINE LOW BACK PAIN WITHOUT SCIATICA: ICD-10-CM

## 2023-08-08 DIAGNOSIS — M47.812 ARTHROPATHY OF CERVICAL FACET JOINT: ICD-10-CM

## 2023-08-08 NOTE — PROGRESS NOTES
CHIEF COMPLAINT  Back,neck pain   Pain has slightly increased due to a fall 3 weeks ago, fell on his left side.    Subjective   Ronni Asif is a 53 y.o. male  who presents for follow-up.  He has a history of back and neck pain.  Today his pain is 5/10VAS in severity. He continues with Gabapentin 800 mg 3/day, and Methocarbamol 750mg PRN (does not utilize daily).  He also continues with Norco 5/325 0-1/day.  He does report at least moderate relief with this regimen. He denies any side effects including somnolence or constipation. This regimen does allow him to increase his activity level. He is also now working part time for the [employer].     Unfortunately his repeat Bilateral T8-T10 RFA was denied by insurance as being experimental.     Cervical medial branch blocks were diagnostically negative at multiple levels, TAMEKA provided no relief.       Procedure list:  8/23/2022-bilateral T8-T10 RFA-80% relief x2 months with ongoing 60 to 70% relief x ~ 6 months. His pain is now starting to return  6/7/2022-bilateral C5-C7 MBB-20% relief (diagnostically negative)  11/30/2021-bilateral C3-C5 MBB-minimal relief (diagnostically negative)  10/7/2021-TAMEKA at C6-C7-minimal relief  7/27/2021-bilateral T8-T10 RFA-50% improvement lasting many months.   6/29/2021-bilateral T8-T10 MBB-80% relief x2 days  6/1/2021-bilateral T8-T10 MBB-80 to 90% relief x1 day    Back Pain  This is a chronic problem. The current episode started more than 1 year ago. The problem occurs daily. The problem has been waxing and waning since onset. The pain is present in the thoracic spine and lumbar spine. The quality of the pain is described as aching. The pain does not radiate. The pain is at a severity of 5/10. The pain is severe. The symptoms are aggravated by bending, standing and twisting. Stiffness is present In the morning. Associated symptoms include headaches, numbness (left foot) and weakness (left shoulder). Pertinent  negatives include no abdominal pain, chest pain, dysuria or fever. Risk factors include obesity. He has tried analgesics, heat, ice, NSAIDs, muscle relaxant and home exercises (Norco 5/325) for the symptoms. The treatment provided significant relief.   Neck Pain   This is a chronic problem. The current episode started more than 1 year ago. The problem occurs constantly. The problem has been waxing and waning. The pain is present in the occipital region and midline. The quality of the pain is described as burning (bilateral upper extremity tingling). The pain is at a severity of 5/10. The symptoms are aggravated by bending (lying in bed). Associated symptoms include headaches, numbness (left foot) and weakness (left shoulder). Pertinent negatives include no chest pain or fever. He has tried ice, oral narcotics and NSAIDs (PT) for the symptoms.      PEG Assessment   What number best describes your pain on average in the past week?4  What number best describes how, during the past week, pain has interfered with your enjoyment of life?5  What number best describes how, during the past week, pain has interfered with your general activity?  5    The following portions of the patient's history were reviewed and updated as appropriate: allergies, current medications, past family history, past medical history, past social history, past surgical history, and problem list.    Review of Systems   Constitutional:  Negative for activity change, fatigue and fever.   HENT:  Negative for congestion.    Respiratory:  Negative for cough and chest tightness.    Cardiovascular:  Negative for chest pain.   Gastrointestinal:  Negative for abdominal pain, constipation and diarrhea.   Genitourinary:  Negative for difficulty urinating and dysuria.   Musculoskeletal:  Positive for back pain and neck pain.   Neurological:  Positive for dizziness, weakness (left shoulder), light-headedness, numbness (left foot) and headaches.  "  Psychiatric/Behavioral:  Negative for agitation, sleep disturbance and suicidal ideas. The patient is not nervous/anxious.      --  The aforementioned information the Chief Complaint section and above subjective data including any HPI data, and also the Review of Systems data, has been personally reviewed and affirmed.  --    -------    Opioid Risk Tool for Male Patients    This tool should be administered to patients upon an initial visit prior to beginning opioid therapy for pain management.  The ORT has been validated for both male and female patients with chronic pain, and there are specific validated tools for each.      Fam Hx of Substance Abuse:  Alcohol=3, Illegal Drugs=3, Rx Drugs=4   TOTAL: 3  Personal History of Substance Abuse:  Alcohol=3, Illegal Drugs=4, Rx Drugs=5 TOTAL: 0  Age Between 16 - 45 years:  yes=1        TOTAL: Not Applicable  History of Preadolescent Sexual Abuse:  yes = N/a in ORT for males    TOTAL: Not Applicable  Psychological Disease:  ADD/OCD/Schizophrenia/Bipolar = 2 ; Depression=1  TOTAL: 0    SCORING TOTALS:          SUM of TOTALS: 3    Interpretation:  - score of 3 or lower indicates low risk for future opioid abuse  - score of 4 to 7 indicates moderate risk for opioid abuse  - score of 8 or higher indicates a high risk for opioid abuse.  - this assessment alone is not the only determining factor in developing a treatment plan    Citation... Dr. Neeru Montes De Oca, Pain Med. 2005; 6 (6) : 432.  -------    Vitals:    08/08/23 0938   BP: 108/60   BP Location: Right arm   Patient Position: Sitting   Cuff Size: Large Adult   Pulse: 98   Resp: 20   Temp: 97.5 øF (36.4 øC)   TempSrc: Temporal   SpO2: 98%   Weight: 133 kg (294 lb 3.2 oz)   Height: 180.3 cm (71\")   PainSc:   5     Objective   Physical Exam  Vitals and nursing note reviewed.   Constitutional:       Appearance: Normal appearance. He is well-developed.   Eyes:      General: Lids are normal.   Cardiovascular:      Rate and " Rhythm: Normal rate.   Pulmonary:      Effort: Pulmonary effort is normal.   Musculoskeletal:      Cervical back: Tenderness present. Decreased range of motion.      Thoracic back: Tenderness present. Decreased range of motion.   Neurological:      Mental Status: He is alert and oriented to person, place, and time.   Psychiatric:         Attention and Perception: Attention normal.         Mood and Affect: Mood normal.         Speech: Speech normal.         Behavior: Behavior normal.         Judgment: Judgment normal.       Assessment & Plan   Diagnoses and all orders for this visit:    1. Muscle spasm (Primary)    2. Cervical radiculopathy    3. Chronic pain syndrome    4. Arthropathy of thoracic facet joint    5. Arthropathy of cervical facet joint    6. Chronic midline low back pain without sciatica    7. Encounter for long-term (current) use of high-risk medication      Ronni Asif reports a pain score of 5.  Given his pain assessment as noted, treatment options were discussed and the following options were decided upon as a follow-up plan to address the patient's pain: continuation of current treatment plan for pain.    --- Routine UDS in office today as part of monitoring requirements for controlled substances.  The specimen was viewed and the immunoassay result reviewed and is NEG (last dose 2 days ago).  This specimen will be sent to [facility] laboratory for confirmation.     --- CSA updated 4/3/2023  --- ORT--Low Risk  --- Continue Norco, no refill needed. Patient appears stable with current regimen. No adverse effects. Regarding continuation of opioids, there is no evidence of aberrant behavior or any red flags.  The patient continues with appropriate response to opioid therapy. ADL's remain intact by self.   --- Continue Gabapentin and Robaxin.   --- Follow-up 3 months or sooner if needed.      ROSI REPORT  As part of the patient's treatment plan, I am prescribing controlled substances. The  patient has been made aware of appropriate use of such medications, including potential risk of somnolence, limited ability to drive and/or work safely, and the potential for dependence or overdose. It has also been made clear that these medications are for use by this patient only, without concomitant use of alcohol or other substances unless prescribed.     Patient has completed prescribing agreement detailing terms of continued prescribing of controlled substances, including monitoring ROSI reports, urine drug screening, and pill counts if necessary. The patient is aware that inappropriate use will results in cessation of prescribing such medications.    As the clinician, I personally reviewed the ROSI from 8/8/2023 while the patient was in the office today.    History and physical exam exhibit continued safe and appropriate use of controlled substances.'     Dictated utilizing Dragon dictation.

## 2023-09-06 ENCOUNTER — TELEPHONE (OUTPATIENT)
Dept: PAIN MEDICINE | Facility: CLINIC | Age: 53
End: 2023-09-06

## 2023-09-06 DIAGNOSIS — M54.12 CERVICAL RADICULOPATHY: ICD-10-CM

## 2023-09-06 DIAGNOSIS — M54.16 LUMBAR RADICULOPATHY: ICD-10-CM

## 2023-09-06 DIAGNOSIS — M62.838 MUSCLE SPASM: ICD-10-CM

## 2023-09-06 RX ORDER — HYDROCODONE BITARTRATE AND ACETAMINOPHEN 5; 325 MG/1; MG/1
1 TABLET ORAL DAILY PRN
Qty: 30 TABLET | Refills: 0 | Status: SHIPPED | OUTPATIENT
Start: 2023-09-06 | End: 2023-09-07 | Stop reason: SDUPTHER

## 2023-09-06 RX ORDER — METHOCARBAMOL 750 MG/1
750 TABLET, FILM COATED ORAL 2 TIMES DAILY PRN
Qty: 60 TABLET | Refills: 2 | Status: SHIPPED | OUTPATIENT
Start: 2023-09-06

## 2023-09-06 NOTE — TELEPHONE ENCOUNTER
Medication Refill Request    Date of phone call: 23    Medication being requested: norco 5-325 mg sig: Take 1 tablet by mouth Daily As Needed for Severe Pain.,   Qty: 30    Date of last visit: 23    Date of last refill:     ROSI up to date?:     Next Follow up?: 23    Any new pertinent information? (i.e, new medication allergies, new use of medications, change in patient's health or condition, non-compliance or inconsistency with prescribing agreement?):     Medication Refill Request    Date of phone call: 23    Medication being requested: methocarbamol  si tablet by mouth 2 (Two) Times a Day As Needed for Muscle Spasms   Qty: 60    Date of last visit: 23    Date of last refill:     ROSI up to date?:     Next Follow up?: 23    Any new pertinent information? (i.e, new medication allergies, new use of medications, change in patient's health or condition, non-compliance or inconsistency with prescribing agreement?):

## 2023-09-06 NOTE — TELEPHONE ENCOUNTER
Caller: PATIENT    Relationship: SELF     Best call back number: [phone]    Requested Prescriptions: HYDROCODONE-ACETAMINOPHEN 5 MG     Pharmacy where request should be sent:  [facility] IN [place], [place]  AND THEIR PHONE NUMBER [phone].    Last office visit with prescribing clinician: Visit date not found   Last telemedicine visit with prescribing clinician: Visit date not found   Next office visit with prescribing clinician: Visit date not found     Additional details provided by patient: PATIENT WAS CALLING TO REQUEST HIS PRESCRIPTION FOR HYDROCODONE-ACETAMINOPHEN 5 MG TO BE SENT TO [facility] IN [place], [place] DUE TO [facility] BEING OUT OF THIS MEDICATION. PATIENT DID MENTION THE PRESCRIPTION AT [facility] NEEDED TO BE CANCELLED. [facility] ONLY HAD A CERTAIN NUMBER OF TABLETS AVAILABLE BUT PATIENT WAS OKAY WITH THAT AND WOULD LIKE THE PRESCRIPTION TO BE SENT TO THEM ASA. PATIENT STATED HE CURRENTLY HAS 7 TABLETS LEFT. THANK YOU!    Does the patient have less than a 3 day supply:  [x] Yes  [] No    Would you like a call back once the refill request has been completed: [] Yes [x] No    If the office needs to give you a call back, can they leave a voicemail: [] Yes [x] No

## 2023-09-07 DIAGNOSIS — M54.16 LUMBAR RADICULOPATHY: ICD-10-CM

## 2023-09-07 DIAGNOSIS — M62.838 MUSCLE SPASM: ICD-10-CM

## 2023-09-07 DIAGNOSIS — M54.12 CERVICAL RADICULOPATHY: ICD-10-CM

## 2023-09-07 RX ORDER — HYDROCODONE BITARTRATE AND ACETAMINOPHEN 5; 325 MG/1; MG/1
1 TABLET ORAL DAILY PRN
Qty: 30 TABLET | Refills: 0 | Status: SHIPPED | OUTPATIENT
Start: 2023-09-07

## 2023-09-07 NOTE — TELEPHONE ENCOUNTER
Medication Refill Request    Date of phone call: 9-7-23    Medication being requested: norco 5-325 mg sig:  Take 1 tablet by mouth Daily As Needed for Severe Pain.   Qty: 30    Date of last visit: 8-8-23    Date of last refill:     ROSI up to date?:     Next Follow up?: 11-13-23    Any new pertinent information? (i.e, new medication allergies, new use of medications, change in patient's health or condition, non-compliance or inconsistency with prescribing agreement?):

## 2023-09-07 NOTE — TELEPHONE ENCOUNTER
Reviewed UDS and ROSI. Both updated and appropriate. Refill appropriate.    Covering for Na Ivelisse BURROUGHSN

## 2023-10-09 DIAGNOSIS — M54.16 LUMBAR RADICULOPATHY: ICD-10-CM

## 2023-10-09 DIAGNOSIS — M62.838 MUSCLE SPASM: ICD-10-CM

## 2023-10-09 DIAGNOSIS — M54.12 CERVICAL RADICULOPATHY: ICD-10-CM

## 2023-10-09 RX ORDER — HYDROCODONE BITARTRATE AND ACETAMINOPHEN 5; 325 MG/1; MG/1
1 TABLET ORAL DAILY PRN
Qty: 30 TABLET | Refills: 0 | Status: SHIPPED | OUTPATIENT
Start: 2023-10-09

## 2023-10-09 NOTE — TELEPHONE ENCOUNTER
Medication Refill Request    Date of phone call: 10-9-23    Medication being requested: norco 5-325 mgsig: Take 1 tablet by mouth Daily As Needed for Severe Pain   Qty: 30    Date of last visit: 8-8-23    Date of last refill:     ROSI up to date?:     Next Follow up?: 11-13-23    Any new pertinent information? (i.e, new medication allergies, new use of medications, change in patient's health or condition, non-compliance or inconsistency with prescribing agreement?):

## 2023-10-24 ENCOUNTER — OFFICE VISIT (OUTPATIENT)
Dept: SLEEP MEDICINE | Facility: HOSPITAL | Age: 53
End: 2023-10-24
Payer: COMMERCIAL

## 2023-10-24 VITALS
HEIGHT: 70 IN | HEART RATE: 80 BPM | DIASTOLIC BLOOD PRESSURE: 75 MMHG | BODY MASS INDEX: 40.94 KG/M2 | OXYGEN SATURATION: 96 % | SYSTOLIC BLOOD PRESSURE: 118 MMHG | WEIGHT: 286 LBS

## 2023-10-24 DIAGNOSIS — G47.33 OSA ON CPAP: Primary | ICD-10-CM

## 2023-10-24 DIAGNOSIS — J45.909 MODERATE ASTHMA WITHOUT COMPLICATION, UNSPECIFIED WHETHER PERSISTENT: ICD-10-CM

## 2023-10-24 DIAGNOSIS — E66.01 MORBIDLY OBESE: ICD-10-CM

## 2023-10-24 DIAGNOSIS — K21.9 GASTROESOPHAGEAL REFLUX DISEASE WITHOUT ESOPHAGITIS: ICD-10-CM

## 2023-10-24 DIAGNOSIS — I10 ESSENTIAL HYPERTENSION: ICD-10-CM

## 2023-10-24 DIAGNOSIS — E11.59 TYPE 2 DIABETES MELLITUS WITH OTHER CIRCULATORY COMPLICATION, WITHOUT LONG-TERM CURRENT USE OF INSULIN: ICD-10-CM

## 2023-10-24 PROCEDURE — G0463 HOSPITAL OUTPT CLINIC VISIT: HCPCS

## 2023-10-24 NOTE — PROGRESS NOTES
SLEEP/PULMONARY  CLINIC NOTE      PATIENT IDENTIFICATION:  Name: Ronni Asif  Age: 53 y.o.  Sex: male  :  1970  MRN: [id number]    DATE OF CONSULTATION:  10/24/2023                     CHIEF COMPLAINT: Obstructive sleep apnea    History of Present Illness:   Ronni Asif is a 53 y.o. male Pt on CPAP feeling better more energy especially the night he use it more than 4 hours, no sleepiness no fatigue no tiredness, no mask irritation no dryness, compliance report reviewed with pt d discussed with the patient the download data and encouarged the patient to continue to use the CPAP. The residual AHI is acceptable, patient can some irritation from the mask causing some soreness on his nose.         Review of Systems:   Constitutional: negative   Eyes: negative   ENT/oropharynx: negative   Cardiovascular: negative   Respiratory: negative   Gastrointestinal: negative   Genitourinary: negative   Neurological: negative   Musculoskeletal: negative   Integument/breast: negative   Endocrine: negative   Allergic/Immunologic: negative     Past Medical History:  Past Medical History:   Diagnosis Date    Anxiety     Asthma     Cancer     skin    DDD (degenerative disc disease), lumbar     Diabetes mellitus     Dizziness     ED (erectile dysfunction)     GERD (gastroesophageal reflux disease)     Headache     Hyperlipidemia     Hypertension     Injury of back 2016    Pt fell 20 feet     Kidney stone     Neuromuscular disorder     Pneumonia     Sleep apnea     cpap    Stroke     tia    Syncope and collapse     TIA (transient ischemic attack)        Past Surgical History:  Past Surgical History:   Procedure Laterality Date    CARDIAC DEFIBRILLATOR PLACEMENT      CERVICAL EPIDURAL N/A 10/07/2021    Procedure: CERVICAL EPIDURAL;  Surgeon: Abdirashid Gonzalez MD;  Location: [facility] MAIN OR;  Service: Pain Management;  Laterality: N/A;    COLONOSCOPY      COLONOSCOPY N/A 2019    Procedure: COLONOSCOPY  to Cecum with Hot and Cold Polypectomy x 2;  Surgeon: Navid Zafar Jr., MD;  Location: [facility] ENDOSCOPY;  Service: General    ENDOSCOPY N/A 05/23/2019    Procedure: ESOPHAGOGASTRODUODENOSCOPY with Bx's;  Surgeon: Navid Zafar Jr., MD;  Location: [facility] ENDOSCOPY;  Service: General    HERNIA REPAIR  10/2015    LAPAROSCOPIC GASTRIC BANDING      June 2020    LUMBAR DISCECTOMY FUSION INSTRUMENTATION Bilateral 08/30/2019    Procedure: LUMBAR 5 TO SACRAL 1 OPEN DECOMPRESSION WITH  POSTERIOR LUMBAR INTERBODY FUSION, CAGE AND SCREW;  Surgeon: Jake Tripathi MD;  Location: [facility] MAIN OR;  Service: Neurosurgery    MEDIAL BRANCH BLOCK Bilateral 06/01/2021    Procedure: thoracic medial branch block (bilateral T9-11 vs T8-10) x2, 1-2 wks apart - diagnostic;  Surgeon: Abdirashid Gonzalez MD;  Location: [facility] MA[facility];  Service: Pain Management;  Laterality: Bilateral;    MEDIAL BRANCH BLOCK Bilateral 06/29/2021    Procedure: Bilateral T8-T10 MEDIAL BRANCH BLOCK;  Surgeon: Abdirashid Gonzalez MD;  Location: [facility] MAIN [facility];  Service: Pain Management;  Laterality: Bilateral;    MEDIAL BRANCH BLOCK Bilateral 11/30/2021    Procedure: Bilateral cervical MEDIAL BRANCH BLOCK at approximately C4-C6;  Surgeon: Abdirashid Gonzalez MD;  Location: [facility] MAIN OR;  Service: Pain Management;  Laterality: Bilateral;    MEDIAL BRANCH BLOCK Bilateral 06/07/2022    Procedure: Bilateral C5-C7 MEDIAL BRANCH BLOCK;  Surgeon: Abdirashid Gonzalez MD;  Location: [facility];  Service: Pain Management;  Laterality: Bilateral;    RADIOFREQUENCY ABLATION Bilateral 07/27/2021    Procedure: Bilateral T8-T10 RADIOFREQUENCY ABLATION;  Surgeon: Abdirashid Gonzalez MD;  Location: [facility];  Service: Pain Management;  Laterality: Bilateral;    RADIOFREQUENCY ABLATION Bilateral 08/23/2022    Procedure: RADIOFREQUENCY ABLATION Bilateral T8-T10;  Surgeon: Abdirashid Gonzalez MD;  Location: [facility];  Service: Pain Management;  Laterality: Bilateral;         Family History:  Family History   Problem Relation Age of Onset    Brain cancer Father     Stroke Father     Hypertension Father     Atrial fibrillation Father     Heart disease Father     Hypertension Mother     Diabetes Mother     Heart disease Mother     Hypertension Brother     Colon cancer Maternal Grandmother     Lung cancer Maternal Grandfather     Bone cancer Maternal Grandfather         Social History:   Social History     Tobacco Use    Smoking status: Never    Smokeless tobacco: Never    Tobacco comments:     no caffeine    Substance Use Topics    Alcohol use: No     Comment: caffeine weekly        Allergies:  No Known Allergies    Home Meds:  (Not in a hospital admission)      Objective:    Vitals Ranges:   Heart Rate:  [80] 80  BP: (118)/(75) 118/75  Body mass index is 41.04 kg/m².     Exam:  General Appearance:  WDWN    HEENT:   without obvious abnormality,  Conjunctiva/corneas clear,  Normal external ear canals, no drainage    Clear orsalmucosa,  Mallampati score 3    Neck:  Supple, symmetrical, trachea midline. No JVD.  Lungs:   Bilateral basal rhonchi bilaterally, respirations unlabored symmetrical wall movement.    Chest wall:  No tenderness or deformity.    Heart:  Regular rate and rhythm, S1 and S2 normal.  Extremities: Trace edema no clubbing or Cyanosis        Data Review:  All labs (24hrs): No results found for this or any previous visit (from the past 24 hour(s)).     Imaging:  CT Head Without Contrast  Narrative: CT HEAD, NONCONTRAST, 05/31/2023     HISTORY:  53-year-old male in the ED complaining of 3-4 day history of  dizziness/vertigo. Intermittent headaches.     TECHNIQUE:    CT imaging of the head without IV contrast. Radiation dose reduction  techniques included automated exposure control or exposure modulation  based on body size. Radiation audit for CT and nuclear cardiology exams  in the last 12 months: 2.     COMPARISON:  *  CT head, 03/01/2023.     FINDINGS:    The examination  is negative. No acute intracranial abnormality is  demonstrated.     No evidence of intracranial hemorrhage. No visible mass, mass effect,  cerebral edema, extra-axial fluid collection or hydrocephalus.  Visualized upper paranasal sinuses and mastoid air spaces are clear. No  change since the prior exam.       Impression: Negative head CT examination. No change since 03/01/2023.     This report was finalized on 5/31/2023 10:21 AM by Dr. Eleazar Parnell MD.          ASSESSMENT:  Diagnoses and all orders for this visit:    DEVAUGHN on CPAP    Moderate asthma without complication, unspecified whether persistent    Essential hypertension    Morbidly obese    Type 2 diabetes mellitus with other circulatory complication, without long-term current use of insulin    Gastroesophageal reflux disease without esophagitis        PLAN:  Change the mask more frequently, and try to clean it more frequent    This patient with obstructive sleep apnea, compliance is improved. Encourage to use it more frequent, I re-emphasized on pt the long and short term benefit of treating DEVAUGHN. The device is benefiting the patient.  The patient is also instructed to get the CPAP supplies from the [facility] and see me in 1 year for follow-up.    Treating DEVAUGHN will improve BP control and blood glucose control       Follow-up 1 year    Amy Wilson MD. D, ABSM.  10/24/2023  12:18 EDT

## 2023-11-13 DIAGNOSIS — M54.12 CERVICAL RADICULOPATHY: ICD-10-CM

## 2023-11-13 DIAGNOSIS — M54.16 LUMBAR RADICULOPATHY: ICD-10-CM

## 2023-11-14 RX ORDER — GABAPENTIN 800 MG/1
800 TABLET ORAL 3 TIMES DAILY
Qty: 90 TABLET | Refills: 1 | Status: SHIPPED | OUTPATIENT
Start: 2023-11-14

## 2023-11-15 ENCOUNTER — OFFICE VISIT (OUTPATIENT)
Dept: PAIN MEDICINE | Facility: CLINIC | Age: 53
End: 2023-11-15
Payer: COMMERCIAL

## 2023-11-15 VITALS
HEIGHT: 70 IN | TEMPERATURE: 98 F | HEART RATE: 87 BPM | OXYGEN SATURATION: 96 % | DIASTOLIC BLOOD PRESSURE: 72 MMHG | SYSTOLIC BLOOD PRESSURE: 110 MMHG | BODY MASS INDEX: 42.12 KG/M2 | RESPIRATION RATE: 20 BRPM | WEIGHT: 294.2 LBS

## 2023-11-15 DIAGNOSIS — M54.16 LUMBAR RADICULOPATHY: ICD-10-CM

## 2023-11-15 DIAGNOSIS — M54.50 CHRONIC MIDLINE LOW BACK PAIN WITHOUT SCIATICA: ICD-10-CM

## 2023-11-15 DIAGNOSIS — M62.838 MUSCLE SPASM: ICD-10-CM

## 2023-11-15 DIAGNOSIS — M47.814 ARTHROPATHY OF THORACIC FACET JOINT: ICD-10-CM

## 2023-11-15 DIAGNOSIS — G89.4 CHRONIC PAIN SYNDROME: ICD-10-CM

## 2023-11-15 DIAGNOSIS — Z79.899 ENCOUNTER FOR LONG-TERM (CURRENT) USE OF HIGH-RISK MEDICATION: ICD-10-CM

## 2023-11-15 DIAGNOSIS — M47.812 ARTHROPATHY OF CERVICAL FACET JOINT: ICD-10-CM

## 2023-11-15 DIAGNOSIS — M54.12 CERVICAL RADICULOPATHY: Primary | ICD-10-CM

## 2023-11-15 DIAGNOSIS — G89.29 CHRONIC MIDLINE LOW BACK PAIN WITHOUT SCIATICA: ICD-10-CM

## 2023-11-15 RX ORDER — HYDROCODONE BITARTRATE AND ACETAMINOPHEN 5; 325 MG/1; MG/1
1 TABLET ORAL DAILY PRN
Qty: 30 TABLET | Refills: 0 | Status: SHIPPED | OUTPATIENT
Start: 2023-11-15

## 2023-11-15 NOTE — PROGRESS NOTES
"CHIEF COMPLAINT  Back,neck pain   Patient reports   Patient complains of left lower back pain \"its stabbing pain\" left knee is swollen.     Subjective   Ronni Asif is a 53 y.o. male  who presents for follow-up.  He has a history of back and neck pain.  Today his pain is 7/10VAS in severity. He recently returned from [place] and did a lot of walking on this trip. He states that he is now having sharp stabbing pain in his left low back, he also reports increased pain in his left knee.     He continues with Gabapentin 800 mg 3/day, and Methocarbamol 750mg PRN (does not utilize daily).  He also continues with Norco 5/325 0-1/day. This medication regimen decreases his pain by a moderate amount. ADLs by self. He denies any side effects including somnolence or constipation.     Unfortunately his repeat Bilateral T8-T10 RFA was denied by insurance as being experimental.      Cervical medial branch blocks were diagnostically negative at multiple levels, TAMEKA provided no relief.      Hx of L5-S1 fusion performed by Dr. Tripathi on 8/30/2019     Procedure list:  8/23/2022-bilateral T8-T10 RFA-80% relief x2 months with ongoing 60 to 70% relief x ~ 6 months. His pain is now starting to return  6/7/2022-bilateral C5-C7 MBB-20% relief (diagnostically negative)  11/30/2021-bilateral C3-C5 MBB-minimal relief (diagnostically negative)  10/7/2021-TAMEKA at C6-C7-minimal relief  7/27/2021-bilateral T8-T10 RFA-50% improvement lasting many months.   6/29/2021-bilateral T8-T10 MBB-80% relief x2 days  6/1/2021-bilateral T8-T10 MBB-80 to 90% relief x1 day    Back Pain  This is a chronic problem. The current episode started more than 1 year ago. The problem occurs daily. The problem has been waxing and waning since onset. The pain is present in the thoracic spine and lumbar spine. The quality of the pain is described as aching. The pain does not radiate. The pain is at a severity of 7/10. The pain is severe. The symptoms are " aggravated by bending, standing and twisting. Stiffness is present In the morning. Associated symptoms include weakness (left knee). Pertinent negatives include no abdominal pain, chest pain, dysuria, fever, headaches or numbness. Risk factors include obesity. He has tried analgesics, heat, ice, NSAIDs, muscle relaxant and home exercises (Norco 5/325) for the symptoms. The treatment provided significant relief.   Neck Pain   This is a chronic problem. The current episode started more than 1 year ago. The problem occurs constantly. The problem has been waxing and waning. The pain is present in the occipital region and midline. The quality of the pain is described as burning (bilateral upper extremity tingling). The pain is at a severity of 7/10. The symptoms are aggravated by bending (lying in bed). Associated symptoms include weakness (left knee). Pertinent negatives include no chest pain, fever, headaches or numbness. He has tried ice, oral narcotics and NSAIDs (PT) for the symptoms.      PEG Assessment   What number best describes your pain on average in the past week?7  What number best describes how, during the past week, pain has interfered with your enjoyment of life?7  What number best describes how, during the past week, pain has interfered with your general activity?  7    The following portions of the patient's history were reviewed and updated as appropriate: allergies, current medications, past family history, past medical history, past social history, past surgical history, and problem list.    Review of Systems   Constitutional:  Negative for activity change (less), fatigue and fever.   HENT:  Negative for congestion.    Respiratory:  Negative for cough and chest tightness.    Cardiovascular:  Negative for chest pain.   Gastrointestinal:  Negative for abdominal pain, constipation and diarrhea.   Genitourinary:  Negative for difficulty urinating and dysuria.   Musculoskeletal:  Positive for back pain  "and neck pain.   Neurological:  Positive for weakness (left knee). Negative for dizziness, light-headedness, numbness and headaches.   Psychiatric/Behavioral:  Positive for sleep disturbance. Negative for agitation and suicidal ideas. The patient is not nervous/anxious.      --  The aforementioned information the Chief Complaint section and above subjective data including any HPI data, and also the Review of Systems data, has been personally reviewed and affirmed.  --    Vitals:    11/15/23 0929   BP: 110/72   BP Location: Right arm   Patient Position: Sitting   Cuff Size: Large Adult   Pulse: 87   Resp: 20   Temp: 98 °F (36.7 °C)   TempSrc: Oral   SpO2: 96%   Weight: 133 kg (294 lb 3.2 oz)   Height: 177.8 cm (70\")   PainSc:   7     Objective   Physical Exam  Vitals and nursing note reviewed.   Constitutional:       Appearance: Normal appearance. He is well-developed.   Eyes:      General: Lids are normal.   Cardiovascular:      Rate and Rhythm: Normal rate.   Pulmonary:      Effort: Pulmonary effort is normal.   Musculoskeletal:      Cervical back: Decreased range of motion.      Thoracic back: Decreased range of motion.      Lumbar back: Tenderness present. Decreased range of motion.   Neurological:      Mental Status: He is alert and oriented to person, place, and time.   Psychiatric:         Attention and Perception: Attention normal.         Mood and Affect: Mood normal.         Speech: Speech normal.         Behavior: Behavior normal.         Judgment: Judgment normal.       Assessment & Plan   Diagnoses and all orders for this visit:    1. Cervical radiculopathy (Primary)    2. Muscle spasm    3. Lumbar radiculopathy    4. Chronic pain syndrome    5. Arthropathy of thoracic facet joint    6. Arthropathy of cervical facet joint    7. Chronic midline low back pain without sciatica    8. Encounter for long-term (current) use of high-risk medication      Ronni Asif reports a pain score of 7.  Given his " pain assessment as noted, treatment options were discussed and the following options were decided upon as a follow-up plan to address the patient's pain: continuation of current treatment plan for pain.    --- Discussed consideration of MDP for increase pain from his recent trip, he would like to wait on this.   --- The urine drug screen confirmation from 8/8/2023 has been reviewed and the result is appropritae based on patient history and ROSI report  --- CSA updated 4/3/2023  --- Opioid Risk--Low  --- Refill Norco. Patient appears stable with current regimen. No adverse effects. Regarding continuation of opioids, there is no evidence of aberrant behavior or any red flags.  The patient continues with appropriate response to opioid therapy. ADL's remain intact by self.   --- Ok to increase methocarbamol 750 mg to every 6 hours PRN for increased low back pain  --- Continue Gabapentin, no refill needed.   --- Follow-up 3 months or sooner if needed.      ROSI REPORT  As part of the patient's treatment plan, I am prescribing controlled substances. The patient has been made aware of appropriate use of such medications, including potential risk of somnolence, limited ability to drive and/or work safely, and the potential for dependence or overdose. It has also been made clear that these medications are for use by this patient only, without concomitant use of alcohol or other substances unless prescribed.     Patient has completed prescribing agreement detailing terms of continued prescribing of controlled substances, including monitoring ROSI reports, urine drug screening, and pill counts if necessary. The patient is aware that inappropriate use will results in cessation of prescribing such medications.    As the clinician, I personally reviewed the ROSI from 11/15/2023 while the patient was in the office today.    History and physical exam exhibit continued safe and appropriate use of controlled  substances.    Dictated utilizing Dragon dictation.

## 2023-11-30 NOTE — PATIENT INSTRUCTIONS
"Stop Meloxicam  Stop Tramadol  Continue Ibuprofen ONLY if approved by Dr. Yu, then do not exceed daily recommended dose on bottle    -------  Education about Medial Branch Blockade and RF Therapy:    This medial branch blockade (MBB) suggested is intended for diagnostic purposes, with the intent of offering the patient Radiofrequency thermal rhizotomy (RF) if the MBB is diagnostically effective.  The diagnostic blockade is necessary to determine the likelihood that RF therapy could be efficacious in providing long term relief to the patient.    Medial branches are sensory nerve branches that connect to a facet joint and transmit sensations & pain signals from that joint.  Facet is a term for the type of joints found in the spine.  Medial branches are the nerves that go to a facet, and therefore are also sometimes called \"facet joint nerves\" (FJNs).      In a medial branch blockade procedure, xray fluoroscopy is used to verify the locations of the outside of the joint lines which are being targeted.  Under xray guidance, needles are placed to these areas.  Contrast dye is injected to confirm proper placement, with dye flowing over the joint area, and to ensure that the dye does not flow into unintended areas such as a vein.  When this is confirmed, local anesthetic is injected to block the medial branch at that joint level.      If MBBs are diagnostically successful in blocking pain, then the patient is most likely a great candidate for Radiofrequency of those facet joint nerves.  In the RF procedure, needles are placed to the joint lines in the same fashion, and after testing, the needle tips are heated to thermally treat the nerves, blocking the nerves by in essence damaging the nerves with the heat treatment.       Medically, a successful RF procedure should provide a patient with 50% pain relief or more for at least 6 months.  Clinical experience suggests that successful patients receive relief more in the " range of 12 months on average.  We also discussed that a fortunate minority of patients receive therapeutic success from the MBB, and may not require RF ablation.  If a patient receives more than 8 weeks of relief from MBB, then occasional repeat MBB for therapeutic purposes is a very reasonable alternative therapy.  This course of therapy is consistent with our LCDs according to our CMS  in the area, and therefore other insurance providers should follow accordingly.  We will monitor our patients to screen for these therapeutic responders and will offer RF therapy only when necessary.        We discussed that MBB & RF are not without risks.  Guidelines regarding anticoagulant use & neuraxial procedures will be respected.  Patients that are ill or otherwise may be at risk for sepsis will not have their spines accessed by neuraxial injections of any type.  This patient will not be offered these therapies if there is an increased risk.   We discussed that there is a risk of postprocedural pain and also a risk of worsening of clinical picture with these procedures as with any neuraxial procedure.    -------       oral

## 2023-12-14 DIAGNOSIS — M54.16 LUMBAR RADICULOPATHY: ICD-10-CM

## 2023-12-14 DIAGNOSIS — M62.838 MUSCLE SPASM: ICD-10-CM

## 2023-12-14 DIAGNOSIS — M54.12 CERVICAL RADICULOPATHY: ICD-10-CM

## 2023-12-14 NOTE — TELEPHONE ENCOUNTER
Rx Refill Note  Requested Prescriptions     Pending Prescriptions Disp Refills    HYDROcodone-acetaminophen (NORCO) 5-325 MG per tablet 30 tablet 0     Sig: Take 1 tablet by mouth Daily As Needed for Severe Pain.    gabapentin (NEURONTIN) 800 MG tablet 90 tablet 1     Sig: Take 1 tablet by mouth 3 (Three) Times a Day.    methocarbamol (ROBAXIN) 750 MG tablet 60 tablet 2     Sig: Take 1 tablet by mouth 2 (Two) Times a Day As Needed for Muscle Spasms.      Last office visit with prescribing clinician: 11/15/2023   Last telemedicine visit with prescribing clinician: Visit date not found   Next office visit with prescribing clinician: 2/16/2024                         Would you like a call back once the refill request has been completed: [] Yes [] No    If the office needs to give you a call back, can they leave a voicemail: [] Yes [] No    Bety Flores CMA  12/14/23, 08:15 EST

## 2023-12-15 RX ORDER — HYDROCODONE BITARTRATE AND ACETAMINOPHEN 5; 325 MG/1; MG/1
1 TABLET ORAL DAILY PRN
Qty: 30 TABLET | Refills: 0 | Status: SHIPPED | OUTPATIENT
Start: 2023-12-15

## 2023-12-15 RX ORDER — GABAPENTIN 800 MG/1
800 TABLET ORAL 3 TIMES DAILY
Qty: 90 TABLET | Refills: 1 | Status: SHIPPED | OUTPATIENT
Start: 2023-12-15

## 2023-12-15 RX ORDER — METHOCARBAMOL 750 MG/1
750 TABLET, FILM COATED ORAL 2 TIMES DAILY PRN
Qty: 60 TABLET | Refills: 2 | Status: SHIPPED | OUTPATIENT
Start: 2023-12-15

## 2024-01-29 DIAGNOSIS — M54.16 LUMBAR RADICULOPATHY: ICD-10-CM

## 2024-01-29 DIAGNOSIS — M54.12 CERVICAL RADICULOPATHY: ICD-10-CM

## 2024-01-29 DIAGNOSIS — M62.838 MUSCLE SPASM: ICD-10-CM

## 2024-01-30 RX ORDER — HYDROCODONE BITARTRATE AND ACETAMINOPHEN 5; 325 MG/1; MG/1
1 TABLET ORAL DAILY PRN
Qty: 30 TABLET | Refills: 0 | Status: SHIPPED | OUTPATIENT
Start: 2024-01-30

## 2024-01-30 RX ORDER — GABAPENTIN 800 MG/1
800 TABLET ORAL 3 TIMES DAILY
Qty: 90 TABLET | Refills: 1 | Status: SHIPPED | OUTPATIENT
Start: 2024-01-30

## 2024-01-30 RX ORDER — METHOCARBAMOL 750 MG/1
750 TABLET, FILM COATED ORAL 2 TIMES DAILY PRN
Qty: 60 TABLET | Refills: 2 | Status: SHIPPED | OUTPATIENT
Start: 2024-01-30

## 2024-02-16 ENCOUNTER — OFFICE VISIT (OUTPATIENT)
Dept: PAIN MEDICINE | Facility: CLINIC | Age: 54
End: 2024-02-16
Payer: COMMERCIAL

## 2024-02-16 VITALS
HEIGHT: 70 IN | HEART RATE: 95 BPM | BODY MASS INDEX: 41 KG/M2 | WEIGHT: 286.4 LBS | SYSTOLIC BLOOD PRESSURE: 134 MMHG | TEMPERATURE: 96.9 F | DIASTOLIC BLOOD PRESSURE: 86 MMHG | OXYGEN SATURATION: 97 % | RESPIRATION RATE: 20 BRPM

## 2024-02-16 DIAGNOSIS — M47.814 ARTHROPATHY OF THORACIC FACET JOINT: Primary | ICD-10-CM

## 2024-02-16 DIAGNOSIS — Z79.899 ENCOUNTER FOR LONG-TERM (CURRENT) USE OF HIGH-RISK MEDICATION: ICD-10-CM

## 2024-02-16 DIAGNOSIS — M54.50 CHRONIC MIDLINE LOW BACK PAIN WITHOUT SCIATICA: ICD-10-CM

## 2024-02-16 DIAGNOSIS — M62.838 MUSCLE SPASM: ICD-10-CM

## 2024-02-16 DIAGNOSIS — M54.12 CERVICAL RADICULOPATHY: ICD-10-CM

## 2024-02-16 DIAGNOSIS — G89.29 CHRONIC MIDLINE LOW BACK PAIN WITHOUT SCIATICA: ICD-10-CM

## 2024-02-16 DIAGNOSIS — M47.812 ARTHROPATHY OF CERVICAL FACET JOINT: ICD-10-CM

## 2024-02-16 DIAGNOSIS — M54.16 LUMBAR RADICULOPATHY: ICD-10-CM

## 2024-02-16 PROCEDURE — 3079F DIAST BP 80-89 MM HG: CPT | Performed by: NURSE PRACTITIONER

## 2024-02-16 PROCEDURE — 1160F RVW MEDS BY RX/DR IN RCRD: CPT | Performed by: NURSE PRACTITIONER

## 2024-02-16 PROCEDURE — 1125F AMNT PAIN NOTED PAIN PRSNT: CPT | Performed by: NURSE PRACTITIONER

## 2024-02-16 PROCEDURE — 1159F MED LIST DOCD IN RCRD: CPT | Performed by: NURSE PRACTITIONER

## 2024-02-16 PROCEDURE — 3075F SYST BP GE 130 - 139MM HG: CPT | Performed by: NURSE PRACTITIONER

## 2024-02-16 PROCEDURE — 99214 OFFICE O/P EST MOD 30 MIN: CPT | Performed by: NURSE PRACTITIONER

## 2024-02-23 ENCOUNTER — TELEPHONE (OUTPATIENT)
Dept: NEUROSURGERY | Facility: CLINIC | Age: 54
End: 2024-02-23
Payer: COMMERCIAL

## 2024-02-23 ENCOUNTER — HOSPITAL ENCOUNTER (OUTPATIENT)
Dept: GENERAL RADIOLOGY | Facility: HOSPITAL | Age: 54
Discharge: HOME | End: 2024-02-23
Payer: COMMERCIAL

## 2024-02-23 DIAGNOSIS — Z98.1 HISTORY OF LUMBAR SPINAL FUSION: ICD-10-CM

## 2024-02-23 PROCEDURE — 72114 X-RAY EXAM L-S SPINE BENDING: CPT

## 2024-02-23 NOTE — TELEPHONE ENCOUNTER
Contacted patient to let him know he will need to have is xrays completed before seeing Dr. Tripathi on Monday 02/26/24

## 2024-02-26 NOTE — TELEPHONE ENCOUNTER
Message from Clarus - 02/25/2024 01:03 PM    I need to cancel my appointment from Monday the 26th at 10:00 AM. I am in the hospital at [facility].

## 2024-03-18 DIAGNOSIS — M54.16 LUMBAR RADICULOPATHY: ICD-10-CM

## 2024-03-18 DIAGNOSIS — M54.12 CERVICAL RADICULOPATHY: ICD-10-CM

## 2024-03-18 DIAGNOSIS — M62.838 MUSCLE SPASM: ICD-10-CM

## 2024-03-18 RX ORDER — GABAPENTIN 800 MG/1
800 TABLET ORAL 3 TIMES DAILY
Qty: 90 TABLET | Refills: 1 | Status: SHIPPED | OUTPATIENT
Start: 2024-03-18

## 2024-03-18 RX ORDER — HYDROCODONE BITARTRATE AND ACETAMINOPHEN 5; 325 MG/1; MG/1
1 TABLET ORAL DAILY PRN
Qty: 30 TABLET | Refills: 0 | Status: SHIPPED | OUTPATIENT
Start: 2024-03-18

## 2024-03-18 RX ORDER — METHOCARBAMOL 750 MG/1
750 TABLET, FILM COATED ORAL 2 TIMES DAILY PRN
Qty: 60 TABLET | Refills: 2 | Status: SHIPPED | OUTPATIENT
Start: 2024-03-18

## 2024-03-19 ENCOUNTER — PRIOR AUTHORIZATION (OUTPATIENT)
Dept: PAIN MEDICINE | Facility: CLINIC | Age: 54
End: 2024-03-19
Payer: COMMERCIAL

## 2024-03-19 NOTE — TELEPHONE ENCOUNTER
Norco 5-325 mg PA was approved The request has been approved. The authorization is effective from 03/19/2024 to 09/19/2024.  Pt informed.

## 2024-05-01 ENCOUNTER — TELEPHONE (OUTPATIENT)
Dept: NEUROSURGERY | Facility: CLINIC | Age: 54
End: 2024-05-01
Payer: COMMERCIAL

## 2024-05-01 NOTE — TELEPHONE ENCOUNTER
Called patient, LVM, to reschedule appt.  Dr. Tripathi carlos be out of office on that day.  Please schedule F/A.  [facility] can reschedule.

## 2024-05-08 ENCOUNTER — TREATMENT (OUTPATIENT)
Dept: PHYSICAL THERAPY | Facility: CLINIC | Age: 54
End: 2024-05-08

## 2024-05-08 DIAGNOSIS — G89.29 CHRONIC BILATERAL LOW BACK PAIN WITHOUT SCIATICA: Primary | ICD-10-CM

## 2024-05-08 DIAGNOSIS — M54.2 PAIN, NECK: ICD-10-CM

## 2024-05-08 DIAGNOSIS — M54.50 CHRONIC BILATERAL LOW BACK PAIN WITHOUT SCIATICA: Primary | ICD-10-CM

## 2024-05-08 DIAGNOSIS — M62.89 MUSCLE TIGHTNESS: ICD-10-CM

## 2024-05-10 NOTE — PROGRESS NOTES
Subjective   Patient ID: Ronni Asif is a 54 y.o. male is here today for follow-up for cervical stenosis.  Patient last seen 2/7/2023.  Since last office visit he has had lumbar x-rays.   He has history of L5-S1 fusion August 2019 with Dr. Tripathi.    History of Present Illness  Today, patient reports constant pain from shoulders to buttocks, but 6 months of R low back pain radiating down the posterior thigh that is aggravated by activity. It is intermittent. Ice helps. He has some chronic neuropathy in left foot, mainly toes and arch of foot. He also reports, burning midline thoracic pain that is aggravated by bending. Additionally reports, sharp pain into the neck and into the top of the left shoulder worse with reaching overhead. He has been dropping items. He sees orthopedic surgeon for shoulder and knee issues. He did have shoulder xrays recently- small tear but ortho felt related to cervical spine. Non-smoker. Skin cancer history (not melanoma).     He follows with pain management.  He has had multiple interventions including bilateral T8-10 MBB/RFA, bilateral cervical MBB's and TAMEKA. he is on gabapentin 800 mg p.o. 3 times daily, Robaxin-750 milligram p.o. twice daily-3 times daily as needed and Norco 5 mg p.o. as needed, very sparing use. He reports PT in fall 2023 and dry needling recently. He is diabetic- Hgb A1C 5-6.     He is currently on disability and working to cancel it. He is getting  soon.    The following portions of the patient's history were reviewed and updated as appropriate: allergies, current medications, past family history, past medical history, past social history, past surgical history, and problem list.    Review of Systems   Genitourinary:  Negative for enuresis.   Musculoskeletal:  Positive for back pain and neck pain.   Neurological:  Positive for weakness (left arm). Negative for numbness.        Tingling in hands       Objective     Vitals:    05/13/24 0837   BP:  "112/72   Weight: 132 kg (290 lb)   Height: 177.8 cm (70\")     Body mass index is 41.61 kg/m².    Tobacco Use: Low Risk  (5/13/2024)    Patient History     Smoking Tobacco Use: Never     Smokeless Tobacco Use: Never     Passive Exposure: Not on file          Physical Exam  Vitals reviewed.   Constitutional:       Appearance: Normal appearance.      Comments: Body mass index is 41.61 kg/m².     Pulmonary:      Effort: Pulmonary effort is normal.   Musculoskeletal:      Cervical back: Tenderness present. No bony tenderness. No pain with movement. Normal range of motion.      Thoracic back: Tenderness present.      Lumbar back: Tenderness present. Negative right straight leg raise test and negative left straight leg raise test.      Comments:   Negative Lhermitte's   Skin:     General: Skin is warm and dry.   Neurological:      General: No focal deficit present.      Mental Status: He is alert.      Coordination: Romberg Test normal.      Gait: Gait is intact.   Psychiatric:         Mood and Affect: Mood normal.         Speech: Speech normal.         Thought Content: Thought content normal.       Neurologic Exam     Mental Status   Speech: speech is normal   Level of consciousness: alert  Knowledge: good.   Normal comprehension.     Motor Exam   Muscle bulk: normal  Overall muscle tone: normal    Strength   Strength 5/5 except as noted.   Left  4/5     Sensory Exam   Light touch normal.     Gait, Coordination, and Reflexes     Gait  Gait: normal    Coordination   Romberg: negative    Reflexes   Right Saez: absent  Left Saez: absent  Right ankle clonus: absent  Left ankle clonus: absent  Trace reflexes throughout except left patellar 1+           Assessment & Plan   Independent Review of Radiographic Studies:      I personally reviewed the images from the following studies.    Lumbar x-rays reveal postoperative change L5/S1 with instrumentation good position and no complicating feature.  No evidence of " fracture.  Mild anterolisthesis L5 on S1 but no abnormal motion with flexion-extension.  Mild degenerative changes present.    Medical Decision Making:      Patient presents for follow-up after lumbar fusion several years ago.  X-rays are stable.  He reports multiple new issues since his last office visit including progressive pain in his back particularly radiating down the posterior aspect of his right thigh and neck pain with radiation into the top of the left shoulder and some difficulty with  of the left hand.  He has chronic pain in his neck, thoracic, and lumbar spine.  He is in pain management.  He did have some benefits with MBB and RFA's in the thoracic spine although they have been denied by insurance further.  He has had conservative measures with PT in the past.  He has not had any recent updated imaging.  On exam he has some mild left  weakness.  He has muted reflexes throughout.  No red flags otherwise.  Does have a history of lumbar surgery.  Due to failure of benefit with conservative measures through PT and rehab, recommend MRI cervical, thoracic, lumbar spine and follow-up after.    Diagnoses and all orders for this visit:    1. Lumbar radiculopathy (Primary)  -     MRI Lumbar Spine With & Without Contrast; Future    2. Neck pain  -     MRI Cervical Spine Without Contrast; Future    3. Spondylosis without myelopathy or radiculopathy, thoracic region  -     MRI Thoracic Spine Without Contrast; Future    4. DDD (degenerative disc disease), lumbar  -     MRI Lumbar Spine With & Without Contrast; Future    5. Degeneration of thoracic intervertebral disc  -     MRI Thoracic Spine Without Contrast; Future    6. History of lumbar spinal fusion  -     MRI Lumbar Spine With & Without Contrast; Future    7. Pain in thoracic spine  -     MRI Thoracic Spine Without Contrast; Future    8. Left arm weakness  -     MRI Cervical Spine Without Contrast; Future      Return for Follow-up with [facility], with  imaging needs 30 minute visit.

## 2024-05-13 ENCOUNTER — OFFICE VISIT (OUTPATIENT)
Dept: NEUROSURGERY | Facility: CLINIC | Age: 54
End: 2024-05-13
Payer: COMMERCIAL

## 2024-05-13 VITALS
WEIGHT: 290 LBS | HEIGHT: 70 IN | BODY MASS INDEX: 41.52 KG/M2 | SYSTOLIC BLOOD PRESSURE: 112 MMHG | DIASTOLIC BLOOD PRESSURE: 72 MMHG

## 2024-05-13 DIAGNOSIS — M47.814 SPONDYLOSIS WITHOUT MYELOPATHY OR RADICULOPATHY, THORACIC REGION: ICD-10-CM

## 2024-05-13 DIAGNOSIS — M54.16 LUMBAR RADICULOPATHY: Primary | ICD-10-CM

## 2024-05-13 DIAGNOSIS — M51.34 DEGENERATION OF THORACIC INTERVERTEBRAL DISC: ICD-10-CM

## 2024-05-13 DIAGNOSIS — M51.36 DDD (DEGENERATIVE DISC DISEASE), LUMBAR: ICD-10-CM

## 2024-05-13 DIAGNOSIS — Z98.1 HISTORY OF LUMBAR SPINAL FUSION: ICD-10-CM

## 2024-05-13 DIAGNOSIS — M54.2 NECK PAIN: ICD-10-CM

## 2024-05-13 DIAGNOSIS — R29.898 LEFT ARM WEAKNESS: ICD-10-CM

## 2024-05-13 DIAGNOSIS — M54.6 PAIN IN THORACIC SPINE: ICD-10-CM

## 2024-05-13 PROCEDURE — 3074F SYST BP LT 130 MM HG: CPT | Performed by: NURSE PRACTITIONER

## 2024-05-13 PROCEDURE — 3078F DIAST BP <80 MM HG: CPT | Performed by: NURSE PRACTITIONER

## 2024-05-13 PROCEDURE — 1159F MED LIST DOCD IN RCRD: CPT | Performed by: NURSE PRACTITIONER

## 2024-05-13 PROCEDURE — 1160F RVW MEDS BY RX/DR IN RCRD: CPT | Performed by: NURSE PRACTITIONER

## 2024-05-13 PROCEDURE — 99214 OFFICE O/P EST MOD 30 MIN: CPT | Performed by: NURSE PRACTITIONER

## 2024-05-13 RX ORDER — MOXIFLOXACIN 5 MG/ML
SOLUTION/ DROPS OPHTHALMIC
COMMUNITY
Start: 2024-03-17

## 2024-05-13 RX ORDER — PREDNISOLONE ACETATE 10 MG/ML
SUSPENSION/ DROPS OPHTHALMIC
COMMUNITY
Start: 2024-03-17

## 2024-05-13 RX ORDER — INSULIN LISPRO 100 [IU]/ML
INJECTION, SOLUTION INTRAVENOUS; SUBCUTANEOUS
COMMUNITY
Start: 2024-02-09

## 2024-05-13 RX ORDER — POLYMYXIN B SULFATE AND TRIMETHOPRIM 1; 10000 MG/ML; [USP'U]/ML
1 SOLUTION OPHTHALMIC 3 TIMES DAILY
COMMUNITY
Start: 2024-03-27

## 2024-05-13 RX ORDER — KETOROLAC TROMETHAMINE 5 MG/ML
SOLUTION OPHTHALMIC
COMMUNITY
Start: 2024-03-17

## 2024-05-13 RX ORDER — PREDNISONE 20 MG/1
TABLET ORAL
COMMUNITY
Start: 2024-03-27

## 2024-05-17 ENCOUNTER — OFFICE VISIT (OUTPATIENT)
Dept: PAIN MEDICINE | Facility: CLINIC | Age: 54
End: 2024-05-17
Payer: COMMERCIAL

## 2024-05-17 VITALS
TEMPERATURE: 97.1 F | WEIGHT: 292.8 LBS | OXYGEN SATURATION: 95 % | SYSTOLIC BLOOD PRESSURE: 115 MMHG | HEIGHT: 70 IN | RESPIRATION RATE: 12 BRPM | BODY MASS INDEX: 41.92 KG/M2 | DIASTOLIC BLOOD PRESSURE: 74 MMHG | HEART RATE: 76 BPM

## 2024-05-17 DIAGNOSIS — Z79.899 ENCOUNTER FOR LONG-TERM (CURRENT) USE OF HIGH-RISK MEDICATION: ICD-10-CM

## 2024-05-17 DIAGNOSIS — M54.50 CHRONIC MIDLINE LOW BACK PAIN WITHOUT SCIATICA: ICD-10-CM

## 2024-05-17 DIAGNOSIS — G89.29 CHRONIC MIDLINE LOW BACK PAIN WITHOUT SCIATICA: ICD-10-CM

## 2024-05-17 DIAGNOSIS — M47.814 ARTHROPATHY OF THORACIC FACET JOINT: ICD-10-CM

## 2024-05-17 DIAGNOSIS — M54.16 LUMBAR RADICULOPATHY: ICD-10-CM

## 2024-05-17 DIAGNOSIS — M54.12 CERVICAL RADICULOPATHY: Primary | ICD-10-CM

## 2024-05-17 DIAGNOSIS — M62.838 MUSCLE SPASM: ICD-10-CM

## 2024-05-17 DIAGNOSIS — M47.812 ARTHROPATHY OF CERVICAL FACET JOINT: ICD-10-CM

## 2024-05-17 PROCEDURE — 1125F AMNT PAIN NOTED PAIN PRSNT: CPT | Performed by: NURSE PRACTITIONER

## 2024-05-17 PROCEDURE — 99214 OFFICE O/P EST MOD 30 MIN: CPT | Performed by: NURSE PRACTITIONER

## 2024-05-17 PROCEDURE — 3078F DIAST BP <80 MM HG: CPT | Performed by: NURSE PRACTITIONER

## 2024-05-17 PROCEDURE — 3074F SYST BP LT 130 MM HG: CPT | Performed by: NURSE PRACTITIONER

## 2024-05-17 PROCEDURE — 1160F RVW MEDS BY RX/DR IN RCRD: CPT | Performed by: NURSE PRACTITIONER

## 2024-05-17 PROCEDURE — 1159F MED LIST DOCD IN RCRD: CPT | Performed by: NURSE PRACTITIONER

## 2024-05-17 RX ORDER — GABAPENTIN 800 MG/1
800 TABLET ORAL 3 TIMES DAILY
Qty: 90 TABLET | Refills: 1 | Status: SHIPPED | OUTPATIENT
Start: 2024-05-17

## 2024-05-17 RX ORDER — METHOCARBAMOL 750 MG/1
750 TABLET, FILM COATED ORAL 2 TIMES DAILY PRN
Qty: 60 TABLET | Refills: 2 | Status: SHIPPED | OUTPATIENT
Start: 2024-05-17

## 2024-05-17 RX ORDER — HYDROCODONE BITARTRATE AND ACETAMINOPHEN 5; 325 MG/1; MG/1
1 TABLET ORAL DAILY PRN
Qty: 30 TABLET | Refills: 0 | Status: SHIPPED | OUTPATIENT
Start: 2024-05-17

## 2024-05-17 NOTE — PROGRESS NOTES
CHIEF COMPLAINT  Pt reports worsened pain states he has upcoming cataract sx next week and cervical MRI with neurosurgery.  Lumbar pain going down right leg down his knee also complaining of burning pain in his thoracic more towards the center.    Subjective   Ronni Asif is a 54 y.o. male  who presents for follow-up.  He has a history of mid back, low back, and neck pain.  Today he states that his pain is worsening. His pain is 7/10VAS in severity. Today he complains of low back pain that is radiating into his right leg posterior/lateral thigh stopping at the knee. He states this feels like a hot sharp knife going down his leg. He states this is relatively new pain. He was evaluated by neurosurgery who recommend updating his cervical, thoracic and lumbar MRIs.     He continues with Gabapentin 800 mg 3/day, and Methocarbamol 750mg PRN 2/day.  He also continues with Norco 5/325 0-1/day (tries to avoid taking this if possible). He states that this medication regimen decreases his pain by a significant amount. He denies any side effects including somnolence or constipation.     Unfortunately his repeat Bilateral T8-T10 RFA was denied by insurance as being experimental.      Cervical medial branch blocks were diagnostically negative at multiple levels, TAMEKA provided no relief.       Hx of L5-S1 fusion performed by Dr. Tripathi on 8/30/2019. Prior to establishing with our clinic he has completed lumber epidurals and RFA.      Procedure list:  8/23/2022-bilateral T8-T10 RFA-80% relief x2 months with ongoing 60 to 70% relief x ~ 6 months. His pain is now starting to return  6/7/2022-bilateral C5-C7 MBB-20% relief (diagnostically negative)  11/30/2021-bilateral C3-C5 MBB-minimal relief (diagnostically negative)  10/7/2021-TAMEKA at C6-C7-minimal relief  7/27/2021-bilateral T8-T10 RFA-50% improvement lasting many months.   6/29/2021-bilateral T8-T10 MBB-80% relief x2 days  6/1/2021-bilateral T8-T10 MBB-80 to 90%  relief x1 day    Back Pain  This is a chronic problem. The current episode started more than 1 year ago. The problem occurs daily. The problem has been waxing and waning since onset. The pain is present in the thoracic spine and lumbar spine. The quality of the pain is described as aching. The pain does not radiate. The pain is at a severity of 7/10. The pain is severe. The symptoms are aggravated by bending, standing and twisting. Stiffness is present In the morning. Associated symptoms include headaches, numbness (4,5 digit fingers) and weakness (left arm). Pertinent negatives include no abdominal pain, chest pain, dysuria or fever. Risk factors include obesity. He has tried analgesics, heat, ice, NSAIDs, muscle relaxant and home exercises (Norco 5/325) for the symptoms. The treatment provided significant relief.   Neck Pain   This is a chronic problem. The current episode started more than 1 year ago. The problem occurs constantly. The problem has been waxing and waning. The pain is present in the occipital region and midline. The quality of the pain is described as burning (bilateral upper extremity tingling). The pain is at a severity of 6/10. The symptoms are aggravated by bending (lying in bed). Associated symptoms include headaches, numbness (4,5 digit fingers) and weakness (left arm). Pertinent negatives include no chest pain or fever. He has tried ice, oral narcotics and NSAIDs (PT) for the symptoms.      PEG Assessment   What number best describes your pain on average in the past week?7  What number best describes how, during the past week, pain has interfered with your enjoyment of life?7  What number best describes how, during the past week, pain has interfered with your general activity?  7    The following portions of the patient's history were reviewed and updated as appropriate: allergies, current medications, past family history, past medical history, past social history, past surgical history, and  "problem list.    Review of Systems   Constitutional:  Negative for activity change (less), fatigue and fever.   Respiratory:  Negative for cough and chest tightness.    Cardiovascular:  Negative for chest pain.   Gastrointestinal:  Negative for abdominal pain, constipation and diarrhea.   Genitourinary:  Negative for difficulty urinating and dysuria.   Musculoskeletal:  Positive for back pain and neck pain.   Neurological:  Positive for dizziness, weakness (left arm), numbness (4,5 digit fingers) and headaches. Negative for light-headedness.   Psychiatric/Behavioral:  Positive for sleep disturbance. Negative for agitation and suicidal ideas. The patient is not nervous/anxious.      --  The aforementioned information the Chief Complaint section and above subjective data including any HPI data, and also the Review of Systems data, has been personally reviewed and affirmed.  --    Vitals:    05/17/24 0912   BP: 115/74   BP Location: Left arm   Patient Position: Sitting   Cuff Size: Large Adult   Pulse: 76   Resp: 12   Temp: 97.1 °F (36.2 °C)   TempSrc: Temporal   SpO2: 95%   Weight: 133 kg (292 lb 12.8 oz)   Height: 177.8 cm (70\")   PainSc:   7     Objective   Physical Exam  Vitals and nursing note reviewed.   Constitutional:       Appearance: Normal appearance. He is well-developed.   Eyes:      General: Lids are normal.   Cardiovascular:      Rate and Rhythm: Normal rate.   Pulmonary:      Effort: Pulmonary effort is normal.   Musculoskeletal:      Cervical back: Decreased range of motion.      Thoracic back: Tenderness present. Decreased range of motion.      Lumbar back: Tenderness and bony tenderness present. Decreased range of motion.   Neurological:      Mental Status: He is alert and oriented to person, place, and time.   Psychiatric:         Attention and Perception: Attention normal.         Mood and Affect: Mood normal.         Speech: Speech normal.         Behavior: Behavior normal.         Judgment: " Judgment normal.       Assessment & Plan   Diagnoses and all orders for this visit:    1. Cervical radiculopathy (Primary)    2. Muscle spasm    3. Lumbar radiculopathy    4. Arthropathy of thoracic facet joint    5. Arthropathy of cervical facet joint    6. Chronic midline low back pain without sciatica    7. Encounter for long-term (current) use of high-risk medication      Ronni Asif reports a pain score of 7.  Given his pain assessment as noted, treatment options were discussed and the following options were decided upon as a follow-up plan to address the patient's pain: continuation of current treatment plan for pain.    --- Consider Right L5 and S1 TFESI. Will wait on updated lumbar MRI ordered by neurosurgery  --- The urine drug screen confirmation from 2/16/2024 has been reviewed and the result is appropriate based on patient history and ROSI report  --- CSA updated in office today.   --- Opioid Risk--Low  --- Refill [place] 5/325. Patient appears stable with current regimen. No adverse effects. Regarding continuation of opioids, there is no evidence of aberrant behavior or any red flags.  The patient continues with appropriate response to opioid therapy. ADL's remain intact by self.   --- Refill Gabapentin and Robaxin.   --- Follow-up 3 months or after completion of MRIs. Whichever is sooner.      ROSI REPORT  As part of the patient's treatment plan, I am prescribing controlled substances. The patient has been made aware of appropriate use of such medications, including potential risk of somnolence, limited ability to drive and/or work safely, and the potential for dependence or overdose. It has also been made clear that these medications are for use by this patient only, without concomitant use of alcohol or other substances unless prescribed.     Patient has completed prescribing agreement detailing terms of continued prescribing of controlled substances, including monitoring ROSI reports,  urine drug screening, and pill counts if necessary. The patient is aware that inappropriate use will results in cessation of prescribing such medications.    As the clinician, I personally reviewed the ROSI from 5/17/2024 while the patient was in the office today.    History and physical exam exhibit continued safe and appropriate use of controlled substances.    Dictated utilizing Dragon dictation.

## 2024-05-30 ENCOUNTER — HOSPITAL ENCOUNTER (OUTPATIENT)
Dept: MRI IMAGING | Facility: HOSPITAL | Age: 54
Discharge: HOME | End: 2024-05-30
Payer: COMMERCIAL

## 2024-05-30 ENCOUNTER — TELEPHONE (OUTPATIENT)
Dept: NEUROSURGERY | Facility: CLINIC | Age: 54
End: 2024-05-30
Payer: COMMERCIAL

## 2024-05-30 DIAGNOSIS — M54.2 NECK PAIN: ICD-10-CM

## 2024-05-30 DIAGNOSIS — M54.16 LUMBAR RADICULOPATHY: ICD-10-CM

## 2024-05-30 DIAGNOSIS — Z98.1 HISTORY OF LUMBAR SPINAL FUSION: ICD-10-CM

## 2024-05-30 DIAGNOSIS — M51.34 DEGENERATION OF THORACIC INTERVERTEBRAL DISC: ICD-10-CM

## 2024-05-30 DIAGNOSIS — M54.6 PAIN IN THORACIC SPINE: ICD-10-CM

## 2024-05-30 DIAGNOSIS — R29.898 LEFT ARM WEAKNESS: ICD-10-CM

## 2024-05-30 DIAGNOSIS — M51.36 DDD (DEGENERATIVE DISC DISEASE), LUMBAR: ICD-10-CM

## 2024-05-30 DIAGNOSIS — M47.814 SPONDYLOSIS WITHOUT MYELOPATHY OR RADICULOPATHY, THORACIC REGION: ICD-10-CM

## 2024-05-30 LAB — CREAT BLDA-MCNC: 1 MG/DL (ref 0.6–1.3)

## 2024-05-30 PROCEDURE — A9577 INJ MULTIHANCE: HCPCS | Performed by: NURSE PRACTITIONER

## 2024-05-30 PROCEDURE — 72141 MRI NECK SPINE W/O DYE: CPT

## 2024-05-30 PROCEDURE — 0 GADOBENATE DIMEGLUMINE 529 MG/ML SOLUTION: Performed by: NURSE PRACTITIONER

## 2024-05-30 PROCEDURE — 82565 ASSAY OF CREATININE: CPT

## 2024-05-30 PROCEDURE — 72146 MRI CHEST SPINE W/O DYE: CPT

## 2024-05-30 PROCEDURE — 72158 MRI LUMBAR SPINE W/O & W/DYE: CPT

## 2024-05-30 RX ADMIN — GADOBENATE DIMEGLUMINE 20 ML: 529 INJECTION, SOLUTION INTRAVENOUS at 15:25

## 2024-05-30 NOTE — TELEPHONE ENCOUNTER
Lvm giving patient f/u appt date and time after having mris done. Please route to me if patient calls back confirming this works or not

## 2024-06-03 NOTE — PROGRESS NOTES
Subjective   Patient ID: Ronni Asif is a 54 y.o. male is here today for follow-up on MRI lumbar, cervical and thoracic spine done on 5/30/24.    History of Present Illness    Presents today for follow-up and review MRIs of the cervical, thoracic and lumbar spine that were performed 5/30/2024.  Patient was last seen in the office 5/13/2024.  Patient has previous history of L5-S1 fusion in 2019 with Dr. Tripathi.    Patient reports neck soreness, intermittent tingling to the fourth and fifth fingers on both hands that has been ongoing for years.  Reports that the pain in his neck is increased when he reaches above his head, reports that it does not resolve once he changes positions.  He also reports ongoing history of left hand numbness especially when reaching overhead, states has resulted in him dropping objects in the past.  He still reports intermittent burning pain in his thoracic spine.  He reports low back pain with occasional radiating pain down the posterior aspect of his right thigh, this typically occurs while driving.  He reports that he is going to the gym, doing his PT exercises.  He is still able to maintain his 8 acres of land.    Patient is followed by Dr. Gonzalez with pain management.  He has had T8-10 medial branch blocks/radiofrequency ablation.  He has had bilateral cervical medial branch blocks and TAMEKA's in the past.  Current medications are gabapentin 800 mg 3 times daily, Robaxin-750 milligrams twice daily and Norco 5 mg as needed.    Presents today unaccompanied, he is getting  June 22nd      The following portions of the patient's history were reviewed and updated as appropriate: allergies, current medications, past family history, past medical history, past social history, past surgical history, and problem list.    Review of Systems   HENT:  Negative for trouble swallowing.    Gastrointestinal:  Negative for constipation.   Genitourinary:  Negative for difficulty  urinating and enuresis.   Musculoskeletal:  Positive for back pain, neck pain and neck stiffness. Negative for gait problem.   Neurological:  Positive for weakness and numbness.   Psychiatric/Behavioral:  Positive for sleep disturbance.        Objective     Vitals:    06/05/24 1038   BP: 110/68   Pulse: 66   SpO2: 96%   Weight: 135 kg (298 lb 9.6 oz)     Body mass index is 42.84 kg/m².    Tobacco Use: Low Risk  (6/5/2024)    Patient History     Smoking Tobacco Use: Never     Smokeless Tobacco Use: Never     Passive Exposure: Not on file          Physical Exam  Vitals reviewed.   Constitutional:       Appearance: Normal appearance.   Pulmonary:      Effort: Pulmonary effort is normal.   Musculoskeletal:      Cervical back: Bony tenderness present.      Thoracic back: Normal.      Lumbar back: Bony tenderness (low lumbar) present. Negative right straight leg raise test and negative left straight leg raise test.   Skin:     General: Skin is warm and dry.   Neurological:      Mental Status: He is alert and oriented to person, place, and time.      Motor: Motor strength is normal.     Gait: Gait is intact.      Deep Tendon Reflexes:      Reflex Scores:       Tricep reflexes are 1+ on the right side and 1+ on the left side.       Bicep reflexes are 1+ on the right side and 1+ on the left side.       Brachioradialis reflexes are 1+ on the right side and 1+ on the left side.       Patellar reflexes are 1+ on the right side and 1+ on the left side.  Psychiatric:         Speech: Speech normal.       Neurologic Exam     Mental Status   Oriented to person, place, and time.   Speech: speech is normal   Level of consciousness: alert  Knowledge: good.     Motor Exam   Muscle bulk: normal  Overall muscle tone: normal    Strength   Strength 5/5 throughout.     Sensory Exam   Light touch normal.     Gait, Coordination, and Reflexes     Gait  Gait: normal    Reflexes   Right brachioradialis: 1+  Left brachioradialis: 1+  Right biceps:  1+  Left biceps: 1+  Right triceps: 1+  Left triceps: 1+  Right patellar: 1+  Left patellar: 1+  Right Saez: absent  Left Saez: absent  Right ankle clonus: absent  Left ankle clonus: absent             Assessment & Plan   Independent Review of Radiographic Studies:      I personally reviewed the images from the following studies.    MRI cervical spine: Disc bulge with canal stenosis at C5-6  MRI thoracic spine: No acute abnormality, degenerative changes, no significant canal or neural foraminal narrowing  MRI lumbar spine: Degenerative changes    Medical Decision Making:      Presents today to review MRIs of the cervical, thoracic and lumbar spine.  Patient does report he has had some decreased  in his left hand especially when doing anything overhead.  Did not appreciate any  weakness on exam today.  He continues to have chronic neck and back pain.  He continues with pain management with Dr. Gonzalez.  He does report that he does not have any new issues or complaints today.  He is getting  on June 22.  He is going to the gym and attempt to lose weight.  He has had cervical epidural injections previously, he thinks the last 1 was several years ago.  He states that initially he did receive good relief relief of his symptoms with those.  I recommend that he see pain management for TAMEKA, continue his physical therapy exercises, continue his gabapentin and muscle relaxers and pain medication.  We will have him follow-up in 2 to 3 months with Dr. Tripathi.  He knows to call the office with any questions, concerns or change in his symptoms.    Diagnoses and all orders for this visit:    1. Neck pain (Primary)  -     Ambulatory Referral to Pain Management      Return in about 2 months (around 8/5/2024).

## 2024-06-05 ENCOUNTER — OFFICE VISIT (OUTPATIENT)
Dept: NEUROSURGERY | Facility: CLINIC | Age: 54
End: 2024-06-05
Payer: COMMERCIAL

## 2024-06-05 VITALS
BODY MASS INDEX: 42.84 KG/M2 | SYSTOLIC BLOOD PRESSURE: 110 MMHG | DIASTOLIC BLOOD PRESSURE: 68 MMHG | OXYGEN SATURATION: 96 % | HEART RATE: 66 BPM | WEIGHT: 298.6 LBS

## 2024-06-05 DIAGNOSIS — M54.2 NECK PAIN: Primary | ICD-10-CM

## 2024-07-31 ENCOUNTER — TELEPHONE (OUTPATIENT)
Dept: NEUROSURGERY | Facility: CLINIC | Age: 54
End: 2024-07-31
Payer: COMMERCIAL

## 2024-07-31 NOTE — TELEPHONE ENCOUNTER
Called patient to reschedule appt due to Dr. SHAFFER being out of office for Medical Leave for 2 months. LVM Please schedule patient first available or patient can elect to see an APC in August HUB or ANYBODY in the office can reschedule.  Next available is in December.

## 2024-08-01 ENCOUNTER — TELEPHONE (OUTPATIENT)
Dept: PAIN MEDICINE | Facility: CLINIC | Age: 54
End: 2024-08-01
Payer: COMMERCIAL

## 2024-08-01 DIAGNOSIS — M62.838 MUSCLE SPASM: ICD-10-CM

## 2024-08-01 DIAGNOSIS — M54.12 CERVICAL RADICULOPATHY: ICD-10-CM

## 2024-08-01 DIAGNOSIS — M54.16 LUMBAR RADICULOPATHY: ICD-10-CM

## 2024-08-01 RX ORDER — GABAPENTIN 800 MG/1
800 TABLET ORAL 3 TIMES DAILY
Qty: 90 TABLET | Refills: 1 | Status: SHIPPED | OUTPATIENT
Start: 2024-08-01

## 2024-08-01 RX ORDER — HYDROCODONE BITARTRATE AND ACETAMINOPHEN 5; 325 MG/1; MG/1
1 TABLET ORAL DAILY PRN
Qty: 30 TABLET | Refills: 0 | Status: SHIPPED | OUTPATIENT
Start: 2024-08-01

## 2024-08-01 RX ORDER — METHOCARBAMOL 750 MG/1
750 TABLET, FILM COATED ORAL 2 TIMES DAILY PRN
Qty: 60 TABLET | Refills: 2 | Status: SHIPPED | OUTPATIENT
Start: 2024-08-01

## 2024-08-01 NOTE — TELEPHONE ENCOUNTER
Reviewed last office visit on 5/17/24. UDS and ROSI reviewed and are appropriate. Due to provider being out of office, will refill appropriately.    Covering for THEO Agrawal

## 2024-08-01 NOTE — TELEPHONE ENCOUNTER
Caller: Ronni Asif    Relationship: Self    Best call back number: [phone]    Requested Prescriptions: GABAPENTIN 800 MG - ROBAXIN 750 MG - NORCO 5-325 MG -      Pharm[facility] where request should be sent:  [phone]    Last office visit with prescribing clinician: 5/17/2024     Next office visit with prescribing clinician: 8/16/2024     Additional details provided by patient: PATIENT IS OUT OF GABAPENTIN, AND WILL BE OUT OF NORCO TODAY. PATIENT ALSO WANTED TOEBBE TO KNOW THAT HE HASN'T BEEN ABLE TO SEE DR. CHAVES YET DUE TO A MEDICAL EMERGENCY THAT THE DOCTOR HAD. HE STATES HIS MRIs WERE DONE IF SHE WANTS TO VIEW THEM.    Does the patient have less than a 3 day supply:  [x] Yes  [] No

## 2024-08-01 NOTE — TELEPHONE ENCOUNTER
Name: Ronni Asif    Relationship: Self    Best Callback Number: [phone]    HUB PROVIDED THE RELAY MESSAGE FROM THE OFFICE   PATIENT HAS FURTHER QUESTIONS AND WOULD LIKE A CALL BACK AT THE FOLLOWING PHONE NUMBER [phone]    ADDITIONAL INFORMATION: PATIENT CALLED BACK FROM  LEFT BEHIND.  STATES HE NEEDS TO SEE DR. CHAVES  OR ANOTHER MD SINCE HE WAS TOLD BY SOFIA SCHILLINGBENZ HE NEEDS SURGERY.  PER PATIENT SHE TOLD HIM THIS ON 6/05/24, HOWEVER [facility] DOES NOT MENTION HE NEEDS A SURGERY CONSULT.    PLEASE CALL PATIENT WITH ANY ADVICE    THANK YOU!

## 2024-08-02 ENCOUNTER — TELEPHONE (OUTPATIENT)
Dept: NEUROSURGERY | Facility: CLINIC | Age: 54
End: 2024-08-02
Payer: COMMERCIAL

## 2024-08-02 NOTE — TELEPHONE ENCOUNTER
Please ask Dr. Judd if he will look at this case. He has imaging in the chart.  An APC looked at the imaging and may statements of surgery.  He is having increase weakness in his hands.  Please advise

## 2024-08-05 NOTE — H&P (VIEW-ONLY)
Subjective   Patient ID: Ronni Asif is a 54 y.o. male is here today for follow-up with hand weakness. Patient had MRIs C/L/T-spine done 5/30/2024    Today patient states that he has LUE weakness, limited ROM, neck pain, and back pain that radiates to the R leg    History of Present Illness    This patient returns today.  Since he was here last in June he is continue with pain in his neck particularly when he reaches above his head.  He has radiation down his left arm particularly with some numbness and tingling in his.  He also has some middle finger numbness on the right hand.  He has burning pain in his thoracic spine as well as pain in his lower back with radiation down his right thigh.  He has been treated with multiple episodes of physical therapy as well as multiple injections.  Whenever he holds his arms above his head or tilts his head back he gets numbness and tingling shooting down his arms.    The following portions of the patient's history were reviewed and updated as appropriate: allergies, current medications, past family history, past medical history, past social history, past surgical history, and problem list.    Review of Systems   Constitutional:  Negative for chills and fever.   HENT:  Negative for congestion.    Genitourinary:  Negative for difficulty urinating and dysuria.   Musculoskeletal:  Positive for back pain, myalgias and neck pain.   Neurological:  Positive for weakness and numbness.     I reviewed the review of systems listed by the patient and discussed by my MA    Objective     There were no vitals filed for this visit.  There is no height or weight on file to calculate BMI.    Tobacco Use: Low Risk  (8/6/2024)    Patient History     Smoking Tobacco Use: Never     Smokeless Tobacco Use: Never     Passive Exposure: Not on file          Physical Exam  Neurological:      Mental Status: He is alert and oriented to person, place, and time.       Neurologic Exam     Mental Status    Oriented to person, place, and time.           Assessment & Plan   Independent Review of Radiographic Studies:      I personally reviewed the images from the following studies.    I reviewed an MRI of the cervical spine done on 30 May of this year.  This shows a widely patent canal and neuroforamina at C2-3.  C3-4 shows a left-sided foraminal stenosis although not severe.  C4-5 also shows some left-sided foraminal stenosis but not severe.  C5-6 shows some flattening of the cord and some foraminal stenosis on both sides.  C6-7 shows some central disc bulging and a little foraminal stenosis but not really very severe.  C7-T1 looks okay.  I reviewed an MRI of his thoracic spine with looks okay with no evidence of significant neural pressure.  Finally I reviewed an MRI of his lumbar spine which looks pretty good on the sagittal images.  The axial images look pretty good as well.  There is a little disc bulging to the left at L3-4.  There is a grade 1 spondylolisthesis of L5 on S1 what appears a previous surgery at that level.  Overall I do not see any significant narrowing in the lumbar spine.    Medical Decision Making:      I told the patient about the imaging.  I told him I did not recommend any surgery on his lumbar or his thoracic spine.  I think he would be a reasonable candidate for a two-level anterior cervical discectomy in the neck.  I told the patient about the surgery which is called an anterior cervical discectomy.  I explained that there is an 80% chance of getting rid of the arm pain and any other arm symptoms.  I also explained that the patient would still have neck pain.  Initially this will be quite severe however it will improve with healing from the surgery.  There is also a risk of infection, bleeding, paralysis, and anesthetic risk.  There is a risk of damage to the soft tissues of the neck such as the esophagus, carotids, and trachea.  All of these risks are about 2 or 3%.  There is about a 5%  chance of nonunion or failure of the instrumentation.  There is also a about a 5% chance of hoarseness and trouble swallowing do to damage to the recurrent laryngeal nerve.  We discussed the postoperative hospital and home course as well.  The patient does ask to proceed.    He will need to be scheduled for a:  Cervical 4 to cervical 5 and cervical 5 to cervical 6 anterior cervical discectomy, fusion and instrumentation    Diagnoses and all orders for this visit:    1. Degeneration of intervertebral disc at C5-C6 level (Primary)      Return for 2-3 week post op.

## 2024-08-06 ENCOUNTER — PREP FOR SURGERY (OUTPATIENT)
Dept: OTHER | Facility: HOSPITAL | Age: 54
End: 2024-08-06
Payer: COMMERCIAL

## 2024-08-06 ENCOUNTER — OFFICE VISIT (OUTPATIENT)
Dept: NEUROSURGERY | Facility: CLINIC | Age: 54
End: 2024-08-06
Payer: COMMERCIAL

## 2024-08-06 DIAGNOSIS — M50.322 DEGENERATION OF INTERVERTEBRAL DISC AT C5-C6 LEVEL: Primary | ICD-10-CM

## 2024-08-06 DIAGNOSIS — M54.12 CERVICAL RADICULOPATHY: Primary | ICD-10-CM

## 2024-08-06 PROCEDURE — 1160F RVW MEDS BY RX/DR IN RCRD: CPT | Performed by: NEUROLOGICAL SURGERY

## 2024-08-06 PROCEDURE — 1159F MED LIST DOCD IN RCRD: CPT | Performed by: NEUROLOGICAL SURGERY

## 2024-08-06 PROCEDURE — 99214 OFFICE O/P EST MOD 30 MIN: CPT | Performed by: NEUROLOGICAL SURGERY

## 2024-08-15 ENCOUNTER — PRE-ADMISSION TESTING (OUTPATIENT)
Dept: PREADMISSION TESTING | Facility: HOSPITAL | Age: 54
End: 2024-08-15
Payer: COMMERCIAL

## 2024-08-15 VITALS
DIASTOLIC BLOOD PRESSURE: 71 MMHG | HEIGHT: 71 IN | OXYGEN SATURATION: 94 % | TEMPERATURE: 98.2 F | SYSTOLIC BLOOD PRESSURE: 110 MMHG | HEART RATE: 100 BPM | WEIGHT: 302.8 LBS | BODY MASS INDEX: 42.39 KG/M2 | RESPIRATION RATE: 18 BRPM

## 2024-08-15 LAB
ANION GAP SERPL CALCULATED.3IONS-SCNC: 11.7 MMOL/L (ref 5–15)
BUN SERPL-MCNC: 13 MG/DL (ref 6–20)
BUN/CREAT SERPL: 13.7 (ref 7–25)
CALCIUM SPEC-SCNC: 10.1 MG/DL (ref 8.6–10.5)
CHLORIDE SERPL-SCNC: 101 MMOL/L (ref 98–107)
CO2 SERPL-SCNC: 25.3 MMOL/L (ref 22–29)
CREAT SERPL-MCNC: 0.95 MG/DL (ref 0.76–1.27)
DEPRECATED RDW RBC AUTO: 46.1 FL (ref 37–54)
EGFRCR SERPLBLD CKD-EPI 2021: 95.1 ML/MIN/1.73
ERYTHROCYTE [DISTWIDTH] IN BLOOD BY AUTOMATED COUNT: 14.6 % (ref 12.3–15.4)
GLUCOSE SERPL-MCNC: 211 MG/DL (ref 65–99)
HCT VFR BLD AUTO: 46.5 % (ref 37.5–51)
HGB BLD-MCNC: 15.2 G/DL (ref 13–17.7)
MCH RBC QN AUTO: 28.5 PG (ref 26.6–33)
MCHC RBC AUTO-ENTMCNC: 32.7 G/DL (ref 31.5–35.7)
MCV RBC AUTO: 87.1 FL (ref 79–97)
PLATELET # BLD AUTO: 214 10*3/MM3 (ref 140–450)
PMV BLD AUTO: 9.8 FL (ref 6–12)
POTASSIUM SERPL-SCNC: 4.2 MMOL/L (ref 3.5–5.2)
RBC # BLD AUTO: 5.34 10*6/MM3 (ref 4.14–5.8)
SODIUM SERPL-SCNC: 138 MMOL/L (ref 136–145)
WBC NRBC COR # BLD AUTO: 6.96 10*3/MM3 (ref 3.4–10.8)

## 2024-08-15 PROCEDURE — 85027 COMPLETE CBC AUTOMATED: CPT

## 2024-08-15 PROCEDURE — 80048 BASIC METABOLIC PNL TOTAL CA: CPT

## 2024-08-15 PROCEDURE — 36415 COLL VENOUS BLD VENIPUNCTURE: CPT

## 2024-08-15 RX ORDER — DULAGLUTIDE 3 MG/.5ML
3 INJECTION, SOLUTION SUBCUTANEOUS WEEKLY
Status: ON HOLD | COMMUNITY
Start: 2024-06-03

## 2024-08-15 RX ORDER — FINASTERIDE 1 MG/1
1 TABLET, FILM COATED ORAL DAILY
Status: ON HOLD | COMMUNITY
Start: 2024-06-24 | End: 2025-06-24

## 2024-08-15 NOTE — DISCHARGE INSTRUCTIONS
Take the following medications the morning of surgery:  GABAPENTIN,LANSOPRAZOLE AND METOPROLOL    BRING INHALERS WITH YOU    If you are on prescription narcotic pain medication to control your pain you may also take that medication the morning of surgery.      General Instructions:     Do not eat solid food after midnight the night before surgery.  Clear liquids day of surgery are allowed but must be stopped at least two hours before your hospital arrival time.       Allowed clear liquids      Water, sodas, and tea or coffee with no cream or milk added.       12 to 20 ounces of a clear liquid that contains carbohydrates is recommended.  If non-diabetic, have Gatorade or Powerade.  If diabetic, have G2 or Powerade Zero.     Do not have liquids red in color.  Do not consume chicken, beef, pork or vegetable broth or bouillon cubes of any variety as they are not considered clear liquids and are not allowed.      Infants may have breast milk up to four hours before surgery.  Infants drinking formula may drink formula up to six hours before surgery.   Patients who avoid smoking, chewing tobacco and alcohol for 4 weeks prior to surgery have a reduced risk of post-operative complications.  Quit smoking as many days before surgery as you can.  Do not smoke, use chewing tobacco or drink alcohol the day of surgery.   If applicable bring your C-PAP/ BI-PAP machine in with you to preop day of surgery.  Bring any papers given to you in the doctor’s office.  Wear clean comfortable clothes.  Do not wear contact lenses, false eyelashes or make-up.  Bring a case for your glasses.   Bring crutches or walker if applicable.  Remove all piercings.  Leave jewelry and any other valuables at home.  Hair extensions with metal clips must be removed prior to surgery.  The Pre-Admission Testing nurse will instruct you to bring medications if unable to obtain an accurate list in Pre-Admission Testing.        If you were given a blood bank ID arm  band remember to bring it with you the day of surgery.    Preventing a Surgical Site Infection:  For 2 to 3 days before surgery, avoid shaving with a razor because the razor can irritate skin and make it easier to develop an infection.    Any areas of open skin can increase the risk of a post-operative wound infection by allowing bacteria to enter and travel throughout the body.  Notify your surgeon if you have any skin wounds / rashes even if it is not near the expected surgical site.  The area will need assessed to determine if surgery should be delayed until it is healed.  The night prior to surgery shower using a fresh bar of anti-bacterial soap (such as Dial) and clean washcloth.  Sleep in a clean bed with clean clothing.  Do not allow pets to sleep with you.  Shower on the morning of surgery using a fresh bar of anti-bacterial soap (such as Dial) and clean washcloth.  Dry with a clean towel and dress in clean clothing.  Ask your surgeon if you will be receiving antibiotics prior to surgery.  Make sure you, your family, and all healthcare providers clean their hands with soap and water or an alcohol based hand  before caring for you or your wound.    Day of surgery:  Your arrival time is approximately two hours before your scheduled surgery time.  Upon arrival, a Pre-op nurse and Anesthesiologist will review your health history, obtain vital signs, and answer questions you may have.  The only belongings needed at this time will be a list of your home medications and if applicable your C-PAP/BI-PAP machine.  A Pre-op nurse will start an IV and you may receive medication in preparation for surgery, including something to help you relax.     Please be aware that surgery does come with discomfort.  We want to make every effort to control your discomfort so please discuss any uncontrolled symptoms with your nurse.   Your doctor will most likely have prescribed pain medications.      If you are going home  after surgery you will receive individualized written care instructions before being discharged.  A responsible adult must drive you to and from the hospital on the day of your surgery and ideally stay with you through the night.   .  Discharge prescriptions can be filled by the hospital pharmacy during regular pharmacy hours.  If you are having surgery late in the day/evening your prescription may be e-prescribed to your pharmacy.  Please verify your pharmacy hours or chose a 24 hour pharmacy to avoid not having access to your prescription because your pharmacy has closed for the day.    If you are staying overnight following surgery, you will be transported to your hospital room following the recovery period.  [facility] has all private rooms.    If you have any questions please call Pre-Admission Testing at [phone].  Deductibles and co-payments are collected on the day of service. Please be prepared to pay the required co-pay, deductible or deposit on the day of service as defined by your plan.    Call your surgeon immediately if you experience any of the following symptoms:  Sore Throat  Shortness of Breath or difficulty breathing  Cough  Chills  Body soreness or muscle pain  Headache  Fever  New loss of taste or smell  Do not arrive for your surgery ill.  Your procedure will need to be rescheduled to another time.  You will need to call your physician before the day of surgery to avoid any unnecessary exposure to hospital staff as well as other patients.        CHLORHEXIDINE CLOTH INSTRUCTIONS  The morning of surgery follow these instructions using the Chlorhexidine cloths you've been given.  These steps reduce bacteria on the body.  Do not use the cloths near your eyes, ears mouth, genitalia or on open wounds.  Throw the cloths away after use but do not try to flush them down a toilet.      Open and remove one cloth at a time from the package.    Leave the cloth unfolded and begin the  bathing.  Massage the skin with the cloths using gentle pressure to remove bacteria.  Do not scrub harshly.   Follow the steps below with one 2% CHG cloth per area (6 total cloths).  One cloth for neck, shoulders and chest.  One cloth for both arms, hands, fingers and underarms (do underarms last).  One cloth for the abdomen followed by groin.  One cloth for right leg and foot including between the toes.  One cloth for left leg and foot including between the toes.  The last cloth is to be used for the back of the neck, back and buttocks.    Allow the CHG to air dry 3 minutes on the skin which will give it time to work and decrease the chance of irritation.  The skin may feel sticky until it is dry.  Do not rinse with water or any other liquid or you will lose the beneficial effects of the CHG.  If mild skin irritation occurs, do rinse the skin to remove the CHG.  Report this to the nurse at time of admission.  Do not apply lotions, creams, ointments, deodorants or perfumes after using the clothes. Dress in clean clothes before coming to the hospital.

## 2024-08-16 ENCOUNTER — OFFICE VISIT (OUTPATIENT)
Dept: PAIN MEDICINE | Facility: CLINIC | Age: 54
End: 2024-08-16
Payer: COMMERCIAL

## 2024-08-16 VITALS
BODY MASS INDEX: 42.81 KG/M2 | SYSTOLIC BLOOD PRESSURE: 117 MMHG | HEART RATE: 88 BPM | OXYGEN SATURATION: 97 % | TEMPERATURE: 98 F | RESPIRATION RATE: 20 BRPM | WEIGHT: 305.8 LBS | DIASTOLIC BLOOD PRESSURE: 74 MMHG | HEIGHT: 71 IN

## 2024-08-16 DIAGNOSIS — M54.16 LUMBAR RADICULOPATHY: ICD-10-CM

## 2024-08-16 DIAGNOSIS — M47.814 ARTHROPATHY OF THORACIC FACET JOINT: ICD-10-CM

## 2024-08-16 DIAGNOSIS — M54.12 CERVICAL RADICULOPATHY: Primary | ICD-10-CM

## 2024-08-16 DIAGNOSIS — M54.50 CHRONIC MIDLINE LOW BACK PAIN WITHOUT SCIATICA: ICD-10-CM

## 2024-08-16 DIAGNOSIS — M62.838 MUSCLE SPASM: ICD-10-CM

## 2024-08-16 DIAGNOSIS — Z79.899 ENCOUNTER FOR LONG-TERM (CURRENT) USE OF HIGH-RISK MEDICATION: ICD-10-CM

## 2024-08-16 DIAGNOSIS — G89.29 CHRONIC MIDLINE LOW BACK PAIN WITHOUT SCIATICA: ICD-10-CM

## 2024-08-16 LAB
POC AMPHETAMINES: NEGATIVE
POC BARBITURATES: NEGATIVE
POC BENZODIAZEPHINES: NEGATIVE
POC COCAINE: NEGATIVE
POC METHADONE: NEGATIVE
POC METHAMPHETAMINE SCREEN URINE: NEGATIVE
POC OPIATES: POSITIVE
POC OXYCODONE: NEGATIVE
POC PHENCYCLIDINE: NEGATIVE
POC PROPOXYPHENE: NEGATIVE
POC THC: NEGATIVE
POC TRICYCLIC ANTIDEPRESSANTS: NEGATIVE

## 2024-08-16 PROCEDURE — 3074F SYST BP LT 130 MM HG: CPT | Performed by: NURSE PRACTITIONER

## 2024-08-16 PROCEDURE — 1159F MED LIST DOCD IN RCRD: CPT | Performed by: NURSE PRACTITIONER

## 2024-08-16 PROCEDURE — 1125F AMNT PAIN NOTED PAIN PRSNT: CPT | Performed by: NURSE PRACTITIONER

## 2024-08-16 PROCEDURE — 99214 OFFICE O/P EST MOD 30 MIN: CPT | Performed by: NURSE PRACTITIONER

## 2024-08-16 PROCEDURE — 80305 DRUG TEST PRSMV DIR OPT OBS: CPT | Performed by: NURSE PRACTITIONER

## 2024-08-16 PROCEDURE — 1160F RVW MEDS BY RX/DR IN RCRD: CPT | Performed by: NURSE PRACTITIONER

## 2024-08-16 PROCEDURE — 3078F DIAST BP <80 MM HG: CPT | Performed by: NURSE PRACTITIONER

## 2024-08-16 NOTE — PROGRESS NOTES
CHIEF COMPLAINT  BACK,NECK PAIN  Pain has slightly increased.  Uds:opi    Subjective   Ronni Asif is a 54 y.o. male  who presents for follow-up.  He has a history of back and neck pain. He is pending C4-C6 ACDF with Dr. Judd on 8/26/2024.     Today his pain is 7/10VAS in severity. He continues with Gabapentin 800 mg 3/day, and Methocarbamol 750mg PRN 2/day.  He also continues with Norco 5/325 0-1/day.  This medication regimen decreases his pain by 70%. He also utilizes OTC Tylenol and Advil as well as ice which is helpful to his pain. He denies any side effects including somnolence or constipation.     Unfortunately his repeat Bilateral T8-T10 RFA was denied by insurance as being experimental.      Cervical medial branch blocks were diagnostically negative at multiple levels, TAMEKA provided no relief.       Hx of L5-S1 fusion performed by Dr. Tripathi on 8/30/2019. Prior to establishing with our clinic he has completed lumber epidurals and RFA.      Procedure list:  8/23/2022-bilateral T8-T10 RFA-80% relief x2 months with ongoing 60 to 70% relief x ~ 6 months. His pain is now starting to return  6/7/2022-bilateral C5-C7 MBB-20% relief (diagnostically negative)  11/30/2021-bilateral C3-C5 MBB-minimal relief (diagnostically negative)  10/7/2021-TAMEKA at C6-C7-minimal relief  7/27/2021-bilateral T8-T10 RFA-50% improvement lasting many months.   6/29/2021-bilateral T8-T10 MBB-80% relief x2 days  6/1/2021-bilateral T8-T10 MBB-80 to 90% relief x1 day    Back Pain  This is a chronic problem. The current episode started more than 1 year ago. The problem occurs daily. The problem has been waxing and waning since onset. The pain is present in the thoracic spine and lumbar spine. The quality of the pain is described as aching. The pain does not radiate. The pain is at a severity of 7/10. The pain is severe. The symptoms are aggravated by bending, standing and twisting. Stiffness is present In the morning. Associated  symptoms include numbness (hands) and weakness (hands). Pertinent negatives include no abdominal pain, chest pain, dysuria, fever or headaches. Risk factors include obesity. He has tried analgesics, heat, ice, NSAIDs, muscle relaxant and home exercises (Norco 5/325) for the symptoms. The treatment provided significant relief.   Neck Pain   This is a chronic problem. The current episode started more than 1 year ago. The problem occurs constantly. The problem has been waxing and waning. The pain is present in the occipital region and midline. The quality of the pain is described as burning (bilateral upper extremity tingling). The pain is at a severity of 7/10. The symptoms are aggravated by bending (lying in bed). Associated symptoms include numbness (hands) and weakness (hands). Pertinent negatives include no chest pain, fever or headaches. He has tried ice, oral narcotics and NSAIDs (PT) for the symptoms.      PEG Assessment   What number best describes your pain on average in the past week?6  What number best describes how, during the past week, pain has interfered with your enjoyment of life?7  What number best describes how, during the past week, pain has interfered with your general activity?  7    The following portions of the patient's history were reviewed and updated as appropriate: allergies, current medications, past family history, past medical history, past social history, past surgical history, and problem list.    Review of Systems   Constitutional:  Negative for activity change (more), fatigue and fever.   Respiratory:  Negative for cough and chest tightness.    Cardiovascular:  Negative for chest pain.   Gastrointestinal:  Negative for abdominal pain, constipation and diarrhea.   Genitourinary:  Negative for difficulty urinating and dysuria.   Musculoskeletal:  Positive for back pain and neck pain.   Neurological:  Positive for dizziness, weakness (hands) and numbness (hands). Negative for  "light-headedness and headaches.   Psychiatric/Behavioral:  Positive for sleep disturbance. Negative for agitation and suicidal ideas. The patient is not nervous/anxious.      --  The aforementioned information the Chief Complaint section and above subjective data including any HPI data, and also the Review of Systems data, has been personally reviewed and affirmed.  --    Vitals:    08/16/24 0925   BP: 117/74   BP Location: Left arm   Patient Position: Sitting   Cuff Size: Large Adult   Pulse: 88   Resp: 20   Temp: 98 °F (36.7 °C)   TempSrc: Temporal   SpO2: 97%   Weight: (!) 139 kg (305 lb 12.8 oz)   Height: 180.3 cm (71\")   PainSc:   7     Objective   Physical Exam  Vitals and nursing note reviewed.   Constitutional:       Appearance: Normal appearance. He is well-developed.   Eyes:      General: Lids are normal.   Cardiovascular:      Rate and Rhythm: Normal rate.   Pulmonary:      Effort: Pulmonary effort is normal.   Musculoskeletal:      Cervical back: Tenderness present. Decreased range of motion.      Thoracic back: Tenderness present. Decreased range of motion.      Lumbar back: Tenderness present. Decreased range of motion.   Neurological:      Mental Status: He is alert and oriented to person, place, and time.   Psychiatric:         Attention and Perception: Attention normal.         Mood and Affect: Mood normal.         Speech: Speech normal.         Behavior: Behavior normal.         Judgment: Judgment normal.       Assessment & Plan   Diagnoses and all orders for this visit:    1. Cervical radiculopathy (Primary)  -     Urine Drug Screen Confirmation - Urine, Clean Catch; Future  -     POC Urine Drug Screen, Triage    2. Lumbar radiculopathy  -     Urine Drug Screen Confirmation - Urine, Clean Catch; Future  -     POC Urine Drug Screen, Triage    3. Muscle spasm  -     Urine Drug Screen Confirmation - Urine, Clean Catch; Future  -     POC Urine Drug Screen, Triage    4. Chronic midline low back pain " without sciatica  -     Urine Drug Screen Confirmation - Urine, Clean Catch; Future  -     POC Urine Drug Screen, Triage    5. Encounter for long-term (current) use of high-risk medication  -     Urine Drug Screen Confirmation - Urine, Clean Catch; Future  -     POC Urine Drug Screen, Triage    6. Arthropathy of thoracic facet joint      Ronni Asif reports a pain score of 7.  Given his pain assessment as noted, treatment options were discussed and the following options were decided upon as a follow-up plan to address the patient's pain: continuation of current treatment plan for pain.    --- Routine UDS in office today as part of monitoring requirements for controlled substances.  The specimen was viewed and the immunoassay result reviewed and is +OPI.  This specimen will be sent to [facility] laboratory for confirmation.     --- CSA updated 5/17/2024  --- Opioid Risk--Low  --- Continue Gabapentin. No refill needed.  --- Continue Sparing use of Norco, no refill needed. Patient appears stable with current regimen. No adverse effects. Regarding continuation of opioids, there is no evidence of aberrant behavior or any red flags.  The patient continues with appropriate response to opioid therapy. ADL's remain intact by self.   --- Release to Neurosurgery for post-operative pain control.   --- Follow-up 2 months or sooner If needed.      ROSI REPORT  As part of the patient's treatment plan, I am prescribing controlled substances. The patient has been made aware of appropriate use of such medications, including potential risk of somnolence, limited ability to drive and/or work safely, and the potential for dependence or overdose. It has also been made clear that these medications are for use by this patient only, without concomitant use of alcohol or other substances unless prescribed.     Patient has completed prescribing agreement detailing terms of continued prescribing of controlled substances, including  monitoring ROSI reports, urine drug screening, and pill counts if necessary. The patient is aware that inappropriate use will results in cessation of prescribing such medications.    As the clinician, I personally reviewed the ROSI from 8/16/2024 while the patient was in the office today.    History and physical exam exhibit continued safe and appropriate use of controlled substances.    Dictated utilizing Dragon dictation.

## 2024-08-26 ENCOUNTER — APPOINTMENT (OUTPATIENT)
Dept: GENERAL RADIOLOGY | Facility: HOSPITAL | Age: 54
End: 2024-08-26
Payer: COMMERCIAL

## 2024-08-26 ENCOUNTER — HOSPITAL ENCOUNTER (OUTPATIENT)
Facility: HOSPITAL | Age: 54
Discharge: HOME | End: 2024-08-27
Attending: NEUROLOGICAL SURGERY | Admitting: NEUROLOGICAL SURGERY
Payer: COMMERCIAL

## 2024-08-26 ENCOUNTER — ANESTHESIA (OUTPATIENT)
Dept: PERIOP | Facility: HOSPITAL | Age: 54
End: 2024-08-26
Payer: COMMERCIAL

## 2024-08-26 ENCOUNTER — ANESTHESIA EVENT (OUTPATIENT)
Dept: PERIOP | Facility: HOSPITAL | Age: 54
End: 2024-08-26
Payer: COMMERCIAL

## 2024-08-26 DIAGNOSIS — M47.812 ARTHROPATHY OF CERVICAL FACET JOINT: ICD-10-CM

## 2024-08-26 DIAGNOSIS — M51.36 DDD (DEGENERATIVE DISC DISEASE), LUMBAR: Primary | ICD-10-CM

## 2024-08-26 DIAGNOSIS — M54.12 CERVICAL RADICULOPATHY: ICD-10-CM

## 2024-08-26 DIAGNOSIS — M48.02 CERVICAL SPINAL STENOSIS: ICD-10-CM

## 2024-08-26 DIAGNOSIS — M50.30 BULGE OF CERVICAL DISC WITHOUT MYELOPATHY: ICD-10-CM

## 2024-08-26 LAB
GLUCOSE BLDC GLUCOMTR-MCNC: 170 MG/DL (ref 70–130)
GLUCOSE BLDC GLUCOMTR-MCNC: 192 MG/DL (ref 70–130)
GLUCOSE BLDC GLUCOMTR-MCNC: 210 MG/DL (ref 70–130)
GLUCOSE BLDC GLUCOMTR-MCNC: 253 MG/DL (ref 70–130)

## 2024-08-26 PROCEDURE — C1713 ANCHOR/SCREW BN/BN,TIS/BN: HCPCS | Performed by: NEUROLOGICAL SURGERY

## 2024-08-26 PROCEDURE — 25010000002 SUGAMMADEX 200 MG/2ML SOLUTION

## 2024-08-26 PROCEDURE — 25010000002 FENTANYL CITRATE (PF) 50 MCG/ML SOLUTION

## 2024-08-26 PROCEDURE — 25010000002 CEFAZOLIN 3 G RECONSTITUTED SOLUTION 1 EACH VIAL: Performed by: NEUROLOGICAL SURGERY

## 2024-08-26 PROCEDURE — 25010000002 HYDROMORPHONE PER 4 MG

## 2024-08-26 PROCEDURE — 25010000002 CEFAZOLIN PER 500 MG: Performed by: NEUROLOGICAL SURGERY

## 2024-08-26 PROCEDURE — L0120 CERV FLEX N/ADJ FOAM PRE OTS: HCPCS | Performed by: NEUROLOGICAL SURGERY

## 2024-08-26 PROCEDURE — 22552 ARTHRD ANT NTRBD CERVICAL EA: CPT | Performed by: NEUROLOGICAL SURGERY

## 2024-08-26 PROCEDURE — 82948 REAGENT STRIP/BLOOD GLUCOSE: CPT

## 2024-08-26 PROCEDURE — 22845 INSERT SPINE FIXATION DEVICE: CPT | Performed by: NEUROLOGICAL SURGERY

## 2024-08-26 PROCEDURE — 25010000002 MORPHINE PER 10 MG: Performed by: NEUROLOGICAL SURGERY

## 2024-08-26 PROCEDURE — 82948 REAGENT STRIP/BLOOD GLUCOSE: CPT | Performed by: FAMILY MEDICINE

## 2024-08-26 PROCEDURE — 94799 UNLISTED PULMONARY SVC/PX: CPT

## 2024-08-26 PROCEDURE — 25010000002 ONDANSETRON PER 1 MG

## 2024-08-26 PROCEDURE — 25010000002 SODIUM CHLORIDE 0.9 % WITH KCL 20 MEQ 20-0.9 MEQ/L-% SOLUTION: Performed by: NEUROLOGICAL SURGERY

## 2024-08-26 PROCEDURE — 22853 INSJ BIOMECHANICAL DEVICE: CPT | Performed by: NEUROLOGICAL SURGERY

## 2024-08-26 PROCEDURE — 25010000002 MIDAZOLAM PER 1 MG: Performed by: ANESTHESIOLOGY

## 2024-08-26 PROCEDURE — 25010000002 DEXAMETHASONE SODIUM PHOSPHATE 20 MG/5ML SOLUTION

## 2024-08-26 PROCEDURE — 25010000002 MAGNESIUM SULFATE PER 500 MG OF MAGNESIUM

## 2024-08-26 PROCEDURE — 25010000002 PROPOFOL 10 MG/ML EMULSION

## 2024-08-26 PROCEDURE — 76000 FLUOROSCOPY <1 HR PHYS/QHP: CPT

## 2024-08-26 PROCEDURE — 22551 ARTHRD ANT NTRBDY CERVICAL: CPT | Performed by: NEUROLOGICAL SURGERY

## 2024-08-26 PROCEDURE — 25010000002 GLYCOPYRROLATE 1 MG/5ML SOLUTION

## 2024-08-26 PROCEDURE — 25010000002 VANCOMYCIN 1 G RECONSTITUTED SOLUTION 1 EACH VIAL: Performed by: NEUROLOGICAL SURGERY

## 2024-08-26 PROCEDURE — 63710000001 INSULIN LISPRO (HUMAN) PER 5 UNITS: Performed by: INTERNAL MEDICINE

## 2024-08-26 PROCEDURE — 25010000002 FENTANYL CITRATE (PF) 50 MCG/ML SOLUTION: Performed by: ANESTHESIOLOGY

## 2024-08-26 PROCEDURE — 25810000003 LACTATED RINGERS PER 1000 ML: Performed by: ANESTHESIOLOGY

## 2024-08-26 PROCEDURE — 25010000002 PROPOFOL 500 MG/50ML EMULSION

## 2024-08-26 DEVICE — INTERBODY FUSION DEVICE  6 DEGREE LARGE 9MM
Type: IMPLANTABLE DEVICE | Site: SPINE CERVICAL | Status: FUNCTIONAL
Brand: ENDOSKELETON® TC NANOLOCK® SURFACE TECHNOLOGY

## 2024-08-26 DEVICE — INTERBODY FUSION DEVICE  6 DEGREE LARGE 9MM
Type: IMPLANTABLE DEVICE | Site: SPINE CERVICAL | Status: FUNCTIONAL
Brand: ENDOSKELETON® TCS NANOLOCK® SURFACE TECHNOLOGY

## 2024-08-26 DEVICE — SSC BONE WAX
Type: IMPLANTABLE DEVICE | Site: SPINE CERVICAL | Status: FUNCTIONAL
Brand: SSC BONE WAX

## 2024-08-26 DEVICE — PUTTY DBF GRAFTON 3CC: Type: IMPLANTABLE DEVICE | Site: SPINE CERVICAL | Status: FUNCTIONAL

## 2024-08-26 DEVICE — IMPLANTABLE DEVICE
Type: IMPLANTABLE DEVICE | Site: SPINE CERVICAL | Status: FUNCTIONAL
Brand: SURGIFOAM® ABSORBABLE GELATIN SPONGE, U.S.P.

## 2024-08-26 DEVICE — PLATE 7200047 ATL VISION ELITE 47.5MM
Type: IMPLANTABLE DEVICE | Site: SPINE CERVICAL | Status: FUNCTIONAL
Brand: ATLANTIS® ANTERIOR CERVICAL PLATE SYSTEM

## 2024-08-26 DEVICE — FLOSEAL WITH RECOTHROM - 5ML
Type: IMPLANTABLE DEVICE | Site: SPINE CERVICAL | Status: FUNCTIONAL
Brand: FLOSEAL HEMOSTATIC MATRIX

## 2024-08-26 RX ORDER — CELECOXIB 100 MG/1
100 CAPSULE ORAL ONCE
Status: COMPLETED | OUTPATIENT
Start: 2024-08-26 | End: 2024-08-26

## 2024-08-26 RX ORDER — HYDROCODONE BITARTRATE AND ACETAMINOPHEN 5; 325 MG/1; MG/1
1 TABLET ORAL EVERY 4 HOURS PRN
Status: DISCONTINUED | OUTPATIENT
Start: 2024-08-26 | End: 2024-08-27 | Stop reason: HOSPADM

## 2024-08-26 RX ORDER — ACETAMINOPHEN 650 MG/1
650 SUPPOSITORY RECTAL EVERY 4 HOURS PRN
Status: DISCONTINUED | OUTPATIENT
Start: 2024-08-26 | End: 2024-08-27 | Stop reason: HOSPADM

## 2024-08-26 RX ORDER — PROPOFOL 10 MG/ML
INJECTION, EMULSION INTRAVENOUS AS NEEDED
Status: DISCONTINUED | OUTPATIENT
Start: 2024-08-26 | End: 2024-08-26 | Stop reason: SURG

## 2024-08-26 RX ORDER — HYDRALAZINE HYDROCHLORIDE 20 MG/ML
5 INJECTION INTRAMUSCULAR; INTRAVENOUS
Status: DISCONTINUED | OUTPATIENT
Start: 2024-08-26 | End: 2024-08-26

## 2024-08-26 RX ORDER — ONDANSETRON 4 MG/1
4 TABLET, ORALLY DISINTEGRATING ORAL EVERY 6 HOURS PRN
Status: DISCONTINUED | OUTPATIENT
Start: 2024-08-26 | End: 2024-08-27 | Stop reason: HOSPADM

## 2024-08-26 RX ORDER — MIDAZOLAM HYDROCHLORIDE 1 MG/ML
1 INJECTION INTRAMUSCULAR; INTRAVENOUS
Status: DISCONTINUED | OUTPATIENT
Start: 2024-08-26 | End: 2024-08-26 | Stop reason: HOSPADM

## 2024-08-26 RX ORDER — ONDANSETRON 2 MG/ML
4 INJECTION INTRAMUSCULAR; INTRAVENOUS ONCE AS NEEDED
Status: DISCONTINUED | OUTPATIENT
Start: 2024-08-26 | End: 2024-08-26

## 2024-08-26 RX ORDER — MAGNESIUM HYDROXIDE 1200 MG/15ML
LIQUID ORAL AS NEEDED
Status: DISCONTINUED | OUTPATIENT
Start: 2024-08-26 | End: 2024-08-26 | Stop reason: HOSPADM

## 2024-08-26 RX ORDER — ACETAMINOPHEN 500 MG
1000 TABLET ORAL ONCE
Status: COMPLETED | OUTPATIENT
Start: 2024-08-26 | End: 2024-08-26

## 2024-08-26 RX ORDER — ACETAMINOPHEN 160 MG/5ML
650 SOLUTION ORAL EVERY 4 HOURS PRN
Status: DISCONTINUED | OUTPATIENT
Start: 2024-08-26 | End: 2024-08-27 | Stop reason: HOSPADM

## 2024-08-26 RX ORDER — ASPIRIN 81 MG/1
81 TABLET ORAL DAILY
Status: DISCONTINUED | OUTPATIENT
Start: 2024-08-26 | End: 2024-08-27 | Stop reason: HOSPADM

## 2024-08-26 RX ORDER — PROMETHAZINE HYDROCHLORIDE 25 MG/1
12.5 TABLET ORAL EVERY 6 HOURS PRN
Status: DISCONTINUED | OUTPATIENT
Start: 2024-08-26 | End: 2024-08-27 | Stop reason: HOSPADM

## 2024-08-26 RX ORDER — OXYCODONE AND ACETAMINOPHEN 7.5; 325 MG/1; MG/1
1 TABLET ORAL EVERY 4 HOURS PRN
Status: DISCONTINUED | OUTPATIENT
Start: 2024-08-26 | End: 2024-08-26

## 2024-08-26 RX ORDER — PROMETHAZINE HYDROCHLORIDE 25 MG/1
25 TABLET ORAL ONCE AS NEEDED
Status: DISCONTINUED | OUTPATIENT
Start: 2024-08-26 | End: 2024-08-26

## 2024-08-26 RX ORDER — SODIUM CHLORIDE 0.9 % (FLUSH) 0.9 %
3-10 SYRINGE (ML) INJECTION AS NEEDED
Status: DISCONTINUED | OUTPATIENT
Start: 2024-08-26 | End: 2024-08-26 | Stop reason: HOSPADM

## 2024-08-26 RX ORDER — SODIUM CHLORIDE 0.9 % (FLUSH) 0.9 %
3 SYRINGE (ML) INJECTION EVERY 12 HOURS SCHEDULED
Status: DISCONTINUED | OUTPATIENT
Start: 2024-08-26 | End: 2024-08-26 | Stop reason: HOSPADM

## 2024-08-26 RX ORDER — FENTANYL CITRATE 50 UG/ML
50 INJECTION, SOLUTION INTRAMUSCULAR; INTRAVENOUS
Status: DISCONTINUED | OUTPATIENT
Start: 2024-08-26 | End: 2024-08-26

## 2024-08-26 RX ORDER — ALBUTEROL SULFATE 90 UG/1
2 AEROSOL, METERED RESPIRATORY (INHALATION) EVERY 4 HOURS PRN
Status: DISCONTINUED | OUTPATIENT
Start: 2024-08-26 | End: 2024-08-27 | Stop reason: HOSPADM

## 2024-08-26 RX ORDER — DROPERIDOL 2.5 MG/ML
0.62 INJECTION, SOLUTION INTRAMUSCULAR; INTRAVENOUS
Status: DISCONTINUED | OUTPATIENT
Start: 2024-08-26 | End: 2024-08-26

## 2024-08-26 RX ORDER — BISACODYL 10 MG
10 SUPPOSITORY, RECTAL RECTAL DAILY PRN
Status: DISCONTINUED | OUTPATIENT
Start: 2024-08-26 | End: 2024-08-27 | Stop reason: HOSPADM

## 2024-08-26 RX ORDER — ACETAMINOPHEN 325 MG/1
650 TABLET ORAL EVERY 4 HOURS PRN
Status: DISCONTINUED | OUTPATIENT
Start: 2024-08-26 | End: 2024-08-27 | Stop reason: HOSPADM

## 2024-08-26 RX ORDER — SODIUM CHLORIDE, SODIUM LACTATE, POTASSIUM CHLORIDE, CALCIUM CHLORIDE 600; 310; 30; 20 MG/100ML; MG/100ML; MG/100ML; MG/100ML
9 INJECTION, SOLUTION INTRAVENOUS CONTINUOUS
Status: DISCONTINUED | OUTPATIENT
Start: 2024-08-26 | End: 2024-08-26

## 2024-08-26 RX ORDER — HYDROMORPHONE HYDROCHLORIDE 1 MG/ML
0.5 INJECTION, SOLUTION INTRAMUSCULAR; INTRAVENOUS; SUBCUTANEOUS
Status: DISCONTINUED | OUTPATIENT
Start: 2024-08-26 | End: 2024-08-26

## 2024-08-26 RX ORDER — ONDANSETRON 2 MG/ML
INJECTION INTRAMUSCULAR; INTRAVENOUS AS NEEDED
Status: DISCONTINUED | OUTPATIENT
Start: 2024-08-26 | End: 2024-08-26 | Stop reason: SURG

## 2024-08-26 RX ORDER — SODIUM CHLORIDE 9 MG/ML
40 INJECTION, SOLUTION INTRAVENOUS AS NEEDED
Status: DISCONTINUED | OUTPATIENT
Start: 2024-08-26 | End: 2024-08-26 | Stop reason: HOSPADM

## 2024-08-26 RX ORDER — HYDROCODONE BITARTRATE AND ACETAMINOPHEN 5; 325 MG/1; MG/1
1 TABLET ORAL ONCE AS NEEDED
Status: DISCONTINUED | OUTPATIENT
Start: 2024-08-26 | End: 2024-08-26

## 2024-08-26 RX ORDER — SODIUM CHLORIDE 0.9 % (FLUSH) 0.9 %
10 SYRINGE (ML) INJECTION EVERY 12 HOURS SCHEDULED
Status: DISCONTINUED | OUTPATIENT
Start: 2024-08-26 | End: 2024-08-27 | Stop reason: HOSPADM

## 2024-08-26 RX ORDER — ACETAMINOPHEN 325 MG/1
650 TABLET ORAL EVERY 6 HOURS PRN
Status: DISCONTINUED | OUTPATIENT
Start: 2024-08-26 | End: 2024-08-27 | Stop reason: HOSPADM

## 2024-08-26 RX ORDER — METHOCARBAMOL 750 MG/1
750 TABLET, FILM COATED ORAL 2 TIMES DAILY PRN
Status: DISCONTINUED | OUTPATIENT
Start: 2024-08-26 | End: 2024-08-27 | Stop reason: HOSPADM

## 2024-08-26 RX ORDER — LIDOCAINE HYDROCHLORIDE 20 MG/ML
INJECTION, SOLUTION INFILTRATION; PERINEURAL AS NEEDED
Status: DISCONTINUED | OUTPATIENT
Start: 2024-08-26 | End: 2024-08-26 | Stop reason: SURG

## 2024-08-26 RX ORDER — LIDOCAINE HYDROCHLORIDE 40 MG/ML
SOLUTION TOPICAL AS NEEDED
Status: DISCONTINUED | OUTPATIENT
Start: 2024-08-26 | End: 2024-08-26 | Stop reason: SURG

## 2024-08-26 RX ORDER — PROMETHAZINE HYDROCHLORIDE 25 MG/1
25 SUPPOSITORY RECTAL ONCE AS NEEDED
Status: DISCONTINUED | OUTPATIENT
Start: 2024-08-26 | End: 2024-08-26

## 2024-08-26 RX ORDER — SODIUM CHLORIDE 0.9 % (FLUSH) 0.9 %
10 SYRINGE (ML) INJECTION AS NEEDED
Status: DISCONTINUED | OUTPATIENT
Start: 2024-08-26 | End: 2024-08-27 | Stop reason: HOSPADM

## 2024-08-26 RX ORDER — PANTOPRAZOLE SODIUM 40 MG/1
40 TABLET, DELAYED RELEASE ORAL
Status: DISCONTINUED | OUTPATIENT
Start: 2024-08-27 | End: 2024-08-27 | Stop reason: HOSPADM

## 2024-08-26 RX ORDER — POLYETHYLENE GLYCOL 3350 17 G/17G
17 POWDER, FOR SOLUTION ORAL DAILY PRN
Status: DISCONTINUED | OUTPATIENT
Start: 2024-08-26 | End: 2024-08-27 | Stop reason: HOSPADM

## 2024-08-26 RX ORDER — DICYCLOMINE HYDROCHLORIDE 10 MG/1
10 CAPSULE ORAL 4 TIMES DAILY
Status: DISCONTINUED | OUTPATIENT
Start: 2024-08-26 | End: 2024-08-27 | Stop reason: HOSPADM

## 2024-08-26 RX ORDER — BISACODYL 5 MG/1
5 TABLET, DELAYED RELEASE ORAL DAILY PRN
Status: DISCONTINUED | OUTPATIENT
Start: 2024-08-26 | End: 2024-08-27 | Stop reason: HOSPADM

## 2024-08-26 RX ORDER — DEXAMETHASONE SODIUM PHOSPHATE 4 MG/ML
INJECTION, SOLUTION INTRA-ARTICULAR; INTRALESIONAL; INTRAMUSCULAR; INTRAVENOUS; SOFT TISSUE AS NEEDED
Status: DISCONTINUED | OUTPATIENT
Start: 2024-08-26 | End: 2024-08-26 | Stop reason: SURG

## 2024-08-26 RX ORDER — DEXTROSE MONOHYDRATE 25 G/50ML
25 INJECTION, SOLUTION INTRAVENOUS
Status: DISCONTINUED | OUTPATIENT
Start: 2024-08-26 | End: 2024-08-27 | Stop reason: HOSPADM

## 2024-08-26 RX ORDER — METOPROLOL SUCCINATE 50 MG/1
50 TABLET, EXTENDED RELEASE ORAL DAILY
Status: DISCONTINUED | OUTPATIENT
Start: 2024-08-26 | End: 2024-08-27 | Stop reason: HOSPADM

## 2024-08-26 RX ORDER — GABAPENTIN 400 MG/1
800 CAPSULE ORAL EVERY 8 HOURS SCHEDULED
Status: DISCONTINUED | OUTPATIENT
Start: 2024-08-26 | End: 2024-08-27 | Stop reason: HOSPADM

## 2024-08-26 RX ORDER — INSULIN LISPRO 100 [IU]/ML
2-9 INJECTION, SOLUTION INTRAVENOUS; SUBCUTANEOUS
Status: DISCONTINUED | OUTPATIENT
Start: 2024-08-26 | End: 2024-08-27 | Stop reason: HOSPADM

## 2024-08-26 RX ORDER — EPHEDRINE SULFATE 50 MG/ML
5 INJECTION, SOLUTION INTRAVENOUS ONCE AS NEEDED
Status: DISCONTINUED | OUTPATIENT
Start: 2024-08-26 | End: 2024-08-26

## 2024-08-26 RX ORDER — FLUMAZENIL 0.1 MG/ML
0.2 INJECTION INTRAVENOUS AS NEEDED
Status: DISCONTINUED | OUTPATIENT
Start: 2024-08-26 | End: 2024-08-26

## 2024-08-26 RX ORDER — AMOXICILLIN 250 MG
2 CAPSULE ORAL 2 TIMES DAILY PRN
Status: DISCONTINUED | OUTPATIENT
Start: 2024-08-26 | End: 2024-08-27 | Stop reason: HOSPADM

## 2024-08-26 RX ORDER — IBUPROFEN 600 MG/1
1 TABLET ORAL
Status: DISCONTINUED | OUTPATIENT
Start: 2024-08-26 | End: 2024-08-27 | Stop reason: HOSPADM

## 2024-08-26 RX ORDER — NICOTINE POLACRILEX 4 MG
15 LOZENGE BUCCAL
Status: DISCONTINUED | OUTPATIENT
Start: 2024-08-26 | End: 2024-08-27 | Stop reason: HOSPADM

## 2024-08-26 RX ORDER — IPRATROPIUM BROMIDE AND ALBUTEROL SULFATE 2.5; .5 MG/3ML; MG/3ML
3 SOLUTION RESPIRATORY (INHALATION) ONCE AS NEEDED
Status: DISCONTINUED | OUTPATIENT
Start: 2024-08-26 | End: 2024-08-26

## 2024-08-26 RX ORDER — NALOXONE HCL 0.4 MG/ML
0.2 VIAL (ML) INJECTION AS NEEDED
Status: DISCONTINUED | OUTPATIENT
Start: 2024-08-26 | End: 2024-08-26

## 2024-08-26 RX ORDER — LISINOPRIL 10 MG/1
10 TABLET ORAL DAILY
Status: DISCONTINUED | OUTPATIENT
Start: 2024-08-26 | End: 2024-08-27 | Stop reason: HOSPADM

## 2024-08-26 RX ORDER — DIPHENHYDRAMINE HYDROCHLORIDE 50 MG/ML
12.5 INJECTION INTRAMUSCULAR; INTRAVENOUS
Status: DISCONTINUED | OUTPATIENT
Start: 2024-08-26 | End: 2024-08-26

## 2024-08-26 RX ORDER — SODIUM CHLORIDE AND POTASSIUM CHLORIDE 150; 900 MG/100ML; MG/100ML
100 INJECTION, SOLUTION INTRAVENOUS CONTINUOUS
Status: DISCONTINUED | OUTPATIENT
Start: 2024-08-26 | End: 2024-08-27 | Stop reason: HOSPADM

## 2024-08-26 RX ORDER — ONDANSETRON 2 MG/ML
4 INJECTION INTRAMUSCULAR; INTRAVENOUS EVERY 6 HOURS PRN
Status: DISCONTINUED | OUTPATIENT
Start: 2024-08-26 | End: 2024-08-27 | Stop reason: HOSPADM

## 2024-08-26 RX ORDER — GLYCOPYRROLATE 0.2 MG/ML
INJECTION INTRAMUSCULAR; INTRAVENOUS AS NEEDED
Status: DISCONTINUED | OUTPATIENT
Start: 2024-08-26 | End: 2024-08-26 | Stop reason: SURG

## 2024-08-26 RX ORDER — FENTANYL CITRATE 50 UG/ML
50 INJECTION, SOLUTION INTRAMUSCULAR; INTRAVENOUS
Status: DISCONTINUED | OUTPATIENT
Start: 2024-08-26 | End: 2024-08-26 | Stop reason: HOSPADM

## 2024-08-26 RX ORDER — ALLOPURINOL 100 MG/1
100 TABLET ORAL DAILY
Status: DISCONTINUED | OUTPATIENT
Start: 2024-08-26 | End: 2024-08-27 | Stop reason: HOSPADM

## 2024-08-26 RX ORDER — LABETALOL HYDROCHLORIDE 5 MG/ML
5 INJECTION, SOLUTION INTRAVENOUS
Status: DISCONTINUED | OUTPATIENT
Start: 2024-08-26 | End: 2024-08-26

## 2024-08-26 RX ORDER — NALOXONE HCL 0.4 MG/ML
0.4 VIAL (ML) INJECTION
Status: DISCONTINUED | OUTPATIENT
Start: 2024-08-26 | End: 2024-08-27 | Stop reason: HOSPADM

## 2024-08-26 RX ORDER — SODIUM CHLORIDE 9 MG/ML
40 INJECTION, SOLUTION INTRAVENOUS AS NEEDED
Status: DISCONTINUED | OUTPATIENT
Start: 2024-08-26 | End: 2024-08-27 | Stop reason: HOSPADM

## 2024-08-26 RX ORDER — ROCURONIUM BROMIDE 10 MG/ML
INJECTION, SOLUTION INTRAVENOUS AS NEEDED
Status: DISCONTINUED | OUTPATIENT
Start: 2024-08-26 | End: 2024-08-26 | Stop reason: SURG

## 2024-08-26 RX ORDER — MAGNESIUM SULFATE HEPTAHYDRATE 500 MG/ML
INJECTION, SOLUTION INTRAMUSCULAR; INTRAVENOUS AS NEEDED
Status: DISCONTINUED | OUTPATIENT
Start: 2024-08-26 | End: 2024-08-26 | Stop reason: SURG

## 2024-08-26 RX ORDER — ATORVASTATIN CALCIUM 10 MG/1
40 TABLET, FILM COATED ORAL NIGHTLY
Status: DISCONTINUED | OUTPATIENT
Start: 2024-08-26 | End: 2024-08-27 | Stop reason: HOSPADM

## 2024-08-26 RX ORDER — FAMOTIDINE 10 MG/ML
20 INJECTION, SOLUTION INTRAVENOUS ONCE
Status: COMPLETED | OUTPATIENT
Start: 2024-08-26 | End: 2024-08-26

## 2024-08-26 RX ORDER — FENOFIBRATE 145 MG/1
145 TABLET, COATED ORAL DAILY
Status: DISCONTINUED | OUTPATIENT
Start: 2024-08-27 | End: 2024-08-27 | Stop reason: HOSPADM

## 2024-08-26 RX ORDER — MORPHINE SULFATE 2 MG/ML
1 INJECTION, SOLUTION INTRAMUSCULAR; INTRAVENOUS
Status: DISCONTINUED | OUTPATIENT
Start: 2024-08-26 | End: 2024-08-27 | Stop reason: HOSPADM

## 2024-08-26 RX ADMIN — CELECOXIB 100 MG: 100 CAPSULE ORAL at 07:12

## 2024-08-26 RX ADMIN — OXYCODONE AND ACETAMINOPHEN 1 TABLET: 7.5; 325 TABLET ORAL at 12:16

## 2024-08-26 RX ADMIN — SODIUM CHLORIDE, POTASSIUM CHLORIDE, SODIUM LACTATE AND CALCIUM CHLORIDE 9 ML/HR: 600; 310; 30; 20 INJECTION, SOLUTION INTRAVENOUS at 07:12

## 2024-08-26 RX ADMIN — MAGNESIUM SULFATE HEPTAHYDRATE 1 G: 500 INJECTION, SOLUTION INTRAMUSCULAR; INTRAVENOUS at 08:09

## 2024-08-26 RX ADMIN — METHOCARBAMOL TABLETS 750 MG: 750 TABLET, COATED ORAL at 16:27

## 2024-08-26 RX ADMIN — HYDROCODONE BITARTRATE AND ACETAMINOPHEN 1 TABLET: 5; 325 TABLET ORAL at 20:29

## 2024-08-26 RX ADMIN — ACETAMINOPHEN 1000 MG: 500 TABLET ORAL at 07:12

## 2024-08-26 RX ADMIN — PROPOFOL 150 MCG/KG/MIN: 10 INJECTION, EMULSION INTRAVENOUS at 07:32

## 2024-08-26 RX ADMIN — SODIUM CHLORIDE 3000 MG: 900 INJECTION INTRAVENOUS at 07:20

## 2024-08-26 RX ADMIN — Medication 10 MG: at 09:56

## 2024-08-26 RX ADMIN — DICYCLOMINE HYDROCHLORIDE 10 MG: 10 CAPSULE ORAL at 17:37

## 2024-08-26 RX ADMIN — SUGAMMADEX 400 MG: 100 INJECTION, SOLUTION INTRAVENOUS at 10:16

## 2024-08-26 RX ADMIN — Medication 10 MG: at 08:58

## 2024-08-26 RX ADMIN — LISINOPRIL 10 MG: 10 TABLET ORAL at 16:06

## 2024-08-26 RX ADMIN — HYDROCODONE BITARTRATE AND ACETAMINOPHEN 1 TABLET: 5; 325 TABLET ORAL at 16:24

## 2024-08-26 RX ADMIN — ROCURONIUM BROMIDE 10 MG: 10 INJECTION, SOLUTION INTRAVENOUS at 09:47

## 2024-08-26 RX ADMIN — MIDAZOLAM 1 MG: 1 INJECTION INTRAMUSCULAR; INTRAVENOUS at 07:20

## 2024-08-26 RX ADMIN — HYDROMORPHONE HYDROCHLORIDE 0.5 MG: 1 INJECTION, SOLUTION INTRAMUSCULAR; INTRAVENOUS; SUBCUTANEOUS at 12:16

## 2024-08-26 RX ADMIN — SODIUM CHLORIDE 2000 MG: 900 INJECTION INTRAVENOUS at 23:51

## 2024-08-26 RX ADMIN — DICYCLOMINE HYDROCHLORIDE 10 MG: 10 CAPSULE ORAL at 20:29

## 2024-08-26 RX ADMIN — FENTANYL CITRATE 50 MCG: 50 INJECTION, SOLUTION INTRAMUSCULAR; INTRAVENOUS at 11:18

## 2024-08-26 RX ADMIN — MAGNESIUM SULFATE HEPTAHYDRATE 1 G: 500 INJECTION, SOLUTION INTRAMUSCULAR; INTRAVENOUS at 08:25

## 2024-08-26 RX ADMIN — FENTANYL CITRATE 50 MCG: 50 INJECTION, SOLUTION INTRAMUSCULAR; INTRAVENOUS at 07:20

## 2024-08-26 RX ADMIN — FENTANYL CITRATE 50 MCG: 50 INJECTION, SOLUTION INTRAMUSCULAR; INTRAVENOUS at 10:47

## 2024-08-26 RX ADMIN — GABAPENTIN 800 MG: 400 CAPSULE ORAL at 21:44

## 2024-08-26 RX ADMIN — ROCURONIUM BROMIDE 80 MG: 10 INJECTION, SOLUTION INTRAVENOUS at 07:32

## 2024-08-26 RX ADMIN — INSULIN LISPRO 4 UNITS: 100 INJECTION, SOLUTION INTRAVENOUS; SUBCUTANEOUS at 21:44

## 2024-08-26 RX ADMIN — FAMOTIDINE 20 MG: 10 INJECTION INTRAVENOUS at 07:12

## 2024-08-26 RX ADMIN — HYDROMORPHONE HYDROCHLORIDE 0.5 MG: 1 INJECTION, SOLUTION INTRAMUSCULAR; INTRAVENOUS; SUBCUTANEOUS at 10:48

## 2024-08-26 RX ADMIN — SODIUM CHLORIDE 2000 MG: 900 INJECTION INTRAVENOUS at 16:06

## 2024-08-26 RX ADMIN — PROPOFOL 200 MG: 10 INJECTION, EMULSION INTRAVENOUS at 07:32

## 2024-08-26 RX ADMIN — LIDOCAINE HYDROCHLORIDE 1 EACH: 40 SOLUTION TOPICAL at 07:35

## 2024-08-26 RX ADMIN — LIDOCAINE HYDROCHLORIDE 60 MG: 20 INJECTION, SOLUTION INFILTRATION; PERINEURAL at 07:32

## 2024-08-26 RX ADMIN — DEXAMETHASONE SODIUM PHOSPHATE 6 MG: 4 INJECTION, SOLUTION INTRAMUSCULAR; INTRAVENOUS at 07:39

## 2024-08-26 RX ADMIN — ATORVASTATIN CALCIUM 40 MG: 10 TABLET, FILM COATED ORAL at 20:29

## 2024-08-26 RX ADMIN — POTASSIUM CHLORIDE AND SODIUM CHLORIDE 100 ML/HR: 900; 150 INJECTION, SOLUTION INTRAVENOUS at 15:16

## 2024-08-26 RX ADMIN — MORPHINE SULFATE 1 MG: 2 INJECTION, SOLUTION INTRAMUSCULAR; INTRAVENOUS at 22:51

## 2024-08-26 RX ADMIN — ONDANSETRON 4 MG: 2 INJECTION INTRAMUSCULAR; INTRAVENOUS at 09:53

## 2024-08-26 RX ADMIN — ROCURONIUM BROMIDE 20 MG: 10 INJECTION, SOLUTION INTRAVENOUS at 07:55

## 2024-08-26 RX ADMIN — GLYCOPYRROLATE 0.2 MCG: 0.2 INJECTION INTRAMUSCULAR; INTRAVENOUS at 07:32

## 2024-08-26 RX ADMIN — GABAPENTIN 800 MG: 400 CAPSULE ORAL at 16:06

## 2024-08-26 RX ADMIN — HYDROMORPHONE HYDROCHLORIDE 0.5 MG: 1 INJECTION, SOLUTION INTRAMUSCULAR; INTRAVENOUS; SUBCUTANEOUS at 11:19

## 2024-08-26 RX ADMIN — Medication 30 MG: at 08:05

## 2024-08-26 RX ADMIN — Medication 10 ML: at 20:29

## 2024-08-26 NOTE — ANESTHESIA PREPROCEDURE EVALUATION
Anesthesia Evaluation     Patient summary reviewed   history of anesthetic complications:  PONV  NPO Solid Status: > 8 hours  NPO Liquid Status: > 2 hours           Airway   Mallampati: II  TM distance: >3 FB  Neck ROM: full  Possible difficult intubation and Large neck circumference  Dental - normal exam     Pulmonary    (+) asthma,sleep apnea on CPAP  Cardiovascular     Rhythm: regular    (+) hypertension, hyperlipidemia      Neuro/Psych  (+) TIA  GI/Hepatic/Renal/Endo    (+) morbid obesity, GERD, diabetes mellitus    Musculoskeletal     Abdominal   (+) obese   Substance History      OB/GYN          Other                    Anesthesia Plan    ASA 3     general     (Recommend CMAC)  intravenous induction     Anesthetic plan, risks, benefits, and alternatives have been provided, discussed and informed consent has been obtained with: patient.  Pre-procedure education provided    CODE STATUS:

## 2024-08-26 NOTE — ANESTHESIA POSTPROCEDURE EVALUATION
Patient: Ronni Asif    Procedure Summary       Date: 08/26/24 Room / Location: [facility] OR [address] [place] [address] MAIN OR    Anesthesia Start: 0727 Anesthesia Stop:     Procedure: Cervical 4 to cervical 5 and cervical 5 to cervical 6 anterior cervical discectomy, fusion and instrumentation (Spine Cervical) Diagnosis:       Cervical radiculopathy      (Cervical radiculopathy [M54.12])    Surgeons: Venu Judd MD Provider: Javier Thomas MD    Anesthesia Type: general ASA Status: 3            Anesthesia Type: general    Vitals  Vitals Value Taken Time   /93 08/26/24 1245   Temp 36.6 °C (97.9 °F) 08/26/24 1030   Pulse 78 08/26/24 1253   Resp 14 08/26/24 1230   SpO2 96 % 08/26/24 1253   Vitals shown include unfiled device data.        Post Anesthesia Care and Evaluation    Level of consciousness: awake and alert  Pain management: adequate    Airway patency: patent  Anesthetic complications: No anesthetic complications  PONV Status: controlled  Cardiovascular status: blood pressure returned to baseline and acceptable  Respiratory status: acceptable  Hydration status: acceptable

## 2024-08-26 NOTE — ANESTHESIA PROCEDURE NOTES
Airway  Urgency: elective    Date/Time: 8/26/2024 7:35 AM  Airway not difficult    General Information and Staff    Patient location during procedure: OR  Anesthesiologist: Javier Thomas MD  CRNA/CAA: Ivis Abdullahi CRNA    Indications and Patient Condition  Indications for airway management: airway protection    Preoxygenated: yes  Mask difficulty assessment: 2 - vent by mask + OA or adjuvant +/- NMBA    Final Airway Details  Final airway type: endotracheal airway      Successful airway: ETT     Successful intubation technique: direct laryngoscopy  Facilitating devices/methods: intubating stylet  Endotracheal tube insertion site: oral  Blade: CMAC  Blade size: D  ETT size (mm): 7.0  Cormack-Lehane Classification: grade I - full view of glottis  Placement verified by: chest auscultation, bronchoscopy and capnometry   Measured from: lips  ETT/EBT  to lips (cm): 24  Number of attempts at approach: 1  Assessment: lips, teeth, and gum same as pre-op and atraumatic intubation

## 2024-08-26 NOTE — CONSULTS
Patient Name:  Ronni Asif  YOB: 1970  MRN:  [id number]  Admit Date:  8/26/2024  Patient Care Team:  Gil Chambers MD as PCP - General (Family Medicine)  Mariah Dunbar MD (Internal Medicine)  Junior Cartagena MD as Consulting Physician (Neurology)      Subjective   History Present Illness   Referring Provider: Dr. Judd  Reason for Consultation: Type 2 DM    Mr. Asif is a 54 y.o. non-smoker with a history of HTN, HLD, Type 2 DM, CAD, GERD, DEVAUGHN on CPAP, anxiety/depression, asthma, and cervical stenosis with radiculopathy admitted to the neurosurgery service at [facility] for surgical decompression which was done earlier today. I was consulted to evaluate and manage his diabetes as well as his other medical problems listed above.    Review of Systems   All other systems reviewed and are negative.       Personal History     Past Medical History:   Diagnosis Date    Anxiety     Asthma     Cancer     skin    Chronic pain     LOW BACK    DDD (degenerative disc disease), cervical     DDD (degenerative disc disease), lumbar     Diabetes mellitus     Dizziness     ED (erectile dysfunction)     GERD (gastroesophageal reflux disease)     Headache     History of kidney stones     Hyperlipidemia     Hypertension     Implantable loop recorder present     Injury of back 03/2016    Pt fell 20 feet     Neck pain     Neuromuscular disorder     Numbness     2 FINGERS ON BILATERAL HANDS    PONV (postoperative nausea and vomiting)     Sleep apnea     cpap    Syncope and collapse     TIA (transient ischemic attack)      Past Surgical History:   Procedure Laterality Date    CARDIAC SURGERY      LOOP RECORDER    CATARACT EXTRACTION      CERVICAL EPIDURAL N/A 10/07/2021    Procedure: CERVICAL EPIDURAL;  Surgeon: Abdirashid Gonzalez MD;  Location: [facility] MAIN OR;  Service: Pain Management;  Laterality: N/A;    COLONOSCOPY  2014    COLONOSCOPY N/A 05/23/2019    Procedure:  COLONOSCOPY to Cecum with Hot and Cold Polypectomy x 2;  Surgeon: Navid Zafar Jr., MD;  Location: [facility] ENDOSCOPY;  Service: General    ENDOSCOPY N/A 05/23/2019    Procedure: ESOPHAGOGASTRODUODENOSCOPY with Bx's;  Surgeon: Navid Zafar Jr., MD;  Location: [facility] ENDOSCOPY;  Service: General    HERNIA REPAIR  10/2015    LAPAROSCOPIC GASTRIC BANDING      June 2020    LUMBAR DISCECTOMY FUSION INSTRUMENTATION Bilateral 08/30/2019    Procedure: LUMBAR 5 TO SACRAL 1 OPEN DECOMPRESSION WITH  POSTERIOR LUMBAR INTERBODY FUSION, CAGE AND SCREW;  Surgeon: Jake Tripathi MD;  Location:  [facility] MAIN OR;  Service: Neurosurgery    MEDIAL BRANCH BLOCK Bilateral 06/01/2021    Procedure: thoracic medial branch block (bilateral T9-11 vs T8-10) x2, 1-2 wks apart - diagnostic;  Surgeon: Abdirashid Gonzalez MD;  Location: [facility] MAIN [facility];  Service: Pain Management;  Laterality: Bilateral;    MEDIAL BRANCH BLOCK Bilateral 06/29/2021    Procedure: Bilateral T8-T10 MEDIAL BRANCH BLOCK;  Surgeon: Abdirashid Gonzalez MD;  Location: [facility];  Service: Pain Management;  Laterality: Bilateral;    MEDIAL BRANCH BLOCK Bilateral 11/30/2021    Procedure: Bilateral cervical MEDIAL BRANCH BLOCK at approximately C4-C6;  Surgeon: Abdirashid Gonzalez MD;  Location: [facility];  Service: Pain Management;  Laterality: Bilateral;    MEDIAL BRANCH BLOCK Bilateral 06/07/2022    Procedure: Bilateral C5-C7 MEDIAL BRANCH BLOCK;  Surgeon: Abdirashid Gonzalez MD;  Location: [facility];  Service: Pain Management;  Laterality: Bilateral;    RADIOFREQUENCY ABLATION Bilateral 07/27/2021    Procedure: Bilateral T8-T10 RADIOFREQUENCY ABLATION;  Surgeon: Abdirashid Gonzalez MD;  Location: [facility];  Service: Pain Management;  Laterality: Bilateral;    RADIOFREQUENCY ABLATION Bilateral 08/23/2022    Procedure: RADIOFREQUENCY ABLATION Bilateral T8-T10;  Surgeon: Abdirashid Gonzalez MD;  Location: [facility];  Service: Pain Management;  Laterality:  "Bilateral;     Family History   Problem Relation Age of Onset    Hypertension Mother     Diabetes Mother     Heart disease Mother     Brain cancer Father     Stroke Father     Hypertension Father     Atrial fibrillation Father     Heart disease Father     Hypertension Brother     Colon cancer Maternal Grandmother     Lung cancer Maternal Grandfather     Bone cancer Maternal Grandfather     Malig Hyperthermia Neg Hx      Social History     Tobacco Use    Smoking status: Never    Smokeless tobacco: Never    Tobacco comments:     no caffeine    Vaping Use    Vaping status: Never Used   Substance Use Topics    Alcohol use: No    Drug use: No     No current facility-administered medications on file prior to encounter.     Current Outpatient Medications on File Prior to Encounter   Medication Sig Dispense Refill    allopurinol (ZYLOPRIM) 100 MG tablet Take 1 tablet by mouth Daily.      atorvastatin (LIPITOR) 40 MG tablet Take 1 tablet by mouth Daily.      B-D 3CC LUER-ALECIA SYR 71UM2-3/2 23G X 1-1/2\" 3 ML misc INJECT 1 EACH IM Q 14 DAYS UTD  1    B-D UF III MINI PEN NEEDLES 31G X 5 MM misc USE 1 QID  3    Continuous Blood Gluc  (Dexcom G6 ) device       Continuous Blood Gluc Sensor (Dexcom G6 Sensor)       Continuous Blood Gluc Transmit (Dexcom G6 Transmitter) misc       gabapentin (NEURONTIN) 800 MG tablet Take 1 tablet by mouth 3 (Three) Times a Day. 90 tablet 1    glucose blood (FREESTYLE LITE) test strip 1 each by Other route.      glucose blood (FREESTYLE LITE) test strip 1 each by Other route.      Insulin Pen Needle (B-D UF III MINI PEN NEEDLES) 31G X 5 MM misc USE TO INJECT FOUR TIMES A DAY      Lancets (FREESTYLE) lancets TEST QD UTD  3    metoprolol succinate XL (TOPROL-XL) 50 MG 24 hr tablet Take 1 tablet by mouth Daily.      Syringe/Needle, Disp, 23G X 1-1/2\" 3 ML misc Inject 1 each into the appropriate muscle as directed by prescriber.      acetaminophen (TYLENOL) 325 MG tablet Take 2 tablets by " mouth Every 6 (Six) Hours As Needed for Mild Pain .      albuterol (2.5 MG/3ML) 0.083% nebulizer solution 3 mL, albuterol (5 MG/ML) 0.5% nebulizer solution 0.5 mL Inhale 4 (four) times a day as needed.      albuterol (PROVENTIL HFA;VENTOLIN HFA) 108 (90 BASE) MCG/ACT inhaler Inhale 2 puffs Every 4 (Four) Hours As Needed for Wheezing.      aspirin 81 MG EC tablet Take 1 tablet by mouth Daily.      BREO ELLIPTA 200-25 MCG/INH inhaler Inhale 1 puff Daily As Needed.  5    colestipol (COLESTID) 1 g tablet Take 1 tablet by mouth Daily.      dicyclomine (BENTYL) 10 MG capsule Take 1 capsule by mouth 4 (Four) Times a Day.      Dulaglutide (Trulicity) 1.5 MG/0.5ML solution pen-injector Inject 1.5 mg under the skin into the appropriate area as directed 1 (One) Time Per Week. TAKES ON SATURDAYS/LAST DOSE 8/10/24 INSTRUCTED PT TO HOLD FOR 1 WEEK BEFORE SURGERY      ergocalciferol (ERGOCALCIFEROL) 1.25 MG (86538 UT) capsule Take 1 capsule by mouth Every 7 (Seven) Days.      famotidine (PEPCID) 20 MG tablet Take 1 tablet by mouth 2 (Two) Times a Day As Needed.      fenofibrate (TRICOR) 145 MG tablet Take 1 tablet by mouth Daily.      fluticasone (FLONASE) 50 MCG/ACT nasal spray 1 spray into the nostril(s) as directed by provider Daily As Needed.      fluticasone-salmeterol (ADVAIR) 250-50 MCG/DOSE DISKUS Inhale 1 puff 2 (Two) Times a Day As Needed.      HYDROcodone-acetaminophen (NORCO) 5-325 MG per tablet Take 1 tablet by mouth Daily As Needed for Severe Pain. 30 tablet 0    ipratropium-albuterol (DUO-NEB) 0.5-2.5 mg/3 ml nebulizer       lansoprazole (PREVACID) 30 MG capsule Take 1 capsule by mouth Daily.      lisinopril (PRINIVIL,ZESTRIL) 10 MG tablet Take 1 tablet by mouth Daily.      methocarbamol (ROBAXIN) 750 MG tablet Take 1 tablet by mouth 2 (Two) Times a Day As Needed for Muscle Spasms. 60 tablet 2    multivitamin (DAILY CANDY) tablet tablet Take 1 tablet by mouth Daily. HOLD PRIOR TO SURG      Nerve Stimulator (TENS  THERAPY REPLACE BACK PADS) misc 1 each.      omega-3 acid ethyl esters (LOVAZA) 1 g capsule Take 1 capsule by mouth Daily. HOLD PRIOR TO SURG      tadalafil (CIALIS) 10 MG tablet Take 1 tablet by mouth Daily As Needed. HOLD FOR 48 HOURS BEFORE SURGERY      Testosterone Cypionate (DEPOTESTOTERONE CYPIONATE) 200 MG/ML injection Inject 0.5 mL into the appropriate muscle as directed by prescriber Every 7 (Seven) Days.      traZODone (DESYREL) 50 MG tablet Take 0.5-1 tablets by mouth At Night As Needed.      triamcinolone (KENALOG) 0.1 % cream Apply  topically to the appropriate area as directed 3 (Three) Times a Day As Needed.      Trulicity 3 MG/0.5ML solution pen-injector INSTRUCTED PT TO HOLD FOR 1 WEEK BEFORE SURGERY      Xifaxan 550 MG tablet Take 1 tablet by mouth. PT IS NOT TAKING       Allergies   Allergen Reactions    Other Itching, Rash and Shortness Of Breath     Cats and dogs       Objective    Objective     Vital Signs  Temp:  [97.7 °F (36.5 °C)-98.4 °F (36.9 °C)] 97.9 °F (36.6 °C)  Heart Rate:  [75-86] 76  Resp:  [14-20] 16  BP: (116-148)/() 148/96  SpO2:  [92 %-100 %] 95 %  on  Flow (L/min):  [2-10] 2;   Device (Oxygen Therapy): nasal cannula  Body mass index is 42.12 kg/m².    Physical Exam  Vitals and nursing note reviewed.   Constitutional:       General: He is not in acute distress.     Appearance: He is not toxic-appearing.   HENT:      Head: Normocephalic and atraumatic.      Nose: Nose normal.      Mouth/Throat:      Mouth: Mucous membranes are moist.      Pharynx: Oropharynx is clear.   Eyes:      Conjunctiva/sclera: Conjunctivae normal.      Pupils: Pupils are equal, round, and reactive to light.   Cardiovascular:      Rate and Rhythm: Normal rate and regular rhythm.      Pulses: Normal pulses.   Pulmonary:      Effort: Pulmonary effort is normal.      Breath sounds: Normal breath sounds.   Abdominal:      General: Bowel sounds are normal. There is no distension.      Palpations: Abdomen is  soft.      Tenderness: There is no abdominal tenderness.   Musculoskeletal:         General: No swelling or tenderness.   Skin:     General: Skin is warm and dry.      Capillary Refill: Capillary refill takes less than 2 seconds.   Neurological:      General: No focal deficit present.      Mental Status: He is alert and oriented to person, place, and time.   Psychiatric:         Mood and Affect: Mood normal.         Behavior: Behavior normal.     Results Review:  I reviewed the patient's new clinical results.  I reviewed the patient's new imaging results and agree with the interpretation.  I reviewed the patient's other test results and agree with the interpretation    Lab Results (last 24 hours)       Procedure Component Value Units Date/Time    POC Glucose Once [[id number]]  (Abnormal) Collected: 08/26/24 0611    Specimen: Blood Updated: 08/26/24 0612     Glucose 170 mg/dL     POC Glucose [[id number]]  (Abnormal) Collected: 08/26/24 1033    Specimen: Blood Updated: 08/26/24 1034     Glucose 192 mg/dL             Imaging Results (Last 24 Hours)       Procedure Component Value Units Date/Time    FL C Arm During Surgery [[id number]] Resulted: 08/26/24 1002     Updated: 08/26/24 1002    Narrative:      This procedure was auto-finalized with no dictation required.            Results for orders placed in visit on 12/10/18    SCANNED - ECHOCARDIOGRAM      No orders to display        Assessment/Plan     Active Hospital Problems    Diagnosis  POA    **Cervical radiculopathy [M54.12]  Yes    Cervical spinal stenosis [M48.02]  Yes    Gastroesophageal reflux disease [K21.9]  Yes    Moderate asthma without complication [J45.909]  Yes    Coronary artery disease [I25.10]  Yes    Type 2 diabetes mellitus with circulatory disorder, without long-term current use of insulin [E11.59]  Yes    Essential hypertension [I10]  Yes    DEVAUGHN on CPAP [G47.33]  Yes    Mixed anxiety depressive disorder [F41.8]  Yes      Resolved Hospital Problems    No resolved problems to display.   Cervical Radiculopathy  - s/p anterior cervical discectomy/fusion of C4-5 and C5-6  - pain control and surgical management per primary  - encourage pulmonary toilet, bowel regimen    Type 2 DM  - complications include atherosclerosis  - BG acceptable  - hold trulicity  - cover with ssi/hypoglycemia protocol    HTN/CAD  - BP acceptable, no anginal symptoms  - continue lisinopril and metoprolol  - continue ASA, statin, tricor     Asthma  - no exacerbation  - PRN albuterol nebs ordered    DEVAUGHN  - resume home CPAP     Kyle Lees MD  [facility]  08/26/24  14:15 EDT    Thank you for the consult. I will follow the patient with you in the hospital.

## 2024-08-26 NOTE — BRIEF OP NOTE
CERVICAL DISCECTOMY ANTERIOR FUSION WITH INSTRUMENTATION  Progress Note    Ronni Reyesenstuhl  8/26/2024    Pre-op Diagnosis:   Cervical radiculopathy [M54.12]       Post-Op Diagnosis Codes:     * Cervical radiculopathy [M54.12]    Procedure/CPT® Codes:        Procedure(s):  Cervical 4 to cervical 5 and cervical 5 to cervical 6 anterior cervical discectomy, fusion and instrumentation              Surgeon(s):  Venu Judd MD    Anesthesia: General    Staff:   Circulator: Jacquie Kapadia RN; Rui Bird RN  Scrub Person: Yessenia Hester Jennifer  Vendor Representative: Fortunato Ferraro  Assistant: Ana Laura Brown CSA  Assistant: Ana Laura Brown CSA      Estimated Blood Loss: 200ml    Urine Voided: * No values recorded between 8/26/2024  7:26 AM and 8/26/2024 10:10 AM *    Specimens:                None          Drains:   Closed/Suction Drain 1 Anterior Neck Bulb 10 Fr. (Active)       Findings: Severe stenosis        Complications: None    Venu Judd MD     Date: 8/26/2024  Time: 10:18 EDT

## 2024-08-26 NOTE — PLAN OF CARE
Goal Outcome Evaluation:  Plan of Care Reviewed With: patient         PT ACDF, shira drain, room air, collar ice pack, vss, 2LPRN, CPAP, ACHS.

## 2024-08-26 NOTE — OP NOTE
Preoperative diagnosis: Cervical radiculopathy secondary to cervical stenosis C4-5 and C5-6    Postoperative diagnosis: Same    Procedure performed: Anterior cervical discectomy, fusion and instrumentation C4-5 and C5-6    Spinal Surgery Levels Completed:2 Levels     Surgeon: Venu Judd M.D.    Assistant: Ana Laura Brown CFA who was instrumental in helping with hemostasis, visualization of neural structures, and retraction of neural structures.  Her skilled assistance was necessary for the success of this case    Indications for the procedure: This patient was having severe symptoms in the neck and arms.   Imaging showed significant neural compression in the cervical spine at C4-5 and C5-6.    Operative summary: After induction of general anesthesia with intravenous agents patient was intubated and placed on the operating table in the supine position.  The head was hyperextended on donut roll and a roll of sheets was placed under the shoulders.  The shoulders were taped down being careful not to stretch the brachial plexus too much.  All peripheral points of entrapment and pressure were padded and secured.   The neck was prepped with Chloraprep.    An incision was made in the right lower portion of the neck.  This was carried down to the platysma.  The platysma was opened in the direction of its fibers.  Dissection was carried down through the middle cervical fascia to the anterior aspect of the cervical spine.  A needle was placed in the first available disc space.  This did prove to be C4-5.  Attention was turned to that level and then X1 down.  The longus colli muscles were elevated off both sides of the anterior cervical spine and then the Cloward retractors were locked under the longus colli muscles and used to hold the esophagus, trachea, and recurrent laryngeal bundle medially and the carotid bundle and its contents laterally.  The disc space at the C5-6 level was incised and disc material was removed with  the 0 and 3-0 up-biting and straight curettes. The pituitary was also used to remove disc material.  Following this the posterior lip of the vertebral body was drilled off in combination with the uncovertebral joints on each side.    The posterior longitudinal ligament was then opened and removed from foramen to foramen completely decompressing the spinal cord and the nerve roots.  Once the nerve roots were visualized in each neural foramen and found to be completely decompressed bleeding was controlled with a combination of the bipolar cautery, FloSeal, and thrombin and Gelfoam.  Once the bleeding was stopped the disc space was sized and a 9 mm Titan Melissa cage was placed into the disc space.  It was filled first with [place] putty. The compression at this level was primarily due to severe stenosis.    Following this attention was turned to the C4-5 level.  At that level the same thing was done.  The disc material was removed, the posterior lip of the vertebral body was removed as well as the uncovertebral joints.  The posterior longitudinal ligament was then opened and removed. Bleeding was again controlled with the bipolar cautery FloSeal, and thrombin and Gelfoam.  Finally another titan Melissa cage was placed at this level along with Gela putty.  This cage measured 9 mm.  At this level the compression was mostly due to stenosis.    A 47 mm Atlantis plate was then attached to the front of the cervical spine using 6 14 mm screws.    The retractors were removed and final x-rays taken. Bleeding was controlled with the bipolar cautery and a drain was placed in the anterior cervical space and tunneled subcutaneously. It was then sewn into place and the incision was closed in layers, dressed and the patient was taken to the recovery room in stable condition.  Sponge instrument and needle counts were correct at the end of the procedure.

## 2024-08-27 ENCOUNTER — APPOINTMENT (OUTPATIENT)
Dept: GENERAL RADIOLOGY | Facility: HOSPITAL | Age: 54
End: 2024-08-27
Payer: COMMERCIAL

## 2024-08-27 VITALS
SYSTOLIC BLOOD PRESSURE: 103 MMHG | OXYGEN SATURATION: 99 % | RESPIRATION RATE: 16 BRPM | HEIGHT: 71 IN | HEART RATE: 73 BPM | DIASTOLIC BLOOD PRESSURE: 57 MMHG | BODY MASS INDEX: 42.28 KG/M2 | WEIGHT: 302 LBS | TEMPERATURE: 97 F

## 2024-08-27 LAB
ANION GAP SERPL CALCULATED.3IONS-SCNC: 9.2 MMOL/L (ref 5–15)
BASOPHILS # BLD AUTO: 0.03 10*3/MM3 (ref 0–0.2)
BASOPHILS NFR BLD AUTO: 0.3 % (ref 0–1.5)
BUN SERPL-MCNC: 12 MG/DL (ref 6–20)
BUN/CREAT SERPL: 11.4 (ref 7–25)
CALCIUM SPEC-SCNC: 9.2 MG/DL (ref 8.6–10.5)
CHLORIDE SERPL-SCNC: 100 MMOL/L (ref 98–107)
CO2 SERPL-SCNC: 26.8 MMOL/L (ref 22–29)
CREAT SERPL-MCNC: 1.05 MG/DL (ref 0.76–1.27)
DEPRECATED RDW RBC AUTO: 43 FL (ref 37–54)
EGFRCR SERPLBLD CKD-EPI 2021: 84.4 ML/MIN/1.73
EOSINOPHIL # BLD AUTO: 0.09 10*3/MM3 (ref 0–0.4)
EOSINOPHIL NFR BLD AUTO: 0.8 % (ref 0.3–6.2)
ERYTHROCYTE [DISTWIDTH] IN BLOOD BY AUTOMATED COUNT: 13.5 % (ref 12.3–15.4)
GLUCOSE BLDC GLUCOMTR-MCNC: 201 MG/DL (ref 70–130)
GLUCOSE BLDC GLUCOMTR-MCNC: 260 MG/DL (ref 70–130)
GLUCOSE SERPL-MCNC: 255 MG/DL (ref 65–99)
HCT VFR BLD AUTO: 39.9 % (ref 37.5–51)
HGB BLD-MCNC: 13 G/DL (ref 13–17.7)
IMM GRANULOCYTES # BLD AUTO: 0.04 10*3/MM3 (ref 0–0.05)
IMM GRANULOCYTES NFR BLD AUTO: 0.4 % (ref 0–0.5)
LYMPHOCYTES # BLD AUTO: 2.29 10*3/MM3 (ref 0.7–3.1)
LYMPHOCYTES NFR BLD AUTO: 21.3 % (ref 19.6–45.3)
MCH RBC QN AUTO: 28.6 PG (ref 26.6–33)
MCHC RBC AUTO-ENTMCNC: 32.6 G/DL (ref 31.5–35.7)
MCV RBC AUTO: 87.7 FL (ref 79–97)
MONOCYTES # BLD AUTO: 0.87 10*3/MM3 (ref 0.1–0.9)
MONOCYTES NFR BLD AUTO: 8.1 % (ref 5–12)
NEUTROPHILS NFR BLD AUTO: 69.1 % (ref 42.7–76)
NEUTROPHILS NFR BLD AUTO: 7.44 10*3/MM3 (ref 1.7–7)
NRBC BLD AUTO-RTO: 0 /100 WBC (ref 0–0.2)
PLATELET # BLD AUTO: 222 10*3/MM3 (ref 140–450)
PMV BLD AUTO: 9.4 FL (ref 6–12)
POTASSIUM SERPL-SCNC: 3.9 MMOL/L (ref 3.5–5.2)
RBC # BLD AUTO: 4.55 10*6/MM3 (ref 4.14–5.8)
SODIUM SERPL-SCNC: 136 MMOL/L (ref 136–145)
WBC NRBC COR # BLD AUTO: 10.76 10*3/MM3 (ref 3.4–10.8)

## 2024-08-27 PROCEDURE — 63710000001 INSULIN LISPRO (HUMAN) PER 5 UNITS: Performed by: INTERNAL MEDICINE

## 2024-08-27 PROCEDURE — 99024 POSTOP FOLLOW-UP VISIT: CPT | Performed by: NURSE PRACTITIONER

## 2024-08-27 PROCEDURE — 80048 BASIC METABOLIC PNL TOTAL CA: CPT | Performed by: INTERNAL MEDICINE

## 2024-08-27 PROCEDURE — 85025 COMPLETE CBC W/AUTO DIFF WBC: CPT | Performed by: NEUROLOGICAL SURGERY

## 2024-08-27 PROCEDURE — 25010000002 CEFAZOLIN PER 500 MG: Performed by: NEUROLOGICAL SURGERY

## 2024-08-27 PROCEDURE — 82948 REAGENT STRIP/BLOOD GLUCOSE: CPT

## 2024-08-27 PROCEDURE — 72040 X-RAY EXAM NECK SPINE 2-3 VW: CPT

## 2024-08-27 RX ORDER — DOCUSATE SODIUM 100 MG/1
100 CAPSULE, LIQUID FILLED ORAL 2 TIMES DAILY
Qty: 30 CAPSULE | Refills: 0 | Status: SHIPPED | OUTPATIENT
Start: 2024-08-27

## 2024-08-27 RX ORDER — LISINOPRIL 10 MG/1
10 TABLET ORAL DAILY
Start: 2024-08-30

## 2024-08-27 RX ORDER — HYDROCODONE BITARTRATE AND ACETAMINOPHEN 5; 325 MG/1; MG/1
1 TABLET ORAL EVERY 4 HOURS PRN
Qty: 25 TABLET | Refills: 0 | Status: SHIPPED | OUTPATIENT
Start: 2024-08-27

## 2024-08-27 RX ADMIN — METHOCARBAMOL TABLETS 750 MG: 750 TABLET, COATED ORAL at 05:14

## 2024-08-27 RX ADMIN — HYDROCODONE BITARTRATE AND ACETAMINOPHEN 1 TABLET: 5; 325 TABLET ORAL at 02:06

## 2024-08-27 RX ADMIN — METOPROLOL SUCCINATE 50 MG: 50 TABLET, FILM COATED, EXTENDED RELEASE ORAL at 08:07

## 2024-08-27 RX ADMIN — HYDROCODONE BITARTRATE AND ACETAMINOPHEN 1 TABLET: 5; 325 TABLET ORAL at 10:08

## 2024-08-27 RX ADMIN — ALLOPURINOL 100 MG: 100 TABLET ORAL at 08:07

## 2024-08-27 RX ADMIN — GABAPENTIN 800 MG: 400 CAPSULE ORAL at 13:53

## 2024-08-27 RX ADMIN — Medication 10 ML: at 08:08

## 2024-08-27 RX ADMIN — DICYCLOMINE HYDROCHLORIDE 10 MG: 10 CAPSULE ORAL at 11:33

## 2024-08-27 RX ADMIN — HYDROCODONE BITARTRATE AND ACETAMINOPHEN 1 TABLET: 5; 325 TABLET ORAL at 13:53

## 2024-08-27 RX ADMIN — INSULIN LISPRO 4 UNITS: 100 INJECTION, SOLUTION INTRAVENOUS; SUBCUTANEOUS at 08:07

## 2024-08-27 RX ADMIN — ASPIRIN 81 MG: 81 TABLET, COATED ORAL at 08:07

## 2024-08-27 RX ADMIN — SODIUM CHLORIDE 2000 MG: 900 INJECTION INTRAVENOUS at 08:08

## 2024-08-27 RX ADMIN — PANTOPRAZOLE SODIUM 40 MG: 40 TABLET, DELAYED RELEASE ORAL at 05:14

## 2024-08-27 RX ADMIN — INSULIN LISPRO 6 UNITS: 100 INJECTION, SOLUTION INTRAVENOUS; SUBCUTANEOUS at 11:33

## 2024-08-27 RX ADMIN — FENOFIBRATE 145 MG: 145 TABLET ORAL at 08:07

## 2024-08-27 RX ADMIN — GABAPENTIN 800 MG: 400 CAPSULE ORAL at 05:14

## 2024-08-27 RX ADMIN — HYDROCODONE BITARTRATE AND ACETAMINOPHEN 1 TABLET: 5; 325 TABLET ORAL at 06:23

## 2024-08-27 RX ADMIN — DICYCLOMINE HYDROCHLORIDE 10 MG: 10 CAPSULE ORAL at 08:07

## 2024-08-27 RX ADMIN — LISINOPRIL 10 MG: 10 TABLET ORAL at 08:07

## 2024-08-27 NOTE — DISCHARGE SUMMARY
Ronni Paulnnenstuhl  1970    Patient Care Team:  Gil Chambers MD as PCP - General (Family Medicine)  Mariah Dunbar MD (Internal Medicine)  Junior Cartagena MD as Consulting Physician (Neurology)    Date of Admit: 8/26/2024    Date of Discharge:  8/27/2024    Discharge Diagnosis:  Cervical radiculopathy    Type 2 diabetes mellitus with circulatory disorder, without long-term current use of insulin    Essential hypertension    Moderate asthma without complication    Gastroesophageal reflux disease    Coronary artery disease    Mixed anxiety depressive disorder    DEVAUGHN on CPAP    Cervical spinal stenosis      Procedures Performed  Procedure(s):  Cervical 4 to cervical 5 and cervical 5 to cervical 6 anterior cervical discectomy, fusion and instrumentation       Complications: None    Consultants:   Consults       Date and Time Order Name Status Description    8/26/2024  1:04 PM Inpatient Hospitalist Consult Completed             Condition on Discharge: stable    Discharge disposition: home      Brief HPI: Patient evaluated in office for complaints of neck pain particularly when reaching above his head and radiation down his left arm with numbness and tingling. Imaging revealed significant neural compression in the cervical spine at C4-5 and C5-6. RBAs of treatment were discussed including the above procedure. Patient consented to above procedure.    Hospital Course: Patient admitted for above procedure. The procedure itself was without complication. The patient was transferred to [facility] following recovery.  Patient had uncomplicated postop recovery stay.  On postop day 1 his JERSON had only 10 cc of bloody drainage, it will be removed prior to discharge.  He is tolerating diet with a little throat soreness but otherwise swallowing without difficulty.  He does report improvement in his left arm symptoms that he was having prior to surgery.  He is ambulating without assistance, voiding without  difficulty.  His right anterior cervical incision is well-appearing with Steri-Strips in place.  He has follow-up appointment with Dr. Judd on 9/12/2024.  He can call the office at any time with any questions or concerns.  He is stable and ready for discharge today.    Discharge Physical Exam:    Temp:  [97.3 °F (36.3 °C)-98.5 °F (36.9 °C)] 97.5 °F (36.4 °C)  Heart Rate:  [75-85] 78  Resp:  [14-20] 16  BP: ()/(55-96) 96/55    Current labs:  Lab Results (last 24 hours)       Procedure Component Value Units Date/Time    POC Glucose Once [[id number]]  (Abnormal) Collected: 08/26/24 1613    Specimen: Blood Updated: 08/26/24 1615     Glucose 253 mg/dL     POC Glucose Once [[id number]]  (Abnormal) Collected: 08/26/24 2103    Specimen: Blood Updated: 08/26/24 2105     Glucose 210 mg/dL     CBC & Differential [[id number]]  (Abnormal) Collected: 08/27/24 0524    Specimen: Blood Updated: 08/27/24 0552    Narrative:      The following orders were created for panel order CBC & Differential.  Procedure                               Abnormality         Status                     ---------                               -----------         ------                     CBC Auto Differential[[id number]]        Abnormal            Final result                 Please view results for these tests on the individual orders.    CBC Auto Differential [[id number]]  (Abnormal) Collected: 08/27/24 0524    Specimen: Blood Updated: 08/27/24 0552     WBC 10.76 10*3/mm3      RBC 4.55 10*6/mm3      Hemoglobin 13.0 g/dL      Hematocrit 39.9 %      MCV 87.7 fL      MCH 28.6 pg      MCHC 32.6 g/dL      RDW 13.5 %      RDW-SD 43.0 fl      MPV 9.4 fL      Platelets 222 10*3/mm3      Neutrophil % 69.1 %      Lymphocyte % 21.3 %      Monocyte % 8.1 %      Eosinophil % 0.8 %      Basophil % 0.3 %      Immature Grans % 0.4 %      Neutrophils, Absolute 7.44 10*3/mm3      Lymphocytes, Absolute 2.29 10*3/mm3      Monocytes, Absolute 0.87 10*3/mm3       Eosinophils, Absolute 0.09 10*3/mm3      Basophils, Absolute 0.03 10*3/mm3      Immature Grans, Absolute 0.04 10*3/mm3      nRBC 0.0 /100 WBC     POC Glucose Once [[id number]]  (Abnormal) Collected: 08/27/24 0742    Specimen: Blood Updated: 08/27/24 0744     Glucose 201 mg/dL     Basic Metabolic Panel [[id number]]  (Abnormal) Collected: 08/27/24 1018    Specimen: Blood Updated: 08/27/24 1056     Glucose 255 mg/dL      BUN 12 mg/dL      Creatinine 1.05 mg/dL      Sodium 136 mmol/L      Potassium 3.9 mmol/L      Chloride 100 mmol/L      CO2 26.8 mmol/L      Calcium 9.2 mg/dL      BUN/Creatinine Ratio 11.4     Anion Gap 9.2 mmol/L      eGFR 84.4 mL/min/1.73     Narrative:      GFR Normal >60  Chronic Kidney Disease <60  Kidney Failure <15      POC Glucose Once [[id number]]  (Abnormal) Collected: 08/27/24 1110    Specimen: Blood Updated: 08/27/24 1112     Glucose 260 mg/dL               General Appearance No acute distress   HEENT NC/AT;    Neurological Awake, Alert, and oriented x 3   Cranial nerves CN II-XII intact   Motor Strength normal, tone normal to bilateral upper extremities   Sensory Intact to light touch to bilateral upper extremities   Gait and station Ambulating without assistance   Neck Supple, trachea midline.  Handling secretions.  Right anterior cervical incision is well-appearing, well-approximated with Steri-Strips in place.  There is no surrounding erythema, swelling or drainage.   Extremities Moves all extremities well, no edema, no cyanosis, no redness         Discharge Medications  ROSI has been reviewed and narcotic consent is on file in the patient's chart.     Your medication list        START taking these medications        Instructions Last Dose Given Next Dose Due   docusate sodium 100 MG capsule  Commonly known as: Colace      Take 1 capsule by mouth 2 (Two) Times a Day.              CHANGE how you take these medications        Instructions Last Dose Given Next Dose Due  "  HYDROcodone-acetaminophen 5-325 MG per tablet  Commonly known as: NORCO  What changed: Another medication with the same name was added. Make sure you understand how and when to take each.      Take 1 tablet by mouth Daily As Needed for Severe Pain.       HYDROcodone-acetaminophen 5-325 MG per tablet  Commonly known as: NORCO  What changed: You were already taking a medication with the same name, and this prescription was added. Make sure you understand how and when to take each.      Take 1 tablet by mouth Every 4 (Four) Hours As Needed for Moderate Pain.              CONTINUE taking these medications        Instructions Last Dose Given Next Dose Due   acetaminophen 325 MG tablet  Commonly known as: TYLENOL      Take 2 tablets by mouth Every 6 (Six) Hours As Needed for Mild Pain .       albuterol (2.5 MG/3ML) 0.083% nebulizer solution 3 mL, albuterol (5 MG/ML) 0.5% nebulizer solution 0.5 mL      Inhale 4 (four) times a day as needed.       albuterol sulfate  (90 Base) MCG/ACT inhaler  Commonly known as: PROVENTIL HFA;VENTOLIN HFA;PROAIR HFA      Inhale 2 puffs Every 4 (Four) Hours As Needed for Wheezing.       allopurinol 100 MG tablet  Commonly known as: ZYLOPRIM      Take 1 tablet by mouth Daily.       aspirin 81 MG EC tablet      Take 1 tablet by mouth Daily.       atorvastatin 40 MG tablet  Commonly known as: LIPITOR      Take 1 tablet by mouth Daily.       B-D 3CC LUER-ALECIA SYR 10PT4-5/2 23G X 1-1/2\" 3 ML misc  Generic drug: Syringe/Needle (Disp)      INJECT 1 EACH IM Q 14 DAYS UTD       Syringe/Needle (Disp) 23G X 1-1/2\" 3 ML misc      Inject 1 each into the appropriate muscle as directed by prescriber.       B-D UF III MINI PEN NEEDLES 31G X 5 MM misc  Generic drug: Insulin Pen Needle      USE 1 QID       B-D UF III MINI PEN NEEDLES 31G X 5 MM misc  Generic drug: Insulin Pen Needle      USE TO INJECT FOUR TIMES A DAY       Breo Ellipta 200-25 MCG/INH inhaler  Generic drug: Fluticasone " Furoate-Vilanterol      Inhale 1 puff Daily As Needed.       colestipol 1 g tablet  Commonly known as: COLESTID      Take 1 tablet by mouth Daily.       Dexcom G6  device           Dexcom G6 Sensor           Dexcom G6 Transmitter misc           ergocalciferol 1.25 MG (62765 UT) capsule  Commonly known as: ERGOCALCIFEROL      Take 1 capsule by mouth Every 7 (Seven) Days.       famotidine 20 MG tablet  Commonly known as: PEPCID      Take 1 tablet by mouth 2 (Two) Times a Day As Needed.       fenofibrate 145 MG tablet  Commonly known as: TRICOR      Take 1 tablet by mouth Daily.       finasteride 1 MG tablet  Commonly known as: PROPECIA      Take 1 tablet by mouth Daily.       fluticasone 50 MCG/ACT nasal spray  Commonly known as: FLONASE      1 spray into the nostril(s) as directed by provider Daily As Needed.       fluticasone-salmeterol 250-50 MCG/DOSE DISKUS  Commonly known as: ADVAIR      Inhale 1 puff 2 (Two) Times a Day As Needed.       freestyle lancets      TEST QD UTD       FREESTYLE LITE test strip  Generic drug: glucose blood      1 each by Other route.       FREESTYLE LITE test strip  Generic drug: glucose blood      1 each by Other route.       gabapentin 800 MG tablet  Commonly known as: NEURONTIN      Take 1 tablet by mouth 3 (Three) Times a Day.       ipratropium-albuterol 0.5-2.5 mg/3 ml nebulizer  Commonly known as: DUO-NEB           lansoprazole 30 MG capsule  Commonly known as: PREVACID      Take 1 capsule by mouth Daily.       lisinopril 10 MG tablet  Commonly known as: PRINIVIL,ZESTRIL      Take 1 tablet by mouth Daily.       methocarbamol 750 MG tablet  Commonly known as: ROBAXIN      Take 1 tablet by mouth 2 (Two) Times a Day As Needed for Muscle Spasms.       metoprolol succinate XL 50 MG 24 hr tablet  Commonly known as: TOPROL-XL      Take 1 tablet by mouth Daily.       multivitamin tablet tablet  Generic drug: multivitamin      Take 1 tablet by mouth Daily. HOLD PRIOR TO SURG        omega-3 acid ethyl esters 1 g capsule  Commonly known as: LOVAZA      Take 1 capsule by mouth Daily. HOLD PRIOR TO SURG       tadalafil 10 MG tablet  Commonly known as: CIALIS      Take 1 tablet by mouth Daily As Needed. HOLD FOR 48 HOURS BEFORE SURGERY       TENS Therapy Replace Back Pads misc      1 each.       Testosterone Cypionate 200 MG/ML injection  Commonly known as: DEPOTESTOTERONE CYPIONATE      Inject 0.5 mL into the appropriate muscle as directed by prescriber Every 7 (Seven) Days.       traZODone 50 MG tablet  Commonly known as: DESYREL      Take 0.5-1 tablets by mouth At Night As Needed.       triamcinolone 0.1 % cream  Commonly known as: KENALOG      Apply  topically to the appropriate area as directed 3 (Three) Times a Day As Needed.       Trulicity 1.5 MG/0.5ML solution pen-injector  Generic drug: Dulaglutide      Inject 1.5 mg under the skin into the appropriate area as directed 1 (One) Time Per Week. TAKES ON SATURDAYS/LAST DOSE 8/10/24 INSTRUCTED PT TO HOLD FOR 1 WEEK BEFORE SURGERY       Trulicity 3 MG/0.5ML solution pen-injector  Generic drug: Dulaglutide      INSTRUCTED PT TO HOLD FOR 1 WEEK BEFORE SURGERY       Trulicity 3 MG/0.5ML solution pen-injector  Generic drug: Dulaglutide      Inject 0.5 mL under the skin into the appropriate area as directed 1 (One) Time Per Week.              STOP taking these medications      dicyclomine 10 MG capsule  Commonly known as: BENTYL        Xifaxan 550 MG tablet  Generic drug: riFAXIMin                  Where to Get Your Medications        These medications were sent to [facility] [place] [id number]      Hours: Monday to Friday 7 AM to 6 PM, Saturday & Sunday 8 AM to 4:30 PM (Closed 12 PM to 12:30 PM) Phone: [phone]   docusate sodium 100 MG capsule  HYDROcodone-acetaminophen 5-325 MG per tablet         Discharge Diet:   Diet Instructions       Diet: Regular/House Diet; Thin (IDDSI 0)      Discharge  Diet: Regular/House Diet    Fluid Consistency: Thin (IDDSI 0)          Diet Order   Procedures    Diet: Regular/House; Texture: Soft to Chew (NDD 3); Soft to Chew: Chopped Meat; Fluid Consistency: Thin (IDDSI 0)       Activity at Discharge:   Activity Instructions       Discharge Activity      1) No driving until cleared by us and no longer taking narcotics.   2) Off work/school until cleared by us.  3) May shower but no submerging wound in tub, pool, etc.  4) Do not lift / push / pull more then 5 lbs.  5.) No brace needed  6.) No overhead activity/ No bending/twisting/impact activity    Other Restrictions (Specify)      Pelvic Rest              Call for: questions or concerns    Follow-up Appointments  Future Appointments   Date Time Provider Department Center   9/12/2024 11:30 AM Venu Jdud MD [facility]   10/11/2024  9:00 AM Na Carreon APRN [facility] PM [facility]   10/29/2024 11:00 AM Amy Wilson MD [facility] None      Follow-up Information       Gil Chambers MD .    Specialty: Family Medicine  Contact information:  [address] [place] [address]  [phone]                           Additional Instructions for the Follow-ups that You Need to Schedule       Dressing Change Instructions   As directed      Change dressing daily or if it becomes soiled. No dressing needed after day 5    Order Comments: Change dressing daily or if it becomes soiled. No dressing needed after day 5         Notify Physician or Go To The ED For the Following Conditions   As directed      Including but not limited to fever >100.5, chills, wound concerns (redness, swelling, drainage), new symptoms of numbness, tingling, weakness; new or uncontrolled pain despite using prescribed medications    Order Comments: Including but not limited to fever >100.5, chills, wound concerns (redness, swelling, drainage), new symptoms of numbness, tingling, weakness; new or uncontrolled pain despite using prescribed  "medications                 Test Results Pending at Discharge     None    I discussed the discharge instructions with patient and Dr Binta Carver, APRN  08/27/24  11:17 EDT      \"Dictated utilizing Dragon dictation\".      "

## 2024-08-27 NOTE — DISCHARGE INSTRUCTIONS
Dr Venu Judd M.D.  [address] [facility], [address]  [place] [address]  [phone]      Anterior Cervical Discectomy with Fusion    Post-Operative Instructions    1. You should have a post-operative visit scheduled for approximately 2 to 2 ½ weeks after surgery. If you do not have an appointment, call the office when you return home to schedule one. You will remain off work at least until your first visit.    2. No lifting overhead, although you may place your arms above your head. DO NOT lift anything over five (5) pounds. Walking is allowed and encouraged. There are no restrictions on sitting or climbing stairs, but refrain from overly strenuous activity. Do not drive until the first visit, although you may be driven. In general, activity restrictions will be lessened following the first visit.    3. Refrain from any sexual activity until after your first evaluation with the Physician Assistant or Nurse Practitioner.    4. You may receive a cervical collar. Please wear this at all times until the first visit, except while in the shower. During showering, refrain from moving your neck from side to side or forward and backward.    5. Take your dressings off and leave them off after day 5 at home. Keep the wound dry. IF you have staples/stiches clean your incision with peroxide three times daily. IF your incision is closed with glue, do not pick, scratch or rub the glue on the wound, so it does not loosen before the wound heals. Do not soak your wound in water until the glue film falls off. Avoid swimming or using a hot tub while the glue is in place. When you shower or bathe, let water run over the wound but do not rub. Pat the wound gently with a soft towel to dry. Avoid direct sunlight to the wound and do not use tanning beds or lamps with the glue film in place. Do not apply any cream, lotion, or ointment to the skin near the wound; it could loosen the glue before the wound heals. Do not apply any tape, sticky  dressing, alcohol or Chloraprep to the glue site as these could loosen the glue.    6. You will leave the hospital with medications for pain and possibly oral antibiotics if indicated. These medications must be taken as prescribed only. If a refill of your pain medication is required, in order to have this medication refilled you must contact the office four days prior to the due date.    7. Notify the office if there are any problems, such as excessive neck or arm pain, persistent temperature of 100 degrees or greater that is not relieved by Tylenol. A small amount of bleeding from the incision during the first few days is not unusual. In addition, mild hoarseness of voice and mild difficulty swallowing are not unusual during the first two weeks. Please call the office if there is excessive bleeding, redness, heat or swelling around the incision or increased difficulty swallowing.

## 2024-08-27 NOTE — PLAN OF CARE
Goal Outcome Evaluation:           Progress: improving  Outcome Evaluation: Pt pleasant and cooperative with care,Able to ambulate per walker with standby assist. Able to amke needs known, aclls for painmedicine as needed. Dressing is C,D & I,- JERSON drain in place and was able to drain 25 ml of serousanguinous drain.

## 2024-08-27 NOTE — PROGRESS NOTES
Name: Ronni Asif ADMIT: 2024   : 1970  PCP: Gil Chambers MD    MRN: [id number] LOS: 0 days   AGE/SEX: 54 y.o. male  ROOM: [facility]     Subjective   Subjective   Referring Provider: Dr. Judd  Reason for Consultation: Type 2 DM  No acute events. Patient denies new complaints. Pain is reasonably controlled. No family at bedside.    Objective   Objective   Vital Signs  Temp:  [97.3 °F (36.3 °C)-98.5 °F (36.9 °C)] 97.5 °F (36.4 °C)  Heart Rate:  [75-83] 78  Resp:  [14-20] 16  BP: ()/(55-96) 96/55  SpO2:  [92 %-100 %] 100 %  on  Flow (L/min):  [2] 2;   Device (Oxygen Therapy): room air  Body mass index is 42.12 kg/m².  Physical Exam  Vitals and nursing note reviewed.   Constitutional:       General: He is not in acute distress.     Appearance: He is not toxic-appearing.   HENT:      Head: Normocephalic and atraumatic.      Nose: Nose normal.      Mouth/Throat:      Mouth: Mucous membranes are moist.      Pharynx: Oropharynx is clear.   Eyes:      Conjunctiva/sclera: Conjunctivae normal.      Pupils: Pupils are equal, round, and reactive to light.   Cardiovascular:      Rate and Rhythm: Normal rate and regular rhythm.      Pulses: Normal pulses.   Pulmonary:      Effort: Pulmonary effort is normal.      Breath sounds: Normal breath sounds.   Abdominal:      General: Bowel sounds are normal. There is no distension.      Palpations: Abdomen is soft.      Tenderness: There is no abdominal tenderness.   Musculoskeletal:         General: No swelling or tenderness.   Skin:     General: Skin is warm and dry.      Capillary Refill: Capillary refill takes less than 2 seconds.   Neurological:      General: No focal deficit present.      Mental Status: He is alert and oriented to person, place, and time.   Psychiatric:         Mood and Affect: Mood normal.         Behavior: Behavior normal.     Results Review     I reviewed the patient's new clinical results.  Results from last 7 days   Lab  Units 08/27/24  0524   WBC 10*3/mm3 10.76   HEMOGLOBIN g/dL 13.0   PLATELETS 10*3/mm3 222     Results from last 7 days   Lab Units 08/27/24  1018   SODIUM mmol/L 136   POTASSIUM mmol/L 3.9   CHLORIDE mmol/L 100   CO2 mmol/L 26.8   BUN mg/dL 12   CREATININE mg/dL 1.05   GLUCOSE mg/dL 255*   EGFR mL/min/1.73 84.4       Results from last 7 days   Lab Units 08/27/24  1018   CALCIUM mg/dL 9.2       Glucose   Date/Time Value Ref Range Status   08/27/2024 1110 260 (H) 70 - 130 mg/dL Final   08/27/2024 0742 201 (H) 70 - 130 mg/dL Final   08/26/2024 2103 210 (H) 70 - 130 mg/dL Final   08/26/2024 1613 253 (H) 70 - 130 mg/dL Final   08/26/2024 1033 192 (H) 70 - 130 mg/dL Final   08/26/2024 0611 170 (H) 70 - 130 mg/dL Final       XR Spine Cervical 2 or 3 View    Result Date: 8/27/2024  Recent postoperative changes related to anterior cervical fusion. There is thickening of the prevertebral soft tissues and there is a surgical drain.  This report was finalized on 8/27/2024 9:35 AM by Judd Winter M.D on Workstation: [id number]       I have personally reviewed all medications:  Scheduled Medications  allopurinol, 100 mg, Oral, Daily  aspirin, 81 mg, Oral, Daily  atorvastatin, 40 mg, Oral, Nightly  ceFAZolin, 2,000 mg, Intravenous, Q8H  dicyclomine, 10 mg, Oral, 4x Daily  fenofibrate, 145 mg, Oral, Daily  gabapentin, 800 mg, Oral, Q8H  insulin lispro, 2-9 Units, Subcutaneous, 4x Daily AC & at Bedtime  lisinopril, 10 mg, Oral, Daily  metoprolol succinate XL, 50 mg, Oral, Daily  pantoprazole, 40 mg, Oral, Q AM  sodium chloride, 10 mL, Intravenous, Q12H    Infusions  sodium chloride 0.9 % with KCl 20 mEq, 100 mL/hr, Last Rate: Stopped (08/27/24 1011)    Diet  Diet: Regular/House; Texture: Soft to Chew (NDD 3); Soft to Chew: Chopped Meat; Fluid Consistency: Thin (IDDSI 0)    I have personally reviewed:  [x]  Laboratory   []  Microbiology   [x]  Radiology   []  EKG/Telemetry  []  Cardiology/Vascular   []  Pathology    []   Records    Assessment/Plan     Active Hospital Problems    Diagnosis  POA    **Cervical radiculopathy [M54.12]  Yes    Cervical spinal stenosis [M48.02]  Yes    Gastroesophageal reflux disease [K21.9]  Yes    Moderate asthma without complication [J45.909]  Yes    Coronary artery disease [I25.10]  Yes    Type 2 diabetes mellitus with circulatory disorder, without long-term current use of insulin [E11.59]  Yes    Essential hypertension [I10]  Yes    DEVAUGHN on CPAP [G47.33]  Yes    Mixed anxiety depressive disorder [F41.8]  Yes      Resolved Hospital Problems   No resolved problems to display.   Cervical Radiculopathy  - s/p anterior cervical discectomy/fusion of C4-5 and C5-6  - pain control and surgical management per primary  - encourage pulmonary toilet, bowel regimen     Type 2 DM  - complications include atherosclerosis  - BG acceptable  - hold trulicity  - cover with ssi/hypoglycemia protocol  - resume home regimen at discharge     HTN/CAD  - BP acceptable but on the low end of normal, no anginal symptoms  - continue metoprolol, hold lisinopril for a few days  - continue ASA, statin, tricor     Asthma  - no exacerbation  - PRN albuterol nebs ordered     DEVAUGHN  - continue home CPAP    Discharge plans per primary noted. No objection to discharge from medicine standpoint.    Kyle Lees MD  [facility]  08/27/24  12:06 EDT

## 2024-08-28 ENCOUNTER — HOSPITAL ENCOUNTER (EMERGENCY)
Facility: HOSPITAL | Age: 54
Discharge: HOME | End: 2024-08-28
Attending: EMERGENCY MEDICINE
Payer: COMMERCIAL

## 2024-08-28 VITALS
OXYGEN SATURATION: 95 % | RESPIRATION RATE: 18 BRPM | WEIGHT: 300 LBS | TEMPERATURE: 98.4 F | HEART RATE: 78 BPM | BODY MASS INDEX: 42.95 KG/M2 | DIASTOLIC BLOOD PRESSURE: 74 MMHG | SYSTOLIC BLOOD PRESSURE: 121 MMHG | HEIGHT: 70 IN

## 2024-08-28 DIAGNOSIS — E11.65 HYPERGLYCEMIA DUE TO DIABETES MELLITUS: ICD-10-CM

## 2024-08-28 DIAGNOSIS — R52 UNCONTROLLED PAIN: Primary | ICD-10-CM

## 2024-08-28 DIAGNOSIS — G89.18 POSTOPERATIVE PAIN AFTER SPINAL SURGERY: ICD-10-CM

## 2024-08-28 DIAGNOSIS — M50.322 DEGENERATION OF INTERVERTEBRAL DISC AT C5-C6 LEVEL: Primary | ICD-10-CM

## 2024-08-28 LAB
ALBUMIN SERPL-MCNC: 3.5 G/DL (ref 3.5–5.2)
ALBUMIN/GLOB SERPL: 1.5 G/DL
ALP SERPL-CCNC: 56 U/L (ref 39–117)
ALT SERPL W P-5'-P-CCNC: 19 U/L (ref 1–41)
ANION GAP SERPL CALCULATED.3IONS-SCNC: 5.8 MMOL/L (ref 5–15)
AST SERPL-CCNC: 18 U/L (ref 1–40)
BASOPHILS # BLD AUTO: 0.03 10*3/MM3 (ref 0–0.2)
BASOPHILS NFR BLD AUTO: 0.3 % (ref 0–1.5)
BILIRUB SERPL-MCNC: 0.5 MG/DL (ref 0–1.2)
BUN SERPL-MCNC: 13 MG/DL (ref 6–20)
BUN/CREAT SERPL: 14.3 (ref 7–25)
CALCIUM SPEC-SCNC: 8.3 MG/DL (ref 8.6–10.5)
CHLORIDE SERPL-SCNC: 108 MMOL/L (ref 98–107)
CO2 SERPL-SCNC: 23.2 MMOL/L (ref 22–29)
CREAT SERPL-MCNC: 0.91 MG/DL (ref 0.76–1.27)
DEPRECATED RDW RBC AUTO: 43.2 FL (ref 37–54)
EGFRCR SERPLBLD CKD-EPI 2021: 100.2 ML/MIN/1.73
EOSINOPHIL # BLD AUTO: 0.06 10*3/MM3 (ref 0–0.4)
EOSINOPHIL NFR BLD AUTO: 0.6 % (ref 0.3–6.2)
ERYTHROCYTE [DISTWIDTH] IN BLOOD BY AUTOMATED COUNT: 13.5 % (ref 12.3–15.4)
GLOBULIN UR ELPH-MCNC: 2.4 GM/DL
GLUCOSE SERPL-MCNC: 167 MG/DL (ref 65–99)
HCT VFR BLD AUTO: 38.4 % (ref 37.5–51)
HGB BLD-MCNC: 12.7 G/DL (ref 13–17.7)
IMM GRANULOCYTES # BLD AUTO: 0.03 10*3/MM3 (ref 0–0.05)
IMM GRANULOCYTES NFR BLD AUTO: 0.3 % (ref 0–0.5)
LYMPHOCYTES # BLD AUTO: 1.88 10*3/MM3 (ref 0.7–3.1)
LYMPHOCYTES NFR BLD AUTO: 18.2 % (ref 19.6–45.3)
MCH RBC QN AUTO: 29 PG (ref 26.6–33)
MCHC RBC AUTO-ENTMCNC: 33.1 G/DL (ref 31.5–35.7)
MCV RBC AUTO: 87.7 FL (ref 79–97)
MONOCYTES # BLD AUTO: 0.97 10*3/MM3 (ref 0.1–0.9)
MONOCYTES NFR BLD AUTO: 9.4 % (ref 5–12)
NEUTROPHILS NFR BLD AUTO: 7.37 10*3/MM3 (ref 1.7–7)
NEUTROPHILS NFR BLD AUTO: 71.2 % (ref 42.7–76)
NRBC BLD AUTO-RTO: 0 /100 WBC (ref 0–0.2)
PLATELET # BLD AUTO: 193 10*3/MM3 (ref 140–450)
PMV BLD AUTO: 9.5 FL (ref 6–12)
POTASSIUM SERPL-SCNC: 3.9 MMOL/L (ref 3.5–5.2)
PROT SERPL-MCNC: 5.9 G/DL (ref 6–8.5)
RBC # BLD AUTO: 4.38 10*6/MM3 (ref 4.14–5.8)
SODIUM SERPL-SCNC: 137 MMOL/L (ref 136–145)
WBC NRBC COR # BLD AUTO: 10.34 10*3/MM3 (ref 3.4–10.8)

## 2024-08-28 PROCEDURE — 80053 COMPREHEN METABOLIC PANEL: CPT | Performed by: EMERGENCY MEDICINE

## 2024-08-28 PROCEDURE — 25010000002 MORPHINE PER 10 MG: Performed by: EMERGENCY MEDICINE

## 2024-08-28 PROCEDURE — 96374 THER/PROPH/DIAG INJ IV PUSH: CPT

## 2024-08-28 PROCEDURE — 25010000002 KETOROLAC TROMETHAMINE PER 15 MG: Performed by: EMERGENCY MEDICINE

## 2024-08-28 PROCEDURE — 99283 EMERGENCY DEPT VISIT LOW MDM: CPT

## 2024-08-28 PROCEDURE — 85025 COMPLETE CBC W/AUTO DIFF WBC: CPT | Performed by: EMERGENCY MEDICINE

## 2024-08-28 PROCEDURE — 99024 POSTOP FOLLOW-UP VISIT: CPT | Performed by: NURSE PRACTITIONER

## 2024-08-28 PROCEDURE — 96375 TX/PRO/DX INJ NEW DRUG ADDON: CPT

## 2024-08-28 PROCEDURE — 25010000002 METHOCARBAMOL 1000 MG/10ML SOLUTION: Performed by: EMERGENCY MEDICINE

## 2024-08-28 RX ORDER — DICYCLOMINE HYDROCHLORIDE 10 MG/1
10 CAPSULE ORAL
COMMUNITY
End: 2024-08-28

## 2024-08-28 RX ORDER — METHOCARBAMOL 100 MG/ML
500 INJECTION, SOLUTION INTRAMUSCULAR; INTRAVENOUS ONCE
Status: COMPLETED | OUTPATIENT
Start: 2024-08-28 | End: 2024-08-28

## 2024-08-28 RX ORDER — METHYLPREDNISOLONE 4 MG
TABLET, DOSE PACK ORAL
Qty: 21 TABLET | Refills: 0 | Status: SHIPPED | OUTPATIENT
Start: 2024-08-28

## 2024-08-28 RX ORDER — KETOROLAC TROMETHAMINE 15 MG/ML
15 INJECTION, SOLUTION INTRAMUSCULAR; INTRAVENOUS ONCE
Status: COMPLETED | OUTPATIENT
Start: 2024-08-28 | End: 2024-08-28

## 2024-08-28 RX ORDER — OXYCODONE AND ACETAMINOPHEN 5; 325 MG/1; MG/1
1 TABLET ORAL EVERY 6 HOURS PRN
Qty: 12 TABLET | Refills: 0 | Status: SHIPPED | OUTPATIENT
Start: 2024-08-28 | End: 2024-08-28

## 2024-08-28 RX ORDER — SODIUM CHLORIDE 0.9 % (FLUSH) 0.9 %
10 SYRINGE (ML) INJECTION AS NEEDED
Status: DISCONTINUED | OUTPATIENT
Start: 2024-08-28 | End: 2024-08-28 | Stop reason: HOSPADM

## 2024-08-28 RX ORDER — MORPHINE SULFATE 2 MG/ML
4 INJECTION, SOLUTION INTRAMUSCULAR; INTRAVENOUS ONCE
Status: COMPLETED | OUTPATIENT
Start: 2024-08-28 | End: 2024-08-28

## 2024-08-28 RX ADMIN — KETOROLAC TROMETHAMINE 15 MG: 15 INJECTION, SOLUTION INTRAMUSCULAR; INTRAVENOUS at 12:00

## 2024-08-28 RX ADMIN — METHOCARBAMOL 500 MG: 100 INJECTION INTRAMUSCULAR; INTRAVENOUS at 11:42

## 2024-08-28 RX ADMIN — MORPHINE SULFATE 4 MG: 2 INJECTION, SOLUTION INTRAMUSCULAR; INTRAVENOUS at 10:22

## 2024-08-28 NOTE — ED PROVIDER NOTES
EMERGENCY DEPARTMENT ENCOUNTER  Room Number:  32/32  PCP: Gil Chambers MD  Independent Historians: Patient      HPI:  Chief Complaint: had concerns including Neck Pain and Post-op Problem.     A complete HPI/ROS/PMH/PSH/SH/FH are unobtainable due to: None    Chronic or social conditions impacting patient care (Social Determinants of Health): None      Context: Ronni Asif is a 54 y.o. male with a medical history of cervical radiculopathy, type 2 diabetes, essential hypertension, GERD, CAD, anxiety, DEVAUGHN and cervical spinal stenosis who presents to the ED c/o acute uncontrolled neck pain limiting his ability with ADLs today.  Patient reports that his pain became uncontrolled during the night and though he was taking his prescribed pain medication he was unable to get himself out of bed this morning.  No focal numbness or weakness.  His throat is sore with some discomfort with swallowing though reports no difficulty with swallowing.  No unilateral arm weakness or numbness nor numbness or weakness of the lower extremities reported.  No chest pain, dyspnea or abdominal pain reported.      Review of prior external notes (non-ED) -and- Review of prior external test results outside of this encounter:  Neurosurgery discharge note 8/27/2024 reviewed: Patient admitted for C4-C5 and C5-C6 anterior cervical discectomy with fusion  ==========================  Spinal fusion occurred on 8/26/2024    PAST MEDICAL HISTORY  Active Ambulatory Problems     Diagnosis Date Noted    Lumbar radiculopathy 05/03/2016    Congenital spondylolysis, lumbosacral region 06/10/2016    Transient cerebral ischemia 08/17/2016    Pain in thoracic spine 03/06/2018    Neck pain 03/06/2018    Degeneration of thoracic intervertebral disc 05/07/2018    Degeneration of intervertebral disc at C5-C6 level 05/07/2018    Type 2 diabetes mellitus with circulatory disorder, without long-term current use of insulin 07/24/2018    Obstructive  sleep apnea, adult 07/24/2018    Essential hypertension 07/24/2018    Moderate asthma without complication 03/05/2019    Gastroesophageal reflux disease 05/14/2019    Diarrhea 05/14/2019    History of colon polyps 05/14/2019    Hyperglycemia 08/25/2019    Surgery follow-up examination 09/13/2019    Morbidly obese 11/21/2019    Chest pain 10/11/2018    Chronic pain 08/21/2017    Coronary artery disease 01/14/2019    Gout 01/30/2019    Labile blood pressure 09/27/2018    Mixed anxiety depressive disorder 08/21/2017    Secondary male hypogonadism 08/21/2017    Shoulder impingement syndrome, left 01/17/2019    Syncope and collapse 08/21/2017    Vitamin D deficiency 01/30/2019    DEVAUGHN on CPAP 08/21/2017    Thoracic radiculopathy 01/08/2020    History of lumbar spinal fusion 11/23/2020    Chronic midline low back pain without sciatica 11/23/2020    Chronic bilateral thoracic back pain 11/23/2020    Arthropathy of thoracic facet joint 03/15/2021    Spondylosis without myelopathy or radiculopathy, thoracic region 04/30/2021    Encounter for long-term (current) use of high-risk medication 05/26/2021    Cervical radiculopathy 09/03/2021    Bulge of cervical disc without myelopathy 09/28/2021    Arthropathy of cervical facet joint 11/04/2021    Cervical spinal stenosis 12/29/2021    DDD (degenerative disc disease), lumbar 02/27/2023     Resolved Ambulatory Problems     Diagnosis Date Noted    Foot drop, left 08/25/2019    Lumbar disc herniation with radiculopathy 08/25/2019     Past Medical History:   Diagnosis Date    Anxiety     Asthma     Cancer     DDD (degenerative disc disease), cervical     Diabetes mellitus     Dizziness     ED (erectile dysfunction)     GERD (gastroesophageal reflux disease)     Headache     History of kidney stones     Hyperlipidemia     Hypertension     Implantable loop recorder present     Injury of back 03/2016    Neuromuscular disorder     Numbness     PONV (postoperative nausea and vomiting)      Sleep apnea     TIA (transient ischemic attack)          PAST SURGICAL HISTORY  Past Surgical History:   Procedure Laterality Date    ANTERIOR CERVICAL DISCECTOMY W/ FUSION N/A 8/26/2024    Procedure: Cervical 4 to cervical 5 and cervical 5 to cervical 6 anterior cervical discectomy, fusion and instrumentation;  Surgeon: Venu Judd MD;  Location: [facility] MAIN OR;  Service: Neurosurgery;  Laterality: N/A;    CARDIAC SURGERY      LOOP RECORDER    CATARACT EXTRACTION      CERVICAL EPIDURAL N/A 10/07/2021    Procedure: CERVICAL EPIDURAL;  Surgeon: Abdirashid Gonzalez MD;  Location: [facility] MAIN OR;  Service: Pain Management;  Laterality: N/A;    COLONOSCOPY  2014    COLONOSCOPY N/A 05/23/2019    Procedure: COLONOSCOPY to Cecum with Hot and Cold Polypectomy x 2;  Surgeon: Navid Zafar Jr., MD;  Location: [facility] ENDOSCOPY;  Service: General    ENDOSCOPY N/A 05/23/2019    Procedure: ESOPHAGOGASTRODUODENOSCOPY with Bx's;  Surgeon: Navid Zafar Jr., MD;  Location: [facility] ENDOSCOPY;  Service: General    HERNIA REPAIR  10/2015    LAPAROSCOPIC GASTRIC BANDING      June 2020    LUMBAR DISCECTOMY FUSION INSTRUMENTATION Bilateral 08/30/2019    Procedure: LUMBAR 5 TO SACRAL 1 OPEN DECOMPRESSION WITH  POSTERIOR LUMBAR INTERBODY FUSION, CAGE AND SCREW;  Surgeon: Jake Tripathi MD;  Location: [facility] MAIN OR;  Service: Neurosurgery    MEDIAL BRANCH BLOCK Bilateral 06/01/2021    Procedure: thoracic medial branch block (bilateral T9-11 vs T8-10) x2, 1-2 wks apart - diagnostic;  Surgeon: Abdirashid Goznalez MD;  Location: [facility] MAIN OR;  Service: Pain Management;  Laterality: Bilateral;    MEDIAL BRANCH BLOCK Bilateral 06/29/2021    Procedure: Bilateral T8-T10 MEDIAL BRANCH BLOCK;  Surgeon: Abdirashid Gonzalez MD;  Location: [facility] MAIN OR;  Service: Pain Management;  Laterality: Bilateral;    MEDIAL BRANCH BLOCK Bilateral 11/30/2021    Procedure: Bilateral cervical MEDIAL BRANCH BLOCK at approximately C4-C6;  Surgeon: Carlos  Abdirashid CORONA MD;  Location: [facility] MAIN OR;  Service: Pain Management;  Laterality: Bilateral;    MEDIAL BRANCH BLOCK Bilateral 06/07/2022    Procedure: Bilateral C5-C7 MEDIAL BRANCH BLOCK;  Surgeon: Abdirashid Gonzalez MD;  Location: [facility] MAIN OR;  Service: Pain Management;  Laterality: Bilateral;    RADIOFREQUENCY ABLATION Bilateral 07/27/2021    Procedure: Bilateral T8-T10 RADIOFREQUENCY ABLATION;  Surgeon: Abdirashid Gonzalez MD;  Location: [facility] MAIN OR;  Service: Pain Management;  Laterality: Bilateral;    RADIOFREQUENCY ABLATION Bilateral 08/23/2022    Procedure: RADIOFREQUENCY ABLATION Bilateral T8-T10;  Surgeon: Abdirashid Gonzalez MD;  Location: [facility] MAIN OR;  Service: Pain Management;  Laterality: Bilateral;         FAMILY HISTORY  Family History   Problem Relation Age of Onset    Hypertension Mother     Diabetes Mother     Heart disease Mother     Brain cancer Father     Stroke Father     Hypertension Father     Atrial fibrillation Father     Heart disease Father     Hypertension Brother     Colon cancer Maternal Grandmother     Lung cancer Maternal Grandfather     Bone cancer Maternal Grandfather     Malig Hyperthermia Neg Hx          SOCIAL HISTORY  Social History     Socioeconomic History    Marital status:    Tobacco Use    Smoking status: Never    Smokeless tobacco: Never    Tobacco comments:     no caffeine    Vaping Use    Vaping status: Never Used   Substance and Sexual Activity    Alcohol use: No    Drug use: No    Sexual activity: Defer         ALLERGIES  Other      REVIEW OF SYSTEMS  Review of Systems  Included in HPI  All systems reviewed and negative except for those discussed in HPI.      PHYSICAL EXAM    I have reviewed the triage vital signs and nursing notes.    ED Triage Vitals   Temp Heart Rate Resp BP SpO2   08/28/24 0914 08/28/24 0914 08/28/24 0914 08/28/24 0914 08/28/24 0914   99.4 °F (37.4 °C) 89 18 130/81 98 %      Temp src Heart Rate Source Patient Position BP Location FiO2  (%)   08/28/24 0930 -- -- -- --   Oral           Physical Exam    Physical Exam   Constitutional: No distress.  Nontoxic  HENT:  Head: Normocephalic and atraumatic.   Oropharynx: Mucous membranes are moist.  Slightly raspy voice.  Controlling secretions without difficulty.  Eyes: . No scleral icterus. No conjunctival pallor.  Neck: C5-8 motor and sensory grossly intact.  Healing anterior incision that is dressed with no purulent drainage.  Cardiovascular: Pink warm and well perfused throughout.    Pulmonary/Chest: No respiratory distress.  No tachypnea or increased work of breathing appreciated.    Abdominal: Soft. There is no tenderness. There is no rebound and no guarding.   Musculoskeletal: Moves all extremities equally.    Neurological: Alert and oriented.  No acute focal deficit appreciated.  Skin: Skin is pink, warm, and dry.   Psychiatric: Mood and affect normal.   Nursing note and vitals reviewed.             LAB RESULTS  Recent Results (from the past 24 hour(s))   Comprehensive Metabolic Panel    Collection Time: 08/28/24 10:07 AM    Specimen: Blood   Result Value Ref Range    Glucose 167 (H) 65 - 99 mg/dL    BUN 13 6 - 20 mg/dL    Creatinine 0.91 0.76 - 1.27 mg/dL    Sodium 137 136 - 145 mmol/L    Potassium 3.9 3.5 - 5.2 mmol/L    Chloride 108 (H) 98 - 107 mmol/L    CO2 23.2 22.0 - 29.0 mmol/L    Calcium 8.3 (L) 8.6 - 10.5 mg/dL    Total Protein 5.9 (L) 6.0 - 8.5 g/dL    Albumin 3.5 3.5 - 5.2 g/dL    ALT (SGPT) 19 1 - 41 U/L    AST (SGOT) 18 1 - 40 U/L    Alkaline Phosphatase 56 39 - 117 U/L    Total Bilirubin 0.5 0.0 - 1.2 mg/dL    Globulin 2.4 gm/dL    A/G Ratio 1.5 g/dL    BUN/Creatinine Ratio 14.3 7.0 - 25.0    Anion Gap 5.8 5.0 - 15.0 mmol/L    eGFR 100.2 >60.0 mL/min/1.73   CBC Auto Differential    Collection Time: 08/28/24 10:07 AM    Specimen: Blood   Result Value Ref Range    WBC 10.34 3.40 - 10.80 10*3/mm3    RBC 4.38 4.14 - 5.80 10*6/mm3    Hemoglobin 12.7 (L) 13.0 - 17.7 g/dL    Hematocrit 38.4  37.5 - 51.0 %    MCV 87.7 79.0 - 97.0 fL    MCH 29.0 26.6 - 33.0 pg    MCHC 33.1 31.5 - 35.7 g/dL    RDW 13.5 12.3 - 15.4 %    RDW-SD 43.2 37.0 - 54.0 fl    MPV 9.5 6.0 - 12.0 fL    Platelets 193 140 - 450 10*3/mm3    Neutrophil % 71.2 42.7 - 76.0 %    Lymphocyte % 18.2 (L) 19.6 - 45.3 %    Monocyte % 9.4 5.0 - 12.0 %    Eosinophil % 0.6 0.3 - 6.2 %    Basophil % 0.3 0.0 - 1.5 %    Immature Grans % 0.3 0.0 - 0.5 %    Neutrophils, Absolute 7.37 (H) 1.70 - 7.00 10*3/mm3    Lymphocytes, Absolute 1.88 0.70 - 3.10 10*3/mm3    Monocytes, Absolute 0.97 (H) 0.10 - 0.90 10*3/mm3    Eosinophils, Absolute 0.06 0.00 - 0.40 10*3/mm3    Basophils, Absolute 0.03 0.00 - 0.20 10*3/mm3    Immature Grans, Absolute 0.03 0.00 - 0.05 10*3/mm3    nRBC 0.0 0.0 - 0.2 /100 WBC         RADIOLOGY  No Radiology Exams Resulted Within Past 24 Hours      MEDICATIONS GIVEN IN ER  Medications   sodium chloride 0.9 % flush 10 mL (has no administration in time range)   ketorolac (TORADOL) injection 15 mg (has no administration in time range)   morphine injection 4 mg (4 mg Intravenous Given 8/28/24 1022)   Methocarbamol (ROBAXIN) injection 500 mg (500 mg Intravenous Given 8/28/24 1142)         ORDERS PLACED DURING THIS VISIT:  Orders Placed This Encounter   Procedures    Comprehensive Metabolic Panel    CBC Auto Differential    Monitor Blood Pressure    Neurosurgery (on-call MD unless specified)    Insert Peripheral IV    CBC & Differential         OUTPATIENT MEDICATION MANAGEMENT:  Current Facility-Administered Medications Ordered in Epic   Medication Dose Route Frequency Provider Last Rate Last Admin    ketorolac (TORADOL) injection 15 mg  15 mg Intravenous Once Leonel Ambrocio MD        sodium chloride 0.9 % flush 10 mL  10 mL Intravenous PRN Leonel Ambrocio MD         Current Outpatient Medications Ordered in Epic   Medication Sig Dispense Refill    acetaminophen (TYLENOL) 325 MG tablet Take 2 tablets by mouth Every 6 (Six) Hours As Needed for Mild  Pain .      albuterol (2.5 MG/3ML) 0.083% nebulizer solution 3 mL, albuterol (5 MG/ML) 0.5% nebulizer solution 0.5 mL Inhale 4 (four) times a day as needed.      albuterol (PROVENTIL HFA;VENTOLIN HFA) 108 (90 BASE) MCG/ACT inhaler Inhale 2 puffs Every 4 (Four) Hours As Needed for Wheezing.      allopurinol (ZYLOPRIM) 100 MG tablet Take 1 tablet by mouth Daily.      aspirin 81 MG EC tablet Take 1 tablet by mouth Daily.      atorvastatin (LIPITOR) 40 MG tablet Take 1 tablet by mouth Daily.      BREO ELLIPTA 200-25 MCG/INH inhaler Inhale 1 puff Daily As Needed.  5    colestipol (COLESTID) 1 g tablet Take 1 tablet by mouth Daily.      docusate sodium (Colace) 100 MG capsule Take 1 capsule by mouth 2 (Two) Times a Day. 30 capsule 0    Dulaglutide (Trulicity) 3 MG/0.5ML solution pen-injector Inject 0.5 mL under the skin into the appropriate area as directed 1 (One) Time Per Week. Saturdays      ergocalciferol (ERGOCALCIFEROL) 1.25 MG (67628 UT) capsule Take 1 capsule by mouth Every 7 (Seven) Days. Saturdays      famotidine (PEPCID) 20 MG tablet Take 1 tablet by mouth 2 (Two) Times a Day As Needed.      fenofibrate (TRICOR) 145 MG tablet Take 1 tablet by mouth Daily.      finasteride (PROPECIA) 1 MG tablet Take 1 tablet by mouth Daily.      fluticasone (FLONASE) 50 MCG/ACT nasal spray 1 spray into the nostril(s) as directed by provider Daily As Needed.      fluticasone-salmeterol (ADVAIR) 250-50 MCG/DOSE DISKUS Inhale 1 puff 2 (Two) Times a Day As Needed.      gabapentin (NEURONTIN) 800 MG tablet Take 1 tablet by mouth 3 (Three) Times a Day. 90 tablet 1    HYDROcodone-acetaminophen (NORCO) 5-325 MG per tablet Take 1 tablet by mouth Daily As Needed for Severe Pain. 30 tablet 0    HYDROcodone-acetaminophen (NORCO) 5-325 MG per tablet Take 1 tablet by mouth Every 4 (Four) Hours As Needed for Moderate Pain. 25 tablet 0    ipratropium-albuterol (DUO-NEB) 0.5-2.5 mg/3 ml nebulizer Take 3 mL by nebulization As Needed.       lansoprazole (PREVACID) 30 MG capsule Take 1 capsule by mouth Daily.      [START ON 8/30/2024] lisinopril (PRINIVIL,ZESTRIL) 10 MG tablet Take 1 tablet by mouth Daily.      methocarbamol (ROBAXIN) 750 MG tablet Take 1 tablet by mouth 2 (Two) Times a Day As Needed for Muscle Spasms. 60 tablet 2    metoprolol succinate XL (TOPROL-XL) 50 MG 24 hr tablet Take 1-2 tablets by mouth Daily.      multivitamin (DAILY CANDY) tablet tablet Take 1 tablet by mouth Daily.      omega-3 acid ethyl esters (LOVAZA) 1 g capsule Take 1 capsule by mouth Daily.      tadalafil (CIALIS) 10 MG tablet Take 1 tablet by mouth Daily As Needed. HOLD FOR 48 HOURS BEFORE SURGERY      Testosterone Cypionate (DEPOTESTOTERONE CYPIONATE) 200 MG/ML injection Inject 0.5 mL into the appropriate muscle as directed by prescriber Every 7 (Seven) Days.      triamcinolone (KENALOG) 0.1 % cream Apply 1 Application topically to the appropriate area as directed 3 (Three) Times a Day As Needed.      Trulicity 3 MG/0.5ML solution pen-injector Inject 0.5 mL under the skin into the appropriate area as directed 1 (One) Time Per Week. Saturdays      methylPREDNISolone (MEDROL) 4 MG dose pack Take as directed on package instructions. 21 tablet 0    phenol (CHLORASEPTIC) 1.4 % liquid liquid Apply 1 spray to the mouth or throat Every 2 (Two) Hours As Needed (Sore throat). 177 mL 0         PROCEDURES  Procedures            PROGRESS, DATA ANALYSIS, CONSULTS, AND MEDICAL DECISION MAKING  All labs have been independently interpreted by me.  All radiology studies have been reviewed by me. All EKG's have been independently viewed and interpreted by me.  Discussion below represents my analysis of pertinent findings related to patient's condition, differential diagnosis, treatment plan and final disposition.    Differential diagnosis:   My differential includes but is not limited to neck pain, cervical contusion, degenerative disc disease, herniated disc, acute cervical strain,  cervical radiculopathy, cervical fracture, torticollis, carotid artery dissection and vertebral artery dissection      Clinical Scores:                  ED Course as of 08/28/24 1150   Wed Aug 28, 2024   1024 CONSULT        Provider: MAR Carver -neurosurgery    Discussion: She is very familiar with this patient and she assisted with discharge yesterday.  Brought up to speed with today's presentation.  She is agreeable to see the patient in the ED and discussed case with her attending as relates to disposition planning.    Agreeable c treatment and planned disposition.         [RS]   1052 Patient has been seen by neurosurgery in the ED.  They recommend some IV Robaxin and monitoring for 1 hour to see if pain is better controlled.  Chloraseptic recommended if patient is able to be discharged. [RS]   1127 Neurosurgery okay with a dose of Toradol here and recommend Medrol Dosepak for home. [RS]      ED Course User Index  [RS] Leonel Ambrocio MD         Prescription drug monitoring program review:     AS OF 11:50 EDT VITALS:    BP - 130/81  HR - 84  TEMP - 98.4 °F (36.9 °C) (Oral)  O2 SATS - 96%    COMPLEXITY OF CARE  Admission was considered but after careful review of the patient's presentation, physical examination, diagnostic results, and response to treatment the patient may be safely discharged with outpatient follow-up.      DIAGNOSIS  Final diagnoses:   Uncontrolled pain   Postoperative pain after spinal surgery   Hyperglycemia due to diabetes mellitus         DISPOSITION  ED Disposition       ED Disposition   Discharge    Condition   Stable    Comment   --                  DISCHARGE    Patient discharged in stable condition.    Reviewed implications of results, diagnosis, meds, responsibility to follow up, warning signs and symptoms of possible worsening, potential complications and reasons to return to ER.    Patient/Family voiced understanding of above instructions.    Discussed plan for discharge, as there  is no emergent indication for admission. Patient referred to primary care provider for regular health maintenance. Pt/family is agreeable and understands need for follow up and possible repeat testing.  Pt is aware that discharge does not mean that nothing is wrong but it indicates no emergency is present that requires admission and they must continue care with follow-up as given below or physician of their choice.     FOLLOW-UP  Gil Chambers MD  [address] [address]  [phone]    Schedule an appointment as soon as possible for a visit   As needed    Venu Judd MD  [address] [facility]  [address]  [place] [address]  [phone]    Call   As needed, If symptoms fail to improve         Medication List        New Prescriptions      methylPREDNISolone 4 MG dose pack  Commonly known as: MEDROL  Take as directed on package instructions.     phenol 1.4 % liquid liquid  Commonly known as: CHLORASEPTIC  Apply 1 spray to the mouth or throat Every 2 (Two) Hours As Needed (Sore throat).               Where to Get Your Medications        These medications were sent to [facility]  [address] [address]      Hours: Monday to Friday 7 AM to 6 PM, Saturday & Sunday 8 AM to 4:30 PM (Closed 12 PM to 12:30 PM) Phone: [phone]   methylPREDNISolone 4 MG dose pack  phenol 1.4 % liquid liquid           Please note that portions of this document were completed with a voice recognition program.    Note Disclaimer: At [facility], we believe that sharing information builds trust and better relationships. You are receiving this note because you recently visited [facility]. It is possible you will see health information before a provider has talked with you about it. This kind of information can be easy to misunderstand. To help you fully understand what it means for your health, we urge you to discuss this note with your provider.           Leonel Ambrocio,  MD  08/28/24 [id number]

## 2024-08-28 NOTE — ED NOTES
Pt to ED from home via [facility]. EMS called SOA and immobility. Pt had cervical fusion spine surgery on Monday discharged last night. Pt reports neck and throat pain, chills. Pt decreased ambulation from pain and feels like his head is unsteady on his neck. Pt reports having to hold his head still to ambulate. Pt denies SOA or CP. Pt took prescribed pain medication prior to arrival.

## 2024-08-28 NOTE — ED NOTES
Pt reports yesterday he was doing really well and walking around, got  discharged last night.   Today the back of his neck and the front of his neck is hurting. This morning he tried to get up to go to the bathroom and he was unsuccessful. Pt is here because his pain is not controlled.   PT took a hydro this morning at 4am and another one before calling EMS.   States that this throat hurts

## 2024-08-28 NOTE — PROGRESS NOTES
Clinical Pharmacy Services: Medication History    Ronni Asif is a 54 y.o. male presenting to [facility] for   Chief Complaint   Patient presents with    Neck Pain    Post-op Problem       He  has a past medical history of Anxiety, Asthma, Cancer, Chronic pain, DDD (degenerative disc disease), cervical, DDD (degenerative disc disease), lumbar, Diabetes mellitus, Dizziness, ED (erectile dysfunction), GERD (gastroesophageal reflux disease), Headache, History of kidney stones, Hyperlipidemia, Hypertension, Implantable loop recorder present, Injury of back (03/2016), Neck pain, Neuromuscular disorder, Numbness, PONV (postoperative nausea and vomiting), Sleep apnea, Syncope and collapse, and TIA (transient ischemic attack).    Allergies as of 08/28/2024 - Reviewed 08/28/2024   Allergen Reaction Noted    Other Itching, Rash, and Shortness Of Breath 04/03/2023       Medication information was obtained from: Patient   Pharmacy and Phone Number:     Prior to Admission Medications       Prescriptions Last Dose Informant Patient Reported? Taking?    acetaminophen (TYLENOL) 325 MG tablet  Self No Yes    Take 2 tablets by mouth Every 6 (Six) Hours As Needed for Mild Pain .    albuterol (2.5 MG/3ML) 0.083% nebulizer solution 3 mL, albuterol (5 MG/ML) 0.5% nebulizer solution 0.5 mL  Self Yes Yes    Inhale 4 (four) times a day as needed.    albuterol (PROVENTIL HFA;VENTOLIN HFA) 108 (90 BASE) MCG/ACT inhaler  Self Yes Yes    Inhale 2 puffs Every 4 (Four) Hours As Needed for Wheezing.    allopurinol (ZYLOPRIM) 100 MG tablet 8/27/2024 Pharmacy, Self Yes Yes    Take 1 tablet by mouth Daily.    aspirin 81 MG EC tablet Past Week Pharmacy, Self Yes Yes    Take 1 tablet by mouth Daily.    atorvastatin (LIPITOR) 40 MG tablet Past Week Pharmacy, Self Yes Yes    Take 1 tablet by mouth Daily.    BREO ELLIPTA 200-25 MCG/INH inhaler  Self Yes Yes    Inhale 1 puff Daily As Needed.    colestipol (COLESTID) 1 g tablet Past  Week Self Yes Yes    Take 1 tablet by mouth Daily.    docusate sodium (Colace) 100 MG capsule  Pharmacy No Yes    Take 1 capsule by mouth 2 (Two) Times a Day.    Dulaglutide (Trulicity) 3 MG/0.5ML solution pen-injector Past Month Pharmacy, Self Yes Yes    Inject 0.5 mL under the skin into the appropriate area as directed 1 (One) Time Per Week. Saturdays    ergocalciferol (ERGOCALCIFEROL) 1.25 MG (36407 UT) capsule Past Month Pharmacy, Self Yes Yes    Take 1 capsule by mouth Every 7 (Seven) Days. Saturdays    famotidine (PEPCID) 20 MG tablet  Pharmacy, Self Yes Yes    Take 1 tablet by mouth 2 (Two) Times a Day As Needed.    fenofibrate (TRICOR) 145 MG tablet Past Week Pharmacy, Self Yes Yes    Take 1 tablet by mouth Daily.    finasteride (PROPECIA) 1 MG tablet Past Week Self Yes Yes    Take 1 tablet by mouth Daily.    fluticasone (FLONASE) 50 MCG/ACT nasal spray  Pharmacy, Self Yes Yes    1 spray into the nostril(s) as directed by provider Daily As Needed.    fluticasone-salmeterol (ADVAIR) 250-50 MCG/DOSE DISKUS  Self Yes Yes    Inhale 1 puff 2 (Two) Times a Day As Needed.    gabapentin (NEURONTIN) 800 MG tablet 8/27/2024 Pharmacy, Self No Yes    Take 1 tablet by mouth 3 (Three) Times a Day.    HYDROcodone-acetaminophen (NORCO) 5-325 MG per tablet 8/28/2024 Self No Yes    Take 1 tablet by mouth Daily As Needed for Severe Pain.    HYDROcodone-acetaminophen (NORCO) 5-325 MG per tablet  Pharmacy No Yes    Take 1 tablet by mouth Every 4 (Four) Hours As Needed for Moderate Pain.    ipratropium-albuterol (DUO-NEB) 0.5-2.5 mg/3 ml nebulizer  Self Yes Yes    Take 3 mL by nebulization As Needed.    lansoprazole (PREVACID) 30 MG capsule  Pharmacy Yes Yes    Take 1 capsule by mouth Daily.    lisinopril (PRINIVIL,ZESTRIL) 10 MG tablet 8/27/2024 Pharmacy, Self No Yes    Take 1 tablet by mouth Daily.    methocarbamol (ROBAXIN) 750 MG tablet Past Week Pharmacy, Self No Yes    Take 1 tablet by mouth 2 (Two) Times a Day As Needed  for Muscle Spasms.    metoprolol succinate XL (TOPROL-XL) 50 MG 24 hr tablet 8/27/2024 Pharmacy, Self Yes Yes    Take 1-2 tablets by mouth Daily.    multivitamin (DAILY CANDY) tablet tablet Past Month Self Yes Yes    Take 1 tablet by mouth Daily.    omega-3 acid ethyl esters (LOVAZA) 1 g capsule Past Month Pharmacy Yes Yes    Take 1 capsule by mouth Daily.    tadalafil (CIALIS) 10 MG tablet Past Month Self Yes Yes    Take 1 tablet by mouth Daily As Needed. HOLD FOR 48 HOURS BEFORE SURGERY    Testosterone Cypionate (DEPOTESTOTERONE CYPIONATE) 200 MG/ML injection Past Month Pharmacy, Self Yes Yes    Inject 0.5 mL into the appropriate muscle as directed by prescriber Every 7 (Seven) Days.    triamcinolone (KENALOG) 0.1 % cream  Self Yes Yes    Apply 1 Application topically to the appropriate area as directed 3 (Three) Times a Day As Needed.    Trulicity 3 MG/0.5ML solution pen-injector Past Month Self Yes Yes    Inject 0.5 mL under the skin into the appropriate area as directed 1 (One) Time Per Week. Saturdays              Medication notes:     This medication list is complete to the best of my knowledge as of 8/28/2024    Please call if questions.    Manuel Chaves  Medication History Technician  [phone]    8/28/2024 10:54 EDT

## 2024-08-28 NOTE — CONSULTS
[facility] NEUROSURGERY CERVICAL PROGRESS NOTE      Cc: POD # 2 s/p C4-5 & C5-C6 ACDF      Subjective     Interval History: Patient was discharged yesterday with his pain under control.  He reports that throughout the night and this morning he has had increased pain with soreness in his throat.  He states he was not able to get out of bed this morning because he was in so much pain and therefore called EMS.  He is having no other symptoms.    ROS:  Constitutional: No fever, chills  Neck: Pain  GI: No nausea, vomiting, no swallow difficulties  Neuro: No numbness, tingling, or weakness,  no balance difficulties  : no difficulty voiding, no incontinence    Objective     Vital signs in last 24 hours:  Temp:  [97 °F (36.1 °C)-99.4 °F (37.4 °C)] 98.4 °F (36.9 °C)  Heart Rate:  [73-92] 84  Resp:  [16-18] 18  BP: (103-130)/(57-81) 130/81    Intake/Output this shift:  No intake/output data recorded.    LABS:  Results from last 7 days   Lab Units 08/28/24  1007 08/27/24  0524   WBC 10*3/mm3 10.34 10.76   HEMOGLOBIN g/dL 12.7* 13.0   HEMATOCRIT % 38.4 39.9   PLATELETS 10*3/mm3 193 222     Results from last 7 days   Lab Units 08/28/24  1007 08/27/24  1018   SODIUM mmol/L 137 136   POTASSIUM mmol/L 3.9 3.9   CHLORIDE mmol/L 108* 100   CO2 mmol/L 23.2 26.8   BUN mg/dL 13 12   CREATININE mg/dL 0.91 1.05   CALCIUM mg/dL 8.3* 9.2   BILIRUBIN mg/dL 0.5  --    ALK PHOS U/L 56  --    ALT (SGPT) U/L 19  --    AST (SGOT) U/L 18  --    GLUCOSE mg/dL 167* 255*         IMAGING STUDIES:  No new imaging to review    I personally viewed the patient's chart, it was also reviewed by and discussed with Dr Binta Christensen reviewed/changed: Yes    Current Facility-Administered Medications:     ketorolac (TORADOL) injection 15 mg, 15 mg, Intravenous, Once, Leonel Ambrocio MD    [COMPLETED] Insert Peripheral IV, , , Once **AND** sodium chloride 0.9 % flush 10 mL, 10 mL, Intravenous, PRN, Leonel Ambrocio MD    Current Outpatient Medications:      acetaminophen (TYLENOL) 325 MG tablet, Take 2 tablets by mouth Every 6 (Six) Hours As Needed for Mild Pain ., Disp: , Rfl:     albuterol (2.5 MG/3ML) 0.083% nebulizer solution 3 mL, albuterol (5 MG/ML) 0.5% nebulizer solution 0.5 mL, Inhale 4 (four) times a day as needed., Disp: , Rfl:     albuterol (PROVENTIL HFA;VENTOLIN HFA) 108 (90 BASE) MCG/ACT inhaler, Inhale 2 puffs Every 4 (Four) Hours As Needed for Wheezing., Disp: , Rfl:     allopurinol (ZYLOPRIM) 100 MG tablet, Take 1 tablet by mouth Daily., Disp: , Rfl:     aspirin 81 MG EC tablet, Take 1 tablet by mouth Daily., Disp: , Rfl:     atorvastatin (LIPITOR) 40 MG tablet, Take 1 tablet by mouth Daily., Disp: , Rfl:     BREO ELLIPTA 200-25 MCG/INH inhaler, Inhale 1 puff Daily As Needed., Disp: , Rfl: 5    colestipol (COLESTID) 1 g tablet, Take 1 tablet by mouth Daily., Disp: , Rfl:     docusate sodium (Colace) 100 MG capsule, Take 1 capsule by mouth 2 (Two) Times a Day., Disp: 30 capsule, Rfl: 0    Dulaglutide (Trulicity) 3 MG/0.5ML solution pen-injector, Inject 0.5 mL under the skin into the appropriate area as directed 1 (One) Time Per Week. Saturdays, Disp: , Rfl:     ergocalciferol (ERGOCALCIFEROL) 1.25 MG (08311 UT) capsule, Take 1 capsule by mouth Every 7 (Seven) Days. Saturdays, Disp: , Rfl:     famotidine (PEPCID) 20 MG tablet, Take 1 tablet by mouth 2 (Two) Times a Day As Needed., Disp: , Rfl:     fenofibrate (TRICOR) 145 MG tablet, Take 1 tablet by mouth Daily., Disp: , Rfl:     finasteride (PROPECIA) 1 MG tablet, Take 1 tablet by mouth Daily., Disp: , Rfl:     fluticasone (FLONASE) 50 MCG/ACT nasal spray, 1 spray into the nostril(s) as directed by provider Daily As Needed., Disp: , Rfl:     fluticasone-salmeterol (ADVAIR) 250-50 MCG/DOSE DISKUS, Inhale 1 puff 2 (Two) Times a Day As Needed., Disp: , Rfl:     gabapentin (NEURONTIN) 800 MG tablet, Take 1 tablet by mouth 3 (Three) Times a Day., Disp: 90 tablet, Rfl: 1    HYDROcodone-acetaminophen (NORCO)  5-325 MG per tablet, Take 1 tablet by mouth Daily As Needed for Severe Pain., Disp: 30 tablet, Rfl: 0    HYDROcodone-acetaminophen (NORCO) 5-325 MG per tablet, Take 1 tablet by mouth Every 4 (Four) Hours As Needed for Moderate Pain., Disp: 25 tablet, Rfl: 0    ipratropium-albuterol (DUO-NEB) 0.5-2.5 mg/3 ml nebulizer, Take 3 mL by nebulization As Needed., Disp: , Rfl:     lansoprazole (PREVACID) 30 MG capsule, Take 1 capsule by mouth Daily., Disp: , Rfl:     [START ON 8/30/2024] lisinopril (PRINIVIL,ZESTRIL) 10 MG tablet, Take 1 tablet by mouth Daily., Disp: , Rfl:     methocarbamol (ROBAXIN) 750 MG tablet, Take 1 tablet by mouth 2 (Two) Times a Day As Needed for Muscle Spasms., Disp: 60 tablet, Rfl: 2    metoprolol succinate XL (TOPROL-XL) 50 MG 24 hr tablet, Take 1-2 tablets by mouth Daily., Disp: , Rfl:     multivitamin (DAILY CANDY) tablet tablet, Take 1 tablet by mouth Daily., Disp: , Rfl:     omega-3 acid ethyl esters (LOVAZA) 1 g capsule, Take 1 capsule by mouth Daily., Disp: , Rfl:     tadalafil (CIALIS) 10 MG tablet, Take 1 tablet by mouth Daily As Needed. HOLD FOR 48 HOURS BEFORE SURGERY, Disp: , Rfl:     Testosterone Cypionate (DEPOTESTOTERONE CYPIONATE) 200 MG/ML injection, Inject 0.5 mL into the appropriate muscle as directed by prescriber Every 7 (Seven) Days., Disp: , Rfl:     triamcinolone (KENALOG) 0.1 % cream, Apply 1 Application topically to the appropriate area as directed 3 (Three) Times a Day As Needed., Disp: , Rfl:     Trulicity 3 MG/0.5ML solution pen-injector, Inject 0.5 mL under the skin into the appropriate area as directed 1 (One) Time Per Week. Saturdays, Disp: , Rfl:     methylPREDNISolone (MEDROL) 4 MG dose pack, Take as directed on package instructions., Disp: 21 tablet, Rfl: 0    phenol (CHLORASEPTIC) 1.4 % liquid liquid, Apply 1 spray to the mouth or throat Every 2 (Two) Hours As Needed (Sore throat)., Disp: 177 mL, Rfl: 0      Physical Exam:    General:  Awake, alert.  Handling  "secretions, speaking in full sentences  Neck: Right anterior cervical incision with Steri-Strips in place is well-appearing, well-approximated.  There is no surrounding erythema, swelling or drainage.  Motor:   Normal muscle strength, bulk and tone in upper and lower extremities.  No fasciculations, rigidity, spasticity, or abnormal movements.  Sensation:  Normal to light touch bilaterally      Assessment & Plan     ASSESSMENT:      Cervical radiculopathy    Bulge of cervical disc without myelopathy    54-year-old male who is postop day #2 status post C4-C5 and C5-C6 ACDF.  Patient was discharged home yesterday, he was feeling well and pain was under control at that time.  He presented to the ER today with complaints of increased neck pain and soreness with swallowing.  He is handling his secretions.  His anterior cervical incision is well-appearing with no redness or swelling.  He is afebrile and has normal white count.  Patient can be discharged home once pain is under control, there is no need for admission at this time.  He can call the office with any questions or concerns that may arise.    PLAN:   -OK for IV Toradol  -IV Robaxin  -DC home with MDP  -Has Rx for Percocet at pharmacy he should   -Keep scheduled follow up post-op appointment on 9/12  -Call office with questions or concerns    I discussed the patient's findings and my recommendations with patient, consulting provider, and Dr Judd    During patient visit, I utilized appropriate personal protective equipment including gloves.  Appropriate PPE was worn during the entire visit.  Hand hygiene was completed before and after.      LOS: 0 days       Celi Carver, APRN  8/28/2024  11:43 EDT    \"Dictated utilizing Dragon dictation\".      "

## 2024-08-29 ENCOUNTER — TELEPHONE (OUTPATIENT)
Dept: NEUROSURGERY | Facility: CLINIC | Age: 54
End: 2024-08-29
Payer: COMMERCIAL

## 2024-08-29 NOTE — TELEPHONE ENCOUNTER
Patient called yesterday asking for a medication I let the patient know we do not prescribe medication he was asking for that he will need to call his PCP

## 2024-09-03 DIAGNOSIS — M50.30 BULGE OF CERVICAL DISC WITHOUT MYELOPATHY: ICD-10-CM

## 2024-09-03 DIAGNOSIS — M47.812 ARTHROPATHY OF CERVICAL FACET JOINT: ICD-10-CM

## 2024-09-03 DIAGNOSIS — M48.02 CERVICAL SPINAL STENOSIS: ICD-10-CM

## 2024-09-03 DIAGNOSIS — M62.838 MUSCLE SPASM: ICD-10-CM

## 2024-09-03 DIAGNOSIS — M51.36 DDD (DEGENERATIVE DISC DISEASE), LUMBAR: ICD-10-CM

## 2024-09-03 RX ORDER — METHOCARBAMOL 750 MG/1
750 TABLET, FILM COATED ORAL 3 TIMES DAILY PRN
Qty: 90 TABLET | Refills: 2 | Status: SHIPPED | OUTPATIENT
Start: 2024-09-03

## 2024-09-03 NOTE — TELEPHONE ENCOUNTER
Patient informed. He wanted me to inform you that he will not longer be taking the Percocet from the surgeon and will be returning to the [place] we prescribe.

## 2024-09-04 ENCOUNTER — TELEPHONE (OUTPATIENT)
Dept: NEUROSURGERY | Facility: CLINIC | Age: 54
End: 2024-09-04
Payer: COMMERCIAL

## 2024-09-04 RX ORDER — HYDROCODONE BITARTRATE AND ACETAMINOPHEN 5; 325 MG/1; MG/1
1 TABLET ORAL EVERY 4 HOURS PRN
Qty: 25 TABLET | Refills: 0 | Status: SHIPPED | OUTPATIENT
Start: 2024-09-04 | End: 2024-09-06 | Stop reason: SDUPTHER

## 2024-09-04 NOTE — TELEPHONE ENCOUNTER
S/w patient and offered appt, patient accepted appt ----- Message from Venu Judd sent at 9/3/2024  3:52 PM EDT -----  Regarding: FW: Continued pain  Contact: [phone]  Have him come in on Thursday.  ----- Message -----  From: Ivis Burnette MA  Sent: 9/3/2024   8:07 AM EDT  To: Venu Judd MD  Subject: FW: Continued pain                               Dr. Judd please advise  ----- Message -----  From: Ronni Asif  Sent: 9/1/2024  10:24 AM EDT  To: [facility] [place]  Subject: Continued pain                                   Dr Judd  I am scheduled for my post op visit 9/12 however my pain is persisting and not improving. I did go to the ER 8/28.  I completed my steroid burst and 3 days of Oxycodone. I am now on hydrocodone as instructed. I have been using ice and bio freeze for relief as well. I wear my cervical collar when not on ice.  I'm fearful something is wrong or I'm not doing something correctly for me to be in this much pain.  May I see you sooner than 9/12.?

## 2024-09-04 NOTE — PROGRESS NOTES
Subjective   Patient ID: Ronni Asif is a 54 y.o. male is here today for 1 week PO follow-up. Patient had a Cervical 4 to cervical 5 and cervical 5 to cervical 6 anterior cervical discectomy, fusion and instrumentation on 8/26/2024    Today patient states that he has B/L shoulder pain. Patient's incision looks healthy no redness, no drainage, no swelling     History of Present Illness    This patient returns today.  His surgery was about 10 days ago.  His incision is healing well with no evidence of infection.  He has pain in his neck and out into his shoulders.  The pain going down his arms and the trouble he had with his arms is completely gone.    The following portions of the patient's history were reviewed and updated as appropriate: allergies, current medications, past family history, past medical history, past social history, past surgical history, and problem list.    Review of Systems   Constitutional:  Negative for chills and fever.   HENT:  Negative for congestion.    Musculoskeletal:  Positive for myalgias, neck pain and neck stiffness.   Neurological:  Positive for weakness. Negative for numbness.       I reviewed the review of systems listed by the patient and discussed by my MA    Objective     There were no vitals filed for this visit.  There is no height or weight on file to calculate BMI.    Tobacco Use: Low Risk  (9/5/2024)    Patient History     Smoking Tobacco Use: Never     Smokeless Tobacco Use: Never     Passive Exposure: Not on file          Physical Exam  Neurological:      Mental Status: He is alert and oriented to person, place, and time.       Neurologic Exam     Mental Status   Oriented to person, place, and time.           Assessment & Plan   Independent Review of Radiographic Studies:      I personally reviewed the images from the following studies.    I reviewed his postoperative films which look okay.    Medical Decision Making:      I told the patient that from my point of  view this is fairly typical pain.  I told him that we will start physical therapy in about a week.  I will see him back in about a month with an x-ray.  He can come out of his collar in a week.    Diagnoses and all orders for this visit:    1. Follow-up examination following surgery (Primary)  -     Ambulatory Referral to Physical Therapy for Evaluation & Treatment  -     XR Spine Cervical 2 or 3 View; Future    Other orders  -     methocarbamol 1000 MG tablet; Take 1 tablet 4 times a day  Dispense: 30 tablet; Refill: 0      Return in about 4 weeks (around 10/3/2024).

## 2024-09-05 ENCOUNTER — OFFICE VISIT (OUTPATIENT)
Dept: NEUROSURGERY | Facility: CLINIC | Age: 54
End: 2024-09-05
Payer: COMMERCIAL

## 2024-09-05 DIAGNOSIS — Z09 FOLLOW-UP EXAMINATION FOLLOWING SURGERY: Primary | ICD-10-CM

## 2024-09-05 PROCEDURE — 1159F MED LIST DOCD IN RCRD: CPT | Performed by: NEUROLOGICAL SURGERY

## 2024-09-05 PROCEDURE — 1160F RVW MEDS BY RX/DR IN RCRD: CPT | Performed by: NEUROLOGICAL SURGERY

## 2024-09-05 PROCEDURE — 99024 POSTOP FOLLOW-UP VISIT: CPT | Performed by: NEUROLOGICAL SURGERY

## 2024-09-05 RX ORDER — METHOCARBAMOL 1000 MG/1
TABLET, FILM COATED ORAL
Qty: 30 TABLET | Refills: 0 | Status: SHIPPED | OUTPATIENT
Start: 2024-09-05

## 2024-09-06 ENCOUNTER — TELEPHONE (OUTPATIENT)
Dept: PAIN MEDICINE | Facility: CLINIC | Age: 54
End: 2024-09-06
Payer: COMMERCIAL

## 2024-09-06 DIAGNOSIS — M48.02 CERVICAL SPINAL STENOSIS: ICD-10-CM

## 2024-09-06 DIAGNOSIS — M51.36 DDD (DEGENERATIVE DISC DISEASE), LUMBAR: ICD-10-CM

## 2024-09-06 DIAGNOSIS — M47.812 ARTHROPATHY OF CERVICAL FACET JOINT: ICD-10-CM

## 2024-09-06 DIAGNOSIS — M50.30 BULGE OF CERVICAL DISC WITHOUT MYELOPATHY: ICD-10-CM

## 2024-09-06 RX ORDER — HYDROCODONE BITARTRATE AND ACETAMINOPHEN 5; 325 MG/1; MG/1
1 TABLET ORAL EVERY 4 HOURS PRN
Qty: 18 TABLET | Refills: 0 | Status: SHIPPED | OUTPATIENT
Start: 2024-09-06

## 2024-09-06 NOTE — TELEPHONE ENCOUNTER
Caller: Ronni Asif    Relationship: Self    Best call back number:     Requested Prescriptions:   HYDROcodone-acetaminophen (NORCO) 5-325 MG per tablet     Pharmacy where request should be sent:  [facility]     Last office visit with prescribing clinician: 8/16/2024   Last telemedicine visit with prescribing clinician: Visit date not found   Next office visit with prescribing clinician: 10/11/2024     Additional details provided by patient: PATI[facility] IS OUT OF THEM BUT [facility] HAS THEM.  HE HAS LESS THAN A 3 DAY SUPPLY.  PATIENT SAW DR OBRIEN YESTERDAY AND HE GAVE PATIENT A MUSCLE RELAXER BUT NO PAIN MEDICINE       Does the patient have less than a 3 day supply:  [x] Yes  [] No    Would you like a call back once the refill request has been completed: [] Yes [] No    If the office needs to give you a call back, can they leave a voicemail: [] Yes [] No    Everette Moreno   09/06/24 10:03 EDT

## 2024-09-06 NOTE — TELEPHONE ENCOUNTER
Please schedule him to be seen in the office next week. Once he is scheduled I will send a temporary supply to last until that office visit

## 2024-09-13 ENCOUNTER — OFFICE VISIT (OUTPATIENT)
Dept: PAIN MEDICINE | Facility: CLINIC | Age: 54
End: 2024-09-13
Payer: COMMERCIAL

## 2024-09-13 VITALS
OXYGEN SATURATION: 96 % | BODY MASS INDEX: 42.63 KG/M2 | SYSTOLIC BLOOD PRESSURE: 130 MMHG | WEIGHT: 297.8 LBS | HEART RATE: 90 BPM | HEIGHT: 70 IN | TEMPERATURE: 97.5 F | DIASTOLIC BLOOD PRESSURE: 76 MMHG

## 2024-09-13 DIAGNOSIS — M50.30 BULGE OF CERVICAL DISC WITHOUT MYELOPATHY: ICD-10-CM

## 2024-09-13 DIAGNOSIS — M51.36 DDD (DEGENERATIVE DISC DISEASE), LUMBAR: ICD-10-CM

## 2024-09-13 DIAGNOSIS — M47.812 ARTHROPATHY OF CERVICAL FACET JOINT: ICD-10-CM

## 2024-09-13 DIAGNOSIS — M48.02 CERVICAL SPINAL STENOSIS: ICD-10-CM

## 2024-09-13 DIAGNOSIS — M62.838 MUSCLE SPASM: Primary | ICD-10-CM

## 2024-09-13 DIAGNOSIS — M54.12 CERVICAL RADICULOPATHY: ICD-10-CM

## 2024-09-13 DIAGNOSIS — M54.16 LUMBAR RADICULOPATHY: ICD-10-CM

## 2024-09-13 RX ORDER — METHOCARBAMOL 750 MG/1
750 TABLET, FILM COATED ORAL 3 TIMES DAILY PRN
Qty: 90 TABLET | Refills: 1 | Status: SHIPPED | OUTPATIENT
Start: 2024-09-13

## 2024-09-13 RX ORDER — HYDROCODONE BITARTRATE AND ACETAMINOPHEN 5; 325 MG/1; MG/1
1 TABLET ORAL EVERY 6 HOURS PRN
Qty: 60 TABLET | Refills: 0 | Status: SHIPPED | OUTPATIENT
Start: 2024-09-13 | End: 2024-09-16 | Stop reason: SDUPTHER

## 2024-09-13 RX ORDER — GABAPENTIN 800 MG/1
800 TABLET ORAL 3 TIMES DAILY
Qty: 90 TABLET | Refills: 1 | Status: SHIPPED | OUTPATIENT
Start: 2024-09-13

## 2024-09-13 NOTE — PROGRESS NOTES
CHIEF COMPLAINT  F/U back pain- patient states that his pain has improved since his last visit.     Subjective   Ronni Asif is a 54 y.o. male  who presents for follow-up.  He has a history of back and neck pain. Since his last office visit he underwent C4-C5 and C5-C6 ACDF on 8/26/2024 performed by Dr. Judd.     Today his pain is 7/10VAS in severity. He states that his neck pain increases with driving. He also continues to have ongoing neck, shoulder, and arm pain. He will follow up with Dr. Judd in a few weeks. He also states that his low back pain has increased, he thinks that this is d/t laying in bed a lot since his neck surgery.      He is currently utilizing Norco 5/325 2-4/day. He is also maintained on Gabapentin 800 mg 3/day, and Methocarbamol 750mg PRN 3/day (this was temporarily increased to 1000 mg TID by neurosurgery) This medication regimen decreases his pain by a moderate amount.  ADLs by self. He denies any side effects including somnolence or constipation.      Unfortunately his repeat Bilateral T8-T10 RFA was denied by insurance as being experimental.      Cervical medial branch blocks were diagnostically negative at multiple levels, TAMEKA provided no relief.       Hx of L5-S1 fusion performed by Dr. Tripathi on 8/30/2019. Prior to establishing with our clinic he has completed lumber epidurals and RFA.      Procedure list:  8/23/2022-bilateral T8-T10 RFA-80% relief x2 months with ongoing 60 to 70% relief x ~ 6 months. His pain is now starting to return  6/7/2022-bilateral C5-C7 MBB-20% relief (diagnostically negative)  11/30/2021-bilateral C3-C5 MBB-minimal relief (diagnostically negative)  10/7/2021-TAMEKA at C6-C7-minimal relief  7/27/2021-bilateral T8-T10 RFA-50% improvement lasting many months.   6/29/2021-bilateral T8-T10 MBB-80% relief x2 days  6/1/2021-bilateral T8-T10 MBB-80 to 90% relief x1 day    Back Pain  This is a chronic problem. The current episode started more than 1 year  ago. The problem occurs daily. The problem is unchanged. The pain is present in the thoracic spine and lumbar spine. The quality of the pain is described as aching. The pain does not radiate. The pain is at a severity of 7/10. The pain is severe. The symptoms are aggravated by bending, standing and twisting. Stiffness is present In the morning. Associated symptoms include headaches and weakness. Pertinent negatives include no abdominal pain, chest pain, dysuria, fever or numbness. Risk factors include obesity. He has tried analgesics, heat, ice, NSAIDs, muscle relaxant and home exercises (Norco 5/325) for the symptoms. The treatment provided significant relief.   Neck Pain   This is a chronic problem. The current episode started more than 1 year ago. The problem occurs constantly. Progression since onset: worse. The pain is present in the occipital region and midline. The quality of the pain is described as burning (bilateral upper extremity tingling). The pain is at a severity of 7/10. The symptoms are aggravated by bending (lying in bed). Associated symptoms include headaches and weakness. Pertinent negatives include no chest pain, fever or numbness. He has tried ice, oral narcotics and NSAIDs (PT) for the symptoms.      PEG Assessment   What number best describes your pain on average in the past week?9  What number best describes how, during the past week, pain has interfered with your enjoyment of life?8  What number best describes how, during the past week, pain has interfered with your general activity?  10    The following portions of the patient's history were reviewed and updated as appropriate: allergies, current medications, past family history, past medical history, past social history, past surgical history, and problem list.    Review of Systems   Constitutional:  Positive for activity change (decreased), chills and fatigue. Negative for fever.   HENT:  Negative for congestion.    Eyes:  Negative for  "visual disturbance.   Respiratory:  Negative for chest tightness and shortness of breath.    Cardiovascular:  Negative for chest pain.   Gastrointestinal:  Negative for abdominal pain, constipation and diarrhea.   Genitourinary:  Negative for difficulty urinating and dysuria.   Musculoskeletal:  Positive for back pain and neck pain.   Neurological:  Positive for dizziness, weakness, light-headedness and headaches. Negative for numbness.   Psychiatric/Behavioral:  Positive for sleep disturbance. Negative for agitation. The patient is not nervous/anxious.      --  The aforementioned information the Chief Complaint section and above subjective data including any HPI data, and also the Review of Systems data, has been personally reviewed and affirmed.  --    Vitals:    09/13/24 1517   Pulse: 90   Temp: 97.5 °F (36.4 °C)   SpO2: 96%   Weight: 135 kg (297 lb 12.8 oz)   Height: 177.8 cm (70\")   PainSc:   7   PainLoc: Neck     Objective   Physical Exam  Vitals and nursing note reviewed.   Constitutional:       Appearance: Normal appearance. He is well-developed.   Eyes:      General: Lids are normal.   Cardiovascular:      Rate and Rhythm: Normal rate.   Pulmonary:      Effort: Pulmonary effort is normal.   Musculoskeletal:      Cervical back: Tenderness and bony tenderness present. Decreased range of motion.      Thoracic back: Tenderness present. Decreased range of motion.      Lumbar back: Tenderness present. Decreased range of motion.   Skin:     Comments:   Right anterior neck incision is well approximated, C/D/I   Neurological:      Mental Status: He is alert and oriented to person, place, and time.   Psychiatric:         Attention and Perception: Attention normal.         Mood and Affect: Mood normal.         Speech: Speech normal.         Behavior: Behavior normal.         Judgment: Judgment normal.       Assessment & Plan   Diagnoses and all orders for this visit:    1. Muscle spasm (Primary)  -     methocarbamol " (ROBAXIN) 750 MG tablet; Take 1 tablet by mouth 3 (Three) Times a Day As Needed for Muscle Spasms.  Dispense: 90 tablet; Refill: 1    2. DDD (degenerative disc disease), lumbar  -     HYDROcodone-acetaminophen (NORCO) 5-325 MG per tablet; Take 1 tablet by mouth Every 6 (Six) Hours As Needed for Moderate Pain.  Dispense: 60 tablet; Refill: 0    3. Cervical spinal stenosis  -     HYDROcodone-acetaminophen (NORCO) 5-325 MG per tablet; Take 1 tablet by mouth Every 6 (Six) Hours As Needed for Moderate Pain.  Dispense: 60 tablet; Refill: 0    4. Arthropathy of cervical facet joint  -     HYDROcodone-acetaminophen (NORCO) 5-325 MG per tablet; Take 1 tablet by mouth Every 6 (Six) Hours As Needed for Moderate Pain.  Dispense: 60 tablet; Refill: 0    5. Bulge of cervical disc without myelopathy  -     HYDROcodone-acetaminophen (NORCO) 5-325 MG per tablet; Take 1 tablet by mouth Every 6 (Six) Hours As Needed for Moderate Pain.  Dispense: 60 tablet; Refill: 0    6. Cervical radiculopathy  -     gabapentin (NEURONTIN) 800 MG tablet; Take 1 tablet by mouth 3 (Three) Times a Day.  Dispense: 90 tablet; Refill: 1    7. Lumbar radiculopathy  -     gabapentin (NEURONTIN) 800 MG tablet; Take 1 tablet by mouth 3 (Three) Times a Day.  Dispense: 90 tablet; Refill: 1      Ronni Reyesjakel reports a pain score of 7.  Given his pain assessment as noted, treatment options were discussed and the following options were decided upon as a follow-up plan to address the patient's pain: prescription for opioid analgesics.    --- The urine drug screen confirmation from 8/16/2024 has been reviewed and the result is appropriate based on patient history and ROSI report  --- CSA updated 5/17/2024  --- Opioid Risk--low  --- Refill Gabapentin and Robaxin   --- Increase Norco 5/325 to q6h PRN x 2 weeks. Will plan to follow up in 2 weeks, he is instructed to bring his bottle so we can assess how often he is needing the pain medication. Regarding  continuation of opioids, there is no evidence of aberrant behavior or any red flags.  The patient continues with appropriate response to opioid therapy. ADL's remain intact by self.   --- Follow-up 2 weeks or sooner if needed     ROSI REPORT  As part of the patient's treatment plan, I am prescribing controlled substances. The patient has been made aware of appropriate use of such medications, including potential risk of somnolence, limited ability to drive and/or work safely, and the potential for dependence or overdose. It has also been made clear that these medications are for use by this patient only, without concomitant use of alcohol or other substances unless prescribed.     Patient has completed prescribing agreement detailing terms of continued prescribing of controlled substances, including monitoring ROSI reports, urine drug screening, and pill counts if necessary. The patient is aware that inappropriate use will results in cessation of prescribing such medications.    As the clinician, I personally reviewed the ROSI from 9/13/2024 while the patient was in the office today.    History and physical exam exhibit continued safe and appropriate use of controlled substances.    Dictated utilizing Dragon dictation.

## 2024-09-16 ENCOUNTER — HOSPITAL ENCOUNTER (OUTPATIENT)
Dept: PHYSICAL THERAPY | Facility: HOSPITAL | Age: 54
Setting detail: THERAPIES SERIES
Discharge: HOME | End: 2024-09-16
Payer: COMMERCIAL

## 2024-09-16 DIAGNOSIS — M48.02 CERVICAL SPINAL STENOSIS: ICD-10-CM

## 2024-09-16 DIAGNOSIS — M50.30 BULGE OF CERVICAL DISC WITHOUT MYELOPATHY: ICD-10-CM

## 2024-09-16 DIAGNOSIS — M47.812 ARTHROPATHY OF CERVICAL FACET JOINT: ICD-10-CM

## 2024-09-16 DIAGNOSIS — Z98.1 S/P CERVICAL SPINAL FUSION: ICD-10-CM

## 2024-09-16 DIAGNOSIS — M54.2 NECK PAIN: Primary | ICD-10-CM

## 2024-09-16 DIAGNOSIS — M51.36 DDD (DEGENERATIVE DISC DISEASE), LUMBAR: ICD-10-CM

## 2024-09-16 PROCEDURE — 97161 PT EVAL LOW COMPLEX 20 MIN: CPT

## 2024-09-16 PROCEDURE — 97110 THERAPEUTIC EXERCISES: CPT

## 2024-09-16 RX ORDER — HYDROCODONE BITARTRATE AND ACETAMINOPHEN 5; 325 MG/1; MG/1
1 TABLET ORAL EVERY 6 HOURS PRN
Qty: 60 TABLET | Refills: 0 | Status: SHIPPED | OUTPATIENT
Start: 2024-09-16

## 2024-09-21 DIAGNOSIS — M54.16 LUMBAR RADICULOPATHY: ICD-10-CM

## 2024-09-21 DIAGNOSIS — M54.12 CERVICAL RADICULOPATHY: ICD-10-CM

## 2024-09-23 RX ORDER — GABAPENTIN 800 MG/1
800 TABLET ORAL 3 TIMES DAILY
Qty: 90 TABLET | Refills: 1 | OUTPATIENT
Start: 2024-09-23

## 2024-09-24 ENCOUNTER — HOSPITAL ENCOUNTER (OUTPATIENT)
Dept: PHYSICAL THERAPY | Facility: HOSPITAL | Age: 54
Setting detail: THERAPIES SERIES
Discharge: HOME | End: 2024-09-24
Payer: COMMERCIAL

## 2024-09-24 DIAGNOSIS — M54.2 NECK PAIN: Primary | ICD-10-CM

## 2024-09-24 DIAGNOSIS — Z98.1 S/P CERVICAL SPINAL FUSION: ICD-10-CM

## 2024-09-24 PROCEDURE — 97110 THERAPEUTIC EXERCISES: CPT

## 2024-09-25 ENCOUNTER — OFFICE VISIT (OUTPATIENT)
Dept: PAIN MEDICINE | Facility: CLINIC | Age: 54
End: 2024-09-25
Payer: COMMERCIAL

## 2024-09-25 VITALS
DIASTOLIC BLOOD PRESSURE: 80 MMHG | SYSTOLIC BLOOD PRESSURE: 126 MMHG | RESPIRATION RATE: 20 BRPM | HEIGHT: 70 IN | HEART RATE: 89 BPM | TEMPERATURE: 97.8 F | OXYGEN SATURATION: 97 % | WEIGHT: 304.2 LBS | BODY MASS INDEX: 43.55 KG/M2

## 2024-09-25 DIAGNOSIS — M51.369 DDD (DEGENERATIVE DISC DISEASE), LUMBAR: ICD-10-CM

## 2024-09-25 DIAGNOSIS — M54.12 CERVICAL RADICULOPATHY: Primary | ICD-10-CM

## 2024-09-25 DIAGNOSIS — Z98.1 HISTORY OF FUSION OF CERVICAL SPINE: ICD-10-CM

## 2024-09-25 DIAGNOSIS — M47.814 ARTHROPATHY OF THORACIC FACET JOINT: ICD-10-CM

## 2024-09-25 DIAGNOSIS — M54.16 LUMBAR RADICULOPATHY: ICD-10-CM

## 2024-09-25 DIAGNOSIS — Z79.899 ENCOUNTER FOR LONG-TERM (CURRENT) USE OF HIGH-RISK MEDICATION: ICD-10-CM

## 2024-09-25 PROCEDURE — 99214 OFFICE O/P EST MOD 30 MIN: CPT | Performed by: NURSE PRACTITIONER

## 2024-09-25 PROCEDURE — 3074F SYST BP LT 130 MM HG: CPT | Performed by: NURSE PRACTITIONER

## 2024-09-25 PROCEDURE — 1159F MED LIST DOCD IN RCRD: CPT | Performed by: NURSE PRACTITIONER

## 2024-09-25 PROCEDURE — 1125F AMNT PAIN NOTED PAIN PRSNT: CPT | Performed by: NURSE PRACTITIONER

## 2024-09-25 PROCEDURE — 3079F DIAST BP 80-89 MM HG: CPT | Performed by: NURSE PRACTITIONER

## 2024-09-25 PROCEDURE — 1160F RVW MEDS BY RX/DR IN RCRD: CPT | Performed by: NURSE PRACTITIONER

## 2024-09-25 RX ORDER — GABAPENTIN 800 MG/1
800 TABLET ORAL 3 TIMES DAILY
Qty: 90 TABLET | Refills: 0 | Status: SHIPPED | OUTPATIENT
Start: 2024-09-25

## 2024-09-26 ENCOUNTER — DOCUMENTATION (OUTPATIENT)
Dept: PHYSICAL THERAPY | Facility: HOSPITAL | Age: 54
End: 2024-09-26
Payer: COMMERCIAL

## 2024-09-26 ENCOUNTER — APPOINTMENT (OUTPATIENT)
Dept: PHYSICAL THERAPY | Facility: HOSPITAL | Age: 54
End: 2024-09-26
Payer: COMMERCIAL

## 2024-10-04 NOTE — PROGRESS NOTES
Subjective   Patient ID: Ronni Asif is a 54 y.o. male is here today for 1 month PO follow-up with a new XR C-spine done today. Patient had a Cervical 4 to cervical 5 and cervical 5 to cervical 6 anterior cervical discectomy, fusion and instrumentation  on 8/26/2024    History of Present Illness    This patient returns today.  He is doing well.  He has almost no pain at all.  His incision has healed well.  He hit his head on a beam in his barn last week and saw stars but is otherwise feeling okay.  He did have some increase in his neck pain.    The following portions of the patient's history were reviewed and updated as appropriate: allergies, current medications, past family history, past medical history, past social history, past surgical history, and problem list.    Review of Systems   Constitutional:  Negative for chills and fever.   HENT:  Negative for congestion.    Musculoskeletal:  Positive for neck pain and neck stiffness. Negative for myalgias.   Neurological:  Positive for weakness and headaches. Negative for numbness.        R hand weakness       I reviewed the review of systems listed by the patient and discussed by my MA    Objective     There were no vitals filed for this visit.  There is no height or weight on file to calculate BMI.    Tobacco Use: Low Risk  (10/8/2024)    Patient History     Smoking Tobacco Use: Never     Smokeless Tobacco Use: Never     Passive Exposure: Not on file          Physical Exam  Neurological:      Mental Status: He is alert and oriented to person, place, and time.       Neurologic Exam     Mental Status   Oriented to person, place, and time.           Assessment & Plan   Independent Review of Radiographic Studies:      I personally reviewed the images from the following studies.    I reviewed his x-rays which were done today in the office.  They have not yet been reviewed by radiologist.  They were done for the purpose of postoperative assessment.  I compared to  the films done the day after surgery in August.  This shows no change with good alignment of the construct at C4-5 and C5-6.    Medical Decision Making:      I told the patient he can gradually return to normal activities.  He should avoid strenuously heavy lifting.  I will see him back on an as-needed basis but he is to call if any problems develop.    Diagnoses and all orders for this visit:    1. Surgery follow-up examination (Primary)      Return if symptoms worsen or fail to improve.

## 2024-10-08 ENCOUNTER — OFFICE VISIT (OUTPATIENT)
Dept: NEUROSURGERY | Facility: CLINIC | Age: 54
End: 2024-10-08
Payer: COMMERCIAL

## 2024-10-08 DIAGNOSIS — Z09 SURGERY FOLLOW-UP EXAMINATION: Primary | ICD-10-CM

## 2024-10-08 PROCEDURE — 1160F RVW MEDS BY RX/DR IN RCRD: CPT | Performed by: NEUROLOGICAL SURGERY

## 2024-10-08 PROCEDURE — 1159F MED LIST DOCD IN RCRD: CPT | Performed by: NEUROLOGICAL SURGERY

## 2024-10-08 PROCEDURE — 99024 POSTOP FOLLOW-UP VISIT: CPT | Performed by: NEUROLOGICAL SURGERY

## 2024-10-11 ENCOUNTER — OFFICE VISIT (OUTPATIENT)
Dept: PAIN MEDICINE | Facility: CLINIC | Age: 54
End: 2024-10-11
Payer: COMMERCIAL

## 2024-10-11 VITALS
BODY MASS INDEX: 43.01 KG/M2 | TEMPERATURE: 97.3 F | WEIGHT: 300.4 LBS | OXYGEN SATURATION: 95 % | HEIGHT: 70 IN | HEART RATE: 101 BPM | DIASTOLIC BLOOD PRESSURE: 84 MMHG | RESPIRATION RATE: 18 BRPM | SYSTOLIC BLOOD PRESSURE: 128 MMHG

## 2024-10-11 DIAGNOSIS — M54.16 LUMBAR RADICULOPATHY: ICD-10-CM

## 2024-10-11 DIAGNOSIS — Z79.899 ENCOUNTER FOR LONG-TERM (CURRENT) USE OF HIGH-RISK MEDICATION: Primary | ICD-10-CM

## 2024-10-11 DIAGNOSIS — M51.369 DDD (DEGENERATIVE DISC DISEASE), LUMBAR: ICD-10-CM

## 2024-10-11 DIAGNOSIS — M62.838 MUSCLE SPASM: ICD-10-CM

## 2024-10-11 DIAGNOSIS — M50.30 BULGE OF CERVICAL DISC WITHOUT MYELOPATHY: ICD-10-CM

## 2024-10-11 DIAGNOSIS — M54.12 CERVICAL RADICULOPATHY: ICD-10-CM

## 2024-10-11 DIAGNOSIS — M47.812 ARTHROPATHY OF CERVICAL FACET JOINT: ICD-10-CM

## 2024-10-11 RX ORDER — METHOCARBAMOL 750 MG/1
750 TABLET, FILM COATED ORAL 3 TIMES DAILY PRN
Qty: 90 TABLET | Refills: 1 | Status: SHIPPED | OUTPATIENT
Start: 2024-10-11

## 2024-10-11 RX ORDER — GABAPENTIN 800 MG/1
800 TABLET ORAL 3 TIMES DAILY
Qty: 90 TABLET | Refills: 3 | Status: SHIPPED | OUTPATIENT
Start: 2024-10-11

## 2024-10-11 RX ORDER — HYDROCODONE BITARTRATE AND ACETAMINOPHEN 5; 325 MG/1; MG/1
1 TABLET ORAL 2 TIMES DAILY PRN
Qty: 60 TABLET | Refills: 0 | Status: SHIPPED | OUTPATIENT
Start: 2024-10-11

## 2024-10-11 NOTE — PROGRESS NOTES
CHIEF COMPLAINT  Follow-up for back and neck pain.    Subjective   Ronni Asif is a 54 y.o. male  who presents for follow-up.  He has a history of back and neck pain.  Today his pain is 7/10VAS in severity. He states that his pain is elevated after cutting grass the last few days (he has 8 acres of land). He continues with  Norco 5/325 0-2/day, Gabapentin 800 mg 3/day, and Methocarbamol 750mg PRN 3/day (he has had 1 day where he took 4 doses of this). This medication regimen decreases his pain and is allowing him to stay active.  ADLs by self. He denies any side effects including somnolence or constipation.      Cervical medial branch blocks were diagnostically negative at multiple levels, TAMEKA provided no relief.  Has since undergone a C4-5 and C5-6 ACDF on 8/26/2024 performed by Dr. Judd     Hx of L5-S1 fusion performed by Dr. Tripathi on 8/30/2019. Prior to establishing with our clinic he has completed lumber epidurals and RFA.     Good relief with thoracic RFA, unfortunately this has now been denied by his insurance as being experimental.      Procedure list:  8/23/2022-bilateral T8-T10 RFA-80% relief x2 months with ongoing 60 to 70% relief x ~ 6 months. His pain is now starting to return  6/7/2022-bilateral C5-C7 MBB-20% relief (diagnostically negative)  11/30/2021-bilateral C3-C5 MBB-minimal relief (diagnostically negative)  10/7/2021-TAMEKA at C6-C7-minimal relief  7/27/2021-bilateral T8-T10 RFA-50% improvement lasting many months.   6/29/2021-bilateral T8-T10 MBB-80% relief x2 days  6/1/2021-bilateral T8-T10 MBB-80 to 90% relief x1 day    Back Pain  This is a chronic problem. The current episode started more than 1 year ago. The problem occurs daily. Progression since onset: waxing and waning. The pain is present in the thoracic spine and lumbar spine. The quality of the pain is described as aching. The pain does not radiate. The pain is at a severity of 7/10. The pain is severe. The symptoms are  aggravated by bending, standing and twisting. Stiffness is present In the morning. Associated symptoms include headaches and weakness. Pertinent negatives include no abdominal pain, chest pain, dysuria, fever or numbness. Risk factors include obesity. He has tried analgesics, heat, ice, NSAIDs, muscle relaxant and home exercises (Norco 5/325) for the symptoms. The treatment provided significant relief.   Neck Pain   This is a chronic problem. The current episode started more than 1 year ago. The problem occurs constantly. The problem has been waxing and waning. The pain is present in the occipital region and midline. The quality of the pain is described as burning (bilateral upper extremity tingling). The pain is at a severity of 6/10. The symptoms are aggravated by bending (lying in bed). Associated symptoms include headaches and weakness. Pertinent negatives include no chest pain, fever or numbness. He has tried ice, oral narcotics and NSAIDs (PT) for the symptoms.      PEG Assessment   What number best describes your pain on average in the past week?8  What number best describes how, during the past week, pain has interfered with your enjoyment of life?9  What number best describes how, during the past week, pain has interfered with your general activity?  9    Review of Pertinent Medical Data ---    This visit from today 2024 Dr. Judd reviewed.  Patient is postop C4-C5 and C5-C6 ACDF on 8/26/2024.  Patient is doing well with almost no pain at all.  He hit his head on a beam in his barn last week and saw stars, also had some increased neck pain.  Patient can gradually return to normal activities.  Avoid strenuous heavy lifting.  Follow-up as needed.    The following portions of the patient's history were reviewed and updated as appropriate: allergies, current medications, past family history, past medical history, past social history, past surgical history, and problem list.    Review of Systems  "  Constitutional:  Negative for fever.   Cardiovascular:  Negative for chest pain.   Gastrointestinal:  Negative for abdominal pain, constipation and diarrhea.   Genitourinary:  Negative for difficulty urinating and dysuria.   Musculoskeletal:  Positive for back pain and neck pain.   Neurological:  Positive for weakness and headaches. Negative for numbness.   Psychiatric/Behavioral:  Positive for sleep disturbance. Negative for suicidal ideas. The patient is not nervous/anxious.      --  The aforementioned information the Chief Complaint section and above subjective data including any HPI data, and also the Review of Systems data, has been personally reviewed and affirmed.  --    Vitals:    10/11/24 0856   BP: 128/84   Pulse: 101   Resp: 18   Temp: 97.3 °F (36.3 °C)   SpO2: 95%   Weight: (!) 136 kg (300 lb 6.4 oz)   Height: 177.8 cm (70\")   PainSc: 7  Comment: neck pain 6/10   PainLoc: Back     Objective   Physical Exam  Vitals and nursing note reviewed.   Constitutional:       Appearance: Normal appearance. He is well-developed.   Eyes:      General: Lids are normal.   Cardiovascular:      Rate and Rhythm: Normal rate.   Pulmonary:      Effort: Pulmonary effort is normal.   Musculoskeletal:      Cervical back: Tenderness and bony tenderness present. Decreased range of motion.      Lumbar back: Tenderness and bony tenderness present. Decreased range of motion.   Neurological:      Mental Status: He is alert and oriented to person, place, and time.   Psychiatric:         Attention and Perception: Attention normal.         Mood and Affect: Mood normal.         Speech: Speech normal.         Behavior: Behavior normal.         Judgment: Judgment normal.       Assessment & Plan   Diagnoses and all orders for this visit:    1. DDD (degenerative disc disease), lumbar  -     HYDROcodone-acetaminophen (NORCO) 5-325 MG per tablet; Take 1 tablet by mouth 2 (Two) Times a Day As Needed for Moderate Pain. Fill date 10/17/2024  " Dispense: 60 tablet; Refill: 0    2. Arthropathy of cervical facet joint  -     HYDROcodone-acetaminophen (NORCO) 5-325 MG per tablet; Take 1 tablet by mouth 2 (Two) Times a Day As Needed for Moderate Pain. Fill date 10/17/2024  Dispense: 60 tablet; Refill: 0    3. Bulge of cervical disc without myelopathy  -     HYDROcodone-acetaminophen (NORCO) 5-325 MG per tablet; Take 1 tablet by mouth 2 (Two) Times a Day As Needed for Moderate Pain. Fill date 10/17/2024  Dispense: 60 tablet; Refill: 0    4. Cervical radiculopathy  -     gabapentin (NEURONTIN) 800 MG tablet; Take 1 tablet by mouth 3 (Three) Times a Day.  Dispense: 90 tablet; Refill: 3    5. Lumbar radiculopathy  -     gabapentin (NEURONTIN) 800 MG tablet; Take 1 tablet by mouth 3 (Three) Times a Day.  Dispense: 90 tablet; Refill: 3    6. Muscle spasm  -     methocarbamol (ROBAXIN) 750 MG tablet; Take 1 tablet by mouth 3 (Three) Times a Day As Needed for Muscle Spasms.  Dispense: 90 tablet; Refill: 1      Ronni Reyesjakel reports a pain score of 7.  Given his pain assessment as noted, treatment options were discussed and the following options were decided upon as a follow-up plan to address the patient's pain: continuation of current treatment plan for pain.    --- The urine drug screen confirmation from 8/16/2024 has been reviewed and the result is appropriate based on patient history and ROSI report  --- CSA updated 8/16/2024  --- Opioid Risk--Low  --- Refill Norco 5/325. Fill date 10/17/2024 applied. Patient appears stable with current regimen. No adverse effects. Regarding continuation of opioids, there is no evidence of aberrant behavior or any red flags.  The patient continues with appropriate response to opioid therapy. ADL's remain intact by self.   --- Refill Gabapentin 800 mg TID  --- Refill Methocarbamol  --- Follow-up 1 month or sooner if needed.      ROSI REPORT  As part of the patient's treatment plan, I am prescribing controlled substances.  The patient has been made aware of appropriate use of such medications, including potential risk of somnolence, limited ability to drive and/or work safely, and the potential for dependence or overdose. It has also been made clear that these medications are for use by this patient only, without concomitant use of alcohol or other substances unless prescribed.     Patient has completed prescribing agreement detailing terms of continued prescribing of controlled substances, including monitoring ROSI reports, urine drug screening, and pill counts if necessary. The patient is aware that inappropriate use will results in cessation of prescribing such medications.    As the clinician, I personally reviewed the ROSI from 10/11/2024 while the patient was in the office today.    History and physical exam exhibit continued safe and appropriate use of controlled substances.    Dictated utilizing Dragon dictation.

## 2024-10-14 ENCOUNTER — HOSPITAL ENCOUNTER (OUTPATIENT)
Dept: PHYSICAL THERAPY | Facility: HOSPITAL | Age: 54
Setting detail: THERAPIES SERIES
Discharge: HOME | End: 2024-10-14
Payer: COMMERCIAL

## 2024-10-14 DIAGNOSIS — M54.2 NECK PAIN: Primary | ICD-10-CM

## 2024-10-14 DIAGNOSIS — Z98.1 S/P CERVICAL SPINAL FUSION: ICD-10-CM

## 2024-10-14 PROCEDURE — 97110 THERAPEUTIC EXERCISES: CPT

## 2024-10-14 NOTE — THERAPY TREATMENT NOTE
Outpatient Physical Therapy Ortho Treatment Note  TERRI Fox     Patient Name: Ronni Asif  : 1970  MRN: [id number]  Today's Date: 10/14/2024        Visit Date: 10/14/2024    Visit Dx:    ICD-10-CM ICD-9-CM   1. Neck pain  M54.2 723.1   2. S/P cervical spinal fusion  Z98.1 V45.4       Patient Active Problem List   Diagnosis    Lumbar radiculopathy    Congenital spondylolysis, lumbosacral region    Transient cerebral ischemia    Pain in thoracic spine    Neck pain    Degeneration of thoracic intervertebral disc    Degeneration of intervertebral disc at C5-C6 level    Type 2 diabetes mellitus with circulatory disorder, without long-term current use of insulin    Obstructive sleep apnea, adult    Essential hypertension    Moderate asthma without complication    Gastroesophageal reflux disease    Diarrhea    History of colon polyps    Hyperglycemia    Surgery follow-up examination    Morbidly obese    Chest pain    Chronic pain    Coronary artery disease    Gout    Labile blood pressure    Mixed anxiety depressive disorder    Secondary male hypogonadism    Shoulder impingement syndrome, left    Syncope and collapse    Vitamin D deficiency    DEVAUGHN on CPAP    Thoracic radiculopathy    History of lumbar spinal fusion    Chronic midline low back pain without sciatica    Chronic bilateral thoracic back pain    Arthropathy of thoracic facet joint    Spondylosis without myelopathy or radiculopathy, thoracic region    Encounter for long-term (current) use of high-risk medication    Cervical radiculopathy    Bulge of cervical disc without myelopathy    Arthropathy of cervical facet joint    Cervical spinal stenosis    DDD (degenerative disc disease), lumbar        Past Medical History:   Diagnosis Date    Anxiety     Asthma     Cancer     skin    Chronic pain     LOW BACK    DDD (degenerative disc disease), cervical     DDD (degenerative disc disease), lumbar     Diabetes mellitus     Dizziness     ED  (erectile dysfunction)     GERD (gastroesophageal reflux disease)     Headache     History of kidney stones     Hyperlipidemia     Hypertension     Implantable loop recorder present     Injury of back 03/2016    Pt fell 20 feet     Neck pain     Neuromuscular disorder     Numbness     2 FINGERS ON BILATERAL HANDS    PONV (postoperative nausea and vomiting)     Sleep apnea     cpap    Syncope and collapse     TIA (transient ischemic attack)         Past Surgical History:   Procedure Laterality Date    ANTERIOR CERVICAL DISCECTOMY W/ FUSION N/A 8/26/2024    Procedure: Cervical 4 to cervical 5 and cervical 5 to cervical 6 anterior cervical discectomy, fusion and instrumentation;  Surgeon: Venu Judd MD;  Location: [facility] MAIN OR;  Service: Neurosurgery;  Laterality: N/A;    CARDIAC SURGERY      LOOP RECORDER    CATARACT EXTRACTION      CERVICAL EPIDURAL N/A 10/07/2021    Procedure: CERVICAL EPIDURAL;  Surgeon: Abdirashid Gonzalez MD;  [place] [facility] [place]  Service: Pain Management;  Laterality: N/A;    COLONOSCOPY  2014    COLONOSCOPY N/A 05/23/2019    Procedure: COLONOSCOPY to Cecum with Hot and Cold Polypectomy x 2;  Surgeon: Navid Zafar Jr., MD;  Location: [facility] ENDOSCOPY;  Service: General    ENDOSCOPY N/A 05/23/2019    Procedure: ESOPHAGOGASTRODUODENOSCOPY with Bx's;  Surgeon: Navid Zafar Jr., MD;  Location: [facility] ENDOSCOPY;  Service: General    HERNIA REPAIR  10/2015    LAPAROSCOPIC GASTRIC BANDING      June 2020    LUMBAR DISCECTOMY FUSION INSTRUMENTATION Bilateral 08/30/2019    Procedure: LUMBAR 5 TO SACRAL 1 OPEN DECOMPRESSION WITH  POSTERIOR LUMBAR INTERBODY FUSION, CAGE AND SCREW;  Surgeon: Jake Tripathi MD;  Location: [facility] MAIN OR;  Service: Neurosurgery    MEDIAL BRANCH BLOCK Bilateral 06/01/2021    Procedure: thoracic medial branch block (bilateral T9-11 vs T8-10) x2, 1-2 wks apart - diagnostic;  Surgeon: Abdirashid Gonzalez MD;  [place] [facility] [place];  Service: Pain Management;   Laterality: Bilateral;    MEDIAL BRANCH BLOCK Bilateral 06/29/2021    Procedure: Bilateral T8-T10 MEDIAL BRANCH BLOCK;  Surgeon: Abdirashid Gonzalez MD;  Location: [facility] MAIN [facility];  Service: Pain Management;  Laterality: Bilateral;    MEDIAL BRANCH BLOCK Bilateral 11/30/2021    Procedure: Bilateral cervical MEDIAL BRANCH BLOCK at approximately C4-C6;  Surgeon: Abdirashid Gonzalez MD;  Location: [facility] MAIN OR;  Service: Pain Management;  Laterality: Bilateral;    MEDIAL BRANCH BLOCK Bilateral 06/07/2022    Procedure: Bilateral C5-C7 MEDIAL BRANCH BLOCK;  Surgeon: Abdirashid Gonzalez MD;  Location: [facility] MAIN OR;  Service: Pain Management;  Laterality: Bilateral;    RADIOFREQUENCY ABLATION Bilateral 07/27/2021    Procedure: Bilateral T8-T10 RADIOFREQUENCY ABLATION;  Surgeon: Abdirashid Gonzalez MD;  Location: [facility] MAIN OR;  Service: Pain Management;  Laterality: Bilateral;    RADIOFREQUENCY ABLATION Bilateral 08/23/2022    Procedure: RADIOFREQUENCY ABLATION Bilateral T8-T10;  Surgeon: Abdirashid Gonzalez MD;  Location: [facility] MAIN OR;  Service: Pain Management;  Laterality: Bilateral;        PT Ortho       Row Name 10/14/24 1400       General ROM    GENERAL ROM COMMENTS B shoulder flexion & abduction WFL & painfree.  -LN       MMT Right Upper Ext    Rt Shoulder Flexion MMT, Gross Movement (4+/5) good plus  -LN    Rt Shoulder Extension MMT, Gross Movement (5/5) normal  -LN    Rt Shoulder ABduction MMT, Gross Movement (4+/5) good plus  -LN    Rt Shoulder ADduction MMT, Gross Movement (5/5) normal  -LN    Rt Shoulder Internal Rotation MMT, Gross Movement (5/5) normal  -LN    Rt Shoulder External Rotation MMT, Gross Movement (5/5) normal  -LN    Rt Elbow Flexion MMT, Gross Movement: (5/5) normal  -LN    Rt Elbow Extension MMT, Gross Movement: (5/5) normal  -LN       MMT Left Upper Ext    Lt Shoulder Flexion MMT, Gross Movement (4+/5) good plus  -LN    Lt Shoulder Extension MMT, Gross Movement (5/5) normal  -LN    Lt Shoulder  "ABduction MMT, Gross Movement (4+/5) good plus  -LN    Lt Shoulder ADduction MMT, Gross Movement (5/5) normal  -LN    Lt Shoulder Internal Rotation MMT, Gross Movement (5/5) normal  -LN    Lt Shoulder External Rotation MMT, Gross Movement (4+/5) good plus  -LN    Lt Elbow Flexion MMT, Gross Movement (5/5) normal  -LN    Lt Elbow Extension MMT, Gross Movement (5/5) normal  -LN       Sensation    Sensation WNL? WFL  -LN    Light Touch No apparent deficits  -LN    Additional Comments no c/o any N/T in UE  -LN       Upper Extremity Flexibility    Suboccipitals Bilateral:;WNL  -LN    Scalenes Bilateral:;WNL  -LN    Upper Trapezius Bilateral:;Mildly limited  -LN    Levator Scapula Bilateral:;Mildly limited  -LN    Pect Minor Bilateral:;Mildly limited  -LN      Row Name 10/14/24 1300       Subjective    Subjective Comments Pt reports his neck is feeling prettty good. \"I saw Dr. Judd and he said everything looks good and he was going to release the restrictions & I don't have to go back to him unless I have an issue.\" \"I have hurt my back recently.\" He reports that he strained his right shoulder blade putting up halloween decorations. \"I have been to pain management.\" \"I am back to where l was prior to the surgery.\"  -LN       Precautions and Contraindications    Precautions/Limitations spinal precautions  cervical  -LN    Precautions hx of lumbar surgery  -LN       Subjective Pain    Able to rate subjective pain? yes  -LN    Pre-Treatment Pain Level 2  -LN    Subjective Pain Comment Pt no longer wearing collar.  -LN       Head/Neck/Trunk    Neck Extension AROM 50%- pain if he goes further  -LN    Neck Flexion AROM WFL- pulling lower R side of neck  -LN    Neck Lt Lateral Flexion AROM 50%  -LN    Neck Rt Lateral Flexion AROM 50%  -LN    Neck Lt Rotation AROM WFL  -LN    Neck Rt Rotation AROM 75%- pulling  -LN        Strength Right    Right Rung 2  -LN    Right  Test 1 58  -LN     Strength Average Right 58  -LN "        Strength Left    Left Rung 2  -LN    Left  Test 1 45  -LN     Strength Average Left 45  -LN              User Key  (r) = Recorded By, (t) = Taken By, (c) = Cosigned By      Initials Name Provider Type    Carmella Grajeda, PT Physical Therapist                                 PT Assessment/Plan       Row Name 10/14/24 1400          PT Assessment    Assessment Comments Pt continues with overall decreased neck pain and tolerating exercises well; able to increase TB resistance today and he tolerated well with no c/o any pain; except is having some pain and mm guarding R scapula/lats since he pulled something putting up Halloween decorations. Pt is independent with HEP and feel he is ready to be discharged with HEP at this time and pt is in agreement. Decreased mm guarding noted, but minimal persists shabbir in B UT. Improved CROM in all planes but is still limited in extension and B lateral flexion. Improved B shoulder strength to 4+-5/5 all planes (WFL) and B  strength also improved nicely. B shoulder active flexion now WFL & painfree.  He has met 5 out of 6 STG & partially met 1 STG & he has met 6 out of 8 LTG and has partially met 2 LTG.  -LN        PT Plan    PT Frequency Other (comment)  -LN     Predicted Duration of Therapy Intervention (PT) see below  -LN     PT Plan Comments Pt to see how he does doing exercises & MH at home and to call if he has any issues or feels like he needs any further PT; otherwise plan on discharge with HEP. Pt to call with any questions or concerns.  -LN               User Key  (r) = Recorded By, (t) = Taken By, (c) = Cosigned By      Initials Name Provider Type    Carmella Grajeda, PT Physical Therapist                     Modalities       Row Name 10/14/24 1300             Moist Heat    MH Applied --  -LN      Location --  -LN      PT Moist Heat Minutes --  -LN      MH Prior to Rx --  -LN      Patient denies application of MH Yes  to use at home  "as needed.  -LN                User Key  (r) = Recorded By, (t) = Taken By, (c) = Cosigned By      Initials Name Provider Type    Carmella Grajeda, PT Physical Therapist                   OP Exercises       Row Name 10/14/24 1300             Precautions    Existing Precautions/Restrictions spinal;other (see comments)  s/p ACDF C4-5 & C5-6  -LN         Subjective    Subjective Comments Pt reports his neck is feeling prettty good. \"I saw Dr. Judd and he said everything looks good and he was going to release the restrictions & I don't have to go back to him unless I have an issue.\" \"I have hurt my back recently.\" He reports that he strained his right shoulder blade putting up halloween decorations. \"I have been to pain management.\" \"I am back to where l was prior to the surgery.\"  -LN         Subjective Pain    Able to rate subjective pain? yes  -LN      Pre-Treatment Pain Level 2  -LN      Subjective Pain Comment Pt no longer wearing collar.  -LN         Total Minutes    33629 - PT Therapeutic Exercise Minutes 30  -LN         Exercise 1    Exercise Name 1 (B) Active cervical flexion  -LN      Cueing 1 Verbal;Tactile;Demo  -LN      Reps 1 5  -LN      Time 1 5 sec  -LN         Exercise 2    Exercise Name 2 (B)Active cervical rotation  -LN      Cueing 2 Verbal;Tactile;Demo  -LN      Reps 2 5 each side  -LN      Time 2 5 sec  -LN         Exercise 3    Exercise Name 3 (B) Active cervical side bending  -LN      Cueing 3 Verbal;Tactile;Demo  -LN      Reps 3 5  -LN      Time 3 5 sec  -LN         Exercise 4    Exercise Name 4 scapular retraction and depression  -LN      Cueing 4 Verbal;Tactile;Demo  -LN      Reps 4 5  -LN      Time 4 5 sec  -LN         Exercise 5    Exercise Name 5 supine chin tuck  -LN      Cueing 5 Verbal;Tactile;Demo  -LN      Reps 5 5  -LN      Time 5 5 sec  -LN         Exercise 6    Exercise Name 6 supine clasped hands supine for AA shoulder flexion  -LN      Cueing 6 Verbal;Tactile;Demo  -LN   "    Reps 6 10  -LN      Time 6 5 sec  -LN         Exercise 7    Exercise Name 7 (B) supine posterior capsule stretch  -LN      Cueing 7 Verbal;Tactile;Demo  -LN      Reps 7 3  -LN      Time 7 5 sec  -LN         Exercise 8    Exercise Name 8 isometric: cervical flex and rotation  -LN      Cueing 8 Verbal;Demo  -LN      Reps 8 3  -LN      Time 8 5 sec  -LN         Exercise 9    Exercise Name 9 TB: (B) bicep curls  -LN      Cueing 9 Verbal;Demo  -LN      Reps 9 20  -LN      Time 9 red  -LN         Exercise 10    Exercise Name 10 TB: (B) tricep extension  -LN      Cueing 10 Verbal;Demo  -LN      Reps 10 20 -LN      Time 10 red  -LN         Exercise 11    Exercise Name 11 Rows vs TB & B shoulder extension vs TB -LN Also instructed pt in seated B shoulder ER vs TB with elbows at sides. To do with red or green B as tolerated.      Cueing 11 Verbal;Tactile;Demo  -LN      Reps 11 20  each-LN      Time 11 green  -LN                User Key  (r) = Recorded By, (t) = Taken By, (c) = Cosigned By      Initials Name Provider Type    Carmella Grajeda, PT Physical Therapist                                  PT OP Goals       Row Name 10/14/24 1400          PT Short Term Goals    STG 1 Pt to verbally report decreased neck pain to <6/10 at worst with ADLs and everyday activities.  -LN     STG 1 Progress Met  -LN     STG 2 Pt independent with initial HEP issued by therapist.  -LN     STG 2 Progress Met  -LN     STG 3 CROM improved by 25%.  -LN     STG 3 Progress Met  -LN     STG 4 Pt to have improved B shoulder flexion AROM to 160 degrees L and 170 degrees R.  -LN     STG 4 Progress Met  -LN     STG 4 Progress Comments B shoulder flexion now WFL & painfree  -LN     STG 5 B shoulder strength improved by 1/2 mm grade.  -LN     STG 5 Progress Partially Met  -LN     STG 5 Progress Comments met for all planes except R shoulder flexion and abduction (stayed at 4+/5) and L shoulder ER (stayed at 4+/5). All planes WFL now.  -LN     STG  6 Muscle guarding decreased to minimal in B cervical PS/levator/UT/MT/rhomboids/lower trap.  -LN     STG 6 Progress Met  -LN        Long Term Goals    LTG 1 Pt to verbally report decreased neck pain to <3/10 at worst with ADLs and everyday activities.  -LN     LTG 1 Progress Met  -LN     LTG 2 Pt independent with more advanced HEP issued by therapist (including TB exercises).  -LN     LTG 2 Progress Met  -LN     LTG 3 CROM improved to 75%-WFL all planes.  -LN     LTG 3 Progress Partially Met;Ongoing;Progressing  -LN     LTG 3 Progress Comments met for all planes except extension and B lateral flexion= 50%.  -LN     LTG 4 B shoulder flexion and abduction AROM improved to WFL and painfree.  -LN     LTG 4 Progress Met  -LN     LTG 5 B shoulder/UE strength improved to 4+-5/5.  -LN     LTG 5 Progress Met  -LN     LTG 6 Cervical strength improved to 4-4+/5 when tested.  -LN     LTG 6 Progress Met  -LN     LTG 7 Muscle guarding decreased to nominal in B cervical PS/levator/UT/MT/rhomboids/lower trap.  -LN     LTG 7 Progress Partially Met  -LN     LTG 8 L  strength improved to 40-45#.  -LN     LTG 8 Progress Met  -LN     LTG 8 Progress Comments improved to 45#  -LN               User Key  (r) = Recorded By, (t) = Taken By, (c) = Cosigned By      Initials Name Provider Type    Carmella Grajeda, PT Physical Therapist                    Therapy Education  Education Details: Pt to add rows and B shoulder extension vs TB and seated B shoulder ER vs TB with elbows at sides. Issued green TB for home and blue TB to progress to as tolerated. Pt to use MH PRN.  Given: HEP, Symptoms/condition management  Program: New, Reinforced, Progressed (added rows and B shoulder extension vs TB and seated B shoulder ER vs TB with elbows at sides.)  How Provided: Verbal, Demonstration  Provided to: Patient  Level of Understanding: Teach back education performed, Verbalized, Demonstrated              Time Calculation:   Start Time:  1400  Stop Time: 1440  Time Calculation (min): 40 min  Timed Charges  87920 - PT Therapeutic Exercise Minutes: 30  Total Minutes  Timed Charges Total Minutes: 30   Total Minutes: 30  Therapy Charges for Today       Code Description Service Date Service Provider Modifiers Qty    [id number]  PT THER PROC EA 15 MIN 10/14/2024 Carmella Patel, PT GP 2                      Carmella Patel, PT  10/14/2024

## 2024-10-28 ENCOUNTER — OFFICE VISIT (OUTPATIENT)
Dept: PAIN MEDICINE | Facility: CLINIC | Age: 54
End: 2024-10-28
Payer: COMMERCIAL

## 2024-10-28 VITALS
TEMPERATURE: 97.3 F | BODY MASS INDEX: 43.49 KG/M2 | RESPIRATION RATE: 18 BRPM | SYSTOLIC BLOOD PRESSURE: 109 MMHG | HEIGHT: 70 IN | HEART RATE: 88 BPM | DIASTOLIC BLOOD PRESSURE: 67 MMHG | OXYGEN SATURATION: 94 % | WEIGHT: 303.8 LBS

## 2024-10-28 DIAGNOSIS — M54.50 CHRONIC MIDLINE LOW BACK PAIN WITHOUT SCIATICA: ICD-10-CM

## 2024-10-28 DIAGNOSIS — M54.12 CERVICAL RADICULOPATHY: Primary | ICD-10-CM

## 2024-10-28 DIAGNOSIS — Z98.1 HISTORY OF FUSION OF CERVICAL SPINE: ICD-10-CM

## 2024-10-28 DIAGNOSIS — G89.29 CHRONIC MIDLINE LOW BACK PAIN WITHOUT SCIATICA: ICD-10-CM

## 2024-10-28 DIAGNOSIS — M47.814 ARTHROPATHY OF THORACIC FACET JOINT: ICD-10-CM

## 2024-10-28 DIAGNOSIS — Z79.899 ENCOUNTER FOR LONG-TERM (CURRENT) USE OF HIGH-RISK MEDICATION: ICD-10-CM

## 2024-10-28 PROCEDURE — 1125F AMNT PAIN NOTED PAIN PRSNT: CPT | Performed by: NURSE PRACTITIONER

## 2024-10-28 PROCEDURE — 99214 OFFICE O/P EST MOD 30 MIN: CPT | Performed by: NURSE PRACTITIONER

## 2024-10-28 PROCEDURE — 1159F MED LIST DOCD IN RCRD: CPT | Performed by: NURSE PRACTITIONER

## 2024-10-28 PROCEDURE — 1160F RVW MEDS BY RX/DR IN RCRD: CPT | Performed by: NURSE PRACTITIONER

## 2024-10-28 PROCEDURE — 3074F SYST BP LT 130 MM HG: CPT | Performed by: NURSE PRACTITIONER

## 2024-10-28 PROCEDURE — 3078F DIAST BP <80 MM HG: CPT | Performed by: NURSE PRACTITIONER

## 2024-10-28 RX ORDER — TRAZODONE HYDROCHLORIDE 50 MG/1
50 TABLET, FILM COATED ORAL NIGHTLY PRN
COMMUNITY
Start: 2024-10-22

## 2024-10-28 NOTE — PROGRESS NOTES
"CHIEF COMPLAINT  Follow-up for back and neck pain.    Subjective   Ronni Asif is a 54 y.o. male  who presents for follow-up.  He has a history of neck and back pain.  Today his pain is 3/10VAS in severity. He states that his pain is slowly improving in his neck.  He remains in PT for his neck pain. He states that his back pain is relatively stable.  He continues with  Norco 5/325 0-1/day, Gabapentin 800 mg 3/day, and Methocarbamol 750mg PRN 0-1/day. This medication regimen decreases his pain by \"quite a bit\". ADLs by self. He denies any side effects including somnolence or constipation. He denies any bowel or bladder incontinence.     Cervical medial branch blocks were diagnostically negative at multiple levels, TAMEKA provided no relief.  Has since undergone a C4-5 and C5-6 ACDF on 8/26/2024 performed by Dr. Judd     Hx of L5-S1 fusion performed by Dr. Tripathi on 8/30/2019. Prior to establishing with our clinic he has completed lumber epidurals and RFA.      Good relief with thoracic RFA, unfortunately this has now been denied by his insurance as being experimental.       Procedure list:  8/23/2022-bilateral T8-T10 RFA-80% relief x2 months with ongoing 60 to 70% relief x ~ 6 months. His pain is now starting to return  6/7/2022-bilateral C5-C7 MBB-20% relief (diagnostically negative)  11/30/2021-bilateral C3-C5 MBB-minimal relief (diagnostically negative)  10/7/2021-TAMEKA at C6-C7-minimal relief  7/27/2021-bilateral T8-T10 RFA-50% improvement lasting many months.   6/29/2021-bilateral T8-T10 MBB-80% relief x2 days  6/1/2021-bilateral T8-T10 MBB-80 to 90% relief x1 day    Back Pain  This is a chronic problem. The current episode started more than 1 year ago. The problem occurs daily. The problem is unchanged. The pain is present in the thoracic spine and lumbar spine. The quality of the pain is described as aching. The pain does not radiate. The pain is at a severity of 3/10. The pain is severe. The " symptoms are aggravated by bending, standing and twisting. Stiffness is present In the morning. Associated symptoms include headaches. Pertinent negatives include no abdominal pain, chest pain, dysuria, fever, numbness or weakness. Risk factors include obesity. He has tried analgesics, heat, ice, NSAIDs, muscle relaxant and home exercises (Norco 5/325) for the symptoms. The treatment provided significant relief.   Neck Pain   This is a chronic problem. The current episode started more than 1 year ago. The problem occurs constantly. The problem has been gradually improving. The pain is present in the occipital region and midline. The quality of the pain is described as burning (bilateral upper extremity tingling). The pain is at a severity of 3/10. The symptoms are aggravated by bending (lying in bed). Associated symptoms include headaches. Pertinent negatives include no chest pain, fever, numbness or weakness. He has tried ice, oral narcotics and NSAIDs (PT) for the symptoms.     PEG Assessment   What number best describes your pain on average in the past week?7  What number best describes how, during the past week, pain has interfered with your enjoyment of life?7  What number best describes how, during the past week, pain has interfered with your general activity?  7    The following portions of the patient's history were reviewed and updated as appropriate: allergies, current medications, past family history, past medical history, past social history, past surgical history, and problem list.    Review of Systems   Constitutional:  Negative for fever.   Cardiovascular:  Negative for chest pain.   Gastrointestinal:  Negative for abdominal pain, constipation and diarrhea.   Genitourinary:  Negative for difficulty urinating and dysuria.   Musculoskeletal:  Positive for back pain and neck pain.   Neurological:  Positive for headaches. Negative for weakness and numbness.   Psychiatric/Behavioral:  Positive for sleep  "disturbance. Negative for suicidal ideas. The patient is not nervous/anxious.      --  The aforementioned information the Chief Complaint section and above subjective data including any HPI data, and also the Review of Systems data, has been personally reviewed and affirmed.  --    Vitals:    10/28/24 1341   BP: 109/67   Pulse: 88   Resp: 18   Temp: 97.3 °F (36.3 °C)   SpO2: 94%   Weight: (!) 138 kg (303 lb 12.8 oz)   Height: 177.8 cm (70\")   PainSc: 3  Comment: neck pain 3/10   PainLoc: Back     Objective   Physical Exam  Vitals and nursing note reviewed.   Constitutional:       Appearance: Normal appearance. He is well-developed.   Eyes:      General: Lids are normal.   Cardiovascular:      Rate and Rhythm: Normal rate.   Pulmonary:      Effort: Pulmonary effort is normal.   Musculoskeletal:      Cervical back: Tenderness present. Decreased range of motion.      Thoracic back: Decreased range of motion.      Lumbar back: Tenderness and bony tenderness present. Decreased range of motion.   Neurological:      Mental Status: He is alert and oriented to person, place, and time.   Psychiatric:         Attention and Perception: Attention normal.         Mood and Affect: Mood normal.         Speech: Speech normal.         Behavior: Behavior normal.         Judgment: Judgment normal.       Assessment & Plan   Diagnoses and all orders for this visit:    1. Cervical radiculopathy (Primary)    2. History of fusion of cervical spine    3. Arthropathy of thoracic facet joint    4. Encounter for long-term (current) use of high-risk medication    5. Chronic midline low back pain without sciatica      Ronni Asif reports a pain score of 3.  Given his pain assessment as noted, treatment options were discussed and the following options were decided upon as a follow-up plan to address the patient's pain: continuation of current treatment plan for pain.    --- The urine drug screen confirmation from 8/16/2024 has been " reviewed and the result is appropriate based on patient history and ROSI report  --- CSA updated 8/16/2024  --- Opioid Risk--Low  --- Continue Norco 5/325. No refill needed. Patient appears stable with current regimen. No adverse effects. Regarding continuation of opioids, there is no evidence of aberrant behavior or any red flags.  The patient continues with appropriate response to opioid therapy. ADL's remain intact by self.   --- Continue Methocarbamol and Gabapentin  --- Follow-up 2 months or sooner if needed.      ROSI REPORT  As part of the patient's treatment plan, I am prescribing controlled substances. The patient has been made aware of appropriate use of such medications, including potential risk of somnolence, limited ability to drive and/or work safely, and the potential for dependence or overdose. It has also been made clear that these medications are for use by this patient only, without concomitant use of alcohol or other substances unless prescribed.     Patient has completed prescribing agreement detailing terms of continued prescribing of controlled substances, including monitoring ROSI reports, urine drug screening, and pill counts if necessary. The patient is aware that inappropriate use will results in cessation of prescribing such medications.    As the clinician, I personally reviewed the ROSI from 10/28/2024 while the patient was in the office today.    History and physical exam exhibit continued safe and appropriate use of controlled substances.    Dictated utilizing Dragon dictation.

## 2024-10-29 ENCOUNTER — OFFICE VISIT (OUTPATIENT)
Dept: SLEEP MEDICINE | Facility: HOSPITAL | Age: 54
End: 2024-10-29
Payer: COMMERCIAL

## 2024-10-29 VITALS
OXYGEN SATURATION: 97 % | SYSTOLIC BLOOD PRESSURE: 104 MMHG | BODY MASS INDEX: 41.52 KG/M2 | HEIGHT: 70 IN | WEIGHT: 290 LBS | HEART RATE: 100 BPM | DIASTOLIC BLOOD PRESSURE: 68 MMHG

## 2024-10-29 DIAGNOSIS — G47.33 OSA ON CPAP: Primary | ICD-10-CM

## 2024-10-29 DIAGNOSIS — I10 ESSENTIAL HYPERTENSION: ICD-10-CM

## 2024-10-29 DIAGNOSIS — E66.01 MORBIDLY OBESE: ICD-10-CM

## 2024-10-29 DIAGNOSIS — I25.10 CORONARY ARTERY DISEASE INVOLVING NATIVE CORONARY ARTERY OF NATIVE HEART WITHOUT ANGINA PECTORIS: ICD-10-CM

## 2024-10-29 PROCEDURE — G0463 HOSPITAL OUTPT CLINIC VISIT: HCPCS

## 2024-10-29 NOTE — PROGRESS NOTES
SLEEP/PULMONARY  CLINIC NOTE      PATIENT IDENTIFICATION:  Name: Ronni Asif  Age: 54 y.o.  Sex: male  :  1970  MRN: [id number]    DATE OF CONSULTATION:  10/29/2024     Referring physician:    Gil Chambers MD            CHIEF COMPLAINT: Obstructive sleep    History of Present Illness:   Ronni Asif is a 54 y.o. male Pt on CPAP feeling better more energy especially the night he use it more than 4 hours, no sleepiness no fatigue no tiredness, no mask irritation no dryness, compliance report reviewed with pt d discussed with the patient the download data and encouarged the patient to continue to use the CPAP. The residual AHI is acceptable recently his machine is cutting of while he is sleeping and required to get the new powerline, humidifier malfunctioning.         Review of Systems:   Constitutional: As above   Eyes: negative   ENT/oropharynx: negative   Cardiovascular: negative   Respiratory: negative   Gastrointestinal: negative   Genitourinary: negative   Neurological: negative   Musculoskeletal: negative   Integument/breast: negative   Endocrine: negative   Allergic/Immunologic: negative     Past Medical History:  Past Medical History:   Diagnosis Date    Anxiety     Asthma     Cancer     skin    Chronic pain     LOW BACK    DDD (degenerative disc disease), cervical     DDD (degenerative disc disease), lumbar     Diabetes mellitus     Dizziness     ED (erectile dysfunction)     GERD (gastroesophageal reflux disease)     Headache     History of kidney stones     Hyperlipidemia     Hypertension     Implantable loop recorder present     Injury of back 2016    Pt fell 20 feet     Neck pain     Neuromuscular disorder     Numbness     2 FINGERS ON BILATERAL HANDS    PONV (postoperative nausea and vomiting)     Sleep apnea     cpap    Syncope and collapse     TIA (transient ischemic attack)        Past Surgical History:  Past Surgical History:   Procedure Laterality Date     ANTERIOR CERVICAL DISCECTOMY W/ FUSION N/A 8/26/2024    Procedure: Cervical 4 to cervical 5 and cervical 5 to cervical 6 anterior cervical discectomy, fusion and instrumentation;  Surgeon: Venu Judd MD;  Location:  [facility];  Service: Neurosurgery;  Laterality: N/A;    CARDIAC SURGERY      LOOP RECORDER    CATARACT EXTRACTION      CERVICAL EPIDURAL N/A 10/07/2021    Procedure: CERVICAL EPIDURAL;  Surgeon: Abdirashid Gonzalez MD;  [facility];  Service: Pain Management;  Laterality: N/A;    COLONOSCOPY  2014    COLONOSCOPY N/A 05/23/2019    Procedure: COLONOSCOPY to Cecum with Hot and Cold Polypectomy x 2;  Surgeon: Navid Zafar Jr., MD;  Location: [facility] ENDOSCOPY;  Service: General    ENDOSCOPY N/A 05/23/2019    Procedure: ESOPHAGOGASTRODUODENOSCOPY with Bx's;  Surgeon: Navid Zafar Jr., MD;  Location: [facility] ENDOSCOPY;  Service: General    HERNIA REPAIR  10/2015    LAPAROSCOPIC GASTRIC BANDING      June 2020    LUMBAR DISCECTOMY FUSION INSTRUMENTATION Bilateral 08/30/2019    Procedure: LUMBAR 5 TO SACRAL 1 OPEN DECOMPRESSION WITH  POSTERIOR LUMBAR INTERBODY FUSION, CAGE AND SCREW;  Surgeon: Jake Tripathi MD;  Location: [facility] MAIN OR;  Service: Neurosurgery    MEDIAL BRANCH BLOCK Bilateral 06/01/2021    Procedure: thoracic medial branch block (bilateral T9-11 vs T8-10) x2, 1-2 wks apart - diagnostic;  Surgeon: Abdirashid Gonzalez MD;  [facility];  Service: Pain Management;  Laterality: Bilateral;    MEDIAL BRANCH BLOCK Bilateral 06/29/2021    Procedure: Bilateral T8-T10 MEDIAL BRANCH BLOCK;  Surgeon: Abdirashid Gonzalez MD;  [facility];  Service: Pain Management;  Laterality: Bilateral;    MEDIAL BRANCH BLOCK Bilateral 11/30/2021    Procedure: Bilateral cervical MEDIAL BRANCH BLOCK at approximately C4-C6;  Surgeon: Abdirashid Gonzalez MD;  [facility];  Service: Pain Management;  Laterality: Bilateral;    MEDIAL BRANCH BLOCK Bilateral 06/07/2022     Procedure: Bilateral C5-C7 MEDIAL BRANCH BLOCK;  Surgeon: Abdirashid Gonzalez MD;  Location: [facility];  Service: Pain Management;  Laterality: Bilateral;    RADIOFREQUENCY ABLATION Bilateral 07/27/2021    Procedure: Bilateral T8-T10 RADIOFREQUENCY ABLATION;  Surgeon: Abdirashid Gonzalez MD;  Location: [facility];  Service: Pain Management;  Laterality: Bilateral;    RADIOFREQUENCY ABLATION Bilateral 08/23/2022    Procedure: RADIOFREQUENCY ABLATION Bilateral T8-T10;  Surgeon: Abdirashid Gonzalez MD;  Location: [facility];  Service: Pain Management;  Laterality: Bilateral;        Family History:  Family History   Problem Relation Age of Onset    Hypertension Mother     Diabetes Mother     Heart disease Mother     Brain cancer Father     Stroke Father     Hypertension Father     Atrial fibrillation Father     Heart disease Father     Hypertension Brother     Colon cancer Maternal Grandmother     Lung cancer Maternal Grandfather     Bone cancer Maternal Grandfather     Malig Hyperthermia Neg Hx         Social History:   Social History     Tobacco Use    Smoking status: Never    Smokeless tobacco: Never    Tobacco comments:     no caffeine    Substance Use Topics    Alcohol use: No        Allergies:  Allergies   Allergen Reactions    Other Itching, Rash and Shortness Of Breath     Cats and dogs       Home Meds:  (Not in a hospital admission)      Objective:    Vitals Ranges:   Temp:  [97.3 °F (36.3 °C)] 97.3 °F (36.3 °C)  Heart Rate:  [] 100  Resp:  [18] 18  BP: (104-109)/(67-68) 104/68  Body mass index is 41.61 kg/m².     Exam:  General Appearance:  WDWN    HEENT:   without obvious abnormality,  Conjunctiva/corneas clear,  Normal external ear canals, no drainage    Clear orsalmucosa,  Mallampati score 3    Neck:  Supple, symmetrical, trachea midline. No JVD.  Lungs:   Bilateral basal rhonchi bilaterally, respirations unlabored symmetrical wall movement.    Chest wall:  No tenderness or deformity.     Heart:  Regular rate and rhythm, S1 and S2 normal.  Extremities: Trace edema no clubbing or Cyanosis        Data Review:  All labs (24hrs): No results found for this or any previous visit (from the past 24 hours).     Imaging:  XR Spine Cervical 2 or 3 View  Narrative: XR SPINE CERVICAL 2 OR 3 VW-     HISTORY: Postop; [id number] for follow-up examination after completed  treatment for conditions other than malignant neoplasm     COMPARISON: Cervical spine 08/27/2024.     FINDINGS: Anterior cervical fusion plate and plate and screws interbody  spacers at C4-C5-C6. No evidence for loosening or hardware failure. Mild  thickening of the prevertebral soft tissues though this exhibits  improvement compared to the previous exam. Mild bilateral facet  arthritis.     Impression: Postoperative change related to anterior cervical fusion and  interbody spacer placement at C4-C5-C6. No evidence for loosening or  hardware failure.     This report was finalized on 10/10/2024 9:42 AM by Judd Winter M.D  on Workstation: [id number]          ASSESSMENT:  Diagnoses and all orders for this visit:    DEVAUGHN on CPAP    Morbidly obese    Essential hypertension    Coronary artery disease involving native coronary artery of native heart without angina pectoris        PLAN:  Get the replacement equipment due to the malfunctioning of his current machine  This patient with obstructive sleep apnea, compliance is improved. Encourage to use it more frequent, I re-emphasized on pt the long and short term benefit of treating DEVAUGHN. The device is benefiting the patient.  The patient is also instructed to get the CPAP supplies from the [employer] and see me in 1 year for follow-up.    Treating DEVAUGHN will improve BP control  Follow-up  one year    Amy Wilson MD. D, ABSM.  10/29/2024  11:43 EDT

## 2024-12-11 DIAGNOSIS — M51.369 DDD (DEGENERATIVE DISC DISEASE), LUMBAR: ICD-10-CM

## 2024-12-11 DIAGNOSIS — M62.838 MUSCLE SPASM: ICD-10-CM

## 2024-12-11 DIAGNOSIS — M54.12 CERVICAL RADICULOPATHY: ICD-10-CM

## 2024-12-11 DIAGNOSIS — M50.30 BULGE OF CERVICAL DISC WITHOUT MYELOPATHY: ICD-10-CM

## 2024-12-11 DIAGNOSIS — M54.16 LUMBAR RADICULOPATHY: ICD-10-CM

## 2024-12-11 DIAGNOSIS — M47.812 ARTHROPATHY OF CERVICAL FACET JOINT: ICD-10-CM

## 2024-12-11 RX ORDER — GABAPENTIN 800 MG/1
800 TABLET ORAL 3 TIMES DAILY
Qty: 90 TABLET | Refills: 3 | Status: SHIPPED | OUTPATIENT
Start: 2024-12-11

## 2024-12-11 RX ORDER — HYDROCODONE BITARTRATE AND ACETAMINOPHEN 5; 325 MG/1; MG/1
1 TABLET ORAL 2 TIMES DAILY PRN
Qty: 60 TABLET | Refills: 0 | Status: SHIPPED | OUTPATIENT
Start: 2024-12-11

## 2024-12-11 RX ORDER — METHOCARBAMOL 750 MG/1
750 TABLET, FILM COATED ORAL 3 TIMES DAILY PRN
Qty: 90 TABLET | Refills: 1 | Status: SHIPPED | OUTPATIENT
Start: 2024-12-11

## 2024-12-12 ENCOUNTER — PRIOR AUTHORIZATION (OUTPATIENT)
Dept: PAIN MEDICINE | Facility: CLINIC | Age: 54
End: 2024-12-12
Payer: COMMERCIAL

## 2024-12-12 RX ORDER — METHOCARBAMOL 500 MG/1
TABLET, FILM COATED ORAL
Qty: 60 TABLET | Refills: 0 | Status: SHIPPED | OUTPATIENT
Start: 2024-12-12

## 2024-12-12 NOTE — TELEPHONE ENCOUNTER
NORCO APPROVED    Outcome  Approved today by [place] [employer] 2017  The request has been approved. The authorization is effective from 12/12/2024 to 06/12/2025, as long as the member is enrolled in their current health plan. A written notification letter will follow with additional details.  Authorization Expiration Date: 6/11/2025    PT INFORMED.

## 2025-01-07 ENCOUNTER — TELEPHONE (OUTPATIENT)
Dept: PAIN MEDICINE | Facility: CLINIC | Age: 55
End: 2025-01-07

## 2025-01-07 DIAGNOSIS — G89.29 CHRONIC MIDLINE LOW BACK PAIN WITHOUT SCIATICA: ICD-10-CM

## 2025-01-07 DIAGNOSIS — M47.812 ARTHROPATHY OF CERVICAL FACET JOINT: Primary | ICD-10-CM

## 2025-01-07 DIAGNOSIS — M54.50 CHRONIC MIDLINE LOW BACK PAIN WITHOUT SCIATICA: ICD-10-CM

## 2025-01-07 DIAGNOSIS — Z98.1 HISTORY OF FUSION OF CERVICAL SPINE: ICD-10-CM

## 2025-01-07 DIAGNOSIS — M47.814 ARTHROPATHY OF THORACIC FACET JOINT: ICD-10-CM

## 2025-01-07 DIAGNOSIS — Z79.899 ENCOUNTER FOR LONG-TERM (CURRENT) USE OF HIGH-RISK MEDICATION: ICD-10-CM

## 2025-01-07 NOTE — TELEPHONE ENCOUNTER
I don't personally know the [facility]. I am happy to place a referral to [facility] d/t his insurance change. Please let him know that I wish him the best!!

## 2025-01-07 NOTE — TELEPHONE ENCOUNTER
Caller: Ronni Asif    Relationship to patient: Self    Best call back number:     Patient is needing: RONNI'S INSURANCE IS CHANGING AND HE CAN ONLY SEE [facility] PROVIDERS. HE WANTED THE OFFICE TO KNOW HE WILL NO LONGER BE ABLE TO GO THERE. HE WOULD LIKE TO KNOW IF J[facility]HUA COULD RECOMMEND SOMEONE WITH PAIN MANAGEMENT AT [facility]

## 2025-01-08 NOTE — TELEPHONE ENCOUNTER
He states that he his insurance gave him until 1/31/25 so he will still keep the telehealth appointment on Friday. He received another letter stating that he can continue to see us until 3/31/25 but he has to figure which date is accurate.

## 2025-01-10 ENCOUNTER — TELEMEDICINE (OUTPATIENT)
Dept: PAIN MEDICINE | Facility: CLINIC | Age: 55
End: 2025-01-10
Payer: COMMERCIAL

## 2025-01-10 DIAGNOSIS — G89.4 CHRONIC PAIN SYNDROME: ICD-10-CM

## 2025-01-10 DIAGNOSIS — M47.812 ARTHROPATHY OF CERVICAL FACET JOINT: Primary | ICD-10-CM

## 2025-01-10 DIAGNOSIS — M54.50 CHRONIC MIDLINE LOW BACK PAIN WITHOUT SCIATICA: ICD-10-CM

## 2025-01-10 DIAGNOSIS — Z79.899 ENCOUNTER FOR LONG-TERM (CURRENT) USE OF HIGH-RISK MEDICATION: ICD-10-CM

## 2025-01-10 DIAGNOSIS — G89.29 CHRONIC MIDLINE LOW BACK PAIN WITHOUT SCIATICA: ICD-10-CM

## 2025-01-10 DIAGNOSIS — Z98.1 HISTORY OF FUSION OF CERVICAL SPINE: ICD-10-CM

## 2025-01-10 DIAGNOSIS — M47.814 ARTHROPATHY OF THORACIC FACET JOINT: ICD-10-CM

## 2025-01-10 NOTE — PROGRESS NOTES
TELEMEDICINE - VIDEO VISIT    You have chosen to receive care through a telehealth visit.  Do you consent to use a video/audio connection for your medical care today? Yes    Location of patient: Primary Residence  Location of Provider: [facility]  Anyone else present: Yes, if yes- who- grandson  Type of Technology used: Twilio through use of Epic/[facility] visit    CHIEF COMPLAINT:  Back and neck pain    Subjective   Ronni Asif is a 54 y.o. male  who presents for a video visit follow-up.He has a history of back and neck pain.  Today his pain is 5/10VAS in severity. He states that his back and neck pain are both elevated d/t shoveling snow. He has been alternating heat and ice to help with this.     He continues with  Norco 5/325 0-1/day (he is only needing this about 2 to 3 days a week), Gabapentin 800 mg 3/day, and Methocarbamol 750mg PRN 2/day. This medication regimen decreases his pain by 80%. He states that this regimen does allow him to stay active compared to if he did not have it. He denies any side effects including somnolence or constipation.      Cervical medial branch blocks were diagnostically negative at multiple levels, TAMEKA provided no relief.  Has since undergone a C4-5 and C5-6 ACDF on 8/26/2024 performed by Dr. Judd     Hx of L5-S1 fusion performed by Dr. Tripathi on 8/30/2019. Prior to establishing with our clinic he has completed lumber epidurals and RFA.      Good relief with thoracic RFA, unfortunately this has now been denied by his insurance as being experimental.       Procedure list:  8/23/2022-bilateral T8-T10 RFA-80% relief x2 months with ongoing 60 to 70% relief x ~ 6 months. His pain is now starting to return  6/7/2022-bilateral C5-C7 MBB-20% relief (diagnostically negative)  11/30/2021-bilateral C3-C5 MBB-minimal relief (diagnostically negative)  10/7/2021-TAMEKA at C6-C7-minimal relief  7/27/2021-bilateral T8-T10 RFA-50% improvement lasting many months.    6/29/2021-bilateral T8-T10 MBB-80% relief x2 days  6/1/2021-bilateral T8-T10 MBB-80 to 90% relief x1 day    Back Pain  This is a chronic problem. The current episode started more than 1 year ago. The problem occurs daily. The problem is unchanged. The pain is present in the thoracic spine and lumbar spine. The quality of the pain is described as aching. The pain does not radiate. The pain is at a severity of 5/10. The pain is severe. The symptoms are aggravated by bending, standing and twisting. Stiffness is present In the morning. Associated symptoms include headaches. Pertinent negatives include no abdominal pain, chest pain, dysuria, fever, numbness or weakness. Risk factors include obesity. He has tried analgesics, heat, ice, NSAIDs, muscle relaxant and home exercises (Norco 5/325) for the symptoms. The treatment provided significant relief.   Neck Pain   This is a chronic problem. The current episode started more than 1 year ago. The problem occurs constantly. The problem has been gradually improving. The pain is present in the occipital region and midline. The quality of the pain is described as burning (bilateral upper extremity tingling). The pain is at a severity of 5/10. The symptoms are aggravated by bending (lying in bed). Associated symptoms include headaches. Pertinent negatives include no chest pain, fever, numbness or weakness. He has tried ice, oral narcotics and NSAIDs (PT) for the symptoms.      The following portions of the patient's history were reviewed and updated as appropriate: allergies, current medications, past family history, past medical history, past social history, past surgical history, and problem list.    Review of Systems   Constitutional:  Negative for fever.   Cardiovascular:  Negative for chest pain.   Gastrointestinal:  Negative for abdominal pain.   Genitourinary:  Negative for dysuria.   Neurological:  Positive for headaches. Negative for weakness and numbness.     --  The  aforementioned information the Chief Complaint section and above subjective data including any HPI data, and also the Review of Systems data, has been personally reviewed and affirmed.  --    Vitals:    01/10/25 1415   PainSc:   5   PainLoc: Back     Objective   Physical Exam  Constitutional:       Appearance: Normal appearance.   Pulmonary:      Effort: Pulmonary effort is normal.   Neurological:      Mental Status: He is alert.   Psychiatric:         Mood and Affect: Mood normal.         Behavior: Behavior normal.         Thought Content: Thought content normal.         Judgment: Judgment normal.       Assessment & Plan   Diagnoses and all orders for this visit:    1. Arthropathy of cervical facet joint (Primary)    2. History of fusion of cervical spine    3. Arthropathy of thoracic facet joint    4. Encounter for long-term (current) use of high-risk medication    5. Chronic midline low back pain without sciatica    6. Chronic pain syndrome      --- The urine drug screen confirmation from 8/16/2024 has been reviewed and the result is appropriate based on patient history and ROSI report  --- CSA updated 8/26/2024  --- Opioid Risk--Low  --- Continue Norco 5/325. Patient appears stable with current regimen. No adverse effects. Regarding continuation of opioids, there is no evidence of aberrant behavior or any red flags.  The patient continues with appropriate response to opioid therapy. ADL's remain intact by self.   --- Continue Gabapentin and Robaxin  --- Follow-up 2 months or sooner if needed.      ROSI REPORT  As part of the patient's treatment plan, I am prescribing controlled substances. The patient has been made aware of appropriate use of such medications, including potential risk of somnolence, limited ability to drive and/or work safely, and the potential for dependence or overdose. It has also been made clear that these medications are for use by this patient only, without concomitant use of alcohol  or other substances unless prescribed.     Patient has completed prescribing agreement detailing terms of continued prescribing of controlled substances, including monitoring ROSI reports, urine drug screening, and pill counts if necessary. The patient is aware that inappropriate use will results in cessation of prescribing such medications.    As the clinician, I personally reviewed the ROSI from 1/10/2025 while the patient was in the office today.    History and physical exam exhibit continued safe and appropriate use of controlled substances.    -------    Dictated utilizing Dragon Dictation

## 2025-02-27 DIAGNOSIS — M50.30 BULGE OF CERVICAL DISC WITHOUT MYELOPATHY: ICD-10-CM

## 2025-02-27 DIAGNOSIS — M47.812 ARTHROPATHY OF CERVICAL FACET JOINT: ICD-10-CM

## 2025-02-27 DIAGNOSIS — M54.12 CERVICAL RADICULOPATHY: ICD-10-CM

## 2025-02-27 DIAGNOSIS — M54.16 LUMBAR RADICULOPATHY: ICD-10-CM

## 2025-02-27 DIAGNOSIS — M51.369 DDD (DEGENERATIVE DISC DISEASE), LUMBAR: ICD-10-CM

## 2025-02-27 DIAGNOSIS — M62.838 MUSCLE SPASM: ICD-10-CM

## 2025-02-27 RX ORDER — METHOCARBAMOL 750 MG/1
750 TABLET, FILM COATED ORAL 3 TIMES DAILY PRN
Qty: 90 TABLET | Refills: 1 | Status: SHIPPED | OUTPATIENT
Start: 2025-02-27

## 2025-02-27 RX ORDER — HYDROCODONE BITARTRATE AND ACETAMINOPHEN 5; 325 MG/1; MG/1
1 TABLET ORAL EVERY 8 HOURS PRN
Qty: 90 TABLET | Refills: 0 | Status: SHIPPED | OUTPATIENT
Start: 2025-02-27

## 2025-02-27 RX ORDER — GABAPENTIN 800 MG/1
800 TABLET ORAL 3 TIMES DAILY
Qty: 270 TABLET | Refills: 0 | Status: SHIPPED | OUTPATIENT
Start: 2025-02-27

## 2025-05-01 NOTE — PROGRESS NOTES
CHIEF COMPLAINT  F/u back and neck pain.      Subjective   Ronni Asif is a 51 y.o. male  who presents for follow-up.  He has a history of back and neck pain.  He completed Bilateral C3-C5 MBB on 11/30/2021 performed by Dr. Gonzalez.  He reports very minimal relief from this procedure. This was diagnostically negative.     He has also been having relatively new headaches and blurred vision, stroke ruled out in the ED.  He was put on a steroid by his PCP which was helpful.     Today his pain is 6/10VAS in severity.  He continues with Norco 5/325 1/day, Gabapentin 600 mg 3/day, and Methocarbamol 750mg BID.  He denies any side effects including somnolence or constipation.     He was working with PT, this was delayed due to having a cyst removed from his chest, he would like to resume PT after the first of the year.     Procedure list:  11/30/2021-bilateral C3-C5 MBB-minimal relief  10/7/2021-TAMEKA at C6-C7-minimal relief  7/27/2021-bilateral T8-T10 RFA-50% improvement, his pain has now started to return to baseline.   6/29/2021-bilateral T8-T10 MBB-80% relief x2 days  6/1/2021-bilateral T8-T10 MBB-80 to 90% relief x1 day    Patient remained masked during entire encounter. No cough present. I donned a mask and eye protection throughout entire visit. Prior to donning mask and eye protection, hand hygiene was performed, as well as when it was doffed.  I was closer than 6 feet, but not for an extended period of time. No obvious exposure to any bodily fluids.    Back Pain  This is a chronic problem. The current episode started more than 1 year ago. The problem occurs daily. The problem is unchanged. The pain is present in the thoracic spine and lumbar spine. The quality of the pain is described as aching. The pain does not radiate. The pain is at a severity of 4/10. The pain is severe. The symptoms are aggravated by bending, standing and twisting. Stiffness is present in the morning. Associated symptoms include  headaches (occ ) and numbness (bilat arms and bilat hands). Pertinent negatives include no abdominal pain, chest pain, dysuria, fever or weakness. Risk factors include obesity. He has tried analgesics, heat, ice, NSAIDs, muscle relaxant and home exercises (Norco 5/325) for the symptoms. The treatment provided significant relief.   Neck Pain   This is a chronic problem. The current episode started more than 1 year ago. The problem occurs constantly. The problem has been gradually worsening. The pain is present in the occipital region and midline. The quality of the pain is described as burning (bilateral upper extremity tingling). The pain is at a severity of 6/10. The symptoms are aggravated by bending (lying in bed). Associated symptoms include headaches (occ ) and numbness (bilat arms and bilat hands). Pertinent negatives include no chest pain, fever or weakness. He has tried ice, oral narcotics and NSAIDs (PT) for the symptoms.      PEG Assessment   What number best describes your pain on average in the past week?6  What number best describes how, during the past week, pain has interfered with your enjoyment of life?6  What number best describes how, during the past week, pain has interfered with your general activity?  6    The following portions of the patient's history were reviewed and updated as appropriate: allergies, current medications, past family history, past medical history, past social history, past surgical history and problem list.    Review of Systems   Constitutional: Positive for activity change (dec). Negative for fatigue and fever.   HENT: Negative for congestion.    Eyes: Negative for visual disturbance.   Respiratory: Negative for cough and shortness of breath.    Cardiovascular: Negative for chest pain.   Gastrointestinal: Negative for abdominal pain, constipation and diarrhea.   Genitourinary: Negative for difficulty urinating and dysuria.   Musculoskeletal: Positive for back pain, neck  pain and neck stiffness.   Neurological: Positive for numbness (bilat arms and bilat hands) and headaches (occ ). Negative for dizziness, weakness and light-headedness.   Psychiatric/Behavioral: Positive for sleep disturbance (occ). Negative for agitation and suicidal ideas. The patient is not nervous/anxious.      --  The aforementioned information the Chief Complaint section and above subjective data including any HPI data, and also the Review of Systems data, has been personally reviewed and affirmed.  --    MRI Spine Cervical WO/W     HISTORY:  Neck pain off-and-on for 5 years but worse the last 3 months with bilateral shoulder pain and hand numbness.     TECHNIQUE:  Multiplanar multisequence imaging of the cervical spine acquired prior to and following intravenous administration 20 cc MultiHance IV contrast utilizing a standard protocol.     COMPARISON: None     FINDINGS:     Study is significantly limited by patient motion artifact. Patient had difficulty holding still despite coaching.     Sagittal alignment is normal. Intervertebral discs are desiccated in general with mild loss of intervertebral disc height at C5-6 and C6-7 with some endplate spondylosis. Bone marrow signal intensity is normal allowing for minor marrow endplate  degenerative changes. The cervical cord is normal in size and grossly normal in signal intensity allowing for the motion artifact. Following contrast administration, there is no pathologic intracanalicular enhancement. (Again allowing for motion  artifact). No Chiari I malformation. There is a partially empty sella.      The cervical canal is mildly developmentally small secondary to short pedicles.     Level by level:     C2-3: There is mild left side facet degenerative change and right paramedian focal disc protrusion/extrusion extending caudad from the disc but remaining contiguous with it. The disc material measures about 7 x 3 x 7 mm and results in mild flattening of  the right  anterior cord.     C3-4: There is mild concentric disc bulge with left-sided uncovertebral osteophyte formation. There is mild right and moderate left side facet degenerative change. There is mild effacement of the thecal sac but the cord is still surrounded by CSF. There  is moderate to severe left foraminal narrowing.     C4-5: There is mild left and moderate right side facet arthritis with a mild concentric disc bulge and endplate spondylosis with uncovertebral osteophyte formation. There is mild flattening of the cord and very mild canal stenosis. There is approximately  moderate left and mild to moderate right foraminal narrowing.     C5-6: There is mild right facet degenerative change. There is a concentric desiccated disc bulge with endplate spondylosis and a superimposed broad posterior protrusion that is more prominent to the right than left at midline. There is right greater than  left-sided uncovertebral osteophyte formation. There is mild to moderate right and milder left-sided canal stenosis. There is severe right greater than left foraminal narrowing.     C6-7: There is mild facet degenerative change bilaterally with a concentric desiccated disc bulge and endplate spondylosis and a superimposed broad posterior protrusion that extends into the foramina. There is uncovertebral osteophyte formation  bilaterally. There is mild cord flattening and canal stenosis and fairly severe bilateral foraminal impingement.     C7-T1: There is a minimal posterior disc bulge. There is severe facet arthritis left greater than right. There is moderate to severe right and mild left foraminal narrowing. There is no canal stenosis.     OTHER: No prevertebral fluid collection     IMPRESSION:  1.  there is multiple level cervical degenerative changes superimposed upon a developmentally small canal. Allowing for motion degradation of the examination, there is mild to moderate cord flattening and canal stenosis at C5-6 worse the  "right of midline  and mild cord flattening at C6-7. There is multiple level foraminal impingement, some of which is severe. See level by level discussion above.  2.  2. No pathologic intracanalicular enhancement. No convincing evidence for cord signal abnormality.     Signer Name: Tierney Garrett MD   Signed: 9/26/2021 1:34 PM   Workstation Name: [email or web address]    [facility]    Vitals:    12/17/21 1432   BP: 117/79   Pulse: 83   Resp: 20   Temp: 97.3 °F (36.3 °C)   SpO2: 97%   Weight: 117 kg (259 lb)   Height: 177.8 cm (70\")   PainSc:   6   PainLoc: Neck     Objective   Physical Exam  Vitals and nursing note reviewed.   Constitutional:       Appearance: Normal appearance. He is well-developed.   Eyes:      General: Lids are normal.   Cardiovascular:      Rate and Rhythm: Normal rate.   Pulmonary:      Effort: Pulmonary effort is normal.   Musculoskeletal:      Cervical back: Tenderness and bony tenderness present. Decreased range of motion.      Thoracic back: Bony tenderness present. Decreased range of motion.      Comments: No tenderness with palpation over occipital nerves   Neurological:      Mental Status: He is alert and oriented to person, place, and time.      Gait: Gait normal.   Psychiatric:         Attention and Perception: Attention normal.         Mood and Affect: Mood normal.         Speech: Speech normal.         Behavior: Behavior normal.         Judgment: Judgment normal.       Assessment/Plan   Diagnoses and all orders for this visit:    1. Cervical radiculopathy (Primary)    2. Pain in thoracic spine    3. Chronic midline low back pain without sciatica    4. Chronic pain syndrome    5. Bilateral hand numbness    6. Arthropathy of cervical facet joint    7. Arthropathy of thoracic facet joint      --- Routine UDS in office today as part of monitoring requirements for controlled substances.  The specimen was viewed and the immunoassay result reviewed and is NEG.  This specimen will " be sent to [facility] for confirmation.     --- CSA updated 5/26/2021  --- With his ongoing neck pain and no improvement with TAMEKA or Cervical MBB recommend keeping follow up with Dr. Tripathi.   --- Increase Gabapentin 800 mg TID  --- Continue Norco 5/325. No refill needed. Patient appears stable with current regimen. No adverse effects. Regarding continuation of opioids, there is no evidence of aberrant behavior or any red flags.  The patient continues with appropriate response to opioid therapy. ADL's remain intact by self.   --- Consider repeating Bilateral T8-T10 RFA for mid-back pain in January.   --- Follow-up 2 months or sooner if needed.      ROSI REPORT  As part of the patient's treatment plan, I am prescribing controlled substances. The patient has been made aware of appropriate use of such medications, including potential risk of somnolence, limited ability to drive and/or work safely, and the potential for dependence or overdose. It has also bee made clear that these medications are for use by this patient only, without concomitant use of alcohol or other substances unless prescribed.     Patient has completed prescribing agreement detailing terms of continued prescribing of controlled substances, including monitoring ROSI reports, urine drug screening, and pill counts if necessary. The patient is aware that inappropriate use will results in cessation of prescribing such medications.    As the clinician, I personally reviewed the ROSI from 12/17/2021 while the patient was in the office today.    History and physical exam exhibit continued safe and appropriate use of controlled substances.    Dictated utilizing Dragon dictation.     This document is intended for medical expert use only. Reading of this document by patients and/or patient's family without participating medical staff guidance may result in misinterpretation and unintended morbidity.   Any interpretation of such data is  the responsibility of the patient and/or family member responsible for the patient in concert with their primary or specialist providers, not to be left for sources of online searches such as [employer], [employer] or similar queries. Relying on these approaches to knowledge may result in misinterpretation, misguided goals of care and even death should patients or family members try recommendations outside of the realm of professional medical care in a supervised way.   Private car

## 2025-06-13 DIAGNOSIS — M54.12 CERVICAL RADICULOPATHY: ICD-10-CM

## 2025-06-13 DIAGNOSIS — M54.16 LUMBAR RADICULOPATHY: ICD-10-CM

## 2025-06-13 NOTE — TELEPHONE ENCOUNTER
Actually, it looks like he was going to establish with [facility]? Did he do that? If not I need to see him in 1 month. Thanks.

## 2025-06-16 RX ORDER — GABAPENTIN 800 MG/1
TABLET ORAL
Qty: 90 TABLET | Refills: 0 | OUTPATIENT
Start: 2025-06-16

## 2025-06-21 DIAGNOSIS — M54.16 LUMBAR RADICULOPATHY: ICD-10-CM

## 2025-06-21 DIAGNOSIS — M54.12 CERVICAL RADICULOPATHY: ICD-10-CM

## 2025-06-23 RX ORDER — GABAPENTIN 800 MG/1
TABLET ORAL
Qty: 90 TABLET | Refills: 3 | OUTPATIENT
Start: 2025-06-23

## (undated) DEVICE — GLV SURG TRIUMPH PF LTX 7.5 STRL

## (undated) DEVICE — MARKR SKIN W/RULR AND LBL

## (undated) DEVICE — ELECTRD BLD EZ CLN MOD XLNG 2.75IN

## (undated) DEVICE — TRAP FLD MINIVAC MEGADYNE 100ML

## (undated) DEVICE — 1 ML TUBERCULIN SYRINGE REGULAR TIP: Brand: MONOJECT

## (undated) DEVICE — PK NEURO SPINE 40

## (undated) DEVICE — Device

## (undated) DEVICE — COVER,C-ARM,41X74: Brand: MEDLINE

## (undated) DEVICE — SHEET, DRAPE, SPLIT, STERILE: Brand: MEDLINE

## (undated) DEVICE — DIFFUSER: Brand: CORE, MAESTRO

## (undated) DEVICE — TOWEL,OR,DSP,ST,BLUE,STD,4/PK,20PK/CS: Brand: MEDLINE

## (undated) DEVICE — INSTRUMENT 8670015 PAK 2 NEEDLES TROCAR

## (undated) DEVICE — SUT SILK 2/0 TIES 18IN A185H

## (undated) DEVICE — COLR CERV MED/DENS NRW LNG

## (undated) DEVICE — PENCL SMOKE/EVAC MEGADYNE TELESCP 10FT

## (undated) DEVICE — JACKSON-PRATT 100CC BULB RESERVOIR: Brand: CARDINAL HEALTH

## (undated) DEVICE — OIL CARTRIDGE: Brand: CORE, MAESTRO

## (undated) DEVICE — PAD GRND E/S NT200IX RF/GEN W/CABL DISP

## (undated) DEVICE — NDL SPINE 22G 31/2IN BLK

## (undated) DEVICE — 6.0MM PRECISION ROUND

## (undated) DEVICE — ANTIBACTERIAL UNDYED BRAIDED (POLYGLACTIN 910), SYNTHETIC ABSORBABLE SUTURE: Brand: COATED VICRYL

## (undated) DEVICE — GLV SURG BIOGEL LTX PF 7

## (undated) DEVICE — SNAR POLYP SENSATION STDOVL 27 240 BX40

## (undated) DEVICE — SUT SILK 2/0 FS BLK 18IN 685G

## (undated) DEVICE — DRP MICROSCOPE 4 BINOCULAR CV 54X150IN

## (undated) DEVICE — SPONGE,NEURO,.75"X.75",XR,STRL,LF,10/PK: Brand: MEDLINE

## (undated) DEVICE — GLV SURG PREMIERPRO ORTHO LTX PF SZ8 BRN

## (undated) DEVICE — 3.0MM PRECISION NEURO (MATCH HEAD)

## (undated) DEVICE — 3M™ STERI-STRIP™ ANTIMICROBIAL SKIN CLOSURES 1/2 INCH X 4 INCHES 50/CARTON 4 CARTONS/CASE A1847: Brand: 3M™ STERI-STRIP™

## (undated) DEVICE — GLV SURG SENSICARE W/ALOE PF LF 7.5 STRL

## (undated) DEVICE — ADHS LIQ MASTISOL 2/3ML

## (undated) DEVICE — SPNG GZ WOVN 4X4IN 12PLY 10/BX STRL

## (undated) DEVICE — EPIDURAL TRAY: Brand: MEDLINE INDUSTRIES, INC.

## (undated) DEVICE — NEEDLE, QUINCKE, 18GX3.5": Brand: MEDLINE

## (undated) DEVICE — APPL CHLORAPREP W/TINT 26ML ORNG

## (undated) DEVICE — CANN NASL O2 INF

## (undated) DEVICE — SPNG LAP 18X18IN LF STRL PK/5

## (undated) DEVICE — SYR CONTRL LUERLOK 10CC

## (undated) DEVICE — NDL SPINE 22G 5IN BLK

## (undated) DEVICE — TOOL MR8-15BA50T MR8 15CM BAL SYMTRI 5MM: Brand: MIDAS REX MR8

## (undated) DEVICE — NDL,TUOHY EPID,22GX3.5",METAL STYLET: Brand: MEDLINE

## (undated) DEVICE — DRN JP FLT NO TROC SIL FUL/PERF 7MM

## (undated) DEVICE — Device: Brand: PORTEX

## (undated) DEVICE — 3M™ STERI-STRIP™ REINFORCED ADHESIVE SKIN CLOSURES, R1547, 1/2 IN X 4 IN (12 MM X 100 MM), 6 STRIPS/ENVELOPE: Brand: 3M™ STERI-STRIP™

## (undated) DEVICE — DRP MICROSCP LEICA 137X381CM

## (undated) DEVICE — PK ATS CUST W CARDIOTOMY RESEVOIR

## (undated) DEVICE — GUIDEWIRE 2345050 BLUNT THREADED 24IN

## (undated) DEVICE — DRP C/ARM 41X74IN

## (undated) DEVICE — CONN TBG Y 5 IN 1 LF STRL

## (undated) DEVICE — DISPOSABLE IRRIGATION BIPOLAR CORD, M1000 TYPE: Brand: KIRWAN

## (undated) DEVICE — DRSNG WND GZ PAD BORDERED 4X8IN STRL

## (undated) DEVICE — TOOL MR8-15MH30 MR8 15CM MATCH 3MM: Brand: MIDAS REX MR8

## (undated) DEVICE — TOTAL TRAY, 16FR 10ML SIL FOLEY, URN: Brand: MEDLINE

## (undated) DEVICE — DRN WND JP RND W TROC SIL 10F 1/8IN

## (undated) DEVICE — THE TORRENT IRRIGATION SCOPE CONNECTOR IS USED WITH THE TORRENT IRRIGATION TUBING TO PROVIDE IRRIGATION FLUIDS SUCH AS STERILE WATER DURING GASTROINTESTINAL ENDOSCOPIC PROCEDURES WHEN USED IN CONJUNCTION WITH AN IRRIGATION PUMP (OR ELECTROSURGICAL UNIT).: Brand: TORRENT

## (undated) DEVICE — THE SINGLE USE ETRAP – POLYP TRAP IS USED FOR SUCTION RETRIEVAL OF ENDOSCOPICALLY REMOVED POLYPS.: Brand: ETRAP

## (undated) DEVICE — SINGLE-USE BIOPSY FORCEPS: Brand: RADIAL JAW 4

## (undated) DEVICE — COVER,TABLE,HEAVY DUTY,79"X110",STRL: Brand: MEDLINE

## (undated) DEVICE — TUBING, SUCTION, 1/4" X 10', STRAIGHT: Brand: MEDLINE

## (undated) DEVICE — 3M™ IOBAN™ 2 ANTIMICROBIAL INCISE DRAPE 6650EZ: Brand: IOBAN™ 2

## (undated) DEVICE — CANN NASL CO2 TRULINK W/O2 A/

## (undated) DEVICE — BITEBLOCK OMNI BLOC

## (undated) DEVICE — 3M™ STERI-STRIP™ COMPOUND BENZOIN TINCTURE 40 BAGS/CARTON 4 CARTONS/CASE C1544: Brand: 3M™ STERI-STRIP™

## (undated) DEVICE — Device: Brand: DEFENDO AIR/WATER/SUCTION AND BIOPSY VALVE

## (undated) DEVICE — PAD,ABDOMINAL,8"X10",ST,LF: Brand: MEDLINE

## (undated) DEVICE — GOWN,NON-REINFORCED,SIRUS,SET IN SLV,XXL: Brand: MEDLINE

## (undated) DEVICE — DISPOSABLE BIPOLAR FORCEPS 7 3/4" (19.7CM) SCOVILLE BAYONET, 1.5MM TIP AND 12 FT. (3.6M) CABLE: Brand: KIRWAN